# Patient Record
Sex: FEMALE | Race: BLACK OR AFRICAN AMERICAN | NOT HISPANIC OR LATINO | Employment: OTHER | URBAN - METROPOLITAN AREA
[De-identification: names, ages, dates, MRNs, and addresses within clinical notes are randomized per-mention and may not be internally consistent; named-entity substitution may affect disease eponyms.]

---

## 2017-01-05 ENCOUNTER — OFFICE VISIT (OUTPATIENT)
Dept: ORTHOPEDICS | Facility: CLINIC | Age: 82
End: 2017-01-05
Payer: MEDICARE

## 2017-01-05 VITALS
SYSTOLIC BLOOD PRESSURE: 145 MMHG | WEIGHT: 127 LBS | HEIGHT: 63 IN | BODY MASS INDEX: 22.5 KG/M2 | HEART RATE: 99 BPM | DIASTOLIC BLOOD PRESSURE: 72 MMHG

## 2017-01-05 DIAGNOSIS — M79.645 PAIN OF LEFT THUMB: Primary | ICD-10-CM

## 2017-01-05 PROCEDURE — 99203 OFFICE O/P NEW LOW 30 MIN: CPT | Mod: S$PBB,,, | Performed by: ORTHOPAEDIC SURGERY

## 2017-01-05 PROCEDURE — 97760 ORTHOTIC MGMT&TRAING 1ST ENC: CPT | Mod: GP,,, | Performed by: ORTHOPAEDIC SURGERY

## 2017-01-05 PROCEDURE — 99999 PR PBB SHADOW E&M-EST. PATIENT-LVL III: CPT | Mod: PBBFAC,,, | Performed by: ORTHOPAEDIC SURGERY

## 2017-01-05 PROCEDURE — 99213 OFFICE O/P EST LOW 20 MIN: CPT | Mod: PBBFAC,PN | Performed by: ORTHOPAEDIC SURGERY

## 2017-01-05 NOTE — LETTER
January 10, 2017      [facility]  [address] [facility] [address]           [address] [address]  Phone: [phone]          Patient: Breanne Culp   MR Number: [id number]   YOB: 1934   Date of Visit: 1/5/2017       Dear [facility]:    Thank you for referring Breanne Culp to me for evaluation. Attached you will find relevant portions of my assessment and plan of care.    If you have questions, please do not hesitate to call me. I look forward to following Breanne Culp along with you.    Sincerely,    Dimitri Titus MD    Enclosure  CC:  No Recipients    If you would like to receive this communication electronically, please contact [email or web address] or [phone] to request more information on [employer] Link access.    For providers and/or their staff who would like to refer a patient to [facility], please contact us through our one-stop-shop provider referral line, [facility], at [phone].    If you feel you have received this communication in error or would no longer like to receive these types of communications, please e-mail [email or web address]

## 2017-01-10 ENCOUNTER — OFFICE VISIT (OUTPATIENT)
Dept: ORTHOPEDICS | Facility: CLINIC | Age: 82
End: 2017-01-10
Payer: MEDICARE

## 2017-01-10 VITALS — HEIGHT: 63 IN | WEIGHT: 127 LBS | BODY MASS INDEX: 22.5 KG/M2

## 2017-01-10 DIAGNOSIS — M79.645 PAIN OF LEFT THUMB: ICD-10-CM

## 2017-01-10 DIAGNOSIS — M79.645 THUMB PAIN, LEFT: Primary | ICD-10-CM

## 2017-01-10 PROCEDURE — 99203 OFFICE O/P NEW LOW 30 MIN: CPT | Mod: S$PBB,,, | Performed by: ORTHOPAEDIC SURGERY

## 2017-01-10 PROCEDURE — 99999 PR PBB SHADOW E&M-EST. PATIENT-LVL II: CPT | Mod: PBBFAC,,, | Performed by: ORTHOPAEDIC SURGERY

## 2017-01-10 PROCEDURE — 99212 OFFICE O/P EST SF 10 MIN: CPT | Mod: PBBFAC,PO | Performed by: ORTHOPAEDIC SURGERY

## 2017-01-10 RX ORDER — DICLOFENAC SODIUM 10 MG/G
2 GEL TOPICAL 3 TIMES DAILY
Qty: 1 TUBE | Refills: 3 | Status: SHIPPED | OUTPATIENT
Start: 2017-01-10 | End: 2017-03-06 | Stop reason: SDUPTHER

## 2017-01-10 NOTE — PROGRESS NOTES
INITIAL VISIT HISTORY:  An 82-year-old female seen in consult for Dr. Titus   for evaluation of left thumb injury.  She had a fall about two weeks ago,   injured her left thumb.  Noted to have a subluxation of the left thumb MP joint   with probable ligament injury.  She was placed in a splint and is here today in   followup.  Currently, complaining of some pain in the thumb, but it does seem to   be getting better.  Denies previous history of problems with the thumb.    PAST MEDICAL HISTORY:  Anticoagulant use, arthritis, cataracts, glaucoma,   hyperlipidemia, osteoporosis and back problems.    PAST SURGICAL HISTORY:  Includes cataract surgery, epidural steroid injection,   tonsillectomy, colonoscopy.    FAMILY HISTORY:  Positive for cancer, diabetes and stroke.    SOCIAL HISTORY:  Former smoker.  Does not drink alcohol.    REVIEW OF SYSTEMS:  Negative fever, chills, rashes.    CURRENT MEDICATIONS:  Reviewed on chart.    ALLERGIES:  Brimonidine.    PHYSICAL EXAMINATION:  GENERAL:  Elderly female in no acute distress, alert and oriented x3.  EXTREMITIES:  Examination of the upper extremities significant for the left hand   and thumb, demonstrating tenderness, swelling of the MP joint of the left   thumb.  There is ulnar deviation to the thumb at the level of the MP joint,   which is passively correctable, but she does have some pain with stress to the   thumb, particularly the MP joint.  There is some mild subluxation and   insufficiency of the radial collateral ligament of the thumb.  The IP and CMC   joints are nontender and have good motion.   strength slightly decreased.    Sensation intact.  Tinel sign negative.    X-RAYS:  Reviewed, AP and lateral, left thumb, demonstrate subluxation of the MP   joint volar and also to the ulnar side slightly on the AP view, although the AP   is not exactly lined up correctly.  Degenerative changes are also noted of the   joint.    IMPRESSION:  1.  Acute-on-chronic  subluxation of the MP joint, left thumb.  2.  Degenerative arthritis, left thumb.    PLAN:  I explained the nature of problem to the patient and  today.    Because this is an acute-on-chronic problem, I do not think we need to rush in   for ligament repair in fact any surgery at all would need to include a fusion of   the MP joint of the thumb.  Because of her age and low demand use; however, I   really want to wait on this and see how she does with just conservative   treatment and a brace.  I have recommended Advil by mouth, Voltaren gel for   topical use and continued use of the thumb wrap and brace.  If she becomes   asymptomatic with some instability to the thumb, I do not think she needs a   fusion; however, we could certainly do the fusion to give her more support and   strength in the hand.  Follow up in 3-4 weeks for recheck.      MARY ANN  dd: 01/10/2017 08:44:25 (CST)  td: 01/10/2017 14:25:23 (CST)  Doc ID   #[id number]  Job ID #[id number]    CC:

## 2017-01-10 NOTE — LETTER
January 10, 2017        Dimitri Titus MD  [address] [place] [address] [address] [place] [address]             [facility]  [address] [place] [address] [address]  [place] [address]  Phone: [phone]   Patient: Breanne Culp   MR Number: [id number]   YOB: 1934   Date of Visit: 1/10/2017       Dear Dr. Titus:    Thank you for referring Breanne Culp to me for evaluation. Below are the relevant portions of my assessment and plan of care.            If you have questions, please do not hesitate to call me. I look forward to following Breanne LEÓN along with you.    Sincerely,      Doroteo Lynch Jr., MD           CC  No Recipients

## 2017-01-10 NOTE — Clinical Note
January 10, 2017      Dimitri Titus MD  [address] [place] [address] [address] [place] [address]           [facility]  [address] [place] [address] [address]  [place] [address]  Phone: [phone]          Patient: Breanne Culp   MR Number: [id number]   YOB: 1934   Date of Visit: 1/10/2017       Dear Dr. Dimitri Titus:    Thank you for referring Breanne Culp to me for evaluation. Attached you will find relevant portions of my assessment and plan of care.    If you have questions, please do not hesitate to call me. I look forward to following Breanne Culp along with you.    Sincerely,    Doroteo Lynch Jr., MD    Enclosure  CC:  No Recipients    If you would like to receive this communication electronically, please contact [email or web address] or [phone] to request more information on [facility] [employer] Link access.    For providers and/or their staff who would like to refer a patient to [facility], please contact us through our one-stop-shop provider referral line, [facility] , at [phone].    If you feel you have received this communication in error or would no longer like to receive these types of communications, please e-mail [email or web address]

## 2017-01-11 ENCOUNTER — TELEPHONE (OUTPATIENT)
Dept: ORTHOPEDICS | Facility: CLINIC | Age: 82
End: 2017-01-11

## 2017-01-11 NOTE — PROGRESS NOTES
HPI: 82-year-old  right hand dominant  female who c/o left thumb pain. The pain began acutely on 12/26/16 when she tripped over a shoe box and landed on her hand. She was seen in the ER and placed in a splint. She rates her pain as 0/10 today.        PAST MEDICAL HISTORY: Anticoagulant use, arthritis, cataracts, glaucoma,   hyperlipidemia, osteoporosis and back problems.     PAST SURGICAL HISTORY: Includes cataract surgery, epidural steroid injection,   tonsillectomy, colonoscopy.     FAMILY HISTORY: Positive for cancer, diabetes and stroke.     SOCIAL HISTORY: Former smoker. Does not drink alcohol.     REVIEW OF SYSTEMS: Negative fever, chills, rashes.     CURRENT MEDICATIONS: Reviewed on chart.     ALLERGIES: Brimonidine.     PHYSICAL EXAMINATION:  GENERAL: Elderly female in no acute distress, alert and oriented x3.  LUE: neurovascularly intact. subluxation of the left thumb MP joint   with probable ligament injury.  left hand  and thumb, demonstrating tenderness, swelling of the MP joint of the left   thumb. There is ulnar deviation to the thumb at the level of the MP joint,   which is passively correctable, but she does have some pain with stress to the   thumb, particularly the MP joint. There is some mild subluxation and   insufficiency of the radial collateral ligament of the thumb. The IPJ and CMCJs   are nontender and have good motion.      X-RAYS: Reviewed, AP and lateral, left thumb, demonstrate mild subluxation of the MP  Joint with Degenerative changes of the joint.      ASSESSMENT:   Left thumb ligamentous injury.      PLAN: We performed a custom orthotic/brace adjustment, fitting and training with the patient today. The patient demonstrated understanding and proper care. This was performed for 15 minutes.    Modaber thumb brace applied.   I referred her to Dr. Lynch for eval and tx as she may require surgery.

## 2017-01-11 NOTE — TELEPHONE ENCOUNTER
----- Message from Vinita Garcia sent at 1/11/2017  8:56 AM CST -----  Patient no. [phone]   [facility] in [place] told patient to call the office.  The medication prescribed needs prior authorization.

## 2017-01-12 ENCOUNTER — TELEPHONE (OUTPATIENT)
Dept: ORTHOPEDICS | Facility: CLINIC | Age: 82
End: 2017-01-12

## 2017-01-12 NOTE — TELEPHONE ENCOUNTER
I spoke with the patients  and informed him that we faxed over a new rx to [facility]. He understood.

## 2017-01-12 NOTE — TELEPHONE ENCOUNTER
----- Message from Vinita Garcia sent at 1/12/2017 11:53 AM CST -----  Patient's , Latasha, called.   No. [phone]   Patient had an appointment on 1/10/17.   Pharmacy told patient that the medicine for her hand needs prior authorization.

## 2017-02-02 ENCOUNTER — OFFICE VISIT (OUTPATIENT)
Dept: ORTHOPEDICS | Facility: CLINIC | Age: 82
End: 2017-02-02
Payer: MEDICARE

## 2017-02-02 VITALS — BODY MASS INDEX: 22.5 KG/M2 | HEIGHT: 63 IN | WEIGHT: 127 LBS

## 2017-02-02 DIAGNOSIS — M79.645 PAIN OF LEFT THUMB: ICD-10-CM

## 2017-02-02 DIAGNOSIS — M18.12 DEGENERATIVE ARTHRITIS OF THUMB, LEFT: Primary | ICD-10-CM

## 2017-02-02 PROCEDURE — 99212 OFFICE O/P EST SF 10 MIN: CPT | Mod: PBBFAC,PO,25 | Performed by: ORTHOPAEDIC SURGERY

## 2017-02-02 PROCEDURE — 99213 OFFICE O/P EST LOW 20 MIN: CPT | Mod: S$PBB,25,, | Performed by: ORTHOPAEDIC SURGERY

## 2017-02-02 PROCEDURE — 20600 DRAIN/INJ JOINT/BURSA W/O US: CPT | Mod: S$PBB,LT,, | Performed by: ORTHOPAEDIC SURGERY

## 2017-02-02 PROCEDURE — 99999 PR PBB SHADOW E&M-EST. PATIENT-LVL II: CPT | Mod: PBBFAC,,, | Performed by: ORTHOPAEDIC SURGERY

## 2017-02-02 PROCEDURE — 20600 DRAIN/INJ JOINT/BURSA W/O US: CPT | Mod: PBBFAC,PO | Performed by: ORTHOPAEDIC SURGERY

## 2017-02-02 RX ORDER — TRIAMCINOLONE ACETONIDE 40 MG/ML
40 INJECTION, SUSPENSION INTRA-ARTICULAR; INTRAMUSCULAR
Status: COMPLETED | OUTPATIENT
Start: 2017-02-02 | End: 2017-02-02

## 2017-02-02 RX ADMIN — TRIAMCINOLONE ACETONIDE 40 MG: 40 INJECTION, SUSPENSION INTRA-ARTICULAR; INTRAMUSCULAR at 01:02

## 2017-02-02 NOTE — PROGRESS NOTES
Ms. Culp in followup of left thumb symptoms after previous injury.  X-ray   showed a fair amount of arthritis in the joint.  I tried her with a brace and   some anti-inflammatory cream, but she is not really having much improvement.    PHYSICAL EXAMINATION:  LEFT THUMB:  There is some tenderness to the MP joint.  Swelling is down.  There   is some bony enlargement.  Range of motion is slightly decreased.     strength decreased.    IMPRESSION:  Acute on chronic subluxation, left thumb MP.    PLAN:  I recommended and performed an injection left thumb MP joint, combination   of Kenalog 10 mg, 0.5 mL Xylocaine, sterile technique.  I have also recommended   she continue with the use of the thumb wrap for support and anti-inflammatory   medication by mouth.  Follow up in one month for recheck.  If she is still   having symptoms, then we may consider a fusion of the MP joint.      OTILIO/IN  dd: 02/02/2017 13:31:13 (CST)  td: 02/02/2017 23:48:15 (CST)  Doc ID   #[id number]  Job ID #[id number]    CC:

## 2017-02-23 ENCOUNTER — OFFICE VISIT (OUTPATIENT)
Dept: PODIATRY | Facility: CLINIC | Age: 82
End: 2017-02-23
Payer: MEDICARE

## 2017-02-23 VITALS — HEIGHT: 63 IN | BODY MASS INDEX: 23.12 KG/M2 | WEIGHT: 130.5 LBS

## 2017-02-23 DIAGNOSIS — G60.9 IDIOPATHIC PERIPHERAL NEUROPATHY: Primary | ICD-10-CM

## 2017-02-23 DIAGNOSIS — B35.1 ONYCHOMYCOSIS DUE TO DERMATOPHYTE: ICD-10-CM

## 2017-02-23 DIAGNOSIS — R20.2 PARESTHESIA OF FOOT, BILATERAL: ICD-10-CM

## 2017-02-23 PROCEDURE — 99213 OFFICE O/P EST LOW 20 MIN: CPT | Mod: PBBFAC,PO | Performed by: PODIATRIST

## 2017-02-23 PROCEDURE — 11721 DEBRIDE NAIL 6 OR MORE: CPT | Mod: PBBFAC,PO | Performed by: PODIATRIST

## 2017-02-23 PROCEDURE — 99214 OFFICE O/P EST MOD 30 MIN: CPT | Mod: 25,S$PBB,, | Performed by: PODIATRIST

## 2017-02-23 PROCEDURE — 99999 PR PBB SHADOW E&M-EST. PATIENT-LVL III: CPT | Mod: PBBFAC,,, | Performed by: PODIATRIST

## 2017-02-23 PROCEDURE — 11721 DEBRIDE NAIL 6 OR MORE: CPT | Mod: S$PBB,Q9,, | Performed by: PODIATRIST

## 2017-02-23 NOTE — MR AVS SNAPSHOT
[facility]  [address] [facility] [address]  [facility] [address]  Phone: [phone]                  Breanne Culp   2017 11:20 AM   Office Visit    Description:  Female : 1934   Provider:  Tahir Person DPM   Department:  [facility]           Reason for Visit     Peripheral Neuropathy     Nail Care                To Do List           Future Appointments        Provider Department Dept Phone    3/6/2017 1:00 PM Doroteo Lynch Jr., MD [place] [phone]    2017 11:20 AM Tahir Person DPM [facility] [phone]      Goals (5 Years of Data)     None      [facility]     [facility] On Call Nurse Care Line -  Assistance  Registered nurses in the [facility] Center provide clinical advisement, health education, appointment booking, and other advisory services.  Call for this free service at [phone].             Medications           Message regarding Medications     Verify the changes and/or additions to your medication regime listed below are the same as discussed with your clinician today.  If any of these changes or additions are incorrect, please notify your healthcare provider.             Verify that the below list of medications is an accurate representation of the medications you are currently taking.  If none reported, the list may be blank. If incorrect, please contact your healthcare provider. Carry this list with you in case of emergency.           Current Medications     JUAN/INULIN/C-LOSE/MV-MN/FA (FIBER PLUS MULITVITAMIN ORAL) No Sig Provided    acetaminophen (TYLENOL) 500 MG tablet Take 500 mg by mouth every 6 (six) hours as needed for Pain.    aspirin 81 mg Tab Every day    calcium-vitamin D (CALCIUM-VITAMIN D) 500 mg(1,250mg) -200 unit per tablet Every day    donepezil (ARICEPT) 10 MG tablet Take 1 tablet (10 mg total) by mouth every evening.    fexofenadine (ALLEGRA) 30 MG tablet Take 30 mg by mouth 2 (two) times daily.  "As needed    hyoscyamine (LEVSIN) 0.125 mg TbDL PLACE ONE T ON THE TONGUE Q 8 H    latanoprost 0.005 % ophthalmic solution Place 1 drop into both eyes every evening.    meloxicam (MOBIC) 7.5 MG tablet Take 1 tablet (7.5 mg total) by mouth once daily.    omega-3 fatty acids-vitamin E (FISH OIL) 1,000 mg Cap Every day    PROPYLENE GLYCOL/ (SYSTANE OPHT) Place 1 drop into both eyes 2 (two) times daily.    raloxifene (EVISTA) 60 mg tablet Take 1 tablet (60 mg total) by mouth once daily.    diclofenac sodium (VOLTAREN) 1 % Gel Apply 2 g topically 3 (three) times daily.           Clinical Reference Information           Your Vitals Were     Height Weight BMI          5' 3" (1.6 m) 59.2 kg (130 lb 8.2 oz) 23.12 kg/m2        Allergies as of 2/23/2017     Brimonidine      Immunizations Administered on Date of Encounter - 2/23/2017     None      Language Assistance Services     ATTENTION: Language assistance services are available, free of charge. Please call [phone].      ATENCIÓN: Si valencia guerrero, tiene a castanon disposición servicios gratuitos de asistencia lingüística. Llame al [phone].     EDUARDO Ý: N?u b?n nói Ti?ng Vi?t, có các d?ch v? h? tr? ngôn ng? mi?n phí dành cho b?n. G?i s? [phone].         [facility] complies with applicable Federal civil rights laws and does not discriminate on the basis of race, color, national origin, age, disability, or sex.        "

## 2017-02-24 NOTE — PROGRESS NOTES
Subjective:      Patient ID: Breanne Culp is a 82 y.o. female.    Chief Complaint: Peripheral Neuropathy ([place] 9/14/16 (Foot Pain)) and Nail Care    Breanne LEÓN is a 82 y.o. female who presents to the clinic for evaluation and treatment of high risk feet. Breanne LEÓN has a past medical history of Anticoagulant long-term use; Arthritis; Cataract; DDD (degenerative disc disease), lumbar; Glaucoma; Hyperlipidemia; Low back pain; Osteoporosis; Positive PPD (1970); and Seasonal allergies. The patient's chief complaint is long, thick toenails.  Also, relates continued paresthesias of bilateral foot.  States symptoms are unchanged in comparison to the previous exam.  States that she was never contacted by the pharmacy regarding the previously prescribed pain cream.   This patient has documented high risk feet requiring routine maintenance secondary to peripheral neuropathy.    PCP: Rui Izquierdo MD    Date Last Seen by PCP: 9/14/16    Last encounter in this department: 11/18/2016    No results found for: HGBA1C      Past Medical History:   Diagnosis Date    Anticoagulant long-term use     ASA 81mg, Fish Oil    Arthritis     Cataract     od    DDD (degenerative disc disease), lumbar     Glaucoma     suspect    Hyperlipidemia     Low back pain     Osteoporosis     Positive PPD 1970    Seasonal allergies        Past Surgical History:   Procedure Laterality Date    CATARACT EXTRACTION W/  INTRAOCULAR LENS IMPLANT Bilateral 10/15/14     Dr Wright    COLONOSCOPY      Epidural Steroid injection      Pain Management    TONSILLECTOMY         Family History   Problem Relation Age of Onset    Cancer Father      Rectal    Glaucoma Mother     Cancer Mother      lung, throat    Cancer Brother      throat    Hypertension Brother     Stroke Brother     Diabetes Sister     Stroke Sister     Amblyopia Neg Hx     Blindness Neg Hx     Cataracts Neg Hx     Macular degeneration Neg Hx     Retinal  detachment Neg Hx     Strabismus Neg Hx     Thyroid disease Neg Hx        Social History     Social History    Marital status:      Spouse name: N/A    Number of children: N/A    Years of education: N/A     Social History Main Topics    Smoking status: Former Smoker     Packs/day: 0.25     Start date: 7/24/1961     Quit date: 6/11/1992    Smokeless tobacco: Never Used    Alcohol use No    Drug use: No    Sexual activity: Not Asked     Other Topics Concern    None     Social History Narrative       Current Outpatient Prescriptions   Medication Sig Dispense Refill    JUAN/INULIN/C-LOSE/MV-MN/FA (FIBER PLUS MULITVITAMIN ORAL) No Sig Provided      acetaminophen (TYLENOL) 500 MG tablet Take 500 mg by mouth every 6 (six) hours as needed for Pain.      aspirin 81 mg Tab Every day      calcium-vitamin D (CALCIUM-VITAMIN D) 500 mg(1,250mg) -200 unit per tablet Every day      donepezil (ARICEPT) 10 MG tablet Take 1 tablet (10 mg total) by mouth every evening. 30 tablet 11    fexofenadine (ALLEGRA) 30 MG tablet Take 30 mg by mouth 2 (two) times daily. As needed      hyoscyamine (LEVSIN) 0.125 mg TbDL PLACE ONE T ON THE TONGUE Q 8 H 30 tablet 3    latanoprost 0.005 % ophthalmic solution Place 1 drop into both eyes every evening. 7.5 mL 3    meloxicam (MOBIC) 7.5 MG tablet Take 1 tablet (7.5 mg total) by mouth once daily. 30 tablet 0    omega-3 fatty acids-vitamin E (FISH OIL) 1,000 mg Cap Every day      PROPYLENE GLYCOL/ (SYSTANE OPHT) Place 1 drop into both eyes 2 (two) times daily.      raloxifene (EVISTA) 60 mg tablet Take 1 tablet (60 mg total) by mouth once daily. 30 tablet 11    diclofenac sodium (VOLTAREN) 1 % Gel Apply 2 g topically 3 (three) times daily. 1 Tube 3     No current facility-administered medications for this visit.        Review of patient's allergies indicates:   Allergen Reactions    Brimonidine Other (See Comments)     Redness and irritation of eyes         Review  of Systems   Constitution: Negative for chills, decreased appetite, fever and weakness.   Cardiovascular: Negative for claudication and leg swelling.   Skin: Positive for color change, dry skin and nail changes.   Musculoskeletal: Positive for arthritis. Negative for joint pain.   Neurological: Positive for paresthesias.   Psychiatric/Behavioral: Negative for altered mental status.           Objective:      Physical Exam   Constitutional: She is oriented to person, place, and time. She appears well-developed and well-nourished. No distress.   Cardiovascular:   Pulses:       Dorsalis pedis pulses are 2+ on the right side, and 2+ on the left side.        Posterior tibial pulses are 1+ on the right side, and 1+ on the left side.   CFT <3 seconds bilateral.  Pedal hair growth decreased bilateral.   No varicosities noted bilateral. Mild nonpitting edema noted to bilateral lower extremity.  Toes are cool to touch bilateral.     Musculoskeletal: She exhibits no edema or tenderness.   Muscle strength 5/5 in all muscle groups bilateral.  No tenderness nor crepitation with ROM of foot/ankle joints bilateral.  No pain with palpation of bilateral foot and ankle.   Pes cavus foot type.  Bilateral hallux abducto valgus.  Bilateral semi-reducible contracture of toes 2-5.       Neurological: She is alert and oriented to person, place, and time. She has normal strength. A sensory deficit is present.   Protective sensation per Holland-Raudel monofilament intact bilateral.    Vibratory sensation decreased bilateral.    Light touch intact bilateral.   Skin: Skin is warm, dry and intact. No abrasion, no bruising, no burn, no ecchymosis, no laceration, no lesion, no petechiae and no rash noted. She is not diaphoretic. No cyanosis or erythema. No pallor. Nails show no clubbing.   Pedal skin is thin and atrophic bilateral.   Toenails x 10 appear thickened by 2 mm's, elongated by 4 mm's, and discolored with subungual debris. Examination  of the skin reveals no evidence of significant rashes, open lesions, suspicious appearing nevi or other concerning lesions.                Assessment:       Encounter Diagnoses   Name Primary?    Idiopathic peripheral neuropathy Yes    Paresthesia of foot, bilateral     Onychomycosis due to dermatophyte          Plan:       Breanne LEÓN was seen today for peripheral neuropathy and nail care.    Diagnoses and all orders for this visit:    Idiopathic peripheral neuropathy    Paresthesia of foot, bilateral    Onychomycosis due to dermatophyte      I counseled the patient on her conditions, their implications and medical management.    Being that the patient is still having occasional paresthesias of bilateral lower extremity, will order a topical analgesic.  Patient apparently was not contacted by [facility] for the medication when prescribed at the last visit.      Patient instructed on proper foot hygeine. We discussed wearing proper shoe gear, daily foot inspections, never walking without protective shoe gear, never putting sharp instruments to feet    With patient's permission, nails were aggressively reduced and debrided x 10 to their soft tissue attachment mechanically and with electric , removing all offending nail and debris. Patient relates relief following the procedure. She will continue to monitor the areas daily, inspect her feet, wear protective shoe gear when ambulatory, moisturizer to maintain skin integrity and follow in this office in approximately 2-3 months, sooner p.r.n.      Return in about 3 months (around 5/23/2017).    Tahir Person DPM

## 2017-03-06 ENCOUNTER — OFFICE VISIT (OUTPATIENT)
Dept: ORTHOPEDICS | Facility: CLINIC | Age: 82
End: 2017-03-06
Payer: MEDICARE

## 2017-03-06 VITALS — HEIGHT: 63 IN | BODY MASS INDEX: 23.04 KG/M2 | WEIGHT: 130 LBS

## 2017-03-06 DIAGNOSIS — M79.645 PAIN OF LEFT THUMB: Primary | ICD-10-CM

## 2017-03-06 DIAGNOSIS — M79.645 THUMB PAIN, LEFT: ICD-10-CM

## 2017-03-06 PROCEDURE — 99212 OFFICE O/P EST SF 10 MIN: CPT | Mod: PBBFAC,PO | Performed by: ORTHOPAEDIC SURGERY

## 2017-03-06 PROCEDURE — 99213 OFFICE O/P EST LOW 20 MIN: CPT | Mod: S$PBB,,, | Performed by: ORTHOPAEDIC SURGERY

## 2017-03-06 PROCEDURE — 99999 PR PBB SHADOW E&M-EST. PATIENT-LVL II: CPT | Mod: PBBFAC,,, | Performed by: ORTHOPAEDIC SURGERY

## 2017-03-06 RX ORDER — DICLOFENAC SODIUM 10 MG/G
2 GEL TOPICAL 3 TIMES DAILY
Qty: 1 TUBE | Refills: 3 | Status: ON HOLD | OUTPATIENT
Start: 2017-03-06 | End: 2020-09-16 | Stop reason: HOSPADM

## 2017-03-06 NOTE — PROGRESS NOTES
HISTORY OF PRESENT ILLNESS:  Ms. Culp in followup of MP arthritis, left thumb   is doing much better since last injection.  Still has some soreness and pain   from time to time, particularly with weather changes, but she does not feel like   it is bad enough to need another injection.    PHYSICAL EXAMINATION:  LEFT HAND:  The left thumb has some tenderness around the MP joint.  Swelling is   minimal, however.  She has some bony deformity, slight ulnar deviation.  Range   of motion slightly decreased.    IMPRESSION:  MP arthritis, left thumb.    PLAN:  I do want her to continue with anti-inflammatory cream topically for the   thumb and also use of her thumb brace, which she uses from time to time.    Continue arthritic medication for now and if symptoms get worse, I would like to   see her back for injection of the thumb.      MARY ANN  dd: 03/06/2017 13:34:39 (CST)  td: 03/07/2017 03:43:24 (CST)  Doc ID   #[id number]  Job ID #[id number]    CC:

## 2017-04-05 ENCOUNTER — OFFICE VISIT (OUTPATIENT)
Dept: FAMILY MEDICINE | Facility: CLINIC | Age: 82
End: 2017-04-05
Payer: MEDICARE

## 2017-04-05 VITALS
SYSTOLIC BLOOD PRESSURE: 138 MMHG | HEART RATE: 83 BPM | OXYGEN SATURATION: 97 % | HEIGHT: 63 IN | BODY MASS INDEX: 22.86 KG/M2 | DIASTOLIC BLOOD PRESSURE: 68 MMHG | WEIGHT: 129 LBS | TEMPERATURE: 99 F

## 2017-04-05 DIAGNOSIS — R05.8 ALLERGIC COUGH: ICD-10-CM

## 2017-04-05 DIAGNOSIS — J30.89 ALLERGIC RHINITIS DUE TO OTHER ALLERGIC TRIGGER, UNSPECIFIED RHINITIS SEASONALITY: Primary | ICD-10-CM

## 2017-04-05 PROCEDURE — 99999 PR PBB SHADOW E&M-EST. PATIENT-LVL IV: CPT | Mod: PBBFAC,,, | Performed by: NURSE PRACTITIONER

## 2017-04-05 PROCEDURE — 99213 OFFICE O/P EST LOW 20 MIN: CPT | Mod: S$PBB,,, | Performed by: NURSE PRACTITIONER

## 2017-04-05 PROCEDURE — 99214 OFFICE O/P EST MOD 30 MIN: CPT | Mod: PBBFAC,PO | Performed by: NURSE PRACTITIONER

## 2017-04-05 RX ORDER — FLUTICASONE PROPIONATE 50 MCG
2 SPRAY, SUSPENSION (ML) NASAL DAILY
Qty: 16 G | Refills: 11 | Status: SHIPPED | OUTPATIENT
Start: 2017-04-05 | End: 2020-08-07

## 2017-04-05 NOTE — PROGRESS NOTES
Subjective:       Patient ID: Breanne Culp is a 83 y.o. female.    Chief Complaint: Cough (gets a tickle in her throat) and Allergies (has been sneezing)    Cough   This is a new problem. The cough is non-productive. Associated symptoms include nasal congestion and postnasal drip. Pertinent negatives include no chest pain, chills, ear congestion, ear pain, fever, headaches, heartburn, hemoptysis, myalgias, rash, rhinorrhea, sore throat, shortness of breath, sweats, weight loss or wheezing.     Review of Systems   Constitutional: Negative for chills, fever and weight loss.   HENT: Positive for postnasal drip. Negative for ear pain, rhinorrhea and sore throat.    Respiratory: Positive for cough. Negative for hemoptysis, shortness of breath and wheezing.    Cardiovascular: Negative for chest pain.   Gastrointestinal: Negative for heartburn.   Musculoskeletal: Negative for myalgias.   Skin: Negative for rash.   Neurological: Negative for headaches.       Objective:      Physical Exam   Constitutional: She is oriented to person, place, and time. She appears well-nourished.   HENT:   Head: Normocephalic and atraumatic.   Right Ear: Hearing, tympanic membrane, external ear and ear canal normal.   Left Ear: Hearing, tympanic membrane, external ear and ear canal normal.   Nose: Nose normal.   Mouth/Throat: Oropharynx is clear and moist. No oropharyngeal exudate, posterior oropharyngeal edema or posterior oropharyngeal erythema.   Eyes: Conjunctivae and EOM are normal. Pupils are equal, round, and reactive to light.   Neck: Normal range of motion. Neck supple.   Cardiovascular: Normal rate, regular rhythm, normal heart sounds and intact distal pulses.    Pulmonary/Chest: Effort normal and breath sounds normal. She has no wheezes. She has no rales.   Abdominal: Soft. Bowel sounds are normal. There is no tenderness.   Neurological: She is alert and oriented to person, place, and time.   Skin: Skin is warm and dry. No rash  noted.   Vitals reviewed.      Assessment:       1. Allergic rhinitis due to other allergic trigger, unspecified rhinitis seasonality    2. Allergic cough        Plan:       Breanne LEÓN was seen today for cough and allergies.    Diagnoses and all orders for this visit:    Allergic rhinitis due to other allergic trigger, unspecified rhinitis seasonality  -     fluticasone (FLONASE) 50 mcg/actuation nasal spray; 2 sprays by Each Nare route once daily.    Allergic cough  -     fluticasone (FLONASE) 50 mcg/actuation nasal spray; 2 sprays by Each Nare route once daily.  Flonase nightly  Start back on Allegra daily    Return if symptoms worsen or fail to improve.

## 2017-04-05 NOTE — MR AVS SNAPSHOT
[facility]  [address]  [facility] [address]  Phone: [phone]  Fax: [phone]                  Breanne Culp   2017 11:20 AM   Office Visit    Description:  Female : 1934   Provider:  Autumn Mccormick NP   Department:  [facility]           Reason for Visit     Cough     Allergies           Diagnoses this Visit        Comments    Allergic rhinitis due to other allergic trigger, unspecified rhinitis seasonality    -  Primary     Allergic cough                To Do List           Future Appointments        Provider Department Dept Phone    2017 11:20 AM Tahir Person DPM [facility] [phone]      Goals (5 Years of Data)     None      Follow-Up and Disposition     Return if symptoms worsen or fail to improve.       These Medications        Disp Refills Start End    fluticasone (FLONASE) 50 mcg/actuation nasal spray 16 g 11 2017     2 sprays by Each Nare route once daily. - Each Nare    Pharmacy: [facility] [address] [place] [address] Ph #: [phone]         [facility]     [facility] Nurse Care Line -  Assistance  Unless otherwise directed by your provider, please contact [facility], our nurse care line that is available for  assistance.     Registered nurses in the [facility] provide: appointment scheduling, clinical advisement, health education, and other advisory services.  Call: [phone] (toll free)               Medications           Message regarding Medications     Verify the changes and/or additions to your medication regime listed below are the same as discussed with your clinician today.  If any of these changes or additions are incorrect, please notify your healthcare provider.        START taking these NEW medications        Refills    fluticasone (FLONASE) 50 mcg/actuation nasal spray 11    Si sprays by Each Nare  "route once daily.    Class: Normal    Route: Each Nare           Verify that the below list of medications is an accurate representation of the medications you are currently taking.  If none reported, the list may be blank. If incorrect, please contact your healthcare provider. Carry this list with you in case of emergency.           Current Medications     JUAN/INULIN/C-LOSE/MV-MN/FA (FIBER PLUS MULITVITAMIN ORAL) No Sig Provided    acetaminophen (TYLENOL) 500 MG tablet Take 500 mg by mouth every 6 (six) hours as needed for Pain.    aspirin 81 mg Tab Every day    calcium-vitamin D (CALCIUM-VITAMIN D) 500 mg(1,250mg) -200 unit per tablet Every day    diclofenac sodium (VOLTAREN) 1 % Gel Apply 2 g topically 3 (three) times daily.    donepezil (ARICEPT) 10 MG tablet Take 1 tablet (10 mg total) by mouth every evening.    fexofenadine (ALLEGRA) 30 MG tablet Take 30 mg by mouth 2 (two) times daily. As needed    hyoscyamine (LEVSIN) 0.125 mg TbDL PLACE ONE T ON THE TONGUE Q 8 H    latanoprost 0.005 % ophthalmic solution Place 1 drop into both eyes every evening.    meloxicam (MOBIC) 7.5 MG tablet Take 1 tablet (7.5 mg total) by mouth once daily.    omega-3 fatty acids-vitamin E (FISH OIL) 1,000 mg Cap Every day    PROPYLENE GLYCOL/ (SYSTANE OPHT) Place 1 drop into both eyes 2 (two) times daily.    raloxifene (EVISTA) 60 mg tablet Take 1 tablet (60 mg total) by mouth once daily.    fluticasone (FLONASE) 50 mcg/actuation nasal spray 2 sprays by Each Nare route once daily.           Clinical Reference Information           Your Vitals Were     BP Pulse Temp Height Weight SpO2    138/68 83 98.7 °F (37.1 °C) (Oral) 5' 3" (1.6 m) 58.5 kg (128 lb 15.5 oz) 97%    BMI                22.85 kg/m2          Blood Pressure          Most Recent Value    BP  138/68      Allergies as of 4/5/2017     Brimonidine      Immunizations Administered on Date of Encounter - 4/5/2017     None      Instructions    Flonase nightly  Start " back on Allegra daily       Language Assistance Services     ATTENTION: Language assistance services are available, free of charge. Please call [phone].      ATENCIÓN: Si habla cesar, tiene a castanon disposición servicios gratuitos de asistencia lingüística. Llame al [phone].     CHÚ Ý: N?u b?n nói Ti?ng Vi?t, có các d?ch v? h? tr? ngôn ng? mi?n phí dành cho b?n. G?i s? [phone].         [facility] complies with applicable Federal civil rights laws and does not discriminate on the basis of race, color, national origin, age, disability, or sex.

## 2017-05-16 DIAGNOSIS — R41.3 MEMORY LOSS: ICD-10-CM

## 2017-05-16 NOTE — TELEPHONE ENCOUNTER
----- Message from Dia Adorno sent at 5/16/2017  1:58 PM CDT -----  Contact: daughter  Daughter - Loree Jacobs [phone] is calling for a recommendation to a Geriatric doctor for memory loss/please call daughter to discuss

## 2017-05-16 NOTE — TELEPHONE ENCOUNTER
----- Message from Gretchen Kruse sent at 5/16/2017  2:58 PM CDT -----  Contact: Daughter- Loree JacobsTniro-[phone]  Patient's daughter returning the nurse phone call.  Thanks!

## 2017-05-16 NOTE — TELEPHONE ENCOUNTER
Spoke to patient's daughter and states her mother has been experiencing forgetfulness and she has been calling people other's name, including  member's of the family. States she is setting the table for an extra person every evening, and wondering where they are when they do not show up. Daughter would like to know what next step would be for this, and if  would like for patient to be seen, they would prefer to see him. Please advise. Thanks!

## 2017-05-17 NOTE — TELEPHONE ENCOUNTER
----- Message from Farhan Morrison sent at 5/17/2017  8:33 AM CDT -----  Contact: Patient's daughter Loree  Place call to pod,Patient's daughter Loree returning call. Please call back at [phone] to discuss medications.

## 2017-05-17 NOTE — TELEPHONE ENCOUNTER
Spoke to patient's daughter and she states that patient has not been taking the Aricept 10mg. Attempted to schedule patient an appointment for next week, and time given, just needs to verify with patient. Daughter is wanting to know if she should start back giving patient the Aricept until she sees . Will call daughter back to schedule patient an appointment after we hear from . Please advise. Thanks!

## 2017-05-18 RX ORDER — DONEPEZIL HYDROCHLORIDE 10 MG/1
10 TABLET, FILM COATED ORAL NIGHTLY
Qty: 30 TABLET | Refills: 3 | Status: SHIPPED | OUTPATIENT
Start: 2017-05-18 | End: 2018-02-21 | Stop reason: SDUPTHER

## 2017-05-18 NOTE — TELEPHONE ENCOUNTER
Spoke to patient's daughter, Ms Ralph. Informed her on the recommendation to restart the aricept. She verbalized understanding.   She is asking for a refill on the medication also, she is wanting to know if patient needs a follow up appt?

## 2017-05-19 ENCOUNTER — TELEPHONE (OUTPATIENT)
Dept: FAMILY MEDICINE | Facility: CLINIC | Age: 82
End: 2017-05-19

## 2017-05-19 NOTE — TELEPHONE ENCOUNTER
----- Message from Aisha Weems sent at 5/19/2017  2:25 PM CDT -----  Contact: Daughter  Loree, daughter [phone], Waiting on patients Aricef to the pharmacy.   Please advise. Thanks.    [facility] [id number]   [address] [place] [address]  Phone: [phone] Fax: [phone]

## 2017-05-19 NOTE — TELEPHONE ENCOUNTER
Called pt daughter Loree, advised rx sent in yesterday and she verbally understood. Advised needs appt and she stated she will have to call back with dates as she is pt caregiver and .

## 2017-05-29 ENCOUNTER — TELEPHONE (OUTPATIENT)
Dept: FAMILY MEDICINE | Facility: CLINIC | Age: 82
End: 2017-05-29

## 2017-05-29 DIAGNOSIS — F03.90 DEMENTIA WITHOUT BEHAVIORAL DISTURBANCE, UNSPECIFIED DEMENTIA TYPE: Primary | ICD-10-CM

## 2017-05-29 NOTE — TELEPHONE ENCOUNTER
----- Message from Aylin Moon sent at 5/26/2017  3:14 PM CDT -----  Daughter (Loree) requesting to speak with nurse concerning scheduling appointment with doctor to discuss patient's Alzheimers/please call back at [phone](stated must dial 1)

## 2017-05-29 NOTE — TELEPHONE ENCOUNTER
Called pt daughter Loree, she is wanting to get her mother in with someone who specializes in Alzheimer's. She stated she would like to know if there is a test to see what stage she is at with it. Advised I would send message to pcp to see what is the best route to take for all this and she verbally understood. Please advise.

## 2017-05-31 ENCOUNTER — OFFICE VISIT (OUTPATIENT)
Dept: PODIATRY | Facility: CLINIC | Age: 82
End: 2017-05-31
Payer: MEDICARE

## 2017-05-31 VITALS — BODY MASS INDEX: 22.73 KG/M2 | WEIGHT: 128.31 LBS | HEIGHT: 63 IN

## 2017-05-31 DIAGNOSIS — G60.9 IDIOPATHIC PERIPHERAL NEUROPATHY: Primary | ICD-10-CM

## 2017-05-31 DIAGNOSIS — B35.1 ONYCHOMYCOSIS DUE TO DERMATOPHYTE: ICD-10-CM

## 2017-05-31 DIAGNOSIS — R20.2 PARESTHESIA OF FOOT, BILATERAL: ICD-10-CM

## 2017-05-31 PROCEDURE — 11721 DEBRIDE NAIL 6 OR MORE: CPT | Mod: PBBFAC,PO | Performed by: PODIATRIST

## 2017-05-31 PROCEDURE — 11721 DEBRIDE NAIL 6 OR MORE: CPT | Mod: S$PBB,Q9,, | Performed by: PODIATRIST

## 2017-05-31 PROCEDURE — 99499 UNLISTED E&M SERVICE: CPT | Mod: S$PBB,,, | Performed by: PODIATRIST

## 2017-05-31 PROCEDURE — 99999 PR PBB SHADOW E&M-EST. PATIENT-LVL III: CPT | Mod: PBBFAC,,, | Performed by: PODIATRIST

## 2017-05-31 PROCEDURE — 99213 OFFICE O/P EST LOW 20 MIN: CPT | Mod: PBBFAC,PO | Performed by: PODIATRIST

## 2017-05-31 NOTE — TELEPHONE ENCOUNTER
Called pt daughter, notified Dr. Izquierdo wants her to see neurology. Advised he stated can be [place] or [place]. Pt daughter does not want [place]. Attempted to schedule in [place] and it would not let me schedule. Attempted to schedule in [place] and next appt was September and she would like sooner than that. Can you please assist in helping pt get appt on the [facility] sooner than September with neurologist who does dementia? Thank you in advance.

## 2017-05-31 NOTE — TELEPHONE ENCOUNTER
----- Message from Mariia Peguero sent at 5/30/2017  2:47 PM CDT -----  Returning your call.  Please call Deborah at [phone].

## 2017-05-31 NOTE — PROGRESS NOTES
Subjective:      Patient ID: Breanne Culp is a 83 y.o. female.    Chief Complaint: Foot Pain (Bilateral foot pain across the top of feet, neuropathy  PCP [place] 5/29/17) and Nail Care    Breanne LEÓN is a 83 y.o. female who presents to the clinic for evaluation and treatment of high risk feet. Breanne LEÓN has a past medical history of Anticoagulant long-term use; Arthritis; Cataract; DDD (degenerative disc disease), lumbar; Glaucoma; Hyperlipidemia; Low back pain; Osteoporosis; Positive PPD (1970); and Seasonal allergies. The patient's chief complaint is long, thick toenails.  Also, relates continued paresthesias of bilateral foot.  States symptoms have failed to improve and continue to be confined to the toes.  States symptoms continue to occur mostly while at rest.  Denies any additional pedal complaints.    PCP: Rui Izquierdo MD    Date Last Seen by PCP: 5/29/17    Last encounter in this department: 5/18/2016    No results found for: HGBA1C      Past Medical History:   Diagnosis Date    Anticoagulant long-term use     ASA 81mg, Fish Oil    Arthritis     Cataract     od    DDD (degenerative disc disease), lumbar     Glaucoma     suspect    Hyperlipidemia     Low back pain     Osteoporosis     Positive PPD 1970    Seasonal allergies        Past Surgical History:   Procedure Laterality Date    CATARACT EXTRACTION W/  INTRAOCULAR LENS IMPLANT Bilateral 10/15/14     Dr Wright    COLONOSCOPY      Epidural Steroid injection      Pain Management    TONSILLECTOMY         Family History   Problem Relation Age of Onset    Cancer Father      Rectal    Glaucoma Mother     Cancer Mother      lung, throat    Cancer Brother      throat    Hypertension Brother     Stroke Brother     Diabetes Sister     Stroke Sister     Amblyopia Neg Hx     Blindness Neg Hx     Cataracts Neg Hx     Macular degeneration Neg Hx     Retinal detachment Neg Hx     Strabismus Neg Hx     Thyroid disease Neg Hx         Social History     Social History    Marital status:      Spouse name: N/A    Number of children: N/A    Years of education: N/A     Social History Main Topics    Smoking status: Former Smoker     Packs/day: 0.25     Start date: 7/24/1961     Quit date: 6/11/1992    Smokeless tobacco: Never Used    Alcohol use No    Drug use: No    Sexual activity: Not Asked     Other Topics Concern    None     Social History Narrative    None       Current Outpatient Prescriptions   Medication Sig Dispense Refill    JUAN/INULIN/C-LOSE/MV-MN/FA (FIBER PLUS MULITVITAMIN ORAL) No Sig Provided      acetaminophen (TYLENOL) 500 MG tablet Take 500 mg by mouth every 6 (six) hours as needed for Pain.      aspirin 81 mg Tab Every day      calcium-vitamin D (CALCIUM-VITAMIN D) 500 mg(1,250mg) -200 unit per tablet Every day      donepezil (ARICEPT) 10 MG tablet Take 1 tablet (10 mg total) by mouth every evening. 30 tablet 3    fexofenadine (ALLEGRA) 30 MG tablet Take 30 mg by mouth 2 (two) times daily. As needed      fluticasone (FLONASE) 50 mcg/actuation nasal spray 2 sprays by Each Nare route once daily. 16 g 11    hyoscyamine (LEVSIN) 0.125 mg TbDL PLACE ONE T ON THE TONGUE Q 8 H 30 tablet 3    latanoprost 0.005 % ophthalmic solution Place 1 drop into both eyes every evening. 7.5 mL 3    meloxicam (MOBIC) 7.5 MG tablet Take 1 tablet (7.5 mg total) by mouth once daily. 30 tablet 0    omega-3 fatty acids-vitamin E (FISH OIL) 1,000 mg Cap Every day      PROPYLENE GLYCOL/ (SYSTANE OPHT) Place 1 drop into both eyes 2 (two) times daily.      raloxifene (EVISTA) 60 mg tablet Take 1 tablet (60 mg total) by mouth once daily. 30 tablet 11    diclofenac sodium (VOLTAREN) 1 % Gel Apply 2 g topically 3 (three) times daily. 1 Tube 3     No current facility-administered medications for this visit.        Review of patient's allergies indicates:   Allergen Reactions    Brimonidine Other (See Comments)      Redness and irritation of eyes         Review of Systems   Constitution: Negative for chills, decreased appetite, fever and weakness.   Cardiovascular: Negative for claudication and leg swelling.   Skin: Positive for color change, dry skin and nail changes.   Musculoskeletal: Positive for arthritis. Negative for joint pain.   Neurological: Positive for paresthesias.   Psychiatric/Behavioral: Negative for altered mental status.           Objective:      Physical Exam   Constitutional: She is oriented to person, place, and time. She appears well-developed and well-nourished. No distress.   Cardiovascular:   Pulses:       Dorsalis pedis pulses are 2+ on the right side, and 2+ on the left side.        Posterior tibial pulses are 1+ on the right side, and 1+ on the left side.   CFT <3 seconds bilateral.  Pedal hair growth decreased bilateral.   No varicosities noted bilateral. Mild nonpitting edema noted to bilateral lower extremity.  Toes are cool to touch bilateral.     Musculoskeletal: She exhibits no edema or tenderness.   Muscle strength 5/5 in all muscle groups bilateral.  No tenderness nor crepitation with ROM of foot/ankle joints bilateral.  No pain with palpation of bilateral foot and ankle.   Pes cavus foot type.  Bilateral hallux abducto valgus.  Bilateral semi-reducible contracture of toes 2-5.       Neurological: She is alert and oriented to person, place, and time. She has normal strength. A sensory deficit is present.   Protective sensation per Clarence-Raudel monofilament intact bilateral.    Vibratory sensation decreased bilateral.    Light touch intact bilateral.   Skin: Skin is warm, dry and intact. No abrasion, no bruising, no burn, no ecchymosis, no laceration, no lesion, no petechiae and no rash noted. She is not diaphoretic. No cyanosis or erythema. No pallor. Nails show no clubbing.   Pedal skin is thin and atrophic bilateral.   Toenails x 10 appear thickened by 4 mm's, elongated by 4 mm's, and  discolored with subungual debris. Examination of the skin reveals no evidence of significant rashes, open lesions, suspicious appearing nevi or other concerning lesions.                Assessment:       Encounter Diagnoses   Name Primary?    Idiopathic peripheral neuropathy Yes    Paresthesia of foot, bilateral     Onychomycosis due to dermatophyte          Plan:       Breanne LEÓN was seen today for foot pain and nail care.    Diagnoses and all orders for this visit:    Idiopathic peripheral neuropathy    Paresthesia of foot, bilateral    Onychomycosis due to dermatophyte      I counseled the patient on her conditions, their implications and medical management.    Advised to begin taking 600 mg of alpha lipoic acid daily to aid in reduction of LE paresthesias.    Patient instructed on proper foot hygeine. We discussed wearing proper shoe gear, daily foot inspections, never walking without protective shoe gear, never putting sharp instruments to feet    With patient's permission, nails were aggressively reduced and debrided x 10 to their soft tissue attachment mechanically and with electric , removing all offending nail and debris. Patient relates relief following the procedure. She will continue to monitor the areas daily, inspect her feet, wear protective shoe gear when ambulatory, moisturizer to maintain skin integrity and follow in this office in approximately 2-3 months, sooner p.r.n.    Return in about 3 months (around 8/31/2017).    Tahir Person DPM

## 2017-06-05 ENCOUNTER — TELEPHONE (OUTPATIENT)
Dept: FAMILY MEDICINE | Facility: CLINIC | Age: 82
End: 2017-06-05

## 2017-06-05 NOTE — TELEPHONE ENCOUNTER
----- Message from Zully Gonzalez sent at 6/5/2017  3:15 PM CDT -----  Contact: Patient's daughter Loree  Patient has been getting worse with her dementia and or Alzheimer's. Please call Loree at [phone] to advise whom the patient should see.

## 2017-06-09 ENCOUNTER — TELEPHONE (OUTPATIENT)
Dept: NEUROLOGY | Facility: CLINIC | Age: 82
End: 2017-06-09

## 2017-06-09 NOTE — TELEPHONE ENCOUNTER
----- Message from Trista Cunningham RT sent at 6/8/2017  4:52 PM CDT -----  Contact: Loree (Daughter) [phone]   Called pt, Loree (Daughter) [phone], returned pt missed call, thanks.

## 2017-06-09 NOTE — TELEPHONE ENCOUNTER
Loree will call back to schedule the patient's appointment once she has checked the patient's calendar.

## 2017-06-12 ENCOUNTER — TELEPHONE (OUTPATIENT)
Dept: FAMILY MEDICINE | Facility: CLINIC | Age: 82
End: 2017-06-12

## 2017-06-12 NOTE — TELEPHONE ENCOUNTER
----- Message from Jeannette Kilgore sent at 6/12/2017 10:02 AM CDT -----  Contact: Patient  Patient called to be worked in today for Leg Pain/Bug Bites - she stated a fruit bug went up her nose. She can be contacted at [phone].    Thanks,  Jeannette

## 2017-06-12 NOTE — TELEPHONE ENCOUNTER
Phoned pt in regards to message below. Scheduled an appt for pt tomorrow at 1:30 PM with Demetria Chandra NP. Pt voiced understanding for appt. CLC

## 2017-06-13 ENCOUNTER — OFFICE VISIT (OUTPATIENT)
Dept: PRIMARY CARE CLINIC | Facility: CLINIC | Age: 82
End: 2017-06-13
Payer: MEDICARE

## 2017-06-13 VITALS
DIASTOLIC BLOOD PRESSURE: 60 MMHG | HEART RATE: 77 BPM | BODY MASS INDEX: 22.3 KG/M2 | HEIGHT: 63 IN | TEMPERATURE: 98 F | WEIGHT: 125.88 LBS | OXYGEN SATURATION: 97 % | SYSTOLIC BLOOD PRESSURE: 120 MMHG

## 2017-06-13 DIAGNOSIS — K59.00 CONSTIPATION, UNSPECIFIED CONSTIPATION TYPE: ICD-10-CM

## 2017-06-13 DIAGNOSIS — M25.561 PAIN IN BOTH KNEES, UNSPECIFIED CHRONICITY: Primary | ICD-10-CM

## 2017-06-13 DIAGNOSIS — M25.562 PAIN IN BOTH KNEES, UNSPECIFIED CHRONICITY: Primary | ICD-10-CM

## 2017-06-13 DIAGNOSIS — T17.1XXA FOREIGN BODY IN NOSE, INITIAL ENCOUNTER: ICD-10-CM

## 2017-06-13 DIAGNOSIS — M79.645 THUMB PAIN, LEFT: ICD-10-CM

## 2017-06-13 DIAGNOSIS — L98.9 SKIN LESION: ICD-10-CM

## 2017-06-13 PROCEDURE — 99999 PR PBB SHADOW E&M-EST. PATIENT-LVL IV: CPT | Mod: PBBFAC,,, | Performed by: NURSE PRACTITIONER

## 2017-06-13 PROCEDURE — 99214 OFFICE O/P EST MOD 30 MIN: CPT | Mod: PBBFAC,PO | Performed by: NURSE PRACTITIONER

## 2017-06-13 PROCEDURE — 99213 OFFICE O/P EST LOW 20 MIN: CPT | Mod: S$PBB,,, | Performed by: NURSE PRACTITIONER

## 2017-06-13 PROCEDURE — 1159F MED LIST DOCD IN RCRD: CPT | Mod: ,,, | Performed by: NURSE PRACTITIONER

## 2017-06-13 PROCEDURE — 1125F AMNT PAIN NOTED PAIN PRSNT: CPT | Mod: ,,, | Performed by: NURSE PRACTITIONER

## 2017-06-13 RX ORDER — MELOXICAM 7.5 MG/1
7.5 TABLET ORAL DAILY
Qty: 30 TABLET | Refills: 0 | Status: ON HOLD | OUTPATIENT
Start: 2017-06-13 | End: 2019-04-03 | Stop reason: CLARIF

## 2017-06-13 NOTE — PROGRESS NOTES
Breanne Culp is a 83 y.o. female patient of Rui Izquierdo MD who presents to the clinic today for   Chief Complaint   Patient presents with    Insect Bite     fruit flies    Extremity Weakness   .    HPI    Pt, who is not known to me, reports new problems to me:    1)  Fruit fly went into her nose.  Wants to be sure it's still not in there.  Also states she got a bite on her right 4th finger pad.  No pain now.  Washed with Dial soap, cleaned with alcohol on it and used neosporin.  Finger is getting better.    2)  Also legs feel weak, knee pain.  No injury to the knees.   States she was in PT but, after she c/o right upper thigh pain, was referred back to her PCP for evaluation, she statets.  3)  Right side of buttock hurts when she strains at stool.  Has some medicine she's going to get for this.  It's at her pharmacy.    4)  Left thumb pain since falling [place] (nearly 6 mo ago).  Got a cortisone shot in it and has a thumb brace to lose.  Forgot to wear it today.     These symptoms began variable times ago and status is variable (see above).     Pt denies the following symptoms:  Falls since [place].      Aggravating factors include fruit flies, fall, walking and standing .    Relieving factors include cortisone shot in the thumb .    OTC Medications tried are none.    Prescription medications taken for symptoms are none.  Had meloxicam in the past that helped, has Voltaren gel at home but has not used it..    Pertinent medical history:  Arthritis, thumb pain after a fall, memory loss    ROS    Constitutional:  No fever, no fatigue.    Skin:  See chief complaint/HPI.    HEENT:  See above    Heart/Lungs:  No complaints    GI/:  No sxs.    MS:  See above      PAST MEDICAL HISTORY:  Past Medical History:   Diagnosis Date    Anticoagulant long-term use     ASA 81mg, Fish Oil    Arthritis     Cataract     od    DDD (degenerative disc disease), lumbar     Glaucoma     suspect    Hyperlipidemia      Low back pain     Osteoporosis     Positive PPD 1970    Seasonal allergies        PAST SURGICAL HISTORY:  Past Surgical History:   Procedure Laterality Date    CATARACT EXTRACTION W/  INTRAOCULAR LENS IMPLANT Bilateral 10/15/14     Dr Wright    COLONOSCOPY      Epidural Steroid injection      Pain Management    TONSILLECTOMY         SOCIAL HISTORY:  Social History     Social History    Marital status:      Spouse name: N/A    Number of children: N/A    Years of education: N/A     Occupational History    Not on file.     Social History Main Topics    Smoking status: Former Smoker     Packs/day: 0.25     Start date: 7/24/1961     Quit date: 6/11/1992    Smokeless tobacco: Never Used    Alcohol use No    Drug use: No    Sexual activity: Not on file     Other Topics Concern    Not on file     Social History Narrative    No narrative on file       FAMILY HISTORY:  Family History   Problem Relation Age of Onset    Cancer Father      Rectal    Glaucoma Mother     Cancer Mother      lung, throat    Cancer Brother      throat    Hypertension Brother     Stroke Brother     Diabetes Sister     Stroke Sister     Amblyopia Neg Hx     Blindness Neg Hx     Cataracts Neg Hx     Macular degeneration Neg Hx     Retinal detachment Neg Hx     Strabismus Neg Hx     Thyroid disease Neg Hx        ALLERGIES AND MEDICATIONS: updated and reviewed.  Review of patient's allergies indicates:   Allergen Reactions    Brimonidine Other (See Comments)     Redness and irritation of eyes     Current Outpatient Prescriptions   Medication Sig Dispense Refill    JUAN/INULIN/C-LOSE/MV-MN/FA (FIBER PLUS MULITVITAMIN ORAL) No Sig Provided      acetaminophen (TYLENOL) 500 MG tablet Take 500 mg by mouth every 6 (six) hours as needed for Pain.      aspirin 81 mg Tab Every day      calcium-vitamin D (CALCIUM-VITAMIN D) 500 mg(1,250mg) -200 unit per tablet Every day      donepezil (ARICEPT) 10 MG tablet  Take 1 tablet (10 mg total) by mouth every evening. 30 tablet 3    fexofenadine (ALLEGRA) 30 MG tablet Take 30 mg by mouth 2 (two) times daily. As needed      fluticasone (FLONASE) 50 mcg/actuation nasal spray 2 sprays by Each Nare route once daily. 16 g 11    hyoscyamine (LEVSIN) 0.125 mg TbDL PLACE ONE T ON THE TONGUE Q 8 H 30 tablet 3    latanoprost 0.005 % ophthalmic solution Place 1 drop into both eyes every evening. 7.5 mL 3    meloxicam (MOBIC) 7.5 MG tablet Take 1 tablet (7.5 mg total) by mouth once daily. 30 tablet 0    omega-3 fatty acids-vitamin E (FISH OIL) 1,000 mg Cap Every day      PROPYLENE GLYCOL/ (SYSTANE OPHT) Place 1 drop into both eyes 2 (two) times daily.      raloxifene (EVISTA) 60 mg tablet Take 1 tablet (60 mg total) by mouth once daily. 30 tablet 11    diclofenac sodium (VOLTAREN) 1 % Gel Apply 2 g topically 3 (three) times daily. 1 Tube 3     No current facility-administered medications for this visit.          PHYSICAL EXAM    Alert, coop 83 y.o. female patient in no acute distress, is not ill-appearing.    Vitals:    06/13/17 1341   BP: 120/60   Pulse: 77   Temp: 97.7 °F (36.5 °C)     VS reviewed.  VS stable.  CC, nursing note, medications & PMH all reviewed today.    Head:  Normocephalic, atraumatic.    EENT:  Ext nose/ears normal.               Eyes with normal lids, not injected.     Resp:  Respirations even, unlabored    Heart:  Regular rate.  CV:  Cap RF brisk    MS:  Ambulates with a cane.          The knees of the bilat LE is/are noted to have left medial knee edema, no erythema, no ecchymosis.  The affecbted area is not tender to palp.  Pain elicited with wt bearing and walking .  No knee joint(s) tender to palp.  ROM of the affected area is good.  ROM of the LEs is symmetrical.  She appears somewhat unsteady in her gait.  Uses a cane for ambulation.  The other joints and areas of the LE are without erythema, edema, ecchymosis or pain.  .     NEURO:  Alert and  oriented x 4.  Responds appropriately during interaction.                  Sensation to light touch intact over LEs .    Skin:  Warm, dry, color good.            A/an very small, faint, linear-shaped lesion is noted on the prox pad of the left 4th digit, approx 2 mm in length            No pustules or vesicles noted.    Psych:  Responds appropriately throughout the visit.               Relaxed.  Well-groomed    Pain in both knees, unspecified chronicity  Comments:  arthritic type pain, meloxicam and diclofenac gel  Orders:  -     meloxicam (MOBIC) 7.5 MG tablet; Take 1 tablet (7.5 mg total) by mouth once daily.  Dispense: 30 tablet; Refill: 0    Foreign body in nose, initial encounter  Comments:  none visualized    Thumb pain, left  Comments:  under Rx by Dr. Lynch    Skin lesion  Comments:  left 4th digit, minor, NT    Constipation, unspecified constipation type  Comments:  has med to  at pharmacy      Pt today presents with multiple concerns (Above with treatment recommendations)  Reassurance that no FB noted in nose.  Mild swelling of right knee medially.  Gait mildly unsteady-uses cane for ambulation.  Was in PT.  States PT recommended eval with PCP.  She does not wish to resume PT at this time, she states.  Skin lesion on left 4th digit--NT, minor, no treatment needed.  Constipation and thumb pain under treatment.    This is a new problem to me.  No work up is planned.        Pt advised to perform comfort measures recommended on patient instruction sheet .    RTC prn  Explained exam findings, diagnosis and treatment plan to patient and her , with her during the visit.  Questions answered and patient states understanding.

## 2017-06-13 NOTE — Clinical Note
Rui Izquierdo MD,  I saw your patient today in the [facility].  If you have any questions, please do not hesitate to contact me.  Thank you!  JUANJOSE Braun

## 2017-06-13 NOTE — PATIENT INSTRUCTIONS
The nose does not show any insect in it.  And your breathing is easy through your nose.  Use your nose spray if you have some congestion in your nose.    Knees:    Your exam and symptoms today are consistent with arthritis causing pain in both knees and some swelling in the right knee.    Use Meloxicam tablets for inflammation and pain.  I refilled that today.    Referral for physical therapy, if you desire.  I understand you want to wait for now.    Comfort measures:    Muscle/joint rubs like Biofreeze, Fede Nava, Aspercreme, Salonpas or generic.  Ask the pharmacist for assistance in choosing the generic equivalent at that store.  Or Voltaren (dicofenac) gel prescription topical agent up to 4x daily.    Ice or heat to the area.    Activity as tolerated.    Elastic knee supports may help.    If worse or you have concerns or questions, please do not hesitate to call.  You can reach us at [phone] Monday through Friday (except holidays) 12 nooon to 6 p.m.    Thank you for using the [facility]!    ONEMY Braun, CNP, FNP-BC  [facility]  [facility]

## 2017-07-18 ENCOUNTER — OFFICE VISIT (OUTPATIENT)
Dept: UROLOGY | Facility: CLINIC | Age: 82
End: 2017-07-18
Payer: MEDICARE

## 2017-07-18 VITALS
BODY MASS INDEX: 22.27 KG/M2 | HEART RATE: 90 BPM | SYSTOLIC BLOOD PRESSURE: 113 MMHG | DIASTOLIC BLOOD PRESSURE: 69 MMHG | HEIGHT: 63 IN | WEIGHT: 125.69 LBS

## 2017-07-18 DIAGNOSIS — N32.81 OAB (OVERACTIVE BLADDER): ICD-10-CM

## 2017-07-18 DIAGNOSIS — R32 URINARY INCONTINENCE IN FEMALE: Primary | ICD-10-CM

## 2017-07-18 LAB
BILIRUB SERPL-MCNC: NORMAL MG/DL
BLOOD URINE, POC: NORMAL
COLOR, POC UA: YELLOW
GLUCOSE UR QL STRIP: NORMAL
KETONES UR QL STRIP: NORMAL
LEUKOCYTE ESTERASE URINE, POC: NORMAL
NITRITE, POC UA: NORMAL
PH, POC UA: 5
PROTEIN, POC: NORMAL
SPECIFIC GRAVITY, POC UA: 1.03
UROBILINOGEN, POC UA: NORMAL

## 2017-07-18 PROCEDURE — 99204 OFFICE O/P NEW MOD 45 MIN: CPT | Mod: S$PBB,,, | Performed by: UROLOGY

## 2017-07-18 PROCEDURE — 81002 URINALYSIS NONAUTO W/O SCOPE: CPT | Mod: PBBFAC,PO | Performed by: UROLOGY

## 2017-07-18 PROCEDURE — 1125F AMNT PAIN NOTED PAIN PRSNT: CPT | Mod: ,,, | Performed by: UROLOGY

## 2017-07-18 PROCEDURE — 99213 OFFICE O/P EST LOW 20 MIN: CPT | Mod: PBBFAC,PO | Performed by: UROLOGY

## 2017-07-18 PROCEDURE — 99999 PR PBB SHADOW E&M-EST. PATIENT-LVL III: CPT | Mod: PBBFAC,,, | Performed by: UROLOGY

## 2017-07-18 PROCEDURE — 1159F MED LIST DOCD IN RCRD: CPT | Mod: ,,, | Performed by: UROLOGY

## 2017-07-18 RX ORDER — OXYBUTYNIN CHLORIDE 10 MG/1
10 TABLET, EXTENDED RELEASE ORAL DAILY
Qty: 30 TABLET | Refills: 11 | Status: SHIPPED | OUTPATIENT
Start: 2017-07-18 | End: 2018-08-22 | Stop reason: SDUPTHER

## 2017-07-18 NOTE — PROGRESS NOTES
UROLOGY [place]  7 18 17    ivania   Urinates very often.     Age 83, says has had a problem of urinating too often and in a hurry, and has nocturia x 2. Her urgency translates into incontinence and must wear a pad. Likewise, during the night she needs to have a diaper, so that she doesn't wet the bed.     No pains or burning on voiding     PMH    Surgical:  has a past surgical history that includes Cataract extraction w/  intraocular lens implant (Bilateral, 10/15/14); Epidural Steroid injection; Tonsillectomy (1941); and Colonoscopy.    Medical:  has a past medical history of Anticoagulant long-term use; Arthritis; Cataract; DDD (degenerative disc disease), lumbar; Glaucoma; Hyperlipidemia; Low back pain; Osteoporosis; Positive PPD; and Seasonal allergies.    Familial: no fh of kidney stones    Social: lives in [place]    Current Outpatient Prescriptions on File Prior to Visit   Medication Sig Dispense Refill    JUAN/INULIN/C-LOSE/MV-MN/FA (FIBER PLUS MULITVITAMIN ORAL) No Sig Provided      acetaminophen (TYLENOL) 500 MG tablet Take 500 mg by mouth every 6 (six) hours as needed for Pain.      aspirin 81 mg Tab Every day      calcium-vitamin D (CALCIUM-VITAMIN D) 500 mg(1,250mg) -200 unit per tablet Every day      donepezil (ARICEPT) 10 MG tablet Take 1 tablet (10 mg total) by mouth every evening. 30 tablet 3    fexofenadine (ALLEGRA) 30 MG tablet Take 30 mg by mouth 2 (two) times daily. As needed      fluticasone (FLONASE) 50 mcg/actuation nasal spray 2 sprays by Each Nare route once daily. 16 g 11    meloxicam (MOBIC) 7.5 MG tablet Take 1 tablet (7.5 mg total) by mouth once daily. 30 tablet 0    omega-3 fatty acids-vitamin E (FISH OIL) 1,000 mg Cap Every day      diclofenac sodium (VOLTAREN) 1 % Gel Apply 2 g topically 3 (three) times daily. 1 Tube 3    hyoscyamine (LEVSIN) 0.125 mg TbDL PLACE ONE T ON THE TONGUE Q 8 H 30 tablet 3    latanoprost 0.005 % ophthalmic solution Place 1 drop into both eyes  every evening. 7.5 mL 3    PROPYLENE GLYCOL/ (SYSTANE OPHT) Place 1 drop into both eyes 2 (two) times daily.      raloxifene (EVISTA) 60 mg tablet Take 1 tablet (60 mg total) by mouth once daily. 30 tablet 11       REVIEW OF SYSTEMS  GENERAL: has occasional fatigue. Has some frontal headaches, no dizzy spells.   HEENT: vision is slightly blurry. Sinuses: No complaints.   CARDIOPULMONARY: has some swelling of the legs; no chest pain. No shortness of breath, no wheezing.   GASTROINTESTINAL: No heartburn. Denies diarrhea; denies constipation, no blood or mucus in stools.   GENITOURINARY: Denies dysuria, bleeding or incontinence.   MUSCULOSKELETAL: has arthritic complaints such as pain or stiffness in various joints  PSYCHIATRIC: No history of depression or anxiety.   ENDOCRINOLOGIC: No reports of sweating, cold or heat intolerance. No polyuria or polydipsia.   NEUROLOGICAL: No headache, dizziness, syncope, paralysis.  LYMPHATICS: No enlarged nodes. No history of splenectomy.  ==    Pt alert, oriented, no distress  HEENT: wnl  Neck: supple, no JVD, no lymphadenopathy  Chest: CV NSR, no murmurs  Lungs: normal auscultation  Abdomen flat, nontender, no organomegaly, no masses.  No hernias  Extremities: no edema, peripheral pulses nl  Neuro: preserved    IMP  Overactive bladder, wet  I recommend starting on ditropan 10 xl daily  RTC as needed

## 2017-07-31 ENCOUNTER — HOSPITAL ENCOUNTER (OUTPATIENT)
Dept: RADIOLOGY | Facility: HOSPITAL | Age: 82
Discharge: HOME | End: 2017-07-31
Attending: FAMILY MEDICINE
Payer: MEDICARE

## 2017-07-31 DIAGNOSIS — Z12.31 VISIT FOR SCREENING MAMMOGRAM: ICD-10-CM

## 2017-07-31 PROCEDURE — 77063 BREAST TOMOSYNTHESIS BI: CPT | Mod: 26,,, | Performed by: RADIOLOGY

## 2017-07-31 PROCEDURE — 77067 SCR MAMMO BI INCL CAD: CPT | Mod: 26,,, | Performed by: RADIOLOGY

## 2017-07-31 PROCEDURE — 77067 SCR MAMMO BI INCL CAD: CPT | Mod: TC

## 2017-08-31 ENCOUNTER — OFFICE VISIT (OUTPATIENT)
Dept: PODIATRY | Facility: CLINIC | Age: 82
End: 2017-08-31
Payer: MEDICARE

## 2017-08-31 VITALS — HEIGHT: 63 IN | WEIGHT: 126.13 LBS | BODY MASS INDEX: 22.35 KG/M2

## 2017-08-31 DIAGNOSIS — B35.1 ONYCHOMYCOSIS DUE TO DERMATOPHYTE: ICD-10-CM

## 2017-08-31 DIAGNOSIS — G60.9 IDIOPATHIC PERIPHERAL NEUROPATHY: Primary | ICD-10-CM

## 2017-08-31 DIAGNOSIS — R20.2 PARESTHESIA OF FOOT, BILATERAL: ICD-10-CM

## 2017-08-31 PROCEDURE — 11721 DEBRIDE NAIL 6 OR MORE: CPT | Mod: S$PBB,Q9,, | Performed by: PODIATRIST

## 2017-08-31 PROCEDURE — 99999 PR PBB SHADOW E&M-EST. PATIENT-LVL III: CPT | Mod: PBBFAC,,, | Performed by: PODIATRIST

## 2017-08-31 PROCEDURE — 99213 OFFICE O/P EST LOW 20 MIN: CPT | Mod: PBBFAC,PO | Performed by: PODIATRIST

## 2017-08-31 PROCEDURE — 11721 DEBRIDE NAIL 6 OR MORE: CPT | Mod: PBBFAC,PO | Performed by: PODIATRIST

## 2017-08-31 PROCEDURE — 99499 UNLISTED E&M SERVICE: CPT | Mod: S$PBB,,, | Performed by: PODIATRIST

## 2017-09-01 NOTE — PROGRESS NOTES
Subjective:      Patient ID: Breanne Culp is a 83 y.o. female.    Chief Complaint: Follow-up (3 month); Ankle Pain (bilateral); and Foot Pain (bilateral)    Breanne LEÓN is a 83 y.o. female who presents to the clinic for evaluation and treatment of high risk feet. Breanne LEÓN has a past medical history of Anticoagulant long-term use; Arthritis; Cataract; DDD (degenerative disc disease), lumbar; Glaucoma; Hyperlipidemia; Low back pain; Osteoporosis; Positive PPD (1970); and Seasonal allergies. The patient's chief complaint is long, thick toenails.  Denies being painful with wearing shoe gear.  Has not attempted to self treat.  Also, relates continued paresthesias of bilateral foot.  Relates taking a B-complex vitamin, however, continues to experience neuropathy pain in the toes of bilateral foot.  States symptoms continue to occur mostly while at rest.  Inquires as to additional treatment options.  Denies any additional pedal complaints.    PCP: Rui Izquierdo MD; Autumn Mccormick NP  Date Last Seen by PCP: 4/10/17      Past Medical History:   Diagnosis Date    Anticoagulant long-term use     ASA 81mg, Fish Oil    Arthritis     Cataract     od    DDD (degenerative disc disease), lumbar     Glaucoma     suspect    Hyperlipidemia     Low back pain     Osteoporosis     Positive PPD 1970    Seasonal allergies        Past Surgical History:   Procedure Laterality Date    BREAST BIOPSY Right     2012 neg    CATARACT EXTRACTION W/  INTRAOCULAR LENS IMPLANT Bilateral 10/15/14     Dr Wright    COLONOSCOPY      Epidural Steroid injection      Pain Management    TONSILLECTOMY  1941       Family History   Problem Relation Age of Onset    Cancer Father      Rectal    Glaucoma Mother     Cancer Mother      lung, throat    Cancer Brother      throat    Hypertension Brother     Stroke Brother     Diabetes Sister     Stroke Sister     Amblyopia Neg Hx     Blindness Neg Hx     Cataracts Neg Hx     Macular  degeneration Neg Hx     Retinal detachment Neg Hx     Strabismus Neg Hx     Thyroid disease Neg Hx     Nephrolithiasis Neg Hx        Social History     Social History    Marital status:      Spouse name: N/A    Number of children: N/A    Years of education: N/A     Social History Main Topics    Smoking status: Former Smoker     Packs/day: 0.25     Start date: 7/24/1961     Quit date: 6/11/1992    Smokeless tobacco: Never Used    Alcohol use No    Drug use: No    Sexual activity: Not Asked     Other Topics Concern    None     Social History Narrative    None       Current Outpatient Prescriptions   Medication Sig Dispense Refill    JUAN/INULIN/C-LOSE/MV-MN/FA (FIBER PLUS MULITVITAMIN ORAL) No Sig Provided      acetaminophen (TYLENOL) 500 MG tablet Take 500 mg by mouth every 6 (six) hours as needed for Pain.      aspirin 81 mg Tab Every day      calcium-vitamin D (CALCIUM-VITAMIN D) 500 mg(1,250mg) -200 unit per tablet Every day      diclofenac sodium (VOLTAREN) 1 % Gel Apply 2 g topically 3 (three) times daily. 1 Tube 3    donepezil (ARICEPT) 10 MG tablet Take 1 tablet (10 mg total) by mouth every evening. 30 tablet 3    fluticasone (FLONASE) 50 mcg/actuation nasal spray 2 sprays by Each Nare route once daily. 16 g 11    meloxicam (MOBIC) 7.5 MG tablet Take 1 tablet (7.5 mg total) by mouth once daily. 30 tablet 0    omega-3 fatty acids-vitamin E (FISH OIL) 1,000 mg Cap Every day      oxybutynin (DITROPAN-XL) 10 MG 24 hr tablet Take 1 tablet (10 mg total) by mouth once daily. 30 tablet 11    PROPYLENE GLYCOL/ (SYSTANE OPHT) Place 1 drop into both eyes 2 (two) times daily.      fexofenadine (ALLEGRA) 30 MG tablet Take 30 mg by mouth 2 (two) times daily. As needed      hyoscyamine (LEVSIN) 0.125 mg TbDL PLACE ONE T ON THE TONGUE Q 8 H 30 tablet 3     No current facility-administered medications for this visit.        Review of patient's allergies indicates:   Allergen Reactions     Brimonidine Other (See Comments)     Redness and irritation of eyes         Review of Systems   Constitution: Negative for chills, decreased appetite, fever and weakness.   Cardiovascular: Negative for claudication and leg swelling.   Skin: Positive for color change, dry skin and nail changes.   Musculoskeletal: Positive for arthritis. Negative for joint pain.   Neurological: Positive for paresthesias.   Psychiatric/Behavioral: Negative for altered mental status.           Objective:      Physical Exam   Constitutional: She is oriented to person, place, and time. She appears well-developed and well-nourished. No distress.   Cardiovascular:   Pulses:       Dorsalis pedis pulses are 2+ on the right side, and 2+ on the left side.        Posterior tibial pulses are 1+ on the right side, and 1+ on the left side.   CFT <3 seconds bilateral.  Pedal hair growth decreased bilateral.   No varicosities noted bilateral. Mild nonpitting edema noted to bilateral lower extremity.  Toes are cool to touch bilateral.     Musculoskeletal: She exhibits no edema or tenderness.   Muscle strength 5/5 in all muscle groups bilateral.  No tenderness nor crepitation with ROM of foot/ankle joints bilateral.  No pain with palpation of bilateral foot and ankle.   Pes cavus foot type.  Bilateral hallux abducto valgus.  Bilateral semi-reducible contracture of toes 2-5.       Neurological: She is alert and oriented to person, place, and time. She has normal strength. A sensory deficit is present.   Protective sensation per [place] monofilament intact bilateral.    Vibratory sensation decreased bilateral.    Light touch intact bilateral.   Skin: Skin is warm, dry and intact. No abrasion, no bruising, no burn, no ecchymosis, no laceration, no lesion, no petechiae and no rash noted. She is not diaphoretic. No cyanosis or erythema. No pallor. Nails show no clubbing.   Pedal skin is thin and atrophic bilateral.   Toenails x 10 appear  thickened by 4 mm's, elongated by 2 mm's, and discolored with subungual debris. Examination of the skin reveals no evidence of significant rashes, open lesions, suspicious appearing nevi or other concerning lesions.                Assessment:       Encounter Diagnoses   Name Primary?    Idiopathic peripheral neuropathy Yes    Onychomycosis due to dermatophyte     Paresthesia of foot, bilateral          Plan:       Breanne LEÓN was seen today for follow-up, ankle pain and foot pain.    Diagnoses and all orders for this visit:    Idiopathic peripheral neuropathy    Onychomycosis due to dermatophyte    Paresthesia of foot, bilateral      I counseled the patient on her conditions, their implications and medical management.    Advised to begin taking 600 mg of alpha lipoic acid daily.  Patient was informed about this at the last exam, however, states she forgot to  this supplement from the pharmacy.    Discussed topical application of frankincense and myrrh to bilateral foot to help address neuropathy pain.    Patient instructed on proper foot hygeine. We discussed wearing proper shoe gear, daily foot inspections, never walking without protective shoe gear, never putting sharp instruments to feet    With patient's permission, nails were aggressively reduced and debrided x 10 to their soft tissue attachment mechanically and with electric , removing all offending nail and debris. Patient relates relief following the procedure. She will continue to monitor the areas daily, inspect her feet, wear protective shoe gear when ambulatory, moisturizer to maintain skin integrity and follow in this office in approximately 2-3 months, sooner p.r.n.    Return in about 3 months (around 11/30/2017).    Tahir Person DPM

## 2017-10-20 ENCOUNTER — OFFICE VISIT (OUTPATIENT)
Dept: OPHTHALMOLOGY | Facility: CLINIC | Age: 82
End: 2017-10-20
Payer: MEDICARE

## 2017-10-20 DIAGNOSIS — H40.053 OHT (OCULAR HYPERTENSION), BILATERAL: Primary | ICD-10-CM

## 2017-10-20 DIAGNOSIS — H43.813 PVD (POSTERIOR VITREOUS DETACHMENT), BILATERAL: ICD-10-CM

## 2017-10-20 DIAGNOSIS — H52.7 REFRACTIVE ERROR: ICD-10-CM

## 2017-10-20 DIAGNOSIS — Z96.1 PSEUDOPHAKIA OF BOTH EYES: ICD-10-CM

## 2017-10-20 PROCEDURE — 99212 OFFICE O/P EST SF 10 MIN: CPT | Mod: PBBFAC,PO | Performed by: OPHTHALMOLOGY

## 2017-10-20 PROCEDURE — 92014 COMPRE OPH EXAM EST PT 1/>: CPT | Mod: S$PBB,,, | Performed by: OPHTHALMOLOGY

## 2017-10-20 PROCEDURE — 99999 PR PBB SHADOW E&M-EST. PATIENT-LVL II: CPT | Mod: PBBFAC,,, | Performed by: OPHTHALMOLOGY

## 2017-10-20 NOTE — PROGRESS NOTES
HPI     Eye Problem    Additional comments: little h/a in am and evening// OU will hurt also//           Glaucoma    Additional comments: yearly iop ch with dilated HRT//  no drops//  no   latanoprost taken//  atears BID OU//           Comments   little h/a in am and evening// OU will hurt also//  yearly iop ch with dilated HRT//  no drops//  no latanoprost taken//  atears BID OU//    Agree with above. I do not see any HRT in [facility] today. Does not know when   she stopped taking Latanoprost.       Last edited by Karishma Wright MD on 10/20/2017 11:37 AM.   (History)        ROS     Positive for: Genitourinary (flank pain; denies flomax), Musculoskeletal   (arthritis), Respiratory (recent pneumonia), Psychiatric (memory trouble),   Heme/Lymph (ASA QD)    Negative for: Endocrine (denies DM), Cardiovascular (denies HTN), Eyes   (denies surgery or trauma)    Last edited by Karishma Wright MD on 10/20/2017 11:37 AM.   (History)        Assessment /Plan     For exam results, see Encounter Report.    OHT (ocular hypertension), bilateral    PVD (posterior vitreous detachment), bilateral    Pseudophakia of both eyes    Refractive error            OHT (ocular hypertension) OU - narrow angles with some PAS OU. IOP stable, even without being on Latanoprost for uncertain duration. HVF defects OU on prior testing, despite normal looking optic nerves - possibly poor test taker - memory difficulties. MRI done in March of 2015 with no other explanation for HVF defects. Allergic reaction to Brimonidine. Can resume latanoprost if IOP goes back up. F/u 6 months IOP check.      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 Optic nerves look WNL on clinical exam, but sup/temp thinning OS on OCT. Last HRT 3/7/16 difficult image but WNL as well. 3 mm proptosis OD measured previously on [place], appears new since 's license photo 2009 - no  abnormality on MRI orbits.    Proptosis - see above.    PVD (posterior vitreous detachment), bilateral - RD precautions    Pseudophakia of both eyes - stable

## 2017-11-06 ENCOUNTER — TELEPHONE (OUTPATIENT)
Dept: PRIMARY CARE CLINIC | Facility: CLINIC | Age: 82
End: 2017-11-06

## 2017-11-06 ENCOUNTER — OFFICE VISIT (OUTPATIENT)
Dept: PRIMARY CARE CLINIC | Facility: CLINIC | Age: 82
End: 2017-11-06
Payer: MEDICARE

## 2017-11-06 ENCOUNTER — LAB VISIT (OUTPATIENT)
Dept: LAB | Facility: HOSPITAL | Age: 82
End: 2017-11-06
Attending: NURSE PRACTITIONER
Payer: MEDICARE

## 2017-11-06 VITALS
HEIGHT: 63 IN | HEART RATE: 72 BPM | DIASTOLIC BLOOD PRESSURE: 80 MMHG | BODY MASS INDEX: 22.38 KG/M2 | OXYGEN SATURATION: 97 % | TEMPERATURE: 98 F | SYSTOLIC BLOOD PRESSURE: 122 MMHG | WEIGHT: 126.31 LBS

## 2017-11-06 DIAGNOSIS — N30.01 ACUTE CYSTITIS WITH HEMATURIA: Primary | ICD-10-CM

## 2017-11-06 DIAGNOSIS — R10.2 SUPRAPUBIC PAIN, ACUTE: ICD-10-CM

## 2017-11-06 DIAGNOSIS — R35.0 URINE FREQUENCY: ICD-10-CM

## 2017-11-06 LAB
ALBUMIN SERPL BCP-MCNC: 3.8 G/DL
ALP SERPL-CCNC: 88 U/L
ALT SERPL W/O P-5'-P-CCNC: 14 U/L
ANION GAP SERPL CALC-SCNC: 9 MMOL/L
AST SERPL-CCNC: 19 U/L
BASOPHILS # BLD AUTO: 0.04 K/UL
BASOPHILS NFR BLD: 0.6 %
BILIRUB SERPL-MCNC: 0.5 MG/DL
BILIRUB SERPL-MCNC: NORMAL MG/DL
BLOOD URINE, POC: 50
BUN SERPL-MCNC: 13 MG/DL
CALCIUM SERPL-MCNC: 9.9 MG/DL
CHLORIDE SERPL-SCNC: 106 MMOL/L
CO2 SERPL-SCNC: 27 MMOL/L
COLOR, POC UA: NORMAL
CREAT SERPL-MCNC: 0.8 MG/DL
DIFFERENTIAL METHOD: NORMAL
EOSINOPHIL # BLD AUTO: 0.1 K/UL
EOSINOPHIL NFR BLD: 1.4 %
ERYTHROCYTE [DISTWIDTH] IN BLOOD BY AUTOMATED COUNT: 13.4 %
EST. GFR  (AFRICAN AMERICAN): >60 ML/MIN/1.73 M^2
EST. GFR  (NON AFRICAN AMERICAN): >60 ML/MIN/1.73 M^2
GLUCOSE SERPL-MCNC: 88 MG/DL
GLUCOSE UR QL STRIP: NORMAL
HCT VFR BLD AUTO: 38.9 %
HGB BLD-MCNC: 13 G/DL
IMM GRANULOCYTES # BLD AUTO: 0.01 K/UL
IMM GRANULOCYTES NFR BLD AUTO: 0.2 %
KETONES UR QL STRIP: NORMAL
LEUKOCYTE ESTERASE URINE, POC: NORMAL
LYMPHOCYTES # BLD AUTO: 2.6 K/UL
LYMPHOCYTES NFR BLD: 41.7 %
MCH RBC QN AUTO: 30.8 PG
MCHC RBC AUTO-ENTMCNC: 33.4 G/DL
MCV RBC AUTO: 92 FL
MONOCYTES # BLD AUTO: 0.5 K/UL
MONOCYTES NFR BLD: 8.3 %
NEUTROPHILS # BLD AUTO: 3 K/UL
NEUTROPHILS NFR BLD: 47.8 %
NITRITE, POC UA: NORMAL
NRBC BLD-RTO: 0 /100 WBC
PH, POC UA: 5
PLATELET # BLD AUTO: 224 K/UL
PMV BLD AUTO: 11.4 FL
POTASSIUM SERPL-SCNC: 4 MMOL/L
PROT SERPL-MCNC: 7.4 G/DL
PROTEIN, POC: NORMAL
RBC # BLD AUTO: 4.22 M/UL
SODIUM SERPL-SCNC: 142 MMOL/L
SPECIFIC GRAVITY, POC UA: 1
UROBILINOGEN, POC UA: NORMAL
WBC # BLD AUTO: 6.3 K/UL

## 2017-11-06 PROCEDURE — 99999 PR PBB SHADOW E&M-EST. PATIENT-LVL V: CPT | Mod: PBBFAC,,, | Performed by: NURSE PRACTITIONER

## 2017-11-06 PROCEDURE — 87186 SC STD MICRODIL/AGAR DIL: CPT

## 2017-11-06 PROCEDURE — 87086 URINE CULTURE/COLONY COUNT: CPT

## 2017-11-06 PROCEDURE — 99214 OFFICE O/P EST MOD 30 MIN: CPT | Mod: S$PBB,,, | Performed by: NURSE PRACTITIONER

## 2017-11-06 PROCEDURE — 87077 CULTURE AEROBIC IDENTIFY: CPT

## 2017-11-06 PROCEDURE — 99215 OFFICE O/P EST HI 40 MIN: CPT | Mod: PBBFAC,PO | Performed by: NURSE PRACTITIONER

## 2017-11-06 PROCEDURE — 87088 URINE BACTERIA CULTURE: CPT

## 2017-11-06 PROCEDURE — 36415 COLL VENOUS BLD VENIPUNCTURE: CPT | Mod: PO

## 2017-11-06 PROCEDURE — 80053 COMPREHEN METABOLIC PANEL: CPT

## 2017-11-06 PROCEDURE — 85025 COMPLETE CBC W/AUTO DIFF WBC: CPT

## 2017-11-06 PROCEDURE — 81001 URINALYSIS AUTO W/SCOPE: CPT | Mod: PBBFAC,PO | Performed by: NURSE PRACTITIONER

## 2017-11-06 RX ORDER — AMOXICILLIN AND CLAVULANATE POTASSIUM 875; 125 MG/1; MG/1
1 TABLET, FILM COATED ORAL 2 TIMES DAILY
Qty: 14 TABLET | Refills: 0 | Status: SHIPPED | OUTPATIENT
Start: 2017-11-06 | End: 2017-11-13

## 2017-11-06 NOTE — TELEPHONE ENCOUNTER
----- Message from Mikayla Jimbc sent at 11/6/2017  1:45 PM CST -----  Contact: Self  Patient states that she was told by  Amalia that she was givng her two prescriptions of amoxicillin-clavulanate 875-125mg (AUGMENTIN) 875-125 mg per tablet and when her  picked it up there was only one prescription of it.  Please call [phone] (home).  Thanks

## 2017-11-06 NOTE — PROGRESS NOTES
Subjective:       Patient ID: Breanne Culp is a 83 y.o. female.    Chief Complaint: Hematuria (pain in tomach- couple months- taking tylenol- blood in urine yesterday)    The patient who is new to me presents with concerns over seeing blood in her urine yesterday.       Hematuria   This is a new problem. The current episode started yesterday (yesterday morning). She describes the hematuria as gross hematuria. The hematuria occurs during the initial portion of her urinary stream. She reports no clotting in her urine stream. Her pain is at a severity of 0/10. She is experiencing no pain. She describes her urine color as bright red. Irritative symptoms include frequency, nocturia and urgency. Pertinent negatives include no abdominal pain, chills, dysuria, fever, flank pain, genital pain, hesitancy, inability to urinate, nausea, vomiting or sore throat. Her past medical history is significant for hypertension.     Review of Systems   Constitutional: Negative for chills, fatigue and fever.   HENT: Negative for congestion, sinus pain, sinus pressure, sneezing and sore throat.    Respiratory: Negative for cough, chest tightness, shortness of breath and wheezing.    Cardiovascular: Negative for chest pain, palpitations and leg swelling.   Gastrointestinal: Negative for abdominal distention, abdominal pain, constipation, diarrhea, nausea and vomiting.   Genitourinary: Positive for frequency, hematuria, nocturia and urgency. Negative for decreased urine volume, difficulty urinating, dysuria, flank pain and hesitancy.   Musculoskeletal: Negative for arthralgias, gait problem, joint swelling and myalgias.   Skin: Negative for rash and wound.   Neurological: Negative for dizziness, light-headedness, numbness and headaches.       Objective:      Physical Exam   Constitutional: She is oriented to person, place, and time. Vital signs are normal. She appears well-developed and well-nourished. She is cooperative.   HENT:   Head:  Normocephalic and atraumatic.   Right Ear: External ear normal.   Left Ear: External ear normal.   Nose: Nose normal.   Mouth/Throat: Oropharynx is clear and moist.   Eyes: Pupils are equal, round, and reactive to light.   Neck: Normal range of motion.   Cardiovascular: Normal rate, regular rhythm, normal heart sounds and intact distal pulses.    Pulmonary/Chest: Effort normal and breath sounds normal.   Abdominal: Soft. Normal appearance and bowel sounds are normal. There is tenderness in the suprapubic area. There is no rigidity, no rebound, no guarding and no CVA tenderness.   Musculoskeletal: Normal range of motion.   Neurological: She is alert and oriented to person, place, and time.   Skin: Skin is warm and dry.   Nursing note and vitals reviewed.      Assessment:       1. Acute cystitis with hematuria    2. Urine frequency    3. Suprapubic pain, acute        Plan:       Acute cystitis with hematuria  -     CBC auto differential; Future; Expected date: 11/06/2017  -     amoxicillin-clavulanate 875-125mg (AUGMENTIN) 875-125 mg per tablet; Take 1 tablet by mouth 2 (two) times daily.  Dispense: 14 tablet; Refill: 0    Urine frequency  -     Comprehensive metabolic panel; Future; Expected date: 11/06/2017  -     CBC auto differential; Future; Expected date: 11/06/2017  -     POCT URINE DIPSTICK WITH MICROSCOPE, AUTOMATED  -     Urine culture  -     amoxicillin-clavulanate 875-125mg (AUGMENTIN) 875-125 mg per tablet; Take 1 tablet by mouth 2 (two) times daily.  Dispense: 14 tablet; Refill: 0    Suprapubic pain, acute  -     POCT URINE DIPSTICK WITH MICROSCOPE, AUTOMATED  -     Urine culture  -     amoxicillin-clavulanate 875-125mg (AUGMENTIN) 875-125 mg per tablet; Take 1 tablet by mouth 2 (two) times daily.  Dispense: 14 tablet; Refill: 0      Patient instructed to increase her fluids, wipe from front to back, and to change her pads frequently. Patient and spouse instructed to go to ED or call clinic with any new  or worsening symptoms after starting antibiotic.

## 2017-11-06 NOTE — TELEPHONE ENCOUNTER
Informed pt Amalia was referring to the abx she sent in is a 2 in 1 abx. Pt verbalized understanding. Also, informed pt to eat some yogurt when she takes abx. Pt verbalized understanding.

## 2017-11-06 NOTE — PATIENT INSTRUCTIONS

## 2017-11-07 ENCOUNTER — TELEPHONE (OUTPATIENT)
Dept: PRIMARY CARE CLINIC | Facility: CLINIC | Age: 82
End: 2017-11-07

## 2017-11-07 NOTE — PROGRESS NOTES
Please call the patient and let her know that her blood count and chemistry labs are all normal. Please set up an appointment with Elvia Mauricio, for a physical and follow up on the UTI in about 2.5 weeks. Thanks.

## 2017-11-07 NOTE — TELEPHONE ENCOUNTER
----- Message from Lina Agrawal sent at 11/7/2017 10:38 AM CST -----  Patient is returning office call. Please call back with details at [phone].

## 2017-11-08 LAB — BACTERIA UR CULT: NORMAL

## 2017-11-09 ENCOUNTER — TELEPHONE (OUTPATIENT)
Dept: PRIMARY CARE CLINIC | Facility: CLINIC | Age: 82
End: 2017-11-09

## 2017-11-09 NOTE — PROGRESS NOTES
Please let the patient know that her antibiotic that was prescribed will treat her UTI. She should keep her appointment with Elvia Mauricio in 2 weeks. Thanks.

## 2017-11-09 NOTE — TELEPHONE ENCOUNTER
----- Message from Felipa Dong sent at 11/9/2017 11:53 AM CST -----  Contact: Elier Pagan [phone]  Call placed to pod. Patient returned your call, please call back.  Thank you!

## 2017-11-20 ENCOUNTER — OFFICE VISIT (OUTPATIENT)
Dept: FAMILY MEDICINE | Facility: CLINIC | Age: 82
End: 2017-11-20
Payer: MEDICARE

## 2017-11-20 ENCOUNTER — HOSPITAL ENCOUNTER (OUTPATIENT)
Dept: RADIOLOGY | Facility: HOSPITAL | Age: 82
Discharge: HOME | End: 2017-11-20
Attending: PHYSICIAN ASSISTANT
Payer: MEDICARE

## 2017-11-20 VITALS
DIASTOLIC BLOOD PRESSURE: 70 MMHG | HEART RATE: 75 BPM | HEIGHT: 63 IN | SYSTOLIC BLOOD PRESSURE: 120 MMHG | RESPIRATION RATE: 16 BRPM | BODY MASS INDEX: 22.35 KG/M2 | WEIGHT: 126.13 LBS

## 2017-11-20 DIAGNOSIS — N30.01 ACUTE CYSTITIS WITH HEMATURIA: Primary | ICD-10-CM

## 2017-11-20 DIAGNOSIS — M54.50 BILATERAL LOW BACK PAIN WITHOUT SCIATICA, UNSPECIFIED CHRONICITY: ICD-10-CM

## 2017-11-20 DIAGNOSIS — H61.22 IMPACTED CERUMEN OF LEFT EAR: ICD-10-CM

## 2017-11-20 DIAGNOSIS — Z13.6 ENCOUNTER FOR SCREENING FOR CARDIOVASCULAR DISORDERS: ICD-10-CM

## 2017-11-20 LAB
BILIRUB UR QL STRIP: NEGATIVE
CLARITY UR: CLEAR
COLOR UR: YELLOW
GLUCOSE UR QL STRIP: NEGATIVE
HGB UR QL STRIP: NEGATIVE
KETONES UR QL STRIP: NEGATIVE
LEUKOCYTE ESTERASE UR QL STRIP: NEGATIVE
NITRITE UR QL STRIP: NEGATIVE
PH UR STRIP: 6 [PH] (ref 5–8)
PROT UR QL STRIP: NEGATIVE
SP GR UR STRIP: 1.02 (ref 1–1.03)
URN SPEC COLLECT METH UR: NORMAL

## 2017-11-20 PROCEDURE — G0009 ADMIN PNEUMOCOCCAL VACCINE: HCPCS | Mod: PBBFAC,PO

## 2017-11-20 PROCEDURE — 99214 OFFICE O/P EST MOD 30 MIN: CPT | Mod: PBBFAC,PO | Performed by: PHYSICIAN ASSISTANT

## 2017-11-20 PROCEDURE — 99999 PR PBB SHADOW E&M-EST. PATIENT-LVL IV: CPT | Mod: PBBFAC,,, | Performed by: PHYSICIAN ASSISTANT

## 2017-11-20 PROCEDURE — 72100 X-RAY EXAM L-S SPINE 2/3 VWS: CPT | Mod: 26,,, | Performed by: RADIOLOGY

## 2017-11-20 PROCEDURE — 72100 X-RAY EXAM L-S SPINE 2/3 VWS: CPT | Mod: TC,PO

## 2017-11-20 PROCEDURE — 87086 URINE CULTURE/COLONY COUNT: CPT

## 2017-11-20 PROCEDURE — G0008 ADMIN INFLUENZA VIRUS VAC: HCPCS | Mod: PBBFAC,PO

## 2017-11-20 PROCEDURE — 90670 PCV13 VACCINE IM: CPT | Mod: PBBFAC,PO

## 2017-11-20 PROCEDURE — 99214 OFFICE O/P EST MOD 30 MIN: CPT | Mod: S$PBB,,, | Performed by: PHYSICIAN ASSISTANT

## 2017-11-20 PROCEDURE — 81003 URINALYSIS AUTO W/O SCOPE: CPT | Mod: PO

## 2017-11-20 NOTE — PROGRESS NOTES
Subjective:       Patient ID: Breanne Culp is a 83 y.o. female    Chief Complaint: Preventative Health Care (Annual visit.Had labs done on 11/6. Still have some pain when she urinates.She finished her antibiotic)    HPI  Mrs Culp presents today with her  for follow up after her recent UTI and for her annual physical.  Her current complaints include lower back and buttock pain and right ankle pain that began last night.  She denies any know injury, no radiating pain, no numbness or tingling.      Review of Systems   Constitutional: Negative for activity change, chills and fever.   HENT: Negative for congestion and sore throat.    Eyes: Negative for visual disturbance.   Respiratory: Negative for cough and shortness of breath.    Cardiovascular: Negative for chest pain and palpitations.   Gastrointestinal: Negative for abdominal pain, diarrhea, nausea and vomiting.   Endocrine: Negative for polydipsia and polyuria.   Musculoskeletal: Positive for arthralgias and back pain. Negative for myalgias.   Skin: Negative for rash.   Neurological: Negative for headaches.   Psychiatric/Behavioral: The patient is not nervous/anxious.           Objective:   Physical Exam   Constitutional: She is oriented to person, place, and time. She appears well-developed and well-nourished. No distress.   HENT:   Head: Normocephalic and atraumatic.   Eyes: EOM are normal. Pupils are equal, round, and reactive to light.   Neck: Normal range of motion. Neck supple.   Cardiovascular: Normal rate, regular rhythm, normal heart sounds and intact distal pulses.  Exam reveals no gallop and no friction rub.    No murmur heard.  Pulmonary/Chest: Effort normal and breath sounds normal. No respiratory distress. She has no wheezes. She has no rales. She exhibits no tenderness.   Abdominal: Soft. Bowel sounds are normal. She exhibits no distension and no mass. There is no tenderness. There is no guarding.   Musculoskeletal: Normal range of  motion. She exhibits tenderness (mild ttp of the lower back over the paraspinal muscles).   Mrs Culp moves slowly and carefully, she is able to climb the step and get onto the exam table.  She reports using a walker or a cane at home.     Neurological: She is alert and oriented to person, place, and time.   Skin: Skin is warm and dry. No rash noted. She is not diaphoretic.   Psychiatric: She has a normal mood and affect. Her behavior is normal. Judgment and thought content normal.        Assessment:       1. Acute cystitis with hematuria  Urine culture    Urinalysis   2. Bilateral low back pain without sciatica, unspecified chronicity  X-Ray Lumbar Spine 2 Or 3 Views   3. Impacted cerumen of left ear     4. Encounter for screening for cardiovascular disorders          Plan:       Acute cystitis with hematuria  -     Urine culture  -     Urinalysis    Bilateral low back pain without sciatica, unspecified chronicity  -     X-Ray Lumbar Spine 2 Or 3 Views; Future; Expected date: 11/20/2017  -   Consider PT if pain persists    Impacted cerumen of left ear  - recommended debrox    Encounter for screening for cardiovascular disorders  - recent labs reviewed    Other orders  -     Pneumococcal Conjugate Vaccine (13 Valent) (IM)

## 2017-11-20 NOTE — PATIENT INSTRUCTIONS
Debrox -wax softening drop      Impacted Earwax    Impacted earwax is a buildup of the natural wax in the ear (cerumen). Impacted earwax is very common. It can cause symptoms such as hearing loss. It can also stop a doctor doing an exam of your ear.  Understanding earwax  Tiny glands in your ear make substances that combine with dead skin cells to form earwax. Earwax helps protect your ear canal from water, dirt, infection, and injury. Over time, earwax travels from the inner part of your ear canal to the entrance of the canal. Then it falls away naturally. But in some cases, it cant travel to the entrance of the canal. This may be because of a health condition or objects put in the ear. With age, earwax tends to become harder and less fluid. Older adults are more likely to have problems with earwax buildup.  What causes impacted earwax?  Earwax can build up because of many health conditions. Some cause a physical blockage. Others cause too much earwax to be made. Health conditions that can cause earwax buildup include:  · Bony blockage in the ear (osteoma or exostoses)  · Infections, such as swimmers ear (external otitis)  · Skin disease, such as eczema  · Autoimmune diseases, such as lupus  · A narrowed ear canal from birth, chronic inflammation, or injury  · Too much earwax because of injury  · Too much earwax because of  water in the ear canal  Objects repeatedly placed in the ear can also cause impacted earwax. For example, putting cotton swabs in the ear may push the wax deeper into the ear. Over time, this may cause blockage. Hearing aids, swimming plugs, and swim molds can cause the same problem when used again and again.  In some cases, the cause of impacted earwax is not known.  Symptoms of impacted earwax  Excess earwax usually does not cause any symptoms, unless there is a large amount of buildup. Then it may cause symptoms such as:  · Hearing loss  · Earache  · Sense of ear fullness  · Itching in the  ear  · Dizziness  · Ringing in the ears  · Cough  Treatment for impacted earwax  If you dont have symptoms, you may not need treatment. Often the earwax goes away on its own with time. If you have symptoms, you may have 1 or more treatments such as:  · Ear drops. These help to soften the earwax. This helps it leave the ear over time.  · Rinsing (irrigation) of the ear canal with water. This is done in a doctors office.  · Removal of the earwax with small tools. This is also done in a doctors office.  In rare cases, some treatments for earwax removal may cause complications such as:  · Swimmers ear (otitis external)  · Earache  · Short-term hearing loss  · Dizziness  · Water trapped in the ear canal  · Hole in the eardrum  · Ringing in the ears  · Bleeding from the ear  Talk with your health care provider about which risks apply most to you.  Dont use these at home  Health care providers do not advise use of ear candles or ear vacuum kits. These methods are not shown to work.   Preventing impacted earwax  You may not be able to prevent impacted earwax if you have a health condition that causes it, such as eczema. In other cases, you may be able to prevent earwax buildup by:  · Using ear drops once a week  · Having routine cleaning of the ear about every 6 months  · Not using cotton swabs in the ear  When to call the health care provider  Call your health care provider right away if you have severe symptoms after earwax removal. These may include bleeding or severe ear pain.   Date Last Reviewed: 3/19/2015  © 8348-9878 [employer]. [address] [place] [address], [place] [address]. All rights reserved. This information is not intended as a substitute for professional medical care. Always follow your healthcare professional's instructions.

## 2017-11-21 ENCOUNTER — TELEPHONE (OUTPATIENT)
Dept: FAMILY MEDICINE | Facility: CLINIC | Age: 82
End: 2017-11-21

## 2017-11-21 LAB — BACTERIA UR CULT: NORMAL

## 2017-11-21 NOTE — TELEPHONE ENCOUNTER
----- Message from Jocelynn Brown sent at 11/21/2017  8:31 AM CST -----  Contact: claudia hoff   Missed call   Call back

## 2017-11-30 ENCOUNTER — OFFICE VISIT (OUTPATIENT)
Dept: PODIATRY | Facility: CLINIC | Age: 82
End: 2017-11-30
Payer: MEDICARE

## 2017-11-30 VITALS — BODY MASS INDEX: 22.5 KG/M2 | HEIGHT: 63 IN | WEIGHT: 127 LBS

## 2017-11-30 DIAGNOSIS — R20.2 PARESTHESIA OF FOOT, BILATERAL: ICD-10-CM

## 2017-11-30 DIAGNOSIS — G60.9 IDIOPATHIC PERIPHERAL NEUROPATHY: Primary | ICD-10-CM

## 2017-11-30 DIAGNOSIS — B35.1 ONYCHOMYCOSIS DUE TO DERMATOPHYTE: ICD-10-CM

## 2017-11-30 PROCEDURE — 99499 UNLISTED E&M SERVICE: CPT | Mod: S$PBB,,, | Performed by: PODIATRIST

## 2017-11-30 PROCEDURE — 11721 DEBRIDE NAIL 6 OR MORE: CPT | Mod: S$PBB,Q9,, | Performed by: PODIATRIST

## 2017-11-30 PROCEDURE — 11721 DEBRIDE NAIL 6 OR MORE: CPT | Mod: PBBFAC,PO | Performed by: PODIATRIST

## 2017-11-30 PROCEDURE — 99999 PR PBB SHADOW E&M-EST. PATIENT-LVL III: CPT | Mod: PBBFAC,,, | Performed by: PODIATRIST

## 2017-11-30 PROCEDURE — 99213 OFFICE O/P EST LOW 20 MIN: CPT | Mod: PBBFAC,PO | Performed by: PODIATRIST

## 2017-11-30 NOTE — PROGRESS NOTES
Subjective:      Patient ID: Breanne Culp is a 83 y.o. female.    Chief Complaint: Follow-up and Foot Pain (complains of both feet hurting.)    Breanne LEÓN is a 83 y.o. female who presents to the clinic for evaluation and treatment of high risk feet. Breanne LEÓN has a past medical history of Anticoagulant long-term use; Arthritis; Cataract; DDD (degenerative disc disease), lumbar; Glaucoma; Hyperlipidemia; Low back pain; Osteoporosis; Positive PPD (1970); and Seasonal allergies. The patient's chief complaint is long, thick toenails.  Denies being painful with wearing shoe gear.  Has not attempted to self treat.  Also, relates continued paresthesias of bilateral foot. Has been taking B-complex vitamins, however, has yet to begin taking 600 mg of alpha lipoic acid daily.  Has attempted to treat with applying frankincense and myrrh oil with no improvement. States symptoms continue to occur mostly while at rest. Denies any additional pedal complaints.    PCP: Rui Izquierdo MD; QUIANA Pollard  Date Last Seen by PCP: 11/20/17      Past Medical History:   Diagnosis Date    Anticoagulant long-term use     ASA 81mg, Fish Oil    Arthritis     Cataract     OU done//    DDD (degenerative disc disease), lumbar     Glaucoma     suspect    Hyperlipidemia     Low back pain     Osteoporosis     Positive PPD 1970    Seasonal allergies        Past Surgical History:   Procedure Laterality Date    BREAST BIOPSY Right     2012 neg    CATARACT EXTRACTION W/  INTRAOCULAR LENS IMPLANT Bilateral 10/15/14     Dr Wright    COLONOSCOPY      Epidural Steroid injection      Pain Management    TONSILLECTOMY  1941       Family History   Problem Relation Age of Onset    Cancer Father      Rectal    Glaucoma Mother     Cancer Mother      lung, throat    Cancer Brother      throat    Hypertension Brother     Stroke Brother     Diabetes Sister     Stroke Sister     Amblyopia Neg Hx     Blindness Neg Hx      Cataracts Neg Hx     Macular degeneration Neg Hx     Retinal detachment Neg Hx     Strabismus Neg Hx     Thyroid disease Neg Hx     Nephrolithiasis Neg Hx        Social History     Social History    Marital status:      Spouse name: N/A    Number of children: N/A    Years of education: N/A     Social History Main Topics    Smoking status: Former Smoker     Packs/day: 0.25     Start date: 7/24/1961     Quit date: 6/11/1992    Smokeless tobacco: Never Used    Alcohol use No    Drug use: No    Sexual activity: Not Asked     Other Topics Concern    None     Social History Narrative    None       Current Outpatient Prescriptions   Medication Sig Dispense Refill    JUAN/INULIN/C-LOSE/MV-MN/FA (FIBER PLUS MULITVITAMIN ORAL) No Sig Provided      acetaminophen (TYLENOL) 500 MG tablet Take 500 mg by mouth every 6 (six) hours as needed for Pain.      aspirin 81 mg Tab Every day      calcium-vitamin D (CALCIUM-VITAMIN D) 500 mg(1,250mg) -200 unit per tablet Every day      fexofenadine (ALLEGRA) 30 MG tablet Take 30 mg by mouth 2 (two) times daily. As needed      fluticasone (FLONASE) 50 mcg/actuation nasal spray 2 sprays by Each Nare route once daily. 16 g 11    omega-3 fatty acids-vitamin E (FISH OIL) 1,000 mg Cap Every day      oxybutynin (DITROPAN-XL) 10 MG 24 hr tablet Take 1 tablet (10 mg total) by mouth once daily. 30 tablet 11    PROPYLENE GLYCOL/ (SYSTANE OPHT) Place 1 drop into both eyes 2 (two) times daily.      diclofenac sodium (VOLTAREN) 1 % Gel Apply 2 g topically 3 (three) times daily. 1 Tube 3    donepezil (ARICEPT) 10 MG tablet Take 1 tablet (10 mg total) by mouth every evening. 30 tablet 3    meloxicam (MOBIC) 7.5 MG tablet Take 1 tablet (7.5 mg total) by mouth once daily. (Patient taking differently: Take 7.5 mg by mouth once daily. ) 30 tablet 0     No current facility-administered medications for this visit.        Review of patient's allergies indicates:    Allergen Reactions    Brimonidine Other (See Comments)     Redness and irritation of eyes         Review of Systems   Constitution: Negative for chills, decreased appetite, fever and weakness.   Cardiovascular: Negative for claudication and leg swelling.   Skin: Positive for color change, dry skin and nail changes.   Musculoskeletal: Positive for arthritis. Negative for joint pain.   Neurological: Positive for paresthesias.   Psychiatric/Behavioral: Negative for altered mental status.           Objective:      Physical Exam   Constitutional: She is oriented to person, place, and time. She appears well-developed and well-nourished. No distress.   Cardiovascular:   Pulses:       Dorsalis pedis pulses are 2+ on the right side, and 2+ on the left side.        Posterior tibial pulses are 1+ on the right side, and 1+ on the left side.   CFT <3 seconds bilateral.  Pedal hair growth decreased bilateral.   No varicosities noted bilateral. Mild nonpitting edema noted to bilateral lower extremity.  Toes are cool to touch bilateral.     Musculoskeletal: She exhibits no edema or tenderness.   Muscle strength 5/5 in all muscle groups bilateral.  No tenderness nor crepitation with ROM of foot/ankle joints bilateral.  No pain with palpation of bilateral foot and ankle.   Pes cavus foot type.  Bilateral hallux abducto valgus.  Bilateral semi-reducible contracture of toes 2-5.       Neurological: She is alert and oriented to person, place, and time. She has normal strength. A sensory deficit is present.   Protective sensation per Bent-Raudel monofilament intact bilateral.    Vibratory sensation decreased bilateral.    Light touch intact bilateral.   Skin: Skin is warm, dry and intact. No abrasion, no bruising, no burn, no ecchymosis, no laceration, no lesion, no petechiae and no rash noted. She is not diaphoretic. No cyanosis or erythema. No pallor. Nails show no clubbing.   Pedal skin is thin and atrophic bilateral.    Toenails x 10 appear thickened by 4 mm's, elongated by 2 mm's, and discolored with subungual debris. Examination of the skin reveals no evidence of significant rashes, open lesions, suspicious appearing nevi or other concerning lesions.                Assessment:       Encounter Diagnoses   Name Primary?    Idiopathic peripheral neuropathy Yes    Onychomycosis due to dermatophyte     Paresthesia of foot, bilateral          Plan:       Breanne LEÓN was seen today for follow-up and foot pain.    Diagnoses and all orders for this visit:    Idiopathic peripheral neuropathy    Onychomycosis due to dermatophyte    Paresthesia of foot, bilateral      I counseled the patient on her conditions, their implications and medical management.    Advised to begin taking 600 mg of alpha lipoic acid daily.  Will consider gabapentin if no improvement within the next month.    Patient instructed on proper foot hygeine. We discussed wearing proper shoe gear, daily foot inspections, never walking without protective shoe gear, never putting sharp instruments to feet    With patient's permission, nails were aggressively reduced and debrided x 10 to their soft tissue attachment mechanically and with electric , removing all offending nail and debris. Patient relates relief following the procedure. She will continue to monitor the areas daily, inspect her feet, wear protective shoe gear when ambulatory, moisturizer to maintain skin integrity and follow in this office in approximately 2-3 months, sooner p.r.n.    Return in about 3 months (around 2/28/2018).    Tahir Person DPM

## 2018-01-26 ENCOUNTER — OFFICE VISIT (OUTPATIENT)
Dept: FAMILY MEDICINE | Facility: CLINIC | Age: 83
End: 2018-01-26
Payer: MEDICARE

## 2018-01-26 VITALS
BODY MASS INDEX: 22.35 KG/M2 | RESPIRATION RATE: 16 BRPM | HEART RATE: 72 BPM | SYSTOLIC BLOOD PRESSURE: 114 MMHG | HEIGHT: 63 IN | WEIGHT: 126.13 LBS | DIASTOLIC BLOOD PRESSURE: 62 MMHG | OXYGEN SATURATION: 97 %

## 2018-01-26 DIAGNOSIS — M51.36 DDD (DEGENERATIVE DISC DISEASE), LUMBAR: ICD-10-CM

## 2018-01-26 DIAGNOSIS — M54.50 BILATERAL LOW BACK PAIN WITHOUT SCIATICA, UNSPECIFIED CHRONICITY: ICD-10-CM

## 2018-01-26 DIAGNOSIS — M25.551 PAIN OF RIGHT HIP JOINT: ICD-10-CM

## 2018-01-26 DIAGNOSIS — M54.9 BACK PAIN, UNSPECIFIED BACK LOCATION, UNSPECIFIED BACK PAIN LATERALITY, UNSPECIFIED CHRONICITY: ICD-10-CM

## 2018-01-26 DIAGNOSIS — M54.16 LUMBAR RADICULOPATHY: Primary | ICD-10-CM

## 2018-01-26 DIAGNOSIS — F03.90 DEMENTIA WITHOUT BEHAVIORAL DISTURBANCE, UNSPECIFIED DEMENTIA TYPE: ICD-10-CM

## 2018-01-26 PROCEDURE — 99215 OFFICE O/P EST HI 40 MIN: CPT | Mod: PBBFAC,PO | Performed by: NURSE PRACTITIONER

## 2018-01-26 PROCEDURE — 99999 PR PBB SHADOW E&M-EST. PATIENT-LVL V: CPT | Mod: PBBFAC,,, | Performed by: NURSE PRACTITIONER

## 2018-01-26 PROCEDURE — 99214 OFFICE O/P EST MOD 30 MIN: CPT | Mod: S$PBB,,, | Performed by: NURSE PRACTITIONER

## 2018-01-26 NOTE — PROGRESS NOTES
Subjective:       Patient ID: Breanne Culp is a 83 y.o. female.    Chief Complaint: Leg Pain (right leg with cramping at night) and Back Pain (right side )    Leg Pain    The incident occurred more than 1 week ago (2 yrs ). The incident occurred at home. There was no injury mechanism. The pain is present in the right leg. The quality of the pain is described as aching and burning. The pain is at a severity of 5/10. The pain is moderate. The pain has been worsening since onset. Associated symptoms include muscle weakness, numbness and tingling. Pertinent negatives include no inability to bear weight, loss of motion or loss of sensation. The symptoms are aggravated by weight bearing and movement. She has tried non-weight bearing and rest for the symptoms.   Back Pain   This is a chronic problem. The current episode started more than 1 year ago. The problem occurs constantly. The problem has been gradually worsening since onset. The pain is present in the lumbar spine. The quality of the pain is described as aching. The symptoms are aggravated by bending. Associated symptoms include leg pain, numbness, paresthesias, tingling and weakness. Pertinent negatives include no abdominal pain, bladder incontinence, bowel incontinence, chest pain, dysuria, fever, headaches, paresis, pelvic pain, perianal numbness or weight loss. She has tried analgesics and bed rest for the symptoms.     Vitals:    01/26/18 0850   BP: 114/62   Pulse: 72   Resp: 16     Review of Systems   Constitutional: Positive for activity change. Negative for diaphoresis, fatigue, fever and weight loss.   HENT: Negative.    Eyes: Negative.    Respiratory: Negative.  Negative for cough, shortness of breath and wheezing.    Cardiovascular: Negative.  Negative for chest pain.   Gastrointestinal: Negative.  Negative for abdominal pain, bowel incontinence, diarrhea and nausea.   Endocrine: Negative.    Genitourinary: Negative.  Negative for bladder  incontinence, dysuria, hematuria and pelvic pain.   Musculoskeletal: Positive for arthralgias, back pain, gait problem and myalgias.   Skin: Negative for color change and rash.   Allergic/Immunologic: Negative.    Neurological: Positive for tingling, weakness, numbness and paresthesias. Negative for speech difficulty and headaches.   Hematological: Negative.    Psychiatric/Behavioral: Negative.        Past Medical History:   Diagnosis Date    Anticoagulant long-term use     ASA 81mg, Fish Oil    Arthritis     Cataract     OU done//    DDD (degenerative disc disease), lumbar     Glaucoma     suspect    Hyperlipidemia     Low back pain     Osteoporosis     Positive PPD 1970    Seasonal allergies      Objective:      Physical Exam   Constitutional: She is oriented to person, place, and time. She appears well-developed and well-nourished.   HENT:   Head: Normocephalic and atraumatic.   Mouth/Throat: Oropharynx is clear and moist.   Eyes: Conjunctivae and EOM are normal. Pupils are equal, round, and reactive to light.   Neck: Neck supple.   Cardiovascular: Normal rate, regular rhythm, normal heart sounds and intact distal pulses.    Pulmonary/Chest: Effort normal and breath sounds normal.   Abdominal: Soft. Bowel sounds are normal.   Musculoskeletal:        Lumbar back: She exhibits decreased range of motion, tenderness, pain and spasm. She exhibits no bony tenderness.   Radicular pain into right thigh area -    Neurological: She is alert and oriented to person, place, and time.   Skin: Skin is warm and dry.   Psychiatric: She has a normal mood and affect. Her behavior is normal.   Nursing note and vitals reviewed.      Assessment:       1. Lumbar radiculopathy    2. Back pain, unspecified back location, unspecified back pain laterality, unspecified chronicity    3. Pain of right hip joint    4. Bilateral low back pain without sciatica, unspecified chronicity    5. Dementia without behavioral disturbance,  unspecified dementia type    6. DDD (degenerative disc disease), lumbar        Plan:       Lumbar radiculopathy  -     MRI Lumbar Spine Without Contrast; Future; Expected date: 01/26/2018  -     Ambulatory referral to Pain Clinic    Back pain, unspecified back location, unspecified back pain laterality, unspecified chronicity  -     MRI Lumbar Spine Without Contrast; Future; Expected date: 01/26/2018  -     Ambulatory referral to Pain Clinic    Pain of right hip joint  -     MRI Lumbar Spine Without Contrast; Future; Expected date: 01/26/2018  -     Ambulatory referral to Pain Clinic    Bilateral low back pain without sciatica, unspecified chronicity    Dementia without behavioral disturbance, unspecified dementia type  On aricept     DDD (degenerative disc disease), lumbar    Reviewed with patient and  in detail about her lumbar xray   Reviewed options for helping her with her pain -   Reviewed pathology of back and education on resources   Patient and  agreeable to MRI and then proceed  >30 min spent with patient and           No Follow-up on file.

## 2018-02-07 ENCOUNTER — HOSPITAL ENCOUNTER (OUTPATIENT)
Dept: RADIOLOGY | Facility: HOSPITAL | Age: 83
Discharge: HOME | End: 2018-02-07
Attending: NURSE PRACTITIONER
Payer: MEDICARE

## 2018-02-07 DIAGNOSIS — M25.551 PAIN OF RIGHT HIP JOINT: ICD-10-CM

## 2018-02-07 DIAGNOSIS — M54.9 BACK PAIN, UNSPECIFIED BACK LOCATION, UNSPECIFIED BACK PAIN LATERALITY, UNSPECIFIED CHRONICITY: ICD-10-CM

## 2018-02-07 DIAGNOSIS — M54.16 LUMBAR RADICULOPATHY: ICD-10-CM

## 2018-02-07 PROCEDURE — 72148 MRI LUMBAR SPINE W/O DYE: CPT | Mod: TC,PO

## 2018-02-07 PROCEDURE — 72148 MRI LUMBAR SPINE W/O DYE: CPT | Mod: 26,,, | Performed by: RADIOLOGY

## 2018-02-09 ENCOUNTER — TELEPHONE (OUTPATIENT)
Dept: FAMILY MEDICINE | Facility: CLINIC | Age: 83
End: 2018-02-09

## 2018-02-09 NOTE — TELEPHONE ENCOUNTER
----- Message from Rui Ruth sent at 2/9/2018 10:45 AM CST -----  Contact:  - Latasha Culp  States that he has questions about the results of the MRI.  They would like to know what is the next thing to do about the patient's pain.  Can you please call him back at [phone].  Thank you

## 2018-02-21 DIAGNOSIS — R41.3 MEMORY LOSS: ICD-10-CM

## 2018-02-21 RX ORDER — DONEPEZIL HYDROCHLORIDE 10 MG/1
TABLET, FILM COATED ORAL
Qty: 30 TABLET | Refills: 11 | Status: SHIPPED | OUTPATIENT
Start: 2018-02-21 | End: 2019-02-22 | Stop reason: SDUPTHER

## 2018-02-25 ENCOUNTER — NURSE TRIAGE (OUTPATIENT)
Dept: ADMINISTRATIVE | Facility: CLINIC | Age: 83
End: 2018-02-25

## 2018-02-25 DIAGNOSIS — F03.90 DEMENTIA WITHOUT BEHAVIORAL DISTURBANCE, UNSPECIFIED DEMENTIA TYPE: Primary | ICD-10-CM

## 2018-02-25 NOTE — TELEPHONE ENCOUNTER
"  Reason for Disposition   Caller has NON-URGENT medication question about med that PCP prescribed and triager unable to answer question    Answer Assessment - Initial Assessment Questions  1. SYMPTOMS: "Do you have any symptoms?"      Occasional nausea, vomiting, and diarrhea with Aricept  2. SEVERITY: If symptoms are present, ask "Are they mild, moderate or severe?"    Protocols used: ST MEDICATION QUESTION CALL-A-    Patient's  called to discuss the side effects he has noticed his wife has while taking the Aricept; she has nausea, decreased appetite, vomiting and diarrhea occasionally. Advised Mr. Culp to speak with a pharmacist and the pt's pcp to discuss trying another medication since the side effects have affected patient's appetite. Mr. Culp states she has not vomited or had diarrhea within the last 24 hours. Mr. Culp accepts care advice.   "

## 2018-02-26 NOTE — TELEPHONE ENCOUNTER
Greetings,     Patient has side effects with the Aricept and her  would like a referral to neurology for the memory loss. Please contact  Robbi at your earliest convenience.     Thank you,   Susana Staley RN   OOC  (Routing comment)

## 2018-02-28 ENCOUNTER — OFFICE VISIT (OUTPATIENT)
Dept: PODIATRY | Facility: CLINIC | Age: 83
End: 2018-02-28
Payer: MEDICARE

## 2018-02-28 VITALS — BODY MASS INDEX: 22.38 KG/M2 | HEIGHT: 63 IN | WEIGHT: 126.31 LBS

## 2018-02-28 DIAGNOSIS — R20.2 PARESTHESIA OF FOOT, BILATERAL: ICD-10-CM

## 2018-02-28 DIAGNOSIS — B35.1 ONYCHOMYCOSIS DUE TO DERMATOPHYTE: ICD-10-CM

## 2018-02-28 DIAGNOSIS — M19.079 ARTHRITIS OF ANKLE JOINT: ICD-10-CM

## 2018-02-28 DIAGNOSIS — G60.9 IDIOPATHIC PERIPHERAL NEUROPATHY: Primary | ICD-10-CM

## 2018-02-28 PROCEDURE — 99213 OFFICE O/P EST LOW 20 MIN: CPT | Mod: 25,S$PBB,, | Performed by: PODIATRIST

## 2018-02-28 PROCEDURE — 11721 DEBRIDE NAIL 6 OR MORE: CPT | Mod: PBBFAC,PN | Performed by: PODIATRIST

## 2018-02-28 PROCEDURE — 11721 DEBRIDE NAIL 6 OR MORE: CPT | Mod: S$PBB,Q9,, | Performed by: PODIATRIST

## 2018-02-28 PROCEDURE — 99213 OFFICE O/P EST LOW 20 MIN: CPT | Mod: PBBFAC,PN,25 | Performed by: PODIATRIST

## 2018-02-28 PROCEDURE — 99999 PR PBB SHADOW E&M-EST. PATIENT-LVL III: CPT | Mod: PBBFAC,,, | Performed by: PODIATRIST

## 2018-02-28 NOTE — TELEPHONE ENCOUNTER
Attempted to contact pt. Left message on voicemail for pt to return call to clinic. Closing message as this is the third attempt to contact.

## 2018-03-01 NOTE — PROGRESS NOTES
Subjective:      Patient ID: Breanne Culp is a 84 y.o. female.    Chief Complaint: Routine Foot Care (IPN  PCP Timbo  1/26/18 ); Nail Care; and Ankle Pain (Rt ankle)    Breanne LEÓN is a 84 y.o. female who presents to the clinic for evaluation and treatment of high risk feet. Breanne LEÓN has a past medical history of Anticoagulant long-term use; Arthritis; Cataract; DDD (degenerative disc disease), lumbar; Glaucoma; Hyperlipidemia; Low back pain; Osteoporosis; Positive PPD (1970); and Seasonal allergies. The patient's chief complaint is long, thick toenails.  Denies being painful with wearing shoe gear.  Has not attempted to self treat.  Also, relates continued sporadic paresthesias in bilateral foot while at rest.  She continues taking alpha lipoic acid daily, however, is uncertain as to the efficacy.  Also, relates having new onset of Rt. Ankle pain.  Describes pain from the ankle as aching and rates as a 5/10.  Symptoms are exacerbated with prolonged walking and alleviated with rest.  Denies definitive injury to the ankle.  Has not attempted to self treat.  Denies any additional pedal complaints.    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    Seasonal allergies        Past Surgical History:   Procedure Laterality Date    BREAST BIOPSY Right     2012 neg    CATARACT EXTRACTION W/  INTRAOCULAR LENS IMPLANT Bilateral 10/15/14     Dr Wright    COLONOSCOPY      Epidural Steroid injection      Pain Management    TONSILLECTOMY  1941       Family History   Problem Relation Age of Onset    Cancer Father      Rectal    Glaucoma Mother     Cancer Mother      lung, throat    Cancer Brother      throat     Hypertension Brother     Stroke Brother     Diabetes Sister     Stroke Sister     Amblyopia Neg Hx     Blindness Neg Hx     Cataracts Neg Hx     Macular degeneration Neg Hx     Retinal detachment Neg Hx     Strabismus Neg Hx     Thyroid disease Neg Hx     Nephrolithiasis Neg Hx        Social History     Social History    Marital status:      Spouse name: N/A    Number of children: N/A    Years of education: N/A     Social History Main Topics    Smoking status: Former Smoker     Packs/day: 0.25     Start date: 7/24/1961     Quit date: 6/11/1992    Smokeless tobacco: Never Used    Alcohol use No    Drug use: No    Sexual activity: Not Asked     Other Topics Concern    None     Social History Narrative    None       Current Outpatient Prescriptions   Medication Sig Dispense Refill    JUAN/INULIN/C-LOSE/MV-MN/FA (FIBER PLUS MULITVITAMIN ORAL) No Sig Provided      acetaminophen (TYLENOL) 500 MG tablet Take 500 mg by mouth every 6 (six) hours as needed for Pain.      aspirin 81 mg Tab Every day      calcium-vitamin D (CALCIUM-VITAMIN D) 500 mg(1,250mg) -200 unit per tablet Every day      donepezil (ARICEPT) 10 MG tablet TAKE 1 TABLET BY MOUTH EVERY EVENING 30 tablet 11    fexofenadine (ALLEGRA) 30 MG tablet Take 30 mg by mouth 2 (two) times daily. As needed      fluticasone (FLONASE) 50 mcg/actuation nasal spray 2 sprays by Each Nare route once daily. 16 g 11    meloxicam (MOBIC) 7.5 MG tablet Take 1 tablet (7.5 mg total) by mouth once daily. (Patient taking differently: Take 7.5 mg by mouth once daily. ) 30 tablet 0    omega-3 fatty acids-vitamin E (FISH OIL) 1,000 mg Cap Every day      oxybutynin (DITROPAN-XL) 10 MG 24 hr tablet Take 1 tablet (10 mg total) by mouth once daily. 30 tablet 11    PROPYLENE GLYCOL/ (SYSTANE OPHT) Place 1 drop into both eyes 2 (two) times daily.      diclofenac sodium (VOLTAREN) 1 % Gel Apply 2 g topically 3 (three) times daily. 1 Tube 3     No  current facility-administered medications for this visit.        Review of patient's allergies indicates:   Allergen Reactions    Brimonidine Other (See Comments)     Redness and irritation of eyes         Review of Systems   Constitution: Negative for chills, decreased appetite, fever and weakness.   Cardiovascular: Negative for claudication and leg swelling.   Skin: Positive for color change, dry skin and nail changes.   Musculoskeletal: Positive for arthritis. Negative for joint pain.   Neurological: Positive for paresthesias.   Psychiatric/Behavioral: Negative for altered mental status.           Objective:      Physical Exam   Constitutional: She is oriented to person, place, and time. She appears well-developed and well-nourished. No distress.   Cardiovascular:   Pulses:       Dorsalis pedis pulses are 2+ on the right side, and 2+ on the left side.        Posterior tibial pulses are 1+ on the right side, and 1+ on the left side.   CFT <3 seconds bilateral.  Pedal hair growth decreased bilateral.   No varicosities noted bilateral. Mild nonpitting edema noted to bilateral lower extremity.  Toes are cool to touch bilateral.     Musculoskeletal: She exhibits tenderness. She exhibits no edema.   Muscle strength 5/5 in all muscle groups bilateral.  No tenderness nor crepitation with ROM of foot/ankle joints bilateral.  Pain with palpation of the Rt. Anterior ankle gutter.  (-) anterior and posterior drawer sign bilateral.    Pes cavus foot type.  Bilateral hallux abducto valgus.  Bilateral semi-reducible contracture of toes 2-5.       Neurological: She is alert and oriented to person, place, and time. She has normal strength. A sensory deficit is present.   Protective sensation per [place] monofilament intact bilateral.    Vibratory sensation decreased bilateral.    Light touch intact bilateral.   Skin: Skin is warm, dry and intact. No abrasion, no bruising, no burn, no ecchymosis, no laceration, no  lesion, no petechiae and no rash noted. She is not diaphoretic. No cyanosis or erythema. No pallor. Nails show no clubbing.   Pedal skin is thin and atrophic bilateral.   Toenails x 10 appear thickened by 3 mm's, elongated by 5 mm's, and discolored with subungual debris. Examination of the skin reveals no evidence of significant rashes, open lesions, suspicious appearing nevi or other concerning lesions.                Assessment:       Encounter Diagnoses   Name Primary?    Idiopathic peripheral neuropathy Yes    Onychomycosis due to dermatophyte     Paresthesia of foot, bilateral     Arthritis of ankle joint          Plan:       Breanne LEÓN was seen today for routine foot care, nail care and ankle pain.    Diagnoses and all orders for this visit:    Idiopathic peripheral neuropathy    Onychomycosis due to dermatophyte    Paresthesia of foot, bilateral    Arthritis of ankle joint      I counseled the patient on her conditions, their implications and medical management.    Suspect that patient's Rt. Ankle pain is secondary to arthritis.  Advised to apply a combination of heat/ice therapy to the affected ankle daily.    Also, discussed application of aspercream or biofreeze to the affected joint 2-4 times daily.    Patient to continue taking 600 mg of alpha lipoic acid daily.  We discussed taking oral gabapentin, however, she would like to try ALA for one final month.    Patient instructed on proper foot hygeine. We discussed wearing proper shoe gear, daily foot inspections, never walking without protective shoe gear, never putting sharp instruments to feet    With patient's permission, nails were aggressively reduced and debrided x 10 to their soft tissue attachment mechanically and with electric , removing all offending nail and debris. Patient relates relief following the procedure. She will continue to monitor the areas daily, inspect her feet, wear protective shoe gear when ambulatory, moisturizer to  maintain skin integrity and follow in this office in approximately 2-3 months, sooner p.r.n.    Follow-up in about 3 months (around 5/28/2018).    Tahir Person DPM

## 2018-03-08 ENCOUNTER — OFFICE VISIT (OUTPATIENT)
Dept: PAIN MEDICINE | Facility: CLINIC | Age: 83
End: 2018-03-08
Payer: MEDICARE

## 2018-03-08 VITALS
RESPIRATION RATE: 20 BRPM | HEART RATE: 88 BPM | SYSTOLIC BLOOD PRESSURE: 141 MMHG | DIASTOLIC BLOOD PRESSURE: 68 MMHG | WEIGHT: 125.69 LBS | BODY MASS INDEX: 22.26 KG/M2 | TEMPERATURE: 97 F | OXYGEN SATURATION: 98 %

## 2018-03-08 DIAGNOSIS — M54.16 BILATERAL LUMBAR RADICULOPATHY: Primary | ICD-10-CM

## 2018-03-08 DIAGNOSIS — M48.062 SPINAL STENOSIS OF LUMBAR REGION WITH NEUROGENIC CLAUDICATION: ICD-10-CM

## 2018-03-08 DIAGNOSIS — M51.36 DDD (DEGENERATIVE DISC DISEASE), LUMBAR: ICD-10-CM

## 2018-03-08 DIAGNOSIS — M16.11 PRIMARY OSTEOARTHRITIS OF RIGHT HIP: ICD-10-CM

## 2018-03-08 PROCEDURE — 99213 OFFICE O/P EST LOW 20 MIN: CPT | Mod: PBBFAC,PN | Performed by: ANESTHESIOLOGY

## 2018-03-08 PROCEDURE — 99999 PR PBB SHADOW E&M-EST. PATIENT-LVL III: CPT | Mod: PBBFAC,,, | Performed by: ANESTHESIOLOGY

## 2018-03-08 PROCEDURE — 99213 OFFICE O/P EST LOW 20 MIN: CPT | Mod: S$PBB,,, | Performed by: ANESTHESIOLOGY

## 2018-03-08 RX ORDER — GABAPENTIN 100 MG/1
100 CAPSULE ORAL NIGHTLY
Qty: 30 CAPSULE | Refills: 2 | Status: SHIPPED | OUTPATIENT
Start: 2018-03-08 | End: 2018-05-14 | Stop reason: SDUPTHER

## 2018-03-08 NOTE — LETTER
March 11, 2018      Lona Gore, SAADIA  [address] [facility] [address] [place] [address]           [facility]  [address] [facility] [address] [place] [address]  Phone: [phone]  Fax: [phone]          Patient: Breanne Culp   MR Number: [id number]   YOB: 1934   Date of Visit: 3/8/2018       Dear Lona Gore:    Thank you for referring Breanne Culp to me for evaluation. Attached you will find relevant portions of my assessment and plan of care.    If you have questions, please do not hesitate to call me. I look forward to following Breanne Culp along with you.    Sincerely,    Tashi Morrison MD    Enclosure  CC:  No Recipients    If you would like to receive this communication electronically, please contact [email or web address] or [phone] to request more information on [facility] access.    For providers and/or their staff who would like to refer a patient to [facility], please contact us through our one-stop-shop provider referral line, [facility], at [phone].    If you feel you have received this communication in error or would no longer like to receive these types of communications, please e-mail [email or web address]

## 2018-03-08 NOTE — PROGRESS NOTES
This note was completed with dictation software and grammatical errors may exist.    CC:Back pain    HPI: The patient is an 84-year-old woman with a history of osteoporosis, low back pain who initially presented in referral from Autumn Mccormick NP for worsening low back pain and right leg pain.  She returns in follow-up today for back pain and leg pain, it has been about 2 years since I've seen her.  We had performed some lumbar epidural steroid injections that provided significant relief but did not seem to last more than 2-3 months.  She states that her pain is getting worse, interfering with her ability to stand and walk.  She begins having pain almost immediately upon standing and walking, located in the bilateral low back throughout the posterior thighs into the lower legs.  Pain is slightly worse on the right than the left.  She states that the pain is burning, throbbing, electric and causes cramping in her legs as well.  She has having some difficulty with sleeping secondary to the pain.  Her other complaint is right lateral hip and right groin pain, right buttock pain.  This seems to be worse with standing and walking, rotating her leg or sleeping on that side.  She denies any bowel or bladder incontinence.    Pain intervention history: She takes Mobic and Tylenol without relief, no recent physical therapy.  She is status post L5/S1 interlaminar epidural steroid injection on 8/4/14 with 50% relief of her back pain and 100% relief of her leg pain.  She is status post L5/S1 interlaminar epidural steroid injection 11/18/14 with about 20% relief.  She is status post bilateral L5 transforaminal epidural steroid injections on 3/14/16 with a little less than 50% relief.    ROS:She reports fevers chills, weight loss, rash some itching, color change, texture change her skin, vision change double vision swallowing plans running nose stomach pain appetite change easy bruising and back pain.  Balance of review of systems  is negative.    Medical, surgical, family and social history reviewed elsewhere in record.    Medications/Allergies: See med card    Vitals:    03/08/18 0759   BP: (!) 141/68   Pulse: 88   Resp: 20   Temp: 97 °F (36.1 °C)   TempSrc: Oral   SpO2: 98%   Weight: 57 kg (125 lb 10.6 oz)   PainSc:   6   PainLoc: Back         Physical exam:  Gen: A and O x3, pleasant, well-groomed  Skin: No rashes or obvious lesions  HEENT: PERRLA  CVS: Regular rate and rhythm, normal S1 and S2, no murmurs.  Resp: Clear to auscultation bilaterally, no wheezes or rales.  Abdomen: Soft, NT/ND, normal bowel sounds present.  Musculoskeletal: Presents in a wheelchair, needs a walker to ambulate..    Neuro:  Lower extremities: 4/5 strength bilaterally  Reflexes: Patellar 1+, Achilles 0+ bilaterally.  Sensory:  Intact and symmetrical to light touch and pinprick in L2-S1 dermatomes bilaterally.    Lumbar spine:  Lumbar spine: Range of motion is severely decreased with extension and mild reduced with flexion with increased pain primarily on extension.  Spike's test causes no increased pain on either side.    Supine straight leg raise is negative bilaterally.    Internal and external rotation of the hip causes some pain in the right groin.  Myofascial exam: No tenderness to palpation across lumbar paraspinous muscles.    Imaging:  3/11/14 Xray:  Intervertebral disk height loss is noted at the L5-S1 level. Mild anterior listhesis of the L5 on S1 vertebral body appears to present of 4 mm. Atherosclerotic calcifications are noted within the aorta. Anterior bridging osteophytes are noted at the L1-L2 and L2-L3 levels. Vertebral body heights appear well preserved. Facet arthropathy is noted at the L4-L5 and L5-S1 levels.    11/20/17 Xray L-spine  The bones are osteopenic.  There is advanced multilevel degenerative change of the lumbar spine in the form of marginal osteophyte formation, facet arthropathy, and disc space narrowing.  Space narrowing is  most pronounced at L5-S1.  No acute lumbar compression fracture or osseous destructive process.  No definite spondylolisthesis.  There is a prominent right-sided lateral osteophyte present at L2-L3.  The sacroiliac joints are unremarkable.  There is advanced degenerative change of both hip joints resulting in moderate hip joint space narrowing, right greater than left.    2/7/18 MRI L-spine  T12-L1: There is no spinal canal or foraminal stenosis.  There is no change.  L1-2: There is no spinal canal or foraminal stenosis.  There is mild facet joint arthropathy.  There is no significant change.  L2-3: There is disc space narrowing.  There is a diffuse disc bulge with mild to moderate facet joint arthropathy and ligamentum flavum thickening.  There is borderline spinal stenosis with mild bilateral foraminal stenosis without significant change.  L3-4: There is a diffuse disc bulge, facet joint arthropathy with ligamentum flavum thickening resulting in borderline to mild spinal stenosis with mild to moderate bilateral foraminal stenosis, slightly progressed.  L4-5:There is disc space narrowing.  There is moderate diffuse disc bulge with osteophytic ridging and superimposed right foraminal disc protrusion.  There is facet joint arthropathy with ligamentum flavum thickening.  There is mild to moderate central spinal stenosis with moderate to marked right and moderate left foraminal stenosis without significant change.  L5-S1: There is mild anterolisthesis of L5 on S1.  There is disc space narrowing.  There is moderate marked facet joint arthropathy with ligamentum flavum thickening.  There is unroofing of a moderate diffuse disc bulge with osteophytic ridging.  There is mild spinal stenosis.  There is moderate to marked bilateral lateral recess stenosis with severe bilateral foraminal stenosis without significant change.    Assessment:   The patient is an 84-year-old woman with a history of osteoporosis, low back pain who  initially presented in referral from Autumn Mccormick NP for worsening low back pain and right leg pain.     1. Bilateral lumbar radiculopathy     2. DDD (degenerative disc disease), lumbar     3. Spinal stenosis of lumbar region with neurogenic claudication     4. Primary osteoarthritis of right hip         Plan:  1.  We reviewed her MRI that shows canal stenosis at L4/5 and bilateral foraminal stenosis at L5/S1.  Unfortunately she has not had relief more than about 2 months with the previous injections.  We discussed that she is not a great candidate for surgery.  We discussed possibly trying gabapentin at a very low dose because I'm concerned about any adverse side effects but we will start at 100 mg nightly to see if this helps her sleep and may provide some benefit.  I will have her follow-up in about 3-4 weeks or sooner as needed.  If she is not getting benefit, I would set her up for a bilateral L5/S1 transforaminal injection to at least help with her leg pain since this seems to affect her more than her back.  We also discussed possibly setting up medial branch blocks for her back.  2.  For her right hip joint pain, she has never had a hip joint injection and that may be an option as well.  3.  I'll see her in follow-up in several weeks or sooner as needed.

## 2018-04-09 ENCOUNTER — OFFICE VISIT (OUTPATIENT)
Dept: PAIN MEDICINE | Facility: CLINIC | Age: 83
End: 2018-04-09
Payer: MEDICARE

## 2018-04-09 VITALS
TEMPERATURE: 96 F | BODY MASS INDEX: 22.24 KG/M2 | WEIGHT: 125.56 LBS | DIASTOLIC BLOOD PRESSURE: 78 MMHG | RESPIRATION RATE: 18 BRPM | OXYGEN SATURATION: 96 % | SYSTOLIC BLOOD PRESSURE: 133 MMHG | HEART RATE: 74 BPM

## 2018-04-09 DIAGNOSIS — M54.16 LUMBAR RADICULOPATHY: Primary | ICD-10-CM

## 2018-04-09 DIAGNOSIS — M48.062 SPINAL STENOSIS OF LUMBAR REGION WITH NEUROGENIC CLAUDICATION: ICD-10-CM

## 2018-04-09 DIAGNOSIS — G60.9 IDIOPATHIC PERIPHERAL NEUROPATHY: ICD-10-CM

## 2018-04-09 PROCEDURE — 99999 PR PBB SHADOW E&M-EST. PATIENT-LVL IV: CPT | Mod: PBBFAC,,, | Performed by: ANESTHESIOLOGY

## 2018-04-09 PROCEDURE — 99213 OFFICE O/P EST LOW 20 MIN: CPT | Mod: S$PBB,,, | Performed by: ANESTHESIOLOGY

## 2018-04-09 PROCEDURE — 99214 OFFICE O/P EST MOD 30 MIN: CPT | Mod: PBBFAC,PN | Performed by: ANESTHESIOLOGY

## 2018-04-09 NOTE — PROGRESS NOTES
This note was completed with dictation software and grammatical errors may exist.    CC:Back pain    HPI: The patient is an 84-year-old woman with a history of osteoporosis, low back pain who initially presented in referral from Autumn Mccormick NP for worsening low back pain and right leg pain. She returns in follow-up today, one month since the last visit, I had started her on 100 mg gabapentin nightly, she feels that she is unsure if this has helped her back and lower extremity pain.  She denies any side effects with this medication.  She has also seen podiatry and started Alpha Lipoic Acid, unsure if this has started helping with her neuropathy.  She continues to have pain with standing and walking, worse pain when laying down at night.  She denies any new weakness, no bowel or bladder incontinence.    Pain intervention history: She takes Mobic and Tylenol without relief, no recent physical therapy.  She is status post L5/S1 interlaminar epidural steroid injection on 8/4/14 with 50% relief of her back pain and 100% relief of her leg pain.  She is status post L5/S1 interlaminar epidural steroid injection 11/18/14 with about 20% relief.  She is status post bilateral L5 transforaminal epidural steroid injections on 3/14/16 with a little less than 50% relief.    ROS:She reports fevers chills, weight loss, rash some itching, color change, texture change her skin, vision change double vision swallowing plans running nose stomach pain appetite change easy bruising and back pain.  Balance of review of systems is negative.    Medical, surgical, family and social history reviewed elsewhere in record.    Medications/Allergies: See med card    Vitals:    04/09/18 0824   BP: 133/78   Pulse: 74   Resp: 18   Temp: 96.3 °F (35.7 °C)   TempSrc: Oral   SpO2: 96%   Weight: 56.9 kg (125 lb 8.8 oz)   PainSc:   4   PainLoc: Back         Physical exam:  Gen: A and O x3, pleasant, well-groomed  Skin: No rashes or obvious lesions  HEENT:  PERRLA  CVS: Regular rate and rhythm, normal S1 and S2, no murmurs.  Resp: Clear to auscultation bilaterally, no wheezes or rales.  Abdomen: Soft, NT/ND, normal bowel sounds present.  Musculoskeletal: Presents in a wheelchair, needs a walker to ambulate..    Neuro:  Lower extremities: 4/5 strength bilaterally  Reflexes: Patellar 1+, Achilles 0+ bilaterally.  Sensory:  Intact and symmetrical to light touch and pinprick in L2-S1 dermatomes bilaterally.    Lumbar spine:  Lumbar spine: Range of motion is severely decreased with extension and mild reduced with flexion with increased pain primarily on extension.  Spike's test causes no increased pain on either side.    Supine straight leg raise is negative bilaterally.    Internal and external rotation of the hip causes some pain in the right groin.  Myofascial exam: No tenderness to palpation across lumbar paraspinous muscles.    Imaging:  3/11/14 Xray:  Intervertebral disk height loss is noted at the L5-S1 level. Mild anterior listhesis of the L5 on S1 vertebral body appears to present of 4 mm. Atherosclerotic calcifications are noted within the aorta. Anterior bridging osteophytes are noted at the L1-L2 and L2-L3 levels. Vertebral body heights appear well preserved. Facet arthropathy is noted at the L4-L5 and L5-S1 levels.    11/20/17 Xray L-spine  The bones are osteopenic.  There is advanced multilevel degenerative change of the lumbar spine in the form of marginal osteophyte formation, facet arthropathy, and disc space narrowing.  Space narrowing is most pronounced at L5-S1.  No acute lumbar compression fracture or osseous destructive process.  No definite spondylolisthesis.  There is a prominent right-sided lateral osteophyte present at L2-L3.  The sacroiliac joints are unremarkable.  There is advanced degenerative change of both hip joints resulting in moderate hip joint space narrowing, right greater than left.    2/7/18 MRI L-spine  T12-L1: There is no spinal  canal or foraminal stenosis.  There is no change.  L1-2: There is no spinal canal or foraminal stenosis.  There is mild facet joint arthropathy.  There is no significant change.  L2-3: There is disc space narrowing.  There is a diffuse disc bulge with mild to moderate facet joint arthropathy and ligamentum flavum thickening.  There is borderline spinal stenosis with mild bilateral foraminal stenosis without significant change.  L3-4: There is a diffuse disc bulge, facet joint arthropathy with ligamentum flavum thickening resulting in borderline to mild spinal stenosis with mild to moderate bilateral foraminal stenosis, slightly progressed.  L4-5:There is disc space narrowing.  There is moderate diffuse disc bulge with osteophytic ridging and superimposed right foraminal disc protrusion.  There is facet joint arthropathy with ligamentum flavum thickening.  There is mild to moderate central spinal stenosis with moderate to marked right and moderate left foraminal stenosis without significant change.  L5-S1: There is mild anterolisthesis of L5 on S1.  There is disc space narrowing.  There is moderate marked facet joint arthropathy with ligamentum flavum thickening.  There is unroofing of a moderate diffuse disc bulge with osteophytic ridging.  There is mild spinal stenosis.  There is moderate to marked bilateral lateral recess stenosis with severe bilateral foraminal stenosis without significant change.    Assessment:   The patient is an 84-year-old woman with a history of osteoporosis, low back pain who initially presented in referral from Autumn Mccormick NP for worsening low back pain and right leg pain.     1. Lumbar radiculopathy     2. Idiopathic peripheral neuropathy     3. Spinal stenosis of lumbar region with neurogenic claudication         Plan:  1.  I will have her continue the gabapentin 100 mg nightly, I asked if she could increase this but she would like to give this a little bit more time since she is also  taking the ALA.  I told her to give it a month and if this is not helping increase to 200 mg nightly.  2. I will have her follow-up in about 8 weeks or sooner as needed.  If she is not getting benefit, I would set her up for a bilateral L5/S1 transforaminal injection to at least help with the back and leg pain.

## 2018-04-23 ENCOUNTER — TELEPHONE (OUTPATIENT)
Dept: OPHTHALMOLOGY | Facility: CLINIC | Age: 83
End: 2018-04-23

## 2018-04-30 ENCOUNTER — TELEPHONE (OUTPATIENT)
Dept: RHEUMATOLOGY | Facility: CLINIC | Age: 83
End: 2018-04-30

## 2018-05-14 RX ORDER — GABAPENTIN 100 MG/1
CAPSULE ORAL
Qty: 30 CAPSULE | Refills: 0 | Status: SHIPPED | OUTPATIENT
Start: 2018-05-14 | End: 2018-06-06 | Stop reason: SDUPTHER

## 2018-05-30 ENCOUNTER — OFFICE VISIT (OUTPATIENT)
Dept: PODIATRY | Facility: CLINIC | Age: 83
End: 2018-05-30
Payer: MEDICARE

## 2018-05-30 VITALS — WEIGHT: 125.44 LBS | BODY MASS INDEX: 22.23 KG/M2 | HEIGHT: 63 IN

## 2018-05-30 DIAGNOSIS — B35.1 ONYCHOMYCOSIS DUE TO DERMATOPHYTE: ICD-10-CM

## 2018-05-30 DIAGNOSIS — R20.2 PARESTHESIA OF FOOT, BILATERAL: ICD-10-CM

## 2018-05-30 DIAGNOSIS — G60.9 IDIOPATHIC PERIPHERAL NEUROPATHY: Primary | ICD-10-CM

## 2018-05-30 PROCEDURE — 11721 DEBRIDE NAIL 6 OR MORE: CPT | Mod: PBBFAC,PN | Performed by: PODIATRIST

## 2018-05-30 PROCEDURE — 99499 UNLISTED E&M SERVICE: CPT | Mod: S$PBB,,, | Performed by: PODIATRIST

## 2018-05-30 PROCEDURE — 99999 PR PBB SHADOW E&M-EST. PATIENT-LVL III: CPT | Mod: PBBFAC,,, | Performed by: PODIATRIST

## 2018-05-30 PROCEDURE — 99213 OFFICE O/P EST LOW 20 MIN: CPT | Mod: PBBFAC,PN,25 | Performed by: PODIATRIST

## 2018-05-30 PROCEDURE — 11721 DEBRIDE NAIL 6 OR MORE: CPT | Mod: S$PBB,Q9,, | Performed by: PODIATRIST

## 2018-05-30 NOTE — PROGRESS NOTES
Subjective:      Patient ID: Breanne Culp is a 84 y.o. female.    Chief Complaint: Peripheral Neuropathy (Sofía 1/26/18)    Breanne LEÓN is a 84 y.o. female who presents to the clinic for evaluation and treatment of high risk feet. Breanne LEÓN has a past medical history of Anticoagulant long-term use; Arthritis; Cataract; DDD (degenerative disc disease), lumbar; Glaucoma; Hyperlipidemia; Low back pain; Osteoporosis; Positive PPD (1970); and Seasonal allergies. The patient's chief complaint is long, thick toenails.  Denies being painful with wearing shoe gear.  Has not attempted to self treat.  Also, relates continued paresthesias in bilateral foot.  States that symptoms are constant while at rest and alleviated with weight bearing.  Currently rates pain as a 6/10.  Is interested in trying another pain cream in hopes to obtain symptomatic relief.   Has not attempted to self treat.  Denies any additional pedal complaints.    PCP: Rui Izquierdo MD;  Date Last Seen by PCP: 2/25/18      Past Medical History:   Diagnosis Date    Anticoagulant long-term use     ASA 81mg, Fish Oil    Arthritis     Cataract     OU done//    DDD (degenerative disc disease), lumbar     Glaucoma     suspect    Hyperlipidemia     Low back pain     Osteoporosis     Positive PPD 1970    Seasonal allergies        Past Surgical History:   Procedure Laterality Date    BREAST BIOPSY Right     2012 neg    CATARACT EXTRACTION W/  INTRAOCULAR LENS IMPLANT Bilateral 10/15/14     Dr Wright    COLONOSCOPY      Epidural Steroid injection      Pain Management    TONSILLECTOMY  1941       Family History   Problem Relation Age of Onset    Cancer Father         Rectal    Glaucoma Mother     Cancer Mother         lung, throat    Cancer Brother         throat    Hypertension Brother     Stroke Brother     Diabetes Sister     Stroke Sister     Amblyopia Neg Hx     Blindness Neg Hx     Cataracts Neg Hx     Macular degeneration  Neg Hx     Retinal detachment Neg Hx     Strabismus Neg Hx     Thyroid disease Neg Hx     Nephrolithiasis Neg Hx        Social History     Social History    Marital status:      Spouse name: N/A    Number of children: N/A    Years of education: N/A     Social History Main Topics    Smoking status: Former Smoker     Packs/day: 0.25     Start date: 7/24/1961     Quit date: 6/11/1992    Smokeless tobacco: Never Used    Alcohol use No    Drug use: No    Sexual activity: Not Asked     Other Topics Concern    None     Social History Narrative    None       Current Outpatient Prescriptions   Medication Sig Dispense Refill    JUAN/INULIN/C-LOSE/MV-MN/FA (FIBER PLUS MULITVITAMIN ORAL) No Sig Provided      acetaminophen (TYLENOL) 500 MG tablet Take 500 mg by mouth every 6 (six) hours as needed for Pain.      aspirin 81 mg Tab Every day      calcium-vitamin D (CALCIUM-VITAMIN D) 500 mg(1,250mg) -200 unit per tablet Every day      diclofenac sodium (VOLTAREN) 1 % Gel Apply 2 g topically 3 (three) times daily. 1 Tube 3    donepezil (ARICEPT) 10 MG tablet TAKE 1 TABLET BY MOUTH EVERY EVENING 30 tablet 11    fexofenadine (ALLEGRA) 30 MG tablet Take 30 mg by mouth 2 (two) times daily. As needed      fluticasone (FLONASE) 50 mcg/actuation nasal spray 2 sprays by Each Nare route once daily. 16 g 11    gabapentin (NEURONTIN) 100 MG capsule TAKE ONE CAPSULE BY MOUTH EVERY EVENING 30 capsule 0    meloxicam (MOBIC) 7.5 MG tablet Take 1 tablet (7.5 mg total) by mouth once daily. (Patient taking differently: Take 7.5 mg by mouth once daily. ) 30 tablet 0    omega-3 fatty acids-vitamin E (FISH OIL) 1,000 mg Cap Every day      oxybutynin (DITROPAN-XL) 10 MG 24 hr tablet Take 1 tablet (10 mg total) by mouth once daily. 30 tablet 11    PROPYLENE GLYCOL/ (SYSTANE OPHT) Place 1 drop into both eyes 2 (two) times daily.       No current facility-administered medications for this visit.        Review of  patient's allergies indicates:   Allergen Reactions    Brimonidine Other (See Comments)     Redness and irritation of eyes         Review of Systems   Constitution: Negative for chills, decreased appetite, fever and weakness.   Cardiovascular: Negative for claudication and leg swelling.   Skin: Positive for color change, dry skin and nail changes.   Musculoskeletal: Positive for arthritis. Negative for joint pain.   Neurological: Positive for paresthesias.   Psychiatric/Behavioral: Negative for altered mental status.           Objective:      Physical Exam   Constitutional: She is oriented to person, place, and time. She appears well-developed and well-nourished. No distress.   Cardiovascular:   Pulses:       Dorsalis pedis pulses are 2+ on the right side, and 2+ on the left side.        Posterior tibial pulses are 1+ on the right side, and 1+ on the left side.   CFT <3 seconds bilateral.  Pedal hair growth decreased bilateral.   No varicosities noted bilateral. Mild nonpitting edema noted to bilateral lower extremity.  Toes are cool to touch bilateral.     Musculoskeletal: She exhibits edema. She exhibits no tenderness.   Muscle strength 5/5 in all muscle groups bilateral.  No tenderness nor crepitation with ROM of foot/ankle joints bilateral.  No pain with palpation of bilateral foot and ankle.  Pes cavus foot type.  Bilateral hallux abducto valgus.  Bilateral semi-reducible contracture of toes 2-5.       Neurological: She is alert and oriented to person, place, and time. She has normal strength. A sensory deficit is present.   Protective sensation per Tyro-Raudel monofilament intact bilateral.    Vibratory sensation decreased bilateral.    Light touch intact bilateral.   Skin: Skin is warm, dry and intact. No abrasion, no bruising, no burn, no ecchymosis, no laceration, no lesion, no petechiae and no rash noted. She is not diaphoretic. No cyanosis or erythema. No pallor. Nails show no clubbing.   Pedal skin  is thin and atrophic bilateral.   Toenails x 10 appear thickened by 3 mm's, elongated by 3 mm's, and discolored with subungual debris. Examination of the skin reveals no evidence of significant rashes, open lesions, suspicious appearing nevi or other concerning lesions.                Assessment:       Encounter Diagnoses   Name Primary?    Idiopathic peripheral neuropathy Yes    Onychomycosis due to dermatophyte     Paresthesia of foot, bilateral          Plan:       Breanne LEÓN was seen today for peripheral neuropathy.    Diagnoses and all orders for this visit:    Idiopathic peripheral neuropathy    Onychomycosis due to dermatophyte    Paresthesia of foot, bilateral      I counseled the patient on her conditions, their implications and medical management.    Patient to continue taking 600 mg of alpha lipoic acid daily.  Prescription written for pain cream with gabapentin.  If this fails to alleviate symptoms in one month, will consider prescribing 300 mg of gabapentin daily.    Patient instructed on proper foot hygeine. We discussed wearing proper shoe gear, daily foot inspections, never walking without protective shoe gear, never putting sharp instruments to feet    With patient's permission, nails were aggressively reduced and debrided x 10 to their soft tissue attachment mechanically and with electric , removing all offending nail and debris. Patient relates relief following the procedure. She will continue to monitor the areas daily, inspect her feet, wear protective shoe gear when ambulatory, moisturizer to maintain skin integrity and follow in this office in approximately 2-3 months, sooner p.r.n.    Follow-up in about 3 months (around 8/30/2018).    Tahir Person DPM

## 2018-06-06 ENCOUNTER — OFFICE VISIT (OUTPATIENT)
Dept: PAIN MEDICINE | Facility: CLINIC | Age: 83
End: 2018-06-06
Payer: MEDICARE

## 2018-06-06 VITALS
SYSTOLIC BLOOD PRESSURE: 118 MMHG | TEMPERATURE: 99 F | HEART RATE: 98 BPM | RESPIRATION RATE: 20 BRPM | DIASTOLIC BLOOD PRESSURE: 80 MMHG | OXYGEN SATURATION: 95 %

## 2018-06-06 DIAGNOSIS — M54.16 LUMBAR RADICULOPATHY: Primary | ICD-10-CM

## 2018-06-06 DIAGNOSIS — M48.062 SPINAL STENOSIS OF LUMBAR REGION WITH NEUROGENIC CLAUDICATION: ICD-10-CM

## 2018-06-06 DIAGNOSIS — M51.36 DDD (DEGENERATIVE DISC DISEASE), LUMBAR: ICD-10-CM

## 2018-06-06 PROCEDURE — 99999 PR PBB SHADOW E&M-EST. PATIENT-LVL IV: CPT | Mod: PBBFAC,,, | Performed by: PHYSICIAN ASSISTANT

## 2018-06-06 PROCEDURE — 99213 OFFICE O/P EST LOW 20 MIN: CPT | Mod: S$PBB,,, | Performed by: PHYSICIAN ASSISTANT

## 2018-06-06 PROCEDURE — 99214 OFFICE O/P EST MOD 30 MIN: CPT | Mod: PBBFAC,PN | Performed by: PHYSICIAN ASSISTANT

## 2018-06-06 RX ORDER — GABAPENTIN 100 MG/1
200 CAPSULE ORAL NIGHTLY
Qty: 60 CAPSULE | Refills: 3 | Status: SHIPPED | OUTPATIENT
Start: 2018-06-06 | End: 2018-11-21 | Stop reason: SDUPTHER

## 2018-06-06 RX ORDER — ALPRAZOLAM 0.5 MG/1
1 TABLET, ORALLY DISINTEGRATING ORAL ONCE AS NEEDED
Status: CANCELLED | OUTPATIENT
Start: 2018-07-10 | End: 2018-07-10

## 2018-06-06 NOTE — PROGRESS NOTES
This note was completed with dictation software and grammatical errors may exist.    CC:Back pain    HPI: The patient is an 84-year-old woman with a history of osteoporosis, low back pain who initially presented in referral from Autumn Mccormick NP for worsening low back pain and right leg pain. She returns in follow-up today with low back and leg pain.  She plans a bilateral low back pain radiating to the buttocks, right greater than left thighs and lower legs.  This is significantly worse with standing and walking, improved with sitting.  She has been taking gabapentin 100 mg nightly with unknown relief.  She denies any numbness today, denies bladder or bowel incontinence.  She reports weakness in her right greater than left legs.    Pain intervention history: She takes Mobic and Tylenol without relief, no recent physical therapy.  She is status post L5/S1 interlaminar epidural steroid injection on 8/4/14 with 50% relief of her back pain and 100% relief of her leg pain.  She is status post L5/S1 interlaminar epidural steroid injection 11/18/14 with about 20% relief.  She is status post bilateral L5 transforaminal epidural steroid injections on 3/14/16 with a little less than 50% relief.    ROS:She reports fevers chills, weight loss, rash some itching, color change, texture change her skin, vision change double vision swallowing plans running nose stomach pain appetite change easy bruising and back pain.  Balance of review of systems is negative.    Medical, surgical, family and social history reviewed elsewhere in record.    Medications/Allergies: See med card    Vitals:    06/06/18 1128   BP: 118/80   Pulse: 98   Resp: 20   Temp: 98.5 °F (36.9 °C)   TempSrc: Oral   SpO2: 95%   PainSc:   6   PainLoc: Back         Physical exam:  Gen: A and O x3, pleasant, well-groomed  Skin: No rashes or obvious lesions  HEENT: PERRLA  CVS: Regular rate and rhythm, normal S1 and S2, no murmurs.  Resp: Clear to auscultation  bilaterally, no wheezes or rales.  Abdomen: Soft, NT/ND, normal bowel sounds present.  Musculoskeletal: Presents in a wheelchair, needs a walker to ambulate..    Neuro:  Lower extremities: 4/5 strength bilaterally  Reflexes: Patellar 1+, Achilles 0+ bilaterally.  Sensory:  Intact and symmetrical to light touch and pinprick in L2-S1 dermatomes bilaterally.    Lumbar spine:  Lumbar spine: Range of motion is severely decreased with extension and mild reduced with flexion with increased pain primarily on extension.  Spike's test causes no increased pain on either side.    Supine straight leg raise causes right greater than left buttock and leg pain.    Internal and external rotation of the hip causes some pain in the right groin.  Myofascial exam: No tenderness to palpation across lumbar paraspinous muscles.    Imaging:  3/11/14 Xray:  Intervertebral disk height loss is noted at the L5-S1 level. Mild anterior listhesis of the L5 on S1 vertebral body appears to present of 4 mm. Atherosclerotic calcifications are noted within the aorta. Anterior bridging osteophytes are noted at the L1-L2 and L2-L3 levels. Vertebral body heights appear well preserved. Facet arthropathy is noted at the L4-L5 and L5-S1 levels.    11/20/17 Xray L-spine  The bones are osteopenic.  There is advanced multilevel degenerative change of the lumbar spine in the form of marginal osteophyte formation, facet arthropathy, and disc space narrowing.  Space narrowing is most pronounced at L5-S1.  No acute lumbar compression fracture or osseous destructive process.  No definite spondylolisthesis.  There is a prominent right-sided lateral osteophyte present at L2-L3.  The sacroiliac joints are unremarkable.  There is advanced degenerative change of both hip joints resulting in moderate hip joint space narrowing, right greater than left.    2/7/18 MRI L-spine  T12-L1: There is no spinal canal or foraminal stenosis.  There is no change.  L1-2: There is no  spinal canal or foraminal stenosis.  There is mild facet joint arthropathy.  There is no significant change.  L2-3: There is disc space narrowing.  There is a diffuse disc bulge with mild to moderate facet joint arthropathy and ligamentum flavum thickening.  There is borderline spinal stenosis with mild bilateral foraminal stenosis without significant change.  L3-4: There is a diffuse disc bulge, facet joint arthropathy with ligamentum flavum thickening resulting in borderline to mild spinal stenosis with mild to moderate bilateral foraminal stenosis, slightly progressed.  L4-5:There is disc space narrowing.  There is moderate diffuse disc bulge with osteophytic ridging and superimposed right foraminal disc protrusion.  There is facet joint arthropathy with ligamentum flavum thickening.  There is mild to moderate central spinal stenosis with moderate to marked right and moderate left foraminal stenosis without significant change.  L5-S1: There is mild anterolisthesis of L5 on S1.  There is disc space narrowing.  There is moderate marked facet joint arthropathy with ligamentum flavum thickening.  There is unroofing of a moderate diffuse disc bulge with osteophytic ridging.  There is mild spinal stenosis.  There is moderate to marked bilateral lateral recess stenosis with severe bilateral foraminal stenosis without significant change.    Assessment:   The patient is an 84-year-old woman with a history of osteoporosis, low back pain who initially presented in referral from Autumn Mccormick NP for worsening low back pain and right leg pain.     1. Lumbar radiculopathy     2. Spinal stenosis of lumbar region with neurogenic claudication     3. DDD (degenerative disc disease), lumbar         Plan:  1.  I will schedule patient for bilateral L5/S1 transforaminal epidural steroid injections.  This can be repeated if she has some relief but not full.  2.  I advised her to increase gabapentin to 200 mg nightly if tolerated.  3.   Follow-up in 4 weeks post procedure or sooner as needed.

## 2018-06-19 ENCOUNTER — TELEPHONE (OUTPATIENT)
Dept: PODIATRY | Facility: CLINIC | Age: 83
End: 2018-06-19

## 2018-06-19 NOTE — TELEPHONE ENCOUNTER
----- Message from Lina Agrawal sent at 6/19/2018 10:00 AM CDT -----  Type: Needs Medical Advice    Who Called:  /Luisito  Best Call Back Number: [phone]  Additional Information:  needs to talk to office concerning a medication (that he did not know the name of) that was supposed to be sent to a pharmacy in [place]. Pharmacy has said that they cannot get in touch with office. Office does not respond. Please call  to help him figure this out.

## 2018-06-19 NOTE — TELEPHONE ENCOUNTER
Patient's  stated that he called [facility] and would like a Rx sent to them. He said it would cost them $15.

## 2018-06-26 ENCOUNTER — NURSE TRIAGE (OUTPATIENT)
Dept: ADMINISTRATIVE | Facility: CLINIC | Age: 83
End: 2018-06-26

## 2018-06-26 NOTE — TELEPHONE ENCOUNTER
"    Reason for Disposition   [1] Thigh, calf, or ankle swelling AND [2] only 1 side     Breanne LEÓN 's right ankle swollen, very painful, per .  He wants to know how much Emla cream to apply.  He states it was ordered by Dr Tahir Person, and is in two separate tubes, one labeled prilocaine and one labeled lidocaine.  The instructions are different with each tube, he says.  He said he has been using frankincense oil on her ankle every day for pain, and it makes it better for about 7 hours, then wears off and she is crying again.  He repeats several times he wants instructions on the cream Dr Person had compounded for her.  Explained I do not see this medication in her med list in her chart, but I will message Dr Person and ask him to enter the medication in her med list with instructions, and to please call Mr Culp with instructions.  In the meantime, he states her right ankle, which began swelling 4 days ago, keeps her up at night and she screams in pain.  He will call his daughter, who will helpfully be able to assist him in getting Breanne LEÓN into the car and to the hospital.  If he cannot reach her, he will call 911 for transportation to the hospital.  Please contact caller directly with any additional care advice.    Answer Assessment - Initial Assessment Questions  1. LOCATION: "Which joint is swollen?"      the right ankle.    2. ONSET: "When did the swelling start?"      4 days ago.    3. SIZE: "How large is the swelling?"      It is larger than the other ankle.    4. PAIN: "Is there any pain?" If so, ask: "How bad is it?" (Scale 1-10; or mild, moderate, severe)      She is crying all night.    5. CAUSE: "What do you think caused the swollen joint?"      I have no idea.    6. OTHER SYMPTOMS: "Do you have any other symptoms?" (e.g., fever, chest pain, difficulty breathing, calf pain)      No fever, no chest pain, no difficulty breathing.  Sometimes her arms hurt, she says.    7. PREGNANCY: "Is there " "any chance you are pregnant?" "When was your last menstrual period?"      N/a    Protocols used: [facility]      "

## 2018-06-27 ENCOUNTER — TELEPHONE (OUTPATIENT)
Dept: PAIN MEDICINE | Facility: CLINIC | Age: 83
End: 2018-06-27

## 2018-06-27 ENCOUNTER — OFFICE VISIT (OUTPATIENT)
Dept: ORTHOPEDICS | Facility: CLINIC | Age: 83
End: 2018-06-27
Payer: MEDICARE

## 2018-06-27 VITALS
HEIGHT: 63 IN | SYSTOLIC BLOOD PRESSURE: 126 MMHG | DIASTOLIC BLOOD PRESSURE: 62 MMHG | WEIGHT: 125 LBS | HEART RATE: 86 BPM | BODY MASS INDEX: 22.15 KG/M2

## 2018-06-27 DIAGNOSIS — S82.64XA CLOSED NONDISPLACED FRACTURE OF LATERAL MALLEOLUS OF RIGHT FIBULA, INITIAL ENCOUNTER: Primary | ICD-10-CM

## 2018-06-27 PROCEDURE — 99203 OFFICE O/P NEW LOW 30 MIN: CPT | Mod: ,,, | Performed by: ORTHOPAEDIC SURGERY

## 2018-06-27 RX ORDER — LIDOCAINE AND PRILOCAINE 25; 25 MG/G; MG/G
CREAM TOPICAL
Status: ON HOLD | COMMUNITY
Start: 2018-06-20 | End: 2020-09-16 | Stop reason: HOSPADM

## 2018-06-27 NOTE — TELEPHONE ENCOUNTER
----- Message from Bernadette Dempsey sent at 6/27/2018 10:43 AM CDT -----  Contact:  Woo  Patient  call stating patient has a broken fibula currently   Patient  call regarding the procedure she has schedule for 7/10 far as do Dr. Morrison will still want to go with procedure      Please call to advise [phone] claudia Berkowitz

## 2018-06-27 NOTE — PROGRESS NOTES
Subjective:       Chief Complaint    Chief Complaint   Patient presents with    Fibula fx     NP, right distal fibular fx.  DOI: 6/22/18 & 6/25/18, due a series of 2 falls. Patient is in a short leg splint & wheelchair.  Previous eval & xrays of the ankle & foot on 6/26/18 @ [facility] ER.  Patient reports pain w/ any movement or touch.  She is ok otherwise.       HPI  Breanne Culp is a 84 y.o.  female who presents  With the fractured lateral malleolus of the right ankle. She was seen in emergency room and placed in a short posterior splint, nonweightbearing.      Past History  Past Medical History:   Diagnosis Date    Anticoagulant long-term use     ASA 81mg, Fish Oil    Arthritis     Cataract     OU done//    DDD (degenerative disc disease), lumbar     Glaucoma     suspect    Hyperlipidemia     Low back pain     Osteoporosis     Positive PPD 1970    Seasonal allergies      Past Surgical History:   Procedure Laterality Date    BREAST BIOPSY Right     2012 neg    CATARACT EXTRACTION W/  INTRAOCULAR LENS IMPLANT Bilateral 10/15/14     Dr Wright    COLONOSCOPY      Epidural Steroid injection      Pain Management    TONSILLECTOMY  1941     Social History     Social History    Marital status:      Spouse name: N/A    Number of children: N/A    Years of education: N/A     Occupational History    Not on file.     Social History Main Topics    Smoking status: Former Smoker     Packs/day: 0.25     Start date: 7/24/1961     Quit date: 6/11/1992    Smokeless tobacco: Never Used    Alcohol use No    Drug use: No    Sexual activity: Not on file     Other Topics Concern    Not on file     Social History Narrative    No narrative on file         Medications  Current Outpatient Prescriptions   Medication Sig    JUAN/INULIN/C-LOSE/MV-MN/FA (FIBER PLUS MULITVITAMIN ORAL) No Sig Provided    acetaminophen (TYLENOL) 500 MG tablet Take 500 mg by mouth every 6 (six) hours as needed for Pain.     aspirin 81 mg Tab Every day    calcium-vitamin D (CALCIUM-VITAMIN D) 500 mg(1,250mg) -200 unit per tablet Every day    diclofenac sodium (VOLTAREN) 1 % Gel Apply 2 g topically 3 (three) times daily.    donepezil (ARICEPT) 10 MG tablet TAKE 1 TABLET BY MOUTH EVERY EVENING    fexofenadine (ALLEGRA) 30 MG tablet Take 30 mg by mouth 2 (two) times daily. As needed    fluticasone (FLONASE) 50 mcg/actuation nasal spray 2 sprays by Each Nare route once daily.    gabapentin (NEURONTIN) 100 MG capsule Take 2 capsules (200 mg total) by mouth every evening.    lidocaine-prilocaine (EMLA) cream As directed    meloxicam (MOBIC) 7.5 MG tablet Take 1 tablet (7.5 mg total) by mouth once daily. (Patient taking differently: Take 7.5 mg by mouth once daily. )    omega-3 fatty acids-vitamin E (FISH OIL) 1,000 mg Cap Every day    oxybutynin (DITROPAN-XL) 10 MG 24 hr tablet Take 1 tablet (10 mg total) by mouth once daily.    PROPYLENE GLYCOL/ (SYSTANE OPHT) Place 1 drop into both eyes 2 (two) times daily.     No current facility-administered medications for this visit.        Allergies  Review of patient's allergies indicates:   Allergen Reactions    Brimonidine Other (See Comments)     Redness and irritation of eyes         Review of Systems     Constitutional: Negative    HENT: Negative  Eyes: Negative  Respiratory: Negative  Cardiovascular: Negative  Musculoskeletal: HPI  Skin: Negative  Neurological: Negative  Hematological: Negative  Endocrine: Negative      Physical Exam    Vitals:    06/27/18 1311   BP: 126/62   Pulse: 86     Physical Examination:Right ankle examination out of the splint reveals swelling and tenderness over the lateral malleolus. Swelling appears to be confined just to the lateral malleolus area.     Skin-Clear  General appearance -  well appearing, and in no distress  Mental status - awake  Neck - supple  Chest -  symmetric air entry  Heart - normal rate   Abdomen - soft      Assessment/Plan    Closed nondisplaced fracture of lateral malleolus of right fibula, initial encounter         Fracture brace ordered through [employer]. Soon as they get the brace. They'll call and will have him come in and shown how to use the boot.    This note was dictated using voice recognition software and may contain grammatical errors.

## 2018-08-03 ENCOUNTER — OFFICE VISIT (OUTPATIENT)
Dept: FAMILY MEDICINE | Facility: CLINIC | Age: 83
End: 2018-08-03
Payer: MEDICARE

## 2018-08-03 VITALS
WEIGHT: 120.81 LBS | DIASTOLIC BLOOD PRESSURE: 66 MMHG | TEMPERATURE: 99 F | BODY MASS INDEX: 21.41 KG/M2 | SYSTOLIC BLOOD PRESSURE: 112 MMHG | OXYGEN SATURATION: 99 % | HEIGHT: 63 IN | HEART RATE: 72 BPM

## 2018-08-03 DIAGNOSIS — R26.2 IMPAIRED AMBULATION: ICD-10-CM

## 2018-08-03 DIAGNOSIS — R41.3 MEMORY LOSS: ICD-10-CM

## 2018-08-03 DIAGNOSIS — M51.36 DDD (DEGENERATIVE DISC DISEASE), LUMBAR: ICD-10-CM

## 2018-08-03 DIAGNOSIS — M47.816 LUMBAR SPONDYLOSIS: ICD-10-CM

## 2018-08-03 DIAGNOSIS — M48.062 SPINAL STENOSIS OF LUMBAR REGION WITH NEUROGENIC CLAUDICATION: ICD-10-CM

## 2018-08-03 DIAGNOSIS — M54.16 LUMBAR RADICULOPATHY: ICD-10-CM

## 2018-08-03 DIAGNOSIS — J30.89 SEASONAL ALLERGIC RHINITIS DUE TO OTHER ALLERGIC TRIGGER: ICD-10-CM

## 2018-08-03 DIAGNOSIS — G60.9 IDIOPATHIC PERIPHERAL NEUROPATHY: Primary | ICD-10-CM

## 2018-08-03 DIAGNOSIS — M81.0 SENILE OSTEOPOROSIS: ICD-10-CM

## 2018-08-03 DIAGNOSIS — E78.5 HYPERLIPIDEMIA, UNSPECIFIED HYPERLIPIDEMIA TYPE: ICD-10-CM

## 2018-08-03 PROCEDURE — 99213 OFFICE O/P EST LOW 20 MIN: CPT | Mod: PBBFAC,PN | Performed by: FAMILY MEDICINE

## 2018-08-03 PROCEDURE — 99214 OFFICE O/P EST MOD 30 MIN: CPT | Mod: S$PBB,,, | Performed by: FAMILY MEDICINE

## 2018-08-03 PROCEDURE — 99999 PR PBB SHADOW E&M-EST. PATIENT-LVL III: CPT | Mod: PBBFAC,,, | Performed by: FAMILY MEDICINE

## 2018-08-03 NOTE — PROGRESS NOTES
For THIS DOCUMENT WAS MADE IN PART WITH VOICE RECOGNITION SOFTWARE.  OCCASIONALLY THIS SOFTWARE WILL MISINTERPRET WORDS OR PHRASES.      Breanne Culp  2/28/1934    Subjective     Chief Complaint   Patient presents with    Establish Care       HPI     84-year-old female here with her daughter to establish care. We have met before because I know her  which he requested I take over primary care.     I reviewed her chart and past medical history. She has chronic back pain due to degenerative disc disease, spinal stenosis, spondylosis, and radiculopathy. She does see a pain specialist.     She does have peripheral neuropathy, at this time cause is not certain but likely multifactorial.     History of hyperlipidemia currently not on medication.     History of at least mild cognitive impairment. On Aricept but having some G.I. Symptoms. Possibly from the Aricept     currently recovering from an ankle fracture seeing orthopedics    Active Ambulatory Problems     Diagnosis Date Noted    Nonspecific (abnormal) findings on radiological and other examination of genitourinary organs 11/14/2011    Posterior subcapsular polar senile cataract 11/08/2010    DDD (degenerative disc disease), lumbar 07/13/2014    Lumbar spondylosis 07/13/2014    Degeneration of lumbar or lumbosacral intervertebral disc 11/18/2014    Idiopathic peripheral neuropathy 11/26/2014    Dystrophic nail 11/26/2014    Onychomycosis due to dermatophyte 11/26/2014    Lumbar stenosis 02/23/2016    Lumbar radiculopathy 03/14/2016    Pain of left thumb 01/10/2017    Closed nondisplaced fracture of lateral malleolus of right fibula with routine healing 08/07/2018    Seasonal allergies     Senile osteoporosis     Hyperlipidemia     Memory loss 08/11/2018     Resolved Ambulatory Problems     Diagnosis Date Noted    Acute maxillary sinusitis 06/13/2010    Nuclear sclerosis 08/20/2014     Past Medical History:   Diagnosis Date     Anticoagulant long-term use     Arthritis     Cataract     DDD (degenerative disc disease), lumbar     Glaucoma     Hyperlipidemia     Low back pain     Osteoporosis     Positive PPD 1970    Seasonal allergies      Current Outpatient Prescriptions on File Prior to Visit   Medication Sig Dispense Refill    acetaminophen (TYLENOL) 500 MG tablet Take 500 mg by mouth every 6 (six) hours as needed for Pain.      aspirin 81 mg Tab Every day      donepezil (ARICEPT) 10 MG tablet TAKE 1 TABLET BY MOUTH EVERY EVENING 30 tablet 11    gabapentin (NEURONTIN) 100 MG capsule Take 2 capsules (200 mg total) by mouth every evening. 60 capsule 3    oxybutynin (DITROPAN-XL) 10 MG 24 hr tablet Take 1 tablet (10 mg total) by mouth once daily. 30 tablet 11    JUAN/INULIN/C-LOSE/MV-MN/FA (FIBER PLUS MULITVITAMIN ORAL) No Sig Provided      calcium-vitamin D (CALCIUM-VITAMIN D) 500 mg(1,250mg) -200 unit per tablet Every day      diclofenac sodium (VOLTAREN) 1 % Gel Apply 2 g topically 3 (three) times daily. 1 Tube 3    fexofenadine (ALLEGRA) 30 MG tablet Take 30 mg by mouth 2 (two) times daily. As needed      fluticasone (FLONASE) 50 mcg/actuation nasal spray 2 sprays by Each Nare route once daily. 16 g 11    lidocaine-prilocaine (EMLA) cream As directed      meloxicam (MOBIC) 7.5 MG tablet Take 1 tablet (7.5 mg total) by mouth once daily. (Patient taking differently: Take 7.5 mg by mouth once daily. ) 30 tablet 0    omega-3 fatty acids-vitamin E (FISH OIL) 1,000 mg Cap Every day      PROPYLENE GLYCOL/ (SYSTANE OPHT) Place 1 drop into both eyes 2 (two) times daily.       No current facility-administered medications on file prior to visit.          Review of Systems   Constitutional: Positive for activity change. Negative for diaphoresis, fatigue, fever and unexpected weight change.   HENT: Negative.    Eyes: Negative.    Respiratory: Negative.  Negative for cough, shortness of breath and wheezing.   "  Cardiovascular: Negative for chest pain and leg swelling.   Gastrointestinal: Negative.  Negative for abdominal pain, diarrhea and nausea.   Endocrine: Negative.  Negative for polydipsia and polyuria.   Genitourinary: Negative.  Negative for dysuria, hematuria and pelvic pain.   Musculoskeletal: Positive for arthralgias, back pain and gait problem.   Skin: Negative for color change and rash.   Allergic/Immunologic: Negative.    Neurological: Positive for weakness and numbness. Negative for speech difficulty and headaches.   Hematological: Negative.    Psychiatric/Behavioral: Negative.        Objective     Physical Exam   Constitutional: She is oriented to person, place, and time. She appears well-developed and well-nourished. No distress.   This is an elderly -American female in a wheelchair. She is comfortable and in no distress   HENT:   Head: Normocephalic and atraumatic.   Right Ear: External ear normal.   Left Ear: External ear normal.   Eyes: No scleral icterus.   Cardiovascular: Normal rate, regular rhythm and normal heart sounds.    No murmur heard.  Pulmonary/Chest: Effort normal and breath sounds normal. No respiratory distress.   Abdominal: Soft. Bowel sounds are normal. She exhibits no distension. There is no tenderness.   Musculoskeletal:   She is in a wheelchair and has a boot on her right ankle   Neurological: She is alert and oriented to person, place, and time.   Skin: No rash noted. She is not diaphoretic.   Psychiatric: She has a normal mood and affect. Her behavior is normal.   Vitals reviewed.    Vitals:    08/03/18 1513   BP: 112/66   BP Location: Left arm   Patient Position: Sitting   BP Method: Medium (Manual)   Pulse: 72   Temp: 98.8 °F (37.1 °C)   TempSrc: Oral   SpO2: 99%   Weight: 54.8 kg (120 lb 13 oz)   Height: 5' 3" (1.6 m)       MOST RECENT LABS IN OUR ELECTRONIC MEDICAL RECORD:     Results for orders placed or performed in visit on 11/20/17   Urine culture   Result Value Ref " Range    Urine Culture, Routine No significant growth    Urinalysis   Result Value Ref Range    Specimen UA Urine, Clean Catch     Color, UA Yellow Yellow, Straw, Genevieve    Appearance, UA Clear Clear    pH, UA 6.0 5.0 - 8.0    Specific Gravity, UA 1.020 1.005 - 1.030    Protein, UA Negative Negative    Glucose, UA Negative Negative    Ketones, UA Negative Negative    Bilirubin (UA) Negative Negative    Occult Blood UA Negative Negative    Nitrite, UA Negative Negative    Leukocytes, UA Negative Negative         Breanne ROMELIA was seen today for establish care.    Diagnoses and all orders for this visit:    Idiopathic peripheral neuropathy  Appears stable, appears to be chronic, does not appear to be painful  Spinal stenosis of lumbar region with neurogenic claudication  Lumbar spondylosis  DDD (degenerative disc disease), lumbar  Lumbar radiculopathy   chronic not on chronic narcotics. Consulting with pain management    Seasonal allergic rhinitis due to other allergic trigger   stable    Senile osteoporosis   suspected per history, currently not on medication. Recovering from a fracture. Will need to get DEXA scan and reevaluate but anticipate treatment candidate    Hyperlipidemia, unspecified hyperlipidemia type   appears chronic currently not on medication. Will request labs    Impaired ambulation    Memory loss        Will request HH and PT when ok with ortho  DXA scan  Hold Aricept, if better then exelon patch  If not restart then add namenda

## 2018-08-07 ENCOUNTER — OFFICE VISIT (OUTPATIENT)
Dept: ORTHOPEDICS | Facility: CLINIC | Age: 83
End: 2018-08-07
Payer: MEDICARE

## 2018-08-07 VITALS — BODY MASS INDEX: 21.26 KG/M2 | WEIGHT: 120 LBS | HEIGHT: 63 IN

## 2018-08-07 DIAGNOSIS — S82.64XD CLOSED NONDISPLACED FRACTURE OF LATERAL MALLEOLUS OF RIGHT FIBULA WITH ROUTINE HEALING, SUBSEQUENT ENCOUNTER: Primary | ICD-10-CM

## 2018-08-07 PROCEDURE — 99213 OFFICE O/P EST LOW 20 MIN: CPT | Mod: ,,, | Performed by: ORTHOPAEDIC SURGERY

## 2018-08-08 NOTE — PROGRESS NOTES
Subjective:       Chief Complaint    Chief Complaint   Patient presents with    Right Ankle - Fracture, Pain, Injury     Right distal fibular fx.  DOI: 6/22/18 & 6/25/18, 6 weeks ago. Patient is wearing a walking boot and her pain is minimal. Patient's family is asking if she can receive Home Therapy.        CHINMAY Culp is a 84 y.o.  female who presents Presents with her son and home therapy. Representative. She is in a wheelchair and has a fracture boot on her right ankle. Fracture boot was removed.      Past History  Past Medical History:   Diagnosis Date    Anticoagulant long-term use     ASA 81mg, Fish Oil    Arthritis     Cataract     OU done//    DDD (degenerative disc disease), lumbar     Glaucoma     suspect    Hyperlipidemia     Low back pain     Osteoporosis     Positive PPD 1970    Seasonal allergies      Past Surgical History:   Procedure Laterality Date    BREAST BIOPSY Right     2012 neg    CATARACT EXTRACTION W/  INTRAOCULAR LENS IMPLANT Bilateral 10/15/14     Dr Wright    COLONOSCOPY      Epidural Steroid injection      Pain Management    TONSILLECTOMY  1941     Social History     Social History    Marital status:      Spouse name: N/A    Number of children: N/A    Years of education: N/A     Occupational History    Not on file.     Social History Main Topics    Smoking status: Former Smoker     Packs/day: 0.25     Start date: 7/24/1961     Quit date: 6/11/1992    Smokeless tobacco: Never Used    Alcohol use No    Drug use: No    Sexual activity: Not on file     Other Topics Concern    Not on file     Social History Narrative    No narrative on file         Medications  Current Outpatient Prescriptions   Medication Sig    JUAN/INULIN/C-LOSE/MV-MN/FA (FIBER PLUS MULITVITAMIN ORAL) No Sig Provided    acetaminophen (TYLENOL) 500 MG tablet Take 500 mg by mouth every 6 (six) hours as needed for Pain.    aspirin 81 mg Tab Every day    calcium-vitamin D  (CALCIUM-VITAMIN D) 500 mg(1,250mg) -200 unit per tablet Every day    diclofenac sodium (VOLTAREN) 1 % Gel Apply 2 g topically 3 (three) times daily.    donepezil (ARICEPT) 10 MG tablet TAKE 1 TABLET BY MOUTH EVERY EVENING    fexofenadine (ALLEGRA) 30 MG tablet Take 30 mg by mouth 2 (two) times daily. As needed    fluticasone (FLONASE) 50 mcg/actuation nasal spray 2 sprays by Each Nare route once daily.    gabapentin (NEURONTIN) 100 MG capsule Take 2 capsules (200 mg total) by mouth every evening.    lidocaine-prilocaine (EMLA) cream As directed    meloxicam (MOBIC) 7.5 MG tablet Take 1 tablet (7.5 mg total) by mouth once daily. (Patient taking differently: Take 7.5 mg by mouth once daily. )    omega-3 fatty acids-vitamin E (FISH OIL) 1,000 mg Cap Every day    PROPYLENE GLYCOL/ (SYSTANE OPHT) Place 1 drop into both eyes 2 (two) times daily.    oxybutynin (DITROPAN-XL) 10 MG 24 hr tablet Take 1 tablet (10 mg total) by mouth once daily.     No current facility-administered medications for this visit.        Allergies  Review of patient's allergies indicates:   Allergen Reactions    Brimonidine Other (See Comments)     Redness and irritation of eyes         Review of Systems     Constitutional: Negative    HENT: Negative  Eyes: Negative  Respiratory: Negative  Cardiovascular: Negative  Musculoskeletal: HPI  Skin: Negative  Neurological: Negative  Hematological: Negative  Endocrine: Negative      Physical Exam    There were no vitals filed for this visit.  Physical Examination:Right ankle has minimal if any tenderness over the fractured lateral malleolus. There is no swelling. Reasonably good range of motion.     Skin-clear  General appearance -  well appearing, and in no distress  Mental status - awake  Neck - supple  Chest -  symmetric air entry  Heart - normal rate   Abdomen - soft      Assessment/Plan   Closed nondisplaced fracture of lateral malleolus of right fibula with routine healing,  subsequent encounter      Discontinue the fracture boot and continue using her walker. She doesn't walk very much. When she does she is using the walker. Spends more time in the wheelchair. She is to return if she has any difficulties. The original ankle x-ray was reviewed. She has essentially a nondisplaced fracture. She has adequate healing to begin weightbearing without the boot as tolerated.      This note was dictated using voice recognition software and may contain grammatical errors.

## 2018-08-10 ENCOUNTER — TELEPHONE (OUTPATIENT)
Dept: PAIN MEDICINE | Facility: CLINIC | Age: 83
End: 2018-08-10

## 2018-08-10 ENCOUNTER — TELEPHONE (OUTPATIENT)
Dept: ORTHOPEDICS | Facility: CLINIC | Age: 83
End: 2018-08-10

## 2018-08-10 DIAGNOSIS — S82.64XD CLOSED NONDISPLACED FRACTURE OF LATERAL MALLEOLUS OF RIGHT FIBULA WITH ROUTINE HEALING: Primary | ICD-10-CM

## 2018-08-10 NOTE — TELEPHONE ENCOUNTER
----- Message from Caren Templeton sent at 8/10/2018  2:48 PM CDT -----  Contact: Pt's daughter Loree Ralph is calling in regards to rescheduling pt's appt that was missed on 08/08.    Loree can be reached at [phone].    Thank you

## 2018-08-11 PROBLEM — R41.3 MEMORY LOSS: Status: ACTIVE | Noted: 2018-08-11

## 2018-08-13 ENCOUNTER — TELEPHONE (OUTPATIENT)
Dept: FAMILY MEDICINE | Facility: CLINIC | Age: 83
End: 2018-08-13

## 2018-08-13 NOTE — TELEPHONE ENCOUNTER
Called and spoke to patient. Patient states she is doing better with new medication. Nothing needed at this time.

## 2018-08-13 NOTE — TELEPHONE ENCOUNTER
----- Message from Liset Topete sent at 8/13/2018  2:17 PM CDT -----  Contact: daughterLoree  Type: Needs Medical Advice    Who Called:  Loree jo  Best Call Back Number: [phone]  Additional Information: Daughter is calling to report on the new medication donepezil (ARICEPT). Daughter states patient is not complaining as much about stomach pain.  Please call daughter. Thanks!

## 2018-08-23 ENCOUNTER — TELEPHONE (OUTPATIENT)
Dept: FAMILY MEDICINE | Facility: CLINIC | Age: 83
End: 2018-08-23

## 2018-08-23 DIAGNOSIS — N32.81 OAB (OVERACTIVE BLADDER): Primary | ICD-10-CM

## 2018-08-23 RX ORDER — OXYBUTYNIN CHLORIDE 15 MG/1
15 TABLET, EXTENDED RELEASE ORAL DAILY
Qty: 30 TABLET | Refills: 11 | Status: SHIPPED | OUTPATIENT
Start: 2018-08-23 | End: 2019-08-23

## 2018-08-23 RX ORDER — OXYBUTYNIN CHLORIDE 10 MG/1
TABLET, EXTENDED RELEASE ORAL
Qty: 90 TABLET | Refills: 3 | Status: SHIPPED | OUTPATIENT
Start: 2018-08-23 | End: 2018-08-23

## 2018-08-23 NOTE — TELEPHONE ENCOUNTER
----- Message from Alin Wilcox sent at 8/23/2018 11:55 AM CDT -----  Contact: Loree Jacobs - daughter  Calling to request a refill increase on Rx oxybutynin (DITROPAN-XL) 10 MG 24 hr tablet 90 tabs  She said they are waking up 4 times a night and she does not think the medication is working call [phone]    [facility] [address] [place] [address] [address] [facility]  [address] [facility] [address]  Phone: [phone] Fax: [phone]

## 2018-08-23 NOTE — TELEPHONE ENCOUNTER
Dr Santillan pt daughter is asking if oxybutynin could be increased. States patient wets the bed 3-4 times per night. This is causing family lack of rest and distress to patient. Please advise.

## 2018-08-23 NOTE — TELEPHONE ENCOUNTER
MD Heidi Jeff, LPN   Caller: Unspecified (Today, 12:59 PM)             Yes, we can try the ditropan 15 mg xl daily (over the 10 mg he was taking before). I dont know if this dose increase will make a difference; there are times when nothing makes a difference.   He can try that for a month, if no help can come see us again and we can discuss alternatives (make sure bladder is emptying well, possible botox injections, etc)    Previous Messages

## 2018-09-10 ENCOUNTER — OFFICE VISIT (OUTPATIENT)
Dept: FAMILY MEDICINE | Facility: CLINIC | Age: 83
End: 2018-09-10
Payer: MEDICARE

## 2018-09-10 VITALS
WEIGHT: 119.19 LBS | HEART RATE: 76 BPM | DIASTOLIC BLOOD PRESSURE: 66 MMHG | BODY MASS INDEX: 21.12 KG/M2 | HEIGHT: 63 IN | SYSTOLIC BLOOD PRESSURE: 110 MMHG | TEMPERATURE: 98 F

## 2018-09-10 DIAGNOSIS — N32.81 OAB (OVERACTIVE BLADDER): ICD-10-CM

## 2018-09-10 DIAGNOSIS — M48.062 SPINAL STENOSIS OF LUMBAR REGION WITH NEUROGENIC CLAUDICATION: Primary | ICD-10-CM

## 2018-09-10 DIAGNOSIS — R26.9 ABNORMAL GAIT: ICD-10-CM

## 2018-09-10 DIAGNOSIS — R41.3 MEMORY LOSS: ICD-10-CM

## 2018-09-10 DIAGNOSIS — R53.1 WEAKNESS: ICD-10-CM

## 2018-09-10 PROCEDURE — 99213 OFFICE O/P EST LOW 20 MIN: CPT | Mod: S$PBB,,, | Performed by: FAMILY MEDICINE

## 2018-09-10 PROCEDURE — 99999 PR PBB SHADOW E&M-EST. PATIENT-LVL III: CPT | Mod: PBBFAC,,, | Performed by: FAMILY MEDICINE

## 2018-09-10 PROCEDURE — 99213 OFFICE O/P EST LOW 20 MIN: CPT | Mod: PBBFAC,PN | Performed by: FAMILY MEDICINE

## 2018-09-10 NOTE — PROGRESS NOTES
THIS DOCUMENT WAS MADE IN PART WITH VOICE RECOGNITION SOFTWARE.  OCCASIONALLY THIS SOFTWARE WILL MISINTERPRET WORDS OR PHRASES.      Breanne LEÓN Robbi  2/28/1934    Breanne LEÓN was seen today for follow-up.    Diagnoses and all orders for this visit:    Spinal stenosis of lumbar region with neurogenic claudication  Weakness  Abnormal gait   perhaps mild improvement with therapy though long-term prognosis guarded. Will require long-term motivation to maintain activity once therapy complete. Also likeliness of rapid relapse given any other acute illness is high    Memory loss   seems to be doing okay on 5 mg Aricept will hold off on increasing right now given many other factors    OAB (overactive bladder)   perhaps mild improvement with oxybutynin. Otherwise stable      Subjective     Chief Complaint   Patient presents with    Follow-up     3-4 week follow up       HPI  perhabs mild help with oxybutinin    Walking better, in home PT   daughter reports noticeable improvement and cooperation.     Discussed increasing Aricept but her  has been very ill, a lot of stress going on so we decided now is not the best time      HPI elements addressed above in the assessment and plan including problems, diagnosis, stability/instability,  improving/worsening, and chronicity will not be duplicated in this section. Any important additional HPI topics will be discussed here if needed.    Active Ambulatory Problems     Diagnosis Date Noted    Nonspecific (abnormal) findings on radiological and other examination of genitourinary organs 11/14/2011    Posterior subcapsular polar senile cataract 11/08/2010    DDD (degenerative disc disease), lumbar 07/13/2014    Lumbar spondylosis 07/13/2014    Degeneration of lumbar or lumbosacral intervertebral disc 11/18/2014    Idiopathic peripheral neuropathy 11/26/2014    Dystrophic nail 11/26/2014    Onychomycosis due to dermatophyte 11/26/2014    Lumbar stenosis 02/23/2016     Lumbar radiculopathy 03/14/2016    Pain of left thumb 01/10/2017    Closed nondisplaced fracture of lateral malleolus of right fibula with routine healing 08/07/2018    Seasonal allergies     Senile osteoporosis     Hyperlipidemia     Memory loss 08/11/2018     Resolved Ambulatory Problems     Diagnosis Date Noted    Acute maxillary sinusitis 06/13/2010    Nuclear sclerosis 08/20/2014     Past Medical History:   Diagnosis Date    Anticoagulant long-term use     Arthritis     Cataract     DDD (degenerative disc disease), lumbar     Glaucoma     Hyperlipidemia     Low back pain     Osteoporosis     Positive PPD 1970    Seasonal allergies          Review of Systems   Constitutional: Negative for unexpected weight change.   Respiratory: Negative for shortness of breath.    Cardiovascular: Negative for chest pain.   Musculoskeletal: Positive for arthralgias, back pain, gait problem and myalgias.   Skin: Negative for wound.   Psychiatric/Behavioral: Negative for dysphoric mood. The patient is not nervous/anxious.        Objective     Physical Exam   Constitutional: She is oriented to person, place, and time. She appears well-developed and well-nourished. No distress.   This is an elderly -American female in a wheelchair. She is comfortable and in no distress   HENT:   Head: Normocephalic and atraumatic.   Cardiovascular: Normal rate, regular rhythm and normal heart sounds.   No murmur heard.  Pulmonary/Chest: Effort normal and breath sounds normal. No respiratory distress.   Musculoskeletal:   Boot is off the ankle. She is ambulating with a walker   Neurological: She is alert and oriented to person, place, and time.   Skin: She is not diaphoretic.   Psychiatric: She has a normal mood and affect. Her behavior is normal.   Vitals reviewed.    Vitals:    09/10/18 1556   BP: 110/66   BP Location: Left arm   Patient Position: Sitting   BP Method: Medium (Manual)   Pulse: 76   Temp: 97.9 °F (36.6 °C)  "  [facility]: Oral   Weight: 54.1 kg (119 lb 2.5 oz)   Height: 5' 3" (1.6 m)       MOST RECENT LABS IN OUR ELECTRONIC MEDICAL RECORD:     Results for orders placed or performed in visit on 11/20/17   Urine culture   Result Value Ref Range    Urine Culture, Routine No significant growth    Urinalysis   Result Value Ref Range    Specimen UA Urine, Clean Catch     Color, UA Yellow Yellow, Straw, Genevieve    Appearance, UA Clear Clear    pH, UA 6.0 5.0 - 8.0    Specific Gravity, UA 1.020 1.005 - 1.030    Protein, UA Negative Negative    Glucose, UA Negative Negative    Ketones, UA Negative Negative    Bilirubin (UA) Negative Negative    Occult Blood UA Negative Negative    Nitrite, UA Negative Negative    Leukocytes, UA Negative Negative         "

## 2018-10-23 ENCOUNTER — OFFICE VISIT (OUTPATIENT)
Dept: PODIATRY | Facility: CLINIC | Age: 83
End: 2018-10-23
Payer: MEDICARE

## 2018-10-23 VITALS
WEIGHT: 119.25 LBS | DIASTOLIC BLOOD PRESSURE: 76 MMHG | BODY MASS INDEX: 21.13 KG/M2 | SYSTOLIC BLOOD PRESSURE: 171 MMHG | HEART RATE: 69 BPM | HEIGHT: 63 IN

## 2018-10-23 DIAGNOSIS — G60.9 IDIOPATHIC PERIPHERAL NEUROPATHY: Primary | ICD-10-CM

## 2018-10-23 DIAGNOSIS — B35.1 ONYCHOMYCOSIS DUE TO DERMATOPHYTE: ICD-10-CM

## 2018-10-23 DIAGNOSIS — R20.2 PARESTHESIA OF FOOT, BILATERAL: ICD-10-CM

## 2018-10-23 PROCEDURE — 11721 DEBRIDE NAIL 6 OR MORE: CPT | Mod: S$PBB,Q9,, | Performed by: PODIATRIST

## 2018-10-23 PROCEDURE — 99999 PR PBB SHADOW E&M-EST. PATIENT-LVL II: CPT | Mod: PBBFAC,,, | Performed by: PODIATRIST

## 2018-10-23 PROCEDURE — 11721 DEBRIDE NAIL 6 OR MORE: CPT | Mod: PBBFAC,PN | Performed by: PODIATRIST

## 2018-10-23 PROCEDURE — 99499 UNLISTED E&M SERVICE: CPT | Mod: S$PBB,,, | Performed by: PODIATRIST

## 2018-10-23 PROCEDURE — 99212 OFFICE O/P EST SF 10 MIN: CPT | Mod: PBBFAC,PN,25 | Performed by: PODIATRIST

## 2018-10-23 NOTE — PROGRESS NOTES
Subjective:      Patient ID: Breanne Culp is a 84 y.o. female.    Chief Complaint: Peripheral Neuropathy (jordan 9/10/18)    Breanne LEÓN is a 84 y.o. female who presents to the clinic for evaluation and treatment of high risk feet. Breanne LEÓN has a past medical history of Anticoagulant long-term use, Arthritis, Cataract, DDD (degenerative disc disease), lumbar, Glaucoma, Hyperlipidemia, Low back pain, Osteoporosis, Positive PPD (1970), and Seasonal allergies. The patient's chief complaint is long, thick toenails.  Denies being painful with wearing shoe gear.  Has not attempted to self treat.  Also, continues to relate burning neuropathy pain in bilateral lower extremity.  Relates being placed on 200mg gabapentin daily per Pain Management, however, this has failed to alleviate symptoms.  Symptoms continue to be present while at rest.  Symptoms lessen with weight bearing.  Has attempted no other self treatment.  Denies any additional pedal complaints.    PCP: Frantz Bill MD;  Date Last Seen by PCP: 9/10/18      Past Medical History:   Diagnosis Date    Anticoagulant long-term use     ASA 81mg, Fish Oil    Arthritis     Cataract     OU done//    DDD (degenerative disc disease), lumbar     Glaucoma     suspect    Hyperlipidemia     Low back pain     Osteoporosis     Positive PPD 1970    Seasonal allergies        Past Surgical History:   Procedure Laterality Date    BREAST BIOPSY Right     2012 neg    CATARACT EXTRACTION W/  INTRAOCULAR LENS IMPLANT Bilateral 10/15/14     Dr rWight    COLONOSCOPY      Epidural Steroid injection      Pain Management    LUISANA-TRANSFORAMINAL L5/S1 Bilateral 3/14/2016    [facility]    INJECTION-STEROID-EPIDURAL-LUMBAR N/A 8/4/2014    [facility]    INJECTION-STEROID-EPIDURAL-LUMBAR, L5/S1 N/A 11/18/2014    [facility]    phaco/pciol Left 10/15/2014     [facility]    phaco/pciol Right 2014    [facility]    TONSILLECTOMY  89       Family History   Problem Relation Age of Onset    Cancer Father         Rectal    Glaucoma Mother     Cancer Mother         lung, throat    Cancer Brother         throat    Hypertension Brother     Stroke Brother     Diabetes Sister     Stroke Sister     Amblyopia Neg Hx     Blindness Neg Hx     Cataracts Neg Hx     Macular degeneration Neg Hx     Retinal detachment Neg Hx     Strabismus Neg Hx     Thyroid disease Neg Hx     Nephrolithiasis Neg Hx        Social History     Socioeconomic History    Marital status:      Spouse name: None    Number of children: None    Years of education: None    Highest education level: None   Social Needs    Financial resource strain: None    Food insecurity - worry: None    Food insecurity - inability: None    Transportation needs - medical: None    Transportation needs - non-medical: None   Occupational History    None   Tobacco Use    Smoking status: Former Smoker     Packs/day: 0.25     Start date: 1961     Last attempt to quit: 1992     Years since quittin.3    Smokeless tobacco: Never Used   Substance and Sexual Activity    Alcohol use: No    Drug use: No    Sexual activity: None   Other Topics Concern    None   Social History Narrative    None       Current Outpatient Medications   Medication Sig Dispense Refill    JUAN/INULIN/C-LOSE/MV-MN/FA (FIBER PLUS MULITVITAMIN ORAL) No Sig Provided      acetaminophen (TYLENOL) 500 MG tablet Take 500 mg by mouth every 6 (six) hours as needed for Pain.      aspirin 81 mg Tab Every day      calcium-vitamin D (CALCIUM-VITAMIN D) 500 mg(1,250mg) -200 unit per tablet Every day      diclofenac sodium (VOLTAREN) 1 % Gel Apply 2 g topically 3 (three) times daily. 1 Tube 3    donepezil (ARICEPT) 10 MG tablet TAKE 1  TABLET BY MOUTH EVERY EVENING 30 tablet 11    fexofenadine (ALLEGRA) 30 MG tablet Take 30 mg by mouth 2 (two) times daily. As needed      fluticasone (FLONASE) 50 mcg/actuation nasal spray 2 sprays by Each Nare route once daily. 16 g 11    gabapentin (NEURONTIN) 100 MG capsule Take 2 capsules (200 mg total) by mouth every evening. 60 capsule 3    lidocaine-prilocaine (EMLA) cream As directed      meloxicam (MOBIC) 7.5 MG tablet Take 1 tablet (7.5 mg total) by mouth once daily. (Patient taking differently: Take 7.5 mg by mouth once daily. ) 30 tablet 0    omega-3 fatty acids-vitamin E (FISH OIL) 1,000 mg Cap Every day      oxybutynin (DITROPAN XL) 15 MG TR24 Take 1 tablet (15 mg total) by mouth once daily. 30 tablet 11    PROPYLENE GLYCOL/ (SYSTANE OPHT) Place 1 drop into both eyes 2 (two) times daily.       No current facility-administered medications for this visit.        Review of patient's allergies indicates:   Allergen Reactions    Brimonidine Other (See Comments)     Redness and irritation of eyes         Review of Systems   Constitution: Negative for chills, decreased appetite, fever and weakness.   Cardiovascular: Negative for claudication and leg swelling.   Skin: Positive for color change, dry skin and nail changes.   Musculoskeletal: Positive for arthritis. Negative for joint pain.   Neurological: Positive for paresthesias.   Psychiatric/Behavioral: Negative for altered mental status.           Objective:      Physical Exam   Constitutional: She is oriented to person, place, and time. She appears well-developed and well-nourished. No distress.   Cardiovascular:   Pulses:       Dorsalis pedis pulses are 2+ on the right side, and 2+ on the left side.        Posterior tibial pulses are 1+ on the right side, and 1+ on the left side.   CFT <3 seconds bilateral.  Pedal hair growth decreased bilateral.   No varicosities noted bilateral. Mild nonpitting edema noted to bilateral lower extremity.   Toes are cool to touch bilateral.     Musculoskeletal: She exhibits edema. She exhibits no tenderness.   Muscle strength 5/5 in all muscle groups bilateral.  No tenderness nor crepitation with ROM of foot/ankle joints bilateral.  No pain with palpation of bilateral foot and ankle.  Pes cavus foot type.  Bilateral hallux abducto valgus.  Bilateral semi-reducible contracture of toes 2-5.       Neurological: She is alert and oriented to person, place, and time. She has normal strength. A sensory deficit is present.   Protective sensation per Bonfield-Raudel monofilament intact bilateral.    Vibratory sensation decreased bilateral.    Light touch intact bilateral.   Skin: Skin is warm, dry and intact. No abrasion, no bruising, no burn, no ecchymosis, no laceration, no lesion, no petechiae and no rash noted. She is not diaphoretic. No cyanosis or erythema. No pallor. Nails show no clubbing.   Pedal skin is thin and atrophic bilateral.   Toenails x 10 appear thickened by 3 mm's, elongated by 4 mm's, and discolored with subungual debris. Examination of the skin reveals no evidence of significant rashes, open lesions, suspicious appearing nevi or other concerning lesions.                Assessment:       Encounter Diagnoses   Name Primary?    Idiopathic peripheral neuropathy Yes    Onychomycosis due to dermatophyte     Paresthesia of foot, bilateral          Plan:       Breanne LEÓN was seen today for peripheral neuropathy.    Diagnoses and all orders for this visit:    Idiopathic peripheral neuropathy    Onychomycosis due to dermatophyte    Paresthesia of foot, bilateral      I counseled the patient on her conditions, their implications and medical management.    Advised to begin taking three (100mg gabapentin) once daily.  Instructed to call to relate the efficacy of the dosage increase in one month.    Patient instructed on proper foot hygeine. We discussed wearing proper shoe gear, daily foot inspections, never  walking without protective shoe gear, never putting sharp instruments to feet    With patient's permission, nails were aggressively reduced and debrided x 10 to their soft tissue attachment mechanically and with electric , removing all offending nail and debris. Patient relates relief following the procedure. She will continue to monitor the areas daily, inspect her feet, wear protective shoe gear when ambulatory, moisturizer to maintain skin integrity and follow in this office in approximately 2-3 months, sooner p.r.n.    Follow-up in about 3 months (around 1/23/2019).    Tahir Person DPM

## 2018-11-03 ENCOUNTER — NURSE TRIAGE (OUTPATIENT)
Dept: ADMINISTRATIVE | Facility: CLINIC | Age: 83
End: 2018-11-03

## 2018-11-03 NOTE — TELEPHONE ENCOUNTER
"    Reason for Disposition   Side (flank) or lower back pain present    Answer Assessment - Initial Assessment Questions  1. SYMPTOM: "What's the main symptom you're concerned about?" (e.g., frequency, incontinence)      Vaginal pain  2. ONSET: "When did the  ________  start?"      This morning  3. PAIN: "Is there any pain?" If so, ask: "How bad is it?" (Scale: 1-10; mild, moderate, severe)     Not sure  4. CAUSE: "What do you think is causing the symptoms?"      Not sure  5. OTHER SYMPTOMS: "Do you have any other symptoms?" (e.g., fever, flank pain, blood in urine, pain with urination)      Back pain  6. PREGNANCY: "Is there any chance you are pregnant?" "When was your last menstrual period?"      no    Protocols used: ST URINARY SYMPTOMS-A-      "

## 2018-11-07 ENCOUNTER — TELEPHONE (OUTPATIENT)
Dept: FAMILY MEDICINE | Facility: CLINIC | Age: 83
End: 2018-11-07

## 2018-11-07 NOTE — TELEPHONE ENCOUNTER
----- Message from Trista Cunningham RT sent at 11/7/2018  1:42 PM CST -----  Contact: Loree,Daughter,[phone]   Loree,Daughter,[phone] Returned missed call for pt, called pod.

## 2018-11-07 NOTE — TELEPHONE ENCOUNTER
----- Message from Diamond Landaverde sent at 11/7/2018 11:48 AM CST -----  Contact: Loree Jacobs (daughter)  Type: Needs Medical Advice    Who Called:  Loree Jacobs (daughter)  Symptoms (please be specific):  [facility]- diverticulosis, severe arthritis in both hips and abdominal pain  How long has patient had these symptoms:  yesterday  Pharmacy name and phone #: na  Best Call Back Number: Loree at [phone]  Additional Information: Calling to schedule a Hospital f/u. No avail appt. Please advise.

## 2018-11-23 ENCOUNTER — OFFICE VISIT (OUTPATIENT)
Dept: FAMILY MEDICINE | Facility: CLINIC | Age: 83
End: 2018-11-23
Payer: MEDICARE

## 2018-11-23 VITALS
BODY MASS INDEX: 22.38 KG/M2 | OXYGEN SATURATION: 97 % | DIASTOLIC BLOOD PRESSURE: 64 MMHG | TEMPERATURE: 98 F | HEART RATE: 73 BPM | SYSTOLIC BLOOD PRESSURE: 118 MMHG | HEIGHT: 63 IN | WEIGHT: 126.31 LBS

## 2018-11-23 DIAGNOSIS — R05.9 COUGH: Primary | ICD-10-CM

## 2018-11-23 DIAGNOSIS — R09.82 POST-NASAL DRAINAGE: ICD-10-CM

## 2018-11-23 DIAGNOSIS — Z23 NEEDS FLU SHOT: ICD-10-CM

## 2018-11-23 PROCEDURE — 99999 PR PBB SHADOW E&M-EST. PATIENT-LVL V: CPT | Mod: PBBFAC,,, | Performed by: NURSE PRACTITIONER

## 2018-11-23 PROCEDURE — 99215 OFFICE O/P EST HI 40 MIN: CPT | Mod: PBBFAC,PN,25 | Performed by: NURSE PRACTITIONER

## 2018-11-23 PROCEDURE — 99215 OFFICE O/P EST HI 40 MIN: CPT | Mod: S$PBB,,, | Performed by: NURSE PRACTITIONER

## 2018-11-23 PROCEDURE — 90662 IIV NO PRSV INCREASED AG IM: CPT | Mod: PBBFAC,PN

## 2018-11-23 NOTE — PROGRESS NOTES
This dictation has been generated using Modal Fluency Dictation some phonetic errors may occur. Please contact author for clarification if needed.     Problem List Items Addressed This Visit     None      Visit Diagnoses     Cough    -  Primary    Post-nasal drainage        Needs flu shot        Relevant Orders    Influenza - High Dose (65+) (PF) (IM)          Orders Placed This Encounter    Influenza - High Dose (65+) (PF) (IM)    Influenza - High Dose (65+) (PF) (IM)       Cough and postnasal drip.  Recommended Allegra 30 mg twice a day as previously taking.  Delsym for the cough.  Update flu shot  In excessive of 45 minutes spent with patient with greater than 50% of time dedicated to education on symptoms, diagnosis, treatments, and coordination of care.       Follow-up if symptoms worsen or fail to improve.    ________________________________________________________________  ________________________________________________________________      Chief Complaint   Patient presents with    Cough    Headache     History of present illness  This 84 y.o. presents today for complaint of cough.  Patient is new to me.  Follows with 1 of my partners.  She is here with her daughter.  She is arriving 13 min late.  We discussed leaving early and the possibility of rescheduling to later in the day or other dates as patient has more issues with dementia and staying on task.  Discussed caregiver stress and anticipatory guidance with other dementia-related problems with her daughter.  Patient does note a cough.  She is verbal.  Her daughter gives a history given that she has dementia.  Her daughter states symptoms have been present for about a week.  She went to the emergency room for a stomach complaint and they checked her chest and said it was okay.  She has been taking Coricidin without any improvement.  Previously had been taking Allegra 30 mg but not currently taking that.  They do ask about a cough suppressant.  Cough  is occasionally productive.  Daughter says that it was clear this morning.  Review of systems  No fever chills malaise.  Daughter notes that she has been wanting to nap more frequently.  There might be a little bit of increase in confusion but difficult to tell with dementia.  Seems she has lost more focus lately.  Had a difficult time keeping her organized to get out of the door into the visit on time.  Her appetite has been fair  Further review of systems deferred due to dementia    Past medical social family history reviewed.  Patient new to me    Past Medical History:   Diagnosis Date    Anticoagulant long-term use     ASA 81mg, Fish Oil    Arthritis     Cataract     OU done//    DDD (degenerative disc disease), lumbar     Glaucoma     suspect    Hyperlipidemia     Low back pain     Osteoporosis     Positive PPD 1970    Seasonal allergies        Past Surgical History:   Procedure Laterality Date    BREAST BIOPSY Right     2012 neg    CATARACT EXTRACTION W/  INTRAOCULAR LENS IMPLANT Bilateral 10/15/14     Dr Wright    COLONOSCOPY      Epidural Steroid injection      Pain Management    LUISANA-TRANSFORAMINAL L5/S1 Bilateral 3/14/2016    [facility]    INJECTION-STEROID-EPIDURAL-LUMBAR N/A 8/4/2014    [facility]    INJECTION-STEROID-EPIDURAL-LUMBAR, L5/S1 N/A 11/18/2014    [facility]    phaco/pciol Left 10/15/2014    [facility]    phaco/pciol Right 8/20/2014    [facility]    TONSILLECTOMY  1941       Family History   Problem Relation Age of Onset    Cancer Father         Rectal    Glaucoma Mother     Cancer Mother         lung, throat    Cancer Brother         throat    Hypertension Brother     Stroke Brother     Diabetes Sister     Stroke Sister     Amblyopia Neg Hx     Blindness Neg Hx      Cataracts Neg Hx     Macular degeneration Neg Hx     Retinal detachment Neg Hx     Strabismus Neg Hx     Thyroid disease Neg Hx     Nephrolithiasis Neg Hx        Social History     Socioeconomic History    Marital status:      Spouse name: None    Number of children: None    Years of education: None    Highest education level: None   Social Needs    Financial resource strain: None    Food insecurity - worry: None    Food insecurity - inability: None    Transportation needs - medical: None    Transportation needs - non-medical: None   Occupational History    None   Tobacco Use    Smoking status: Former Smoker     Packs/day: 0.25     Start date: 1961     Last attempt to quit: 1992     Years since quittin.4    Smokeless tobacco: Never Used   Substance and Sexual Activity    Alcohol use: No    Drug use: No    Sexual activity: None   Other Topics Concern    None   Social History Narrative    None       Current Outpatient Medications   Medication Sig Dispense Refill    acetaminophen (TYLENOL) 500 MG tablet Take 500 mg by mouth every 6 (six) hours as needed for Pain.      donepezil (ARICEPT) 10 MG tablet TAKE 1 TABLET BY MOUTH EVERY EVENING 30 tablet 11    gabapentin (NEURONTIN) 100 MG capsule Take 2 capsules (200 mg total) by mouth every evening. 60 capsule 3    oxybutynin (DITROPAN XL) 15 MG TR24 Take 1 tablet (15 mg total) by mouth once daily. 30 tablet 11    JUAN/INULIN/C-LOSE/MV-MN/FA (FIBER PLUS MULITVITAMIN ORAL) No Sig Provided      aspirin 81 mg Tab Every day      calcium-vitamin D (CALCIUM-VITAMIN D) 500 mg(1,250mg) -200 unit per tablet Every day      diclofenac sodium (VOLTAREN) 1 % Gel Apply 2 g topically 3 (three) times daily. 1 Tube 3    fexofenadine (ALLEGRA) 30 MG tablet Take 30 mg by mouth 2 (two) times daily. As needed      fluticasone (FLONASE) 50 mcg/actuation nasal spray 2 sprays by Each Nare route once daily. 16 g 11    lidocaine-prilocaine  (EMLA) cream As directed      meloxicam (MOBIC) 7.5 MG tablet Take 1 tablet (7.5 mg total) by mouth once daily. (Patient taking differently: Take 7.5 mg by mouth once daily. ) 30 tablet 0    omega-3 fatty acids-vitamin E (FISH OIL) 1,000 mg Cap Every day      PROPYLENE GLYCOL/ (SYSTANE OPHT) Place 1 drop into both eyes 2 (two) times daily.       No current facility-administered medications for this visit.        Review of patient's allergies indicates:   Allergen Reactions    Brimonidine Other (See Comments)     Redness and irritation of eyes       Physical examination  Vitals Reviewed  Gen. Well-dressed well-nourished   Skin warm dry and intact.  No rashes noted.  HEENT.  TM intact bilateral with normal light reflex.  No mastoid tenderness during percussion.  Nares patent bilateral.  Pharynx is unremarkable except postnasal drip.  No maxillary or frontal sinus tenderness when percussed.    Neck is supple without adenopathy  Chest.  Respirations are even unlabored.  Lungs are clear to auscultation.  Cardiac regular rate and rhythm.  No chest wall adenopathy noted.  Neuro. Awake alert oriented x4.  Normal judgment and cognition noted.  Extremities no clubbing cyanosis or edema noted.     Call or return to clinic prn if these symptoms worsen or fail to improve as anticipated.

## 2018-11-26 RX ORDER — GABAPENTIN 100 MG/1
CAPSULE ORAL
Qty: 60 CAPSULE | Refills: 0 | Status: SHIPPED | OUTPATIENT
Start: 2018-11-26 | End: 2019-02-07 | Stop reason: SDUPTHER

## 2019-02-07 RX ORDER — GABAPENTIN 100 MG/1
CAPSULE ORAL
Qty: 60 CAPSULE | Refills: 0 | Status: SHIPPED | OUTPATIENT
Start: 2019-02-07 | End: 2019-04-01 | Stop reason: SDUPTHER

## 2019-02-22 DIAGNOSIS — R41.3 MEMORY LOSS: ICD-10-CM

## 2019-02-22 RX ORDER — DONEPEZIL HYDROCHLORIDE 10 MG/1
TABLET, FILM COATED ORAL
Qty: 30 TABLET | Refills: 0 | Status: SHIPPED | OUTPATIENT
Start: 2019-02-22 | End: 2019-02-22 | Stop reason: SDUPTHER

## 2019-02-22 RX ORDER — DONEPEZIL HYDROCHLORIDE 10 MG/1
TABLET, FILM COATED ORAL
Qty: 90 TABLET | Refills: 0 | Status: SHIPPED | OUTPATIENT
Start: 2019-02-22 | End: 2020-01-02 | Stop reason: SDUPTHER

## 2019-03-15 ENCOUNTER — TELEPHONE (OUTPATIENT)
Dept: FAMILY MEDICINE | Facility: CLINIC | Age: 84
End: 2019-03-15

## 2019-03-15 NOTE — TELEPHONE ENCOUNTER
----- Message from Dia Adorno sent at 3/15/2019  2:38 PM CDT -----  Contact: daughter  Daughter Loree - [phone] ([facility] phone numberr - use 1) is calling/she sent the wrong information previously - on dad - but should have been on mom)/patient's pain is keeping everyone awake including patient can not sleep/the arthritis is very bad and the Gabapentin 100mg - takes two tablets in the evening - is not helping pain/should this be increased or changed?/please call daughter to discuss

## 2019-03-19 ENCOUNTER — TELEPHONE (OUTPATIENT)
Dept: FAMILY MEDICINE | Facility: CLINIC | Age: 84
End: 2019-03-19

## 2019-03-19 NOTE — TELEPHONE ENCOUNTER
85-year-old complaining of significant weakness. I would start by scheduling an appointment. If symptoms are significant, and if they are worsening, then go to the emergency room. If she's having dark stools, possibly melanoma then this may even be an emergency and need immediate ER.

## 2019-03-19 NOTE — TELEPHONE ENCOUNTER
Notified patient that id sx are worsening and stools are dark then that may warrant immediate ER attention. Advised to go to ER if so. Daughter verbalized understanding.

## 2019-03-19 NOTE — TELEPHONE ENCOUNTER
Pt is weak and having dark colored (very dark brown) and huge. Daughter is concerned as pt is getting weaker since Last ov on 11-. daughter would like advise on how to proceed.

## 2019-03-19 NOTE — TELEPHONE ENCOUNTER
----- Message from Marleny Parham sent at 3/19/2019 11:47 AM CDT -----  Contact: Daughter/Loree  ..Type: Needs Medical Advice    Who Called: Daughter/Loree  Symptoms (please be specific):  Dark Stool  How long has patient had these symptoms: 1 day  Best Call Back Number: [phone]   Additional Information: Loree stated her mom passed dark stool on Saturday, needs advise on what to do  Please call to advise  Thanks

## 2019-03-25 ENCOUNTER — TELEPHONE (OUTPATIENT)
Dept: PAIN MEDICINE | Facility: CLINIC | Age: 84
End: 2019-03-25

## 2019-03-25 ENCOUNTER — OFFICE VISIT (OUTPATIENT)
Dept: PAIN MEDICINE | Facility: CLINIC | Age: 84
End: 2019-03-25
Payer: MEDICARE

## 2019-03-25 ENCOUNTER — TELEPHONE (OUTPATIENT)
Dept: FAMILY MEDICINE | Facility: CLINIC | Age: 84
End: 2019-03-25

## 2019-03-25 VITALS
TEMPERATURE: 97 F | DIASTOLIC BLOOD PRESSURE: 68 MMHG | OXYGEN SATURATION: 96 % | SYSTOLIC BLOOD PRESSURE: 132 MMHG | RESPIRATION RATE: 18 BRPM | HEART RATE: 80 BPM

## 2019-03-25 DIAGNOSIS — M48.062 SPINAL STENOSIS OF LUMBAR REGION WITH NEUROGENIC CLAUDICATION: ICD-10-CM

## 2019-03-25 DIAGNOSIS — M16.11 PRIMARY OSTEOARTHRITIS OF RIGHT HIP: Primary | ICD-10-CM

## 2019-03-25 DIAGNOSIS — M25.551 RIGHT HIP PAIN: ICD-10-CM

## 2019-03-25 DIAGNOSIS — M54.16 LUMBAR RADICULOPATHY: ICD-10-CM

## 2019-03-25 PROCEDURE — 99214 OFFICE O/P EST MOD 30 MIN: CPT | Mod: PBBFAC,PN | Performed by: PHYSICIAN ASSISTANT

## 2019-03-25 PROCEDURE — 99999 PR PBB SHADOW E&M-EST. PATIENT-LVL IV: CPT | Mod: PBBFAC,,, | Performed by: PHYSICIAN ASSISTANT

## 2019-03-25 PROCEDURE — 99999 PR PBB SHADOW E&M-EST. PATIENT-LVL IV: ICD-10-PCS | Mod: PBBFAC,,, | Performed by: PHYSICIAN ASSISTANT

## 2019-03-25 PROCEDURE — 99214 PR OFFICE/OUTPT VISIT, EST, LEVL IV, 30-39 MIN: ICD-10-PCS | Mod: S$PBB,,, | Performed by: PHYSICIAN ASSISTANT

## 2019-03-25 PROCEDURE — 99214 OFFICE O/P EST MOD 30 MIN: CPT | Mod: S$PBB,,, | Performed by: PHYSICIAN ASSISTANT

## 2019-03-25 RX ORDER — ALPRAZOLAM 0.5 MG/1
1 TABLET, ORALLY DISINTEGRATING ORAL ONCE AS NEEDED
Status: CANCELLED | OUTPATIENT
Start: 2019-04-03 | End: 2030-08-29

## 2019-03-25 NOTE — TELEPHONE ENCOUNTER
----- Message from Dia Adorno sent at 3/25/2019 11:24 AM CDT -----  Contact: daughter  Daughter - Loree Jacobs - [phone] ([facility]) is calling/patient's  - Latasha Culp MRN [id number] has an appt today at 3:30pm with Mr Palomo/patient has also seen Mr Palomo previously and daughter is asking if she could be worked in with spouse today as she is having a lot of hip pain from the arthritis/please advise

## 2019-03-25 NOTE — TELEPHONE ENCOUNTER
----- Message from Dia Adorno sent at 3/25/2019 11:21 AM CDT -----  Contact: daughter  Daughter - Loree Jacobs - [phone] ([facility] - have to dial a [phone]) is calling/she is asking if Dr Bill would please order home health for patient and the home health she is wanting to use is [facility]/please advise

## 2019-03-25 NOTE — PROGRESS NOTES
This note was completed with dictation software and grammatical errors may exist.    CC:Back pain    HPI: The patient is an 85-year-old woman with a history of osteoporosis, low back pain who initially presented in referral from Autumn Mccormick NP for worsening low back pain and right leg pain. She returns in follow-up today with right hip pain. The patient is a poor historian but is accompanied by her daughter to help with history.  The patient is unable to tell me specifically where her pain is but the daughter states that she always complains about her right groin and rubs her right anterior thigh.  She complains of weakness in her legs but is currently denying back pain. She has intermittent incontinence.  She denies numbness.    Pain intervention history: She takes Mobic and Tylenol without relief, no recent physical therapy.  She is status post L5/S1 interlaminar epidural steroid injection on 8/4/14 with 50% relief of her back pain and 100% relief of her leg pain.  She is status post L5/S1 interlaminar epidural steroid injection 11/18/14 with about 20% relief.  She is status post bilateral L5 transforaminal epidural steroid injections on 3/14/16 with a little less than 50% relief.    ROS:She reports fevers chills, weight loss, rash some itching, color change, texture change her skin, vision change double vision swallowing plans running nose stomach pain appetite change easy bruising and back pain.  Balance of review of systems is negative.    Medical, surgical, family and social history reviewed elsewhere in record.     Medications/Allergies: See med card    Vitals:    03/25/19 1535   BP: 132/68   Pulse: 80   Resp: 18   Temp: 97 °F (36.1 °C)   TempSrc: Oral   SpO2: 96%   PainSc:   6   PainLoc: Hip         Physical exam:  Gen: A and O x3, pleasant, well-groomed  Skin: No rashes or obvious lesions  HEENT: PERRLA  CVS: Regular rate and rhythm, normal S1 and S2, no murmurs.  Resp: Clear to auscultation bilaterally,  no wheezes or rales.  Abdomen: Soft, NT/ND, normal bowel sounds present.  Musculoskeletal: Presents in a wheelchair, unable to stand without assistance.    Neuro:  Lower extremities: 4/5 strength bilaterally  Reflexes: Patellar 1+, Achilles 0+ bilaterally.  Sensory:  Intact and symmetrical to light touch and pinprick in L2-S1 dermatomes bilaterally.    Lumbar spine:  Unable to perform range of motion.  Spike's test causes no increased pain on either side.    Straight leg raise is negative bilaterally.  Internal and external rotation of the hip causes right groin pain during internal rotation.  Myofascial exam: No tenderness to palpation across lumbar paraspinous muscles.    Imaging:  3/11/14 Xray:  Intervertebral disk height loss is noted at the L5-S1 level. Mild anterior listhesis of the L5 on S1 vertebral body appears to present of 4 mm. Atherosclerotic calcifications are noted within the aorta. Anterior bridging osteophytes are noted at the L1-L2 and L2-L3 levels. Vertebral body heights appear well preserved. Facet arthropathy is noted at the L4-L5 and L5-S1 levels.    11/20/17 Xray L-spine  The bones are osteopenic.  There is advanced multilevel degenerative change of the lumbar spine in the form of marginal osteophyte formation, facet arthropathy, and disc space narrowing.  Space narrowing is most pronounced at L5-S1.  No acute lumbar compression fracture or osseous destructive process.  No definite spondylolisthesis.  There is a prominent right-sided lateral osteophyte present at L2-L3.  The sacroiliac joints are unremarkable.  There is advanced degenerative change of both hip joints resulting in moderate hip joint space narrowing, right greater than left.    2/7/18 MRI L-spine  T12-L1: There is no spinal canal or foraminal stenosis.  There is no change.  L1-2: There is no spinal canal or foraminal stenosis.  There is mild facet joint arthropathy.  There is no significant change.  L2-3: There is disc space  narrowing.  There is a diffuse disc bulge with mild to moderate facet joint arthropathy and ligamentum flavum thickening.  There is borderline spinal stenosis with mild bilateral foraminal stenosis without significant change.  L3-4: There is a diffuse disc bulge, facet joint arthropathy with ligamentum flavum thickening resulting in borderline to mild spinal stenosis with mild to moderate bilateral foraminal stenosis, slightly progressed.  L4-5:There is disc space narrowing.  There is moderate diffuse disc bulge with osteophytic ridging and superimposed right foraminal disc protrusion.  There is facet joint arthropathy with ligamentum flavum thickening.  There is mild to moderate central spinal stenosis with moderate to marked right and moderate left foraminal stenosis without significant change.  L5-S1: There is mild anterolisthesis of L5 on S1.  There is disc space narrowing.  There is moderate marked facet joint arthropathy with ligamentum flavum thickening.  There is unroofing of a moderate diffuse disc bulge with osteophytic ridging.  There is mild spinal stenosis.  There is moderate to marked bilateral lateral recess stenosis with severe bilateral foraminal stenosis without significant change.      Assessment:   The patient is an 85-year-old woman with a history of osteoporosis, low back pain who initially presented in referral from Autumn Mccormick NP for worsening low back pain and right leg pain.     1. Primary osteoarthritis of right hip     2. Spinal stenosis of lumbar region with neurogenic claudication     3. Lumbar radiculopathy         Plan:  1.  I reviewed the patient's hip x-rays from several years ago we discussed that she has arthritic changes likely causing her right groin and anterior thigh pain.  I will schedule her for a right hip joint injection.  2.  If her low back pain and bilateral leg pain returns we can repeat bilateral L5/S1 transforaminal epidural steroid injections.  3.  She will  continue to take gabapentin and Tylenol.  4.  Follow-up in 4 weeks postprocedure or sooner as needed.

## 2019-03-25 NOTE — TELEPHONE ENCOUNTER
Daughter states that patient is needing HH orders from [facility] for PT. States that she will not get out of bed, legs are weak, having to change in the bed and feed in the bed. Last Office Visit was 11/2018. Would you like NP to see patient?

## 2019-03-26 NOTE — TELEPHONE ENCOUNTER
I can order home health although if she does not come in for a face to face visit it's possible that payment will be denied by Medicare. I don't make these rules   I believe Medicare still requires a face to face visit provider within 30 days.  Let me know

## 2019-03-27 ENCOUNTER — TELEPHONE (OUTPATIENT)
Dept: PAIN MEDICINE | Facility: CLINIC | Age: 84
End: 2019-03-27

## 2019-03-27 NOTE — TELEPHONE ENCOUNTER
----- Message from Alisia Soto sent at 3/27/2019 10:58 AM CDT -----  Contact: Daughter Loree   Daughter Loree called, she want to know what time patient upcoming procedure is scheduled for. Please call back at [phone]. Please put a 1 before the area code

## 2019-03-29 ENCOUNTER — TELEPHONE (OUTPATIENT)
Dept: PAIN MEDICINE | Facility: CLINIC | Age: 84
End: 2019-03-29

## 2019-03-29 NOTE — TELEPHONE ENCOUNTER
Spoke with patient's daughter and informed her the pre-op center will call 1-2 days prior with the arrival time.

## 2019-04-01 RX ORDER — GABAPENTIN 100 MG/1
CAPSULE ORAL
Qty: 60 CAPSULE | Refills: 0 | Status: SHIPPED | OUTPATIENT
Start: 2019-04-01 | End: 2020-01-02 | Stop reason: SDUPTHER

## 2019-04-02 DIAGNOSIS — M25.551 RIGHT HIP PAIN: Primary | ICD-10-CM

## 2019-04-03 ENCOUNTER — HOSPITAL ENCOUNTER (OUTPATIENT)
Facility: HOSPITAL | Age: 84
Discharge: HOME | End: 2019-04-03
Attending: ANESTHESIOLOGY | Admitting: ANESTHESIOLOGY
Payer: MEDICARE

## 2019-04-03 ENCOUNTER — HOSPITAL ENCOUNTER (OUTPATIENT)
Dept: RADIOLOGY | Facility: HOSPITAL | Age: 84
Discharge: HOME | End: 2019-04-03
Attending: ANESTHESIOLOGY
Payer: MEDICARE

## 2019-04-03 VITALS
SYSTOLIC BLOOD PRESSURE: 183 MMHG | DIASTOLIC BLOOD PRESSURE: 82 MMHG | RESPIRATION RATE: 16 BRPM | HEART RATE: 88 BPM | TEMPERATURE: 98 F | OXYGEN SATURATION: 98 %

## 2019-04-03 DIAGNOSIS — M16.11 PRIMARY OSTEOARTHRITIS OF RIGHT HIP: Primary | ICD-10-CM

## 2019-04-03 DIAGNOSIS — M25.551 RIGHT HIP PAIN: ICD-10-CM

## 2019-04-03 PROCEDURE — 25000003 PHARM REV CODE 250: Mod: PO | Performed by: ANESTHESIOLOGY

## 2019-04-03 PROCEDURE — 20610 DRAIN/INJ JOINT/BURSA W/O US: CPT | Mod: PO | Performed by: ANESTHESIOLOGY

## 2019-04-03 PROCEDURE — 20610 PR DRAIN/INJECT LARGE JOINT/BURSA: ICD-10-PCS | Mod: RT,,, | Performed by: ANESTHESIOLOGY

## 2019-04-03 PROCEDURE — 20610 DRAIN/INJ JOINT/BURSA W/O US: CPT | Mod: RT,,, | Performed by: ANESTHESIOLOGY

## 2019-04-03 PROCEDURE — 25500020 PHARM REV CODE 255: Mod: PO | Performed by: ANESTHESIOLOGY

## 2019-04-03 PROCEDURE — 76000 FLUOROSCOPY <1 HR PHYS/QHP: CPT | Mod: TC,PO

## 2019-04-03 RX ORDER — ALPRAZOLAM 0.5 MG/1
1 TABLET, ORALLY DISINTEGRATING ORAL ONCE AS NEEDED
Status: COMPLETED | OUTPATIENT
Start: 2019-04-03 | End: 2019-04-03

## 2019-04-03 RX ORDER — TRAMADOL HYDROCHLORIDE 50 MG/1
50 TABLET ORAL NIGHTLY PRN
Qty: 30 TABLET | Refills: 0 | Status: SHIPPED | OUTPATIENT
Start: 2019-04-03 | End: 2020-03-12 | Stop reason: SDUPTHER

## 2019-04-03 RX ORDER — BUPIVACAINE HYDROCHLORIDE 2.5 MG/ML
INJECTION, SOLUTION EPIDURAL; INFILTRATION; INTRACAUDAL
Status: DISCONTINUED | OUTPATIENT
Start: 2019-04-03 | End: 2019-04-03 | Stop reason: HOSPADM

## 2019-04-03 RX ORDER — LIDOCAINE HYDROCHLORIDE 10 MG/ML
INJECTION, SOLUTION EPIDURAL; INFILTRATION; INTRACAUDAL; PERINEURAL
Status: DISCONTINUED | OUTPATIENT
Start: 2019-04-03 | End: 2019-04-03 | Stop reason: HOSPADM

## 2019-04-03 RX ADMIN — ALPRAZOLAM 0.5 MG: 0.5 TABLET, ORALLY DISINTEGRATING ORAL at 01:04

## 2019-04-03 NOTE — OP NOTE
PROCEDURE DATE: 4/3/2019    PROCEDURE:  Right5  Hip joint injection under fluoroscopy.      Diagnosis: Right hip pain  Post Op diagnosis: Same       PHYSICIAN: Tashi Morrison MD                                                                               Local anesthetic:  Lidocaine 1%, 3 ml total                                                                                                              MEDICATIONS INJECTED:  Methylprednisone 40mg and bupivacaine 0.25% 2 mL                                                                             ESTIMATED BLOOD LOSS:  none                                                                                                                             COMPLICATIONS:  none                                                                                                                                    TECHNIQUE: A time-out taken to identify patient and procedure side prior to starting the procedure.  With the patient lying in the supine position, the right greater trochanter, femoral neck and femoral head were visualized. The area was prepped and draped in the usual sterile fashion.  Local anesthetic was used, given by raising a wheal and going down to the hub of the 25-gauge needle 1.5inch needle.  A 3.5inch 22-gauge needle was introduced under fluoroscopy to the lateral portion of the femoral neck and then walked medially to enter the hip joint capsule.  When the tip of the needle was presumed to be in appropriate position, omnipaque contrast was injected and noted to spread throughout the joint space.  After negative aspiration for blood, the medication as noted above was then injected slowly.  The patient tolerated the procedure well.                                                                                                                                                                                                The patient was monitored after the  procedure and was given post procedure and discharge instructions to follow at home. The patient was discharged in a stable condition.

## 2019-04-03 NOTE — DISCHARGE SUMMARY
[facility]  Discharge Note  Short Stay    Admit Date: 4/3/2019    Discharge Date: 4/3/2019    Attending Physician: Tashi Morrison MD     Discharge Provider: Tashi Morrison    Diagnoses:  Active Hospital Problems    Diagnosis  POA    *Primary osteoarthritis of right hip [M16.11]  Yes      Resolved Hospital Problems   No resolved problems to display.       Discharged Condition: good    Final Diagnoses: Right hip pain [M25.551]    Disposition: Home or Self Care    Hospital Course: no complications, uneventful    Outcome of Hospitalization, Treatment, Procedure, or Surgery:  Patient was admitted for outpatient procedure. The patient underwent procedure without complications and are discharged home    Follow up/Patient Instructions:  Follow up as scheduled in Pain Management clinic in 3-4 weeks/Patient has received instructions and follow up date and time    Medications:  Continue previous medications    Discharge Procedure Orders   Call MD for:  temperature >100.4     Call MD for:  severe uncontrolled pain     Call MD for:  redness, tenderness, or signs of infection (pain, swelling, redness, odor or green/yellow discharge around incision site)     Call MD for:  severe persistent headache     No dressing needed         Discharge Procedure Orders (must include Diet, Follow-up, Activity):   Discharge Procedure Orders (must include Diet, Follow-up, Activity)   Call MD for:  temperature >100.4     Call MD for:  severe uncontrolled pain     Call MD for:  redness, tenderness, or signs of infection (pain, swelling, redness, odor or green/yellow discharge around incision site)     Call MD for:  severe persistent headache     No dressing needed

## 2019-04-17 ENCOUNTER — OFFICE VISIT (OUTPATIENT)
Dept: FAMILY MEDICINE | Facility: CLINIC | Age: 84
End: 2019-04-17
Payer: MEDICARE

## 2019-04-17 VITALS — SYSTOLIC BLOOD PRESSURE: 126 MMHG | DIASTOLIC BLOOD PRESSURE: 84 MMHG | HEART RATE: 112 BPM

## 2019-04-17 DIAGNOSIS — S31.000A WOUND OF SACRAL REGION, INITIAL ENCOUNTER: Primary | ICD-10-CM

## 2019-04-17 DIAGNOSIS — F03.90 DEMENTIA WITHOUT BEHAVIORAL DISTURBANCE, UNSPECIFIED DEMENTIA TYPE: ICD-10-CM

## 2019-04-17 DIAGNOSIS — R13.10 SWALLOWING PROBLEM: ICD-10-CM

## 2019-04-17 DIAGNOSIS — R13.10 DYSPHAGIA, UNSPECIFIED TYPE: ICD-10-CM

## 2019-04-17 DIAGNOSIS — F44.4 FUNCTIONAL PARAPARESIS: ICD-10-CM

## 2019-04-17 DIAGNOSIS — S31.000A SACRAL WOUND, INITIAL ENCOUNTER: Primary | ICD-10-CM

## 2019-04-17 DIAGNOSIS — F03.C0 ADVANCED DEMENTIA: ICD-10-CM

## 2019-04-17 DIAGNOSIS — E46 MALNUTRITION, UNSPECIFIED TYPE: ICD-10-CM

## 2019-04-17 PROCEDURE — 99999 PR PBB SHADOW E&M-EST. PATIENT-LVL III: CPT | Mod: PBBFAC,,, | Performed by: FAMILY MEDICINE

## 2019-04-17 PROCEDURE — 99999 PR PBB SHADOW E&M-EST. PATIENT-LVL III: ICD-10-PCS | Mod: PBBFAC,,, | Performed by: FAMILY MEDICINE

## 2019-04-17 PROCEDURE — 99214 OFFICE O/P EST MOD 30 MIN: CPT | Mod: S$PBB,,, | Performed by: FAMILY MEDICINE

## 2019-04-17 PROCEDURE — 99214 PR OFFICE/OUTPT VISIT, EST, LEVL IV, 30-39 MIN: ICD-10-PCS | Mod: S$PBB,,, | Performed by: FAMILY MEDICINE

## 2019-04-17 PROCEDURE — 99213 OFFICE O/P EST LOW 20 MIN: CPT | Mod: PBBFAC,PN | Performed by: FAMILY MEDICINE

## 2019-04-17 NOTE — PROGRESS NOTES
Assessment and Plan:    1. Sacral wound, initial encounter  Cont. kevin's   Rotate sitting position  Add cushion to chair  Home health wound care    2. Advanced dementia  Needs home health services including : Skilled nursing, social work, speech therapy, wound care  Discussed future issues including palliative options    3. Functional paraparesis  Home health    4. Dysphagia, unspecified type  Speech therapy  Discussed future treatment modalities : pleasure feeds vs g-tube ( poor candidate)  Ensure  Thickened liquids and pureed diet.       ______________________________________________________________________  Subjective:    Chief Complaint:  Chief Complaint   Patient presents with    Home Health Review     face to face interview    Wound Check     pt has sacral wound first noticed redness on Friday and started using butt paste barrier and over the weekend when caretaker wiped pt the skin tore and they have continued to use barrier cream.         HPI:  Breanne LEÓN is a 85 y.o. year old     -- Dementia, Functional Paraplegia  She lives with her  in a private home  Caretakers during the day, Daughter at night  Takes multiple medications  Has sacral wound.     -- Dysphagia   Duration : 1 week  Pocketing foods  No h/o CVA  Having trouble swallowing liquids and solids.   Has been speaking gibberish since onset.   No focal defects noted.     -- Sacral Wound  Duration : unknown  Is wheelchair bound  No symptom of fever.   No wound care or home health.         Past Medical History:  Past Medical History:   Diagnosis Date    Anticoagulant long-term use     ASA 81mg, Fish Oil    Arthritis     Cataract     OU done//    DDD (degenerative disc disease), lumbar     Glaucoma     suspect    Hyperlipidemia     Low back pain     Osteoporosis     Positive PPD 1970    Seasonal allergies        Past Surgical History:  Past Surgical History:   Procedure Laterality Date    BREAST BIOPSY Right     2012 neg     CATARACT EXTRACTION W/  INTRAOCULAR LENS IMPLANT Bilateral 10/15/14     Dr Wright    COLONOSCOPY      Epidural Steroid injection      Pain Management    LUISANA-TRANSFORAMINAL L5/S1 Bilateral 3/14/2016    [facility]    Injection, Joint, Hip Right 4/3/2019    [facility]    INJECTION-STEROID-EPIDURAL-LUMBAR N/A 2014    [facility]    INJECTION-STEROID-EPIDURAL-LUMBAR, L5/S1 N/A 2014    [facility] at [facility]    phaco/pciol Left 10/15/2014    [facility]    phaco/pciol Right 2014    [facility] at [facility]    TONSILLECTOMY  194       Family History:  Family History   Problem Relation Age of Onset    Cancer Father         Rectal    Glaucoma Mother     Cancer Mother         lung, throat    Cancer Brother         throat    Hypertension Brother     Stroke Brother     Diabetes Sister     Stroke Sister     Amblyopia Neg Hx     Blindness Neg Hx     Cataracts Neg Hx     Macular degeneration Neg Hx     Retinal detachment Neg Hx     Strabismus Neg Hx     Thyroid disease Neg Hx     Nephrolithiasis Neg Hx        Social History:  Social History     Socioeconomic History    Marital status:      Spouse name: Not on file    Number of children: Not on file    Years of education: Not on file    Highest education level: Not on file   Occupational History    Not on file   Social Needs    Financial resource strain: Not on file    Food insecurity:     Worry: Not on file     Inability: Not on file    Transportation needs:     Medical: Not on file     Non-medical: Not on file   Tobacco Use    Smoking status: Former Smoker     Packs/day: 0.25     Start date: 1961     Last attempt to quit: 1992     Years since quittin.8    Smokeless tobacco: Never Used   Substance and Sexual  Activity    Alcohol use: No    Drug use: No    Sexual activity: Not on file   Lifestyle    Physical activity:     Days per week: Not on file     Minutes per session: Not on file    Stress: Not on file   Relationships    Social connections:     Talks on phone: Not on file     Gets together: Not on file     Attends Restorationism service: Not on file     Active member of club or organization: Not on file     Attends meetings of clubs or organizations: Not on file     Relationship status: Not on file   Other Topics Concern    Not on file   Social History Narrative    Not on file       Medications:  Current Outpatient Medications on File Prior to Visit   Medication Sig Dispense Refill    acetaminophen (TYLENOL) 500 MG tablet Take 500 mg by mouth every 6 (six) hours as needed for Pain.      aspirin 81 mg Tab Every day      diclofenac sodium (VOLTAREN) 1 % Gel Apply 2 g topically 3 (three) times daily. 1 Tube 3    donepezil (ARICEPT) 10 MG tablet TAKE 1 TABLET BY MOUTH EVERY EVENING 90 tablet 0    fexofenadine (ALLEGRA) 30 MG tablet Take 30 mg by mouth 2 (two) times daily. As needed      fluticasone (FLONASE) 50 mcg/actuation nasal spray 2 sprays by Each Nare route once daily. 16 g 11    gabapentin (NEURONTIN) 100 MG capsule TAKE 2 CAPSULES BY MOUTH EVERY EVENING 60 capsule 0    lidocaine-prilocaine (EMLA) cream As directed      oxybutynin (DITROPAN XL) 15 MG TR24 Take 1 tablet (15 mg total) by mouth once daily. 30 tablet 11    traMADol (ULTRAM) 50 mg tablet Take 1 tablet (50 mg total) by mouth nightly as needed for Pain. 30 tablet 0     No current facility-administered medications on file prior to visit.        Allergies:  Brimonidine    Immunizations:  Immunization History   Administered Date(s) Administered    Influenza 10/16/2007, 10/24/2008, 11/25/2009    Influenza - High Dose 09/15/2011, 11/20/2017, 11/23/2018    Pneumococcal Conjugate - 13 Valent 11/20/2017    Pneumococcal Polysaccharide - 23  Elaina 09/10/2014       Review of Systems:  Review of Systems   Constitutional: Negative for fever.   Respiratory: Negative for cough and shortness of breath.    Cardiovascular: Negative for chest pain.   Gastrointestinal: Negative for abdominal pain, diarrhea, nausea and vomiting.        Pocketing food  Trouble swallowing   Skin: Negative for rash.   Psychiatric/Behavioral: Negative for dysphoric mood.       Objective:    Vitals:  Vitals:    04/17/19 1533   BP: 126/84   Pulse: (!) 112   PainSc:   4   PainLoc: Leg       Physical Exam   Constitutional: She appears distressed.   Calling out in pain  Cachectic    HENT:   Head: Normocephalic and atraumatic.   Food in mouth  No tongue deviation  Speaking clearly  Poor at following direction   Eyes: Pupils are equal, round, and reactive to light. EOM are normal.   Neck: Neck supple.   Cardiovascular: Normal rate and regular rhythm. Exam reveals no friction rub.   No murmur heard.  Pulmonary/Chest: Effort normal and breath sounds normal.   Abdominal: Soft. Bowel sounds are normal. She exhibits no distension. There is no tenderness.   Musculoskeletal:   Diffuse muscle atrophy   Neurological: She is alert.   Disoriented  Does not follow direction   Skin: Skin is warm and dry. No rash noted.   See stg 2 wound below   Psychiatric: She has a normal mood and affect. Her behavior is normal.                 Walter Feng MD  Family Medicine

## 2019-04-18 NOTE — PROGRESS NOTES
I attended a home health order. Please verify that it is [facility] which appears based out of [place]

## 2019-04-24 ENCOUNTER — TELEPHONE (OUTPATIENT)
Dept: FAMILY MEDICINE | Facility: CLINIC | Age: 84
End: 2019-04-24

## 2019-05-15 ENCOUNTER — NURSE TRIAGE (OUTPATIENT)
Dept: ADMINISTRATIVE | Facility: CLINIC | Age: 84
End: 2019-05-15

## 2019-05-15 ENCOUNTER — TELEPHONE (OUTPATIENT)
Dept: FAMILY MEDICINE | Facility: CLINIC | Age: 84
End: 2019-05-15

## 2019-05-15 NOTE — TELEPHONE ENCOUNTER
----- Message from Gretchen Kruse sent at 5/15/2019  7:40 AM CDT -----  Type: Needs Medical Advice    Who Called:  Daughter- Loree Jacobs  Symptoms (please be specific):    How long has patient had these symptoms:  Pharmacy name and phone #:  Best Call Back Number: [phone]  Additional Information: Patient has feeding tube, patient 240 cc every three hours. Patient's daughter states after changing the patient's diaper after a bowel movement. The patient  continue to have bowel movement after first one. This continue all day yesterday.

## 2019-05-15 NOTE — TELEPHONE ENCOUNTER
Called the mdo earlier today, mother is on a feeding tube she's getting 240 cc bolus 5 times daily, which means they are giving a bolus every 3 hours.  Her mother is having large bowel movements.  Wants to know if this is too much in a short period of time?  We discussed residuals and she is not having high residuals, and she is not showing signs of any n/v, no coughing spells, andstools are large, but formed.  I advised she can stretch out the feedings until later in the night, perhaps making the last feeding at 9 pm, instead of 6 pm, but that it sounded like the volume was fine.  Advised that when she wipes her anus, this can stimulate her to continue having a bm.  Advised leaving her in a side lying position for 15 minutes, if she can tolerate it,  giving her time to complete the bm, then getting her cleaned up.    Reason for Disposition   Feeding tube, questions or concerns related to   Normal feeding tube function    Protocols used: SWALLOWING DIFFICULTY-A-AH, FEEDING TUBE SYMPTOMS AND [id number]

## 2019-05-15 NOTE — TELEPHONE ENCOUNTER
I am okay to reducing the feeds to every 4 hr.  Also, who originally started the feeds?  Did she have a registered dietitian assess her and give the appropriate recommendations?

## 2019-05-15 NOTE — TELEPHONE ENCOUNTER
Attempted to contact patient, no answer. Left voicemail to return call to clinic.    This is a relatively normal process as the tube feedings are processed differently by the GI tract and the feedings are more frequent than the patient is probably used to eating.

## 2019-05-15 NOTE — TELEPHONE ENCOUNTER
Tried to reach pt Care giver. No answer, will try again later and I left a message on answering machine.    Contacting to inform pt CG of recommendations per provider.

## 2019-05-15 NOTE — TELEPHONE ENCOUNTER
Patient's daughter states she was just put on 240cc bolus Tube Feedings every 3 hours while awake. Patient's daughter states that the patient is having loose, constant, stools. I advised daughter that this is normal, especially for the first sever weeks of tube feedings. I advised using barrier cream such as aquaphor/kevin's butt paste to prevent skin irritation. Patient's daughter is concerned if 240cc every 3 hours may be too much, or too frequent. Patient's daughter would like to know if reducing to every 4 hours would be okay, especially in the beginning? Please advise

## 2019-05-16 ENCOUNTER — NURSE TRIAGE (OUTPATIENT)
Dept: ADMINISTRATIVE | Facility: CLINIC | Age: 84
End: 2019-05-16

## 2019-05-17 NOTE — TELEPHONE ENCOUNTER
Reason for Disposition   Message left on unidentified voice mail.  Phone number verified.    Protocols used: NO CONTACT OR DUPLICATE CONTACT CALL-A-AH

## 2019-05-20 ENCOUNTER — TELEPHONE (OUTPATIENT)
Dept: ADMINISTRATIVE | Facility: CLINIC | Age: 84
End: 2019-05-20

## 2019-05-20 ENCOUNTER — TELEPHONE (OUTPATIENT)
Dept: FAMILY MEDICINE | Facility: CLINIC | Age: 84
End: 2019-05-20

## 2019-05-20 NOTE — TELEPHONE ENCOUNTER
I received an unusual refill request asking for three tablets of prednisone and eight tablets of plavix.   Is there a reason for these unusual dispense request

## 2019-05-20 NOTE — TELEPHONE ENCOUNTER
Home Health SOC 05/11/2019 - 07/09/2019 with [facility] - Dr. Frantz Bill. Patient received SN and OT services.

## 2019-05-21 RX ORDER — PREDNISONE 10 MG/1
TABLET ORAL
Qty: 3 TABLET | Refills: 0 | OUTPATIENT
Start: 2019-05-21

## 2019-05-21 NOTE — TELEPHONE ENCOUNTER
Tried to reach pt. No answer, will try again later and I left a message on answering machine.    Contacting to inform pt of recommendations per provider.

## 2019-05-21 NOTE — TELEPHONE ENCOUNTER
Per daughter:    1. Would like a refill on the plavix  (not predinsone)    2. Can pt still continue to take gabapentin?

## 2019-05-21 NOTE — TELEPHONE ENCOUNTER
----- Message from Katja Lancaster sent at 5/21/2019 11:44 AM CDT -----  Contact: patient daughter nevaeh sprague [phone]  Patient daughter nevaeh sprague [phone]  Returning phone call

## 2019-05-21 NOTE — TELEPHONE ENCOUNTER
Tried to reach pt CG. No answer, will try again later and I left a message on answering machine.    Contacting to follow up with request     Will wait for daughter to return call, can you please decline refills w/ note for pt to call office?

## 2019-05-21 NOTE — TELEPHONE ENCOUNTER
Regarding gabapentin, she is on a low dosage so if this is helping without adverse effects she absolutely can continue this.    Regarding Plavix, I could not find where I have prescribed that in the past.  It also looks like she is taking an aspirin daily.  I do worry about the risk of bleeding verses risk of benefit at this age.  It may be reasonable to just continue either the aspirin or Plavix , but not both, at this point unless her cardiologist specifically told her to take both.  And if that is the case it would probably be best if they prescribed this for her.    But if needed I am happy to help out anyway I can

## 2019-05-22 RX ORDER — CLOPIDOGREL BISULFATE 75 MG/1
TABLET ORAL
Qty: 90 TABLET | Refills: 0 | OUTPATIENT
Start: 2019-05-22

## 2019-05-22 NOTE — TELEPHONE ENCOUNTER
Spoke w/ pt caregiver. Informed pt caregiver about recommendations per provider. she verbalized understanding.    Daughter wanting to know about getting refill lorazepam 1 mg w/ directions to take nightly. Send the script to [facility] pharmacy. Pt does not rest very well, and daughter feels that this has been helping.

## 2019-05-22 NOTE — TELEPHONE ENCOUNTER
----- Message from Aric Joshi sent at 5/22/2019 11:21 AM CDT -----  Type:  Patient Returning Call    Who Called:  Daughter/Loree  Who Left Message for Patient:  Sonia  Does the patient know what this is regarding?:    Best Call Back Number:  [phone]  Additional Information:

## 2019-05-23 RX ORDER — LORAZEPAM 1 MG/1
TABLET ORAL
Qty: 30 TABLET | Refills: 1 | Status: ON HOLD | OUTPATIENT
Start: 2019-05-23 | End: 2020-09-16 | Stop reason: HOSPADM

## 2019-05-23 RX ORDER — LORAZEPAM 1 MG/1
1 TABLET ORAL NIGHTLY PRN
Refills: 0 | COMMUNITY
Start: 2019-05-10 | End: 2019-05-23 | Stop reason: SDUPTHER

## 2019-05-23 NOTE — TELEPHONE ENCOUNTER
----- Message from Allie Deshpande sent at 5/23/2019 10:06 AM CDT -----  Contact: Patient daughter Loree  Type:  Patient Returning Call    Who Called:  Patient daughter  Who Left Message for Patient:  Nurse Sommer  Does the patient know what this is regarding?:  Yes  Best Call Back Number:  [phone] states that you have to put a one in front of the number for it to go thru per daughter  Additional Information:  Please contact to advise

## 2019-05-23 NOTE — TELEPHONE ENCOUNTER
Family would like to know what other medication would assist with calming the patient, without addictive properties. Please advise.

## 2019-05-23 NOTE — TELEPHONE ENCOUNTER
I could not find a record of anyone here, myself nor my colleagues, prescribing lorazepam.  Who has been prescribing this and has she been on this recently?  1 mg is rather high dose for an 85-year-old

## 2019-05-23 NOTE — TELEPHONE ENCOUNTER
Pt was at [facility].     Calling to advise the followin. Lorazepam is a sedative, and that this can cause the patient to fall more often  2. This medication is addictive    Need questioned:  1. Do this help improve her quality of life?  2. Does family still with to refill this medication w/ knowing above information.

## 2019-05-24 ENCOUNTER — TELEPHONE (OUTPATIENT)
Dept: FAMILY MEDICINE | Facility: CLINIC | Age: 84
End: 2019-05-24

## 2019-05-24 NOTE — TELEPHONE ENCOUNTER
----- Message from Abby Arellano sent at 5/24/2019  3:13 PM CDT -----  [facility]  Type: Needs Medical Advice    Who Called:  Emelina  Best Call Back Number: [phone]  Additional Information: Emelina is calling to report the patient's heart rate this morning was 122,fluctuating temp 99.1 and 102.0 she was under a lot of blankets with winter socks, and no bowel movement in 5 days.Please call back and advise.

## 2019-05-24 NOTE — TELEPHONE ENCOUNTER
HR normal running at 94-99.    Temp at time of 102 HR, was 97.6.     Emelina is calling to report the patient's heart rate this morning was 122, fluctuating temp 99.1 and 102.0 (she was under a lot of blankets with winter socks & nightly), and no bowel movement in 5 days.    Pt has been sweating and hot, w/ her temp when covered up.     Calling daughter to advise to go to ED w/ pt.     Daughter states understanding. And will get pt to ED when she get off of work.

## 2019-06-05 ENCOUNTER — EXTERNAL HOME HEALTH (OUTPATIENT)
Dept: HOME HEALTH SERVICES | Facility: HOSPITAL | Age: 84
End: 2019-06-05
Payer: MEDICARE

## 2019-06-05 PROCEDURE — G0180 PR HOME HEALTH MD CERTIFICATION: ICD-10-PCS | Mod: ,,, | Performed by: FAMILY MEDICINE

## 2019-06-05 PROCEDURE — G0180 MD CERTIFICATION HHA PATIENT: HCPCS | Mod: ,,, | Performed by: FAMILY MEDICINE

## 2019-08-19 ENCOUNTER — HOSPITAL ENCOUNTER (INPATIENT)
Facility: HOSPITAL | Age: 84
LOS: 2 days | Discharge: NURSING, REHAB OR LONG-TERM CARE | DRG: 872 | End: 2019-08-22
Attending: EMERGENCY MEDICINE | Admitting: INTERNAL MEDICINE
Payer: MEDICARE

## 2019-08-19 DIAGNOSIS — N30.00 ACUTE CYSTITIS WITHOUT HEMATURIA: Primary | ICD-10-CM

## 2019-08-19 DIAGNOSIS — R00.0 TACHYCARDIA: ICD-10-CM

## 2019-08-19 DIAGNOSIS — A41.9 SEPSIS, DUE TO UNSPECIFIED ORGANISM: ICD-10-CM

## 2019-08-19 LAB
ALBUMIN SERPL BCP-MCNC: 3.2 G/DL (ref 3.5–5.2)
ALP SERPL-CCNC: 93 U/L (ref 55–135)
ALT SERPL W/O P-5'-P-CCNC: 16 U/L (ref 10–44)
ANION GAP SERPL CALC-SCNC: 14 MMOL/L (ref 8–16)
AST SERPL-CCNC: 20 U/L (ref 10–40)
BASOPHILS # BLD AUTO: 0.04 K/UL (ref 0–0.2)
BASOPHILS NFR BLD: 0.3 % (ref 0–1.9)
BILIRUB SERPL-MCNC: 0.5 MG/DL (ref 0.1–1)
BUN SERPL-MCNC: 26 MG/DL (ref 8–23)
CALCIUM SERPL-MCNC: 9.6 MG/DL (ref 8.7–10.5)
CHLORIDE SERPL-SCNC: 106 MMOL/L (ref 95–110)
CO2 SERPL-SCNC: 19 MMOL/L (ref 23–29)
CREAT SERPL-MCNC: 0.6 MG/DL (ref 0.5–1.4)
DIFFERENTIAL METHOD: ABNORMAL
EOSINOPHIL # BLD AUTO: 0.4 K/UL (ref 0–0.5)
EOSINOPHIL NFR BLD: 3.1 % (ref 0–8)
ERYTHROCYTE [DISTWIDTH] IN BLOOD BY AUTOMATED COUNT: 15 % (ref 11.5–14.5)
EST. GFR  (AFRICAN AMERICAN): >60 ML/MIN/1.73 M^2
EST. GFR  (NON AFRICAN AMERICAN): >60 ML/MIN/1.73 M^2
GLUCOSE SERPL-MCNC: 134 MG/DL (ref 70–110)
HCT VFR BLD AUTO: 35 % (ref 37–48.5)
HGB BLD-MCNC: 11.9 G/DL (ref 12–16)
IMM GRANULOCYTES # BLD AUTO: 0.1 K/UL (ref 0–0.04)
IMM GRANULOCYTES NFR BLD AUTO: 0.7 % (ref 0–0.5)
LACTATE SERPL-SCNC: 2 MMOL/L (ref 0.5–1.9)
LYMPHOCYTES # BLD AUTO: 2.4 K/UL (ref 1–4.8)
LYMPHOCYTES NFR BLD: 16.8 % (ref 18–48)
MCH RBC QN AUTO: 29 PG (ref 27–31)
MCHC RBC AUTO-ENTMCNC: 34 G/DL (ref 32–36)
MCV RBC AUTO: 85 FL (ref 82–98)
MONOCYTES # BLD AUTO: 1.1 K/UL (ref 0.3–1)
MONOCYTES NFR BLD: 7.4 % (ref 4–15)
NEUTROPHILS # BLD AUTO: 10.2 K/UL (ref 1.8–7.7)
NEUTROPHILS NFR BLD: 71.7 % (ref 38–73)
NRBC BLD-RTO: 0 /100 WBC
PLATELET # BLD AUTO: 457 K/UL (ref 150–350)
PMV BLD AUTO: 11.8 FL (ref 9.2–12.9)
POTASSIUM SERPL-SCNC: 3.5 MMOL/L (ref 3.5–5.1)
PROT SERPL-MCNC: 7.3 G/DL (ref 6–8.4)
RBC # BLD AUTO: 4.11 M/UL (ref 4–5.4)
SODIUM SERPL-SCNC: 139 MMOL/L (ref 136–145)
WBC # BLD AUTO: 14.25 K/UL (ref 3.9–12.7)

## 2019-08-19 PROCEDURE — 99285 EMERGENCY DEPT VISIT HI MDM: CPT | Mod: 25

## 2019-08-19 PROCEDURE — 87086 URINE CULTURE/COLONY COUNT: CPT

## 2019-08-19 PROCEDURE — 85025 COMPLETE CBC W/AUTO DIFF WBC: CPT

## 2019-08-19 PROCEDURE — 63600175 PHARM REV CODE 636 W HCPCS: Performed by: EMERGENCY MEDICINE

## 2019-08-19 PROCEDURE — 87186 SC STD MICRODIL/AGAR DIL: CPT

## 2019-08-19 PROCEDURE — 93005 ELECTROCARDIOGRAM TRACING: CPT

## 2019-08-19 PROCEDURE — 81001 URINALYSIS AUTO W/SCOPE: CPT

## 2019-08-19 PROCEDURE — 96367 TX/PROPH/DG ADDL SEQ IV INF: CPT

## 2019-08-19 PROCEDURE — 80053 COMPREHEN METABOLIC PANEL: CPT

## 2019-08-19 PROCEDURE — 96365 THER/PROPH/DIAG IV INF INIT: CPT

## 2019-08-19 PROCEDURE — 87077 CULTURE AEROBIC IDENTIFY: CPT

## 2019-08-19 PROCEDURE — 96361 HYDRATE IV INFUSION ADD-ON: CPT

## 2019-08-19 PROCEDURE — 36415 COLL VENOUS BLD VENIPUNCTURE: CPT

## 2019-08-19 PROCEDURE — 87040 BLOOD CULTURE FOR BACTERIA: CPT | Mod: 59

## 2019-08-19 PROCEDURE — 83605 ASSAY OF LACTIC ACID: CPT

## 2019-08-19 RX ORDER — ASCORBIC ACID 500 MG
500 TABLET ORAL 2 TIMES DAILY
Status: ON HOLD | COMMUNITY
End: 2020-08-13 | Stop reason: HOSPADM

## 2019-08-19 RX ORDER — OXYBUTYNIN CHLORIDE 5 MG/1
5 TABLET ORAL 3 TIMES DAILY
COMMUNITY
End: 2020-01-02 | Stop reason: SDUPTHER

## 2019-08-19 RX ORDER — VANCOMYCIN HCL IN 5 % DEXTROSE 1G/250ML
1000 PLASTIC BAG, INJECTION (ML) INTRAVENOUS ONCE
Status: COMPLETED | OUTPATIENT
Start: 2019-08-19 | End: 2019-08-20

## 2019-08-19 RX ORDER — MULTIVITAMIN
1 TABLET ORAL DAILY
Status: ON HOLD | COMMUNITY
End: 2020-09-16 | Stop reason: HOSPADM

## 2019-08-19 RX ORDER — ACETAMINOPHEN 10 MG/ML
1000 INJECTION, SOLUTION INTRAVENOUS ONCE
Status: COMPLETED | OUTPATIENT
Start: 2019-08-20 | End: 2019-08-20

## 2019-08-19 RX ORDER — SODIUM CHLORIDE 9 MG/ML
1000 INJECTION, SOLUTION INTRAVENOUS
Status: COMPLETED | OUTPATIENT
Start: 2019-08-19 | End: 2019-08-20

## 2019-08-19 RX ORDER — IPRATROPIUM BROMIDE 0.5 MG/2.5ML
500 SOLUTION RESPIRATORY (INHALATION) 4 TIMES DAILY
Status: ON HOLD | COMMUNITY
End: 2020-09-16 | Stop reason: HOSPADM

## 2019-08-19 RX ADMIN — ACETAMINOPHEN 1000 MG: 10 INJECTION, SOLUTION INTRAVENOUS at 11:08

## 2019-08-19 RX ADMIN — SODIUM CHLORIDE 1000 ML: 0.9 INJECTION, SOLUTION INTRAVENOUS at 11:08

## 2019-08-19 RX ADMIN — PIPERACILLIN AND TAZOBACTAM 3.38 G: 3; .375 INJECTION, POWDER, LYOPHILIZED, FOR SOLUTION INTRAVENOUS; PARENTERAL at 11:08

## 2019-08-20 PROBLEM — R41.3 MEMORY LOSS: Chronic | Status: ACTIVE | Noted: 2018-08-11

## 2019-08-20 PROBLEM — I63.9 CVA (CEREBRAL VASCULAR ACCIDENT): Chronic | Status: ACTIVE | Noted: 2019-08-20

## 2019-08-20 PROBLEM — Z97.8 FOLEY CATHETER IN PLACE ON ADMISSION: Chronic | Status: ACTIVE | Noted: 2019-08-20

## 2019-08-20 PROBLEM — A41.9 SEPSIS: Status: ACTIVE | Noted: 2019-08-20

## 2019-08-20 PROBLEM — N30.00 ACUTE CYSTITIS WITHOUT HEMATURIA: Status: ACTIVE | Noted: 2019-08-20

## 2019-08-20 PROBLEM — E87.20 METABOLIC ACIDOSIS: Status: ACTIVE | Noted: 2019-08-20

## 2019-08-20 PROBLEM — I16.0 HYPERTENSIVE URGENCY: Status: ACTIVE | Noted: 2019-08-20

## 2019-08-20 LAB
ANION GAP SERPL CALC-SCNC: 10 MMOL/L (ref 8–16)
BACTERIA #/AREA URNS HPF: ABNORMAL /HPF
BASOPHILS # BLD AUTO: 0.05 K/UL (ref 0–0.2)
BASOPHILS NFR BLD: 0.4 % (ref 0–1.9)
BILIRUB UR QL STRIP: NEGATIVE
BUN SERPL-MCNC: 18 MG/DL (ref 8–23)
CALCIUM SERPL-MCNC: 8.2 MG/DL (ref 8.7–10.5)
CHLORIDE SERPL-SCNC: 112 MMOL/L (ref 95–110)
CLARITY UR: ABNORMAL
CO2 SERPL-SCNC: 18 MMOL/L (ref 23–29)
COLOR UR: YELLOW
CREAT SERPL-MCNC: 0.5 MG/DL (ref 0.5–1.4)
DIFFERENTIAL METHOD: ABNORMAL
EOSINOPHIL # BLD AUTO: 0.1 K/UL (ref 0–0.5)
EOSINOPHIL NFR BLD: 0.8 % (ref 0–8)
ERYTHROCYTE [DISTWIDTH] IN BLOOD BY AUTOMATED COUNT: 15.3 % (ref 11.5–14.5)
EST. GFR  (AFRICAN AMERICAN): >60 ML/MIN/1.73 M^2
EST. GFR  (NON AFRICAN AMERICAN): >60 ML/MIN/1.73 M^2
GLUCOSE SERPL-MCNC: 169 MG/DL (ref 70–110)
GLUCOSE UR QL STRIP: NEGATIVE
HCT VFR BLD AUTO: 34.4 % (ref 37–48.5)
HGB BLD-MCNC: 11.3 G/DL (ref 12–16)
HGB UR QL STRIP: ABNORMAL
HYALINE CASTS #/AREA URNS LPF: ABNORMAL /LPF
IMM GRANULOCYTES # BLD AUTO: 0.07 K/UL (ref 0–0.04)
IMM GRANULOCYTES NFR BLD AUTO: 0.6 % (ref 0–0.5)
KETONES UR QL STRIP: NEGATIVE
LACTATE SERPL-SCNC: 1.5 MMOL/L (ref 0.5–1.9)
LACTATE SERPL-SCNC: 2.2 MMOL/L (ref 0.5–1.9)
LEUKOCYTE ESTERASE UR QL STRIP: ABNORMAL
LYMPHOCYTES # BLD AUTO: 1.1 K/UL (ref 1–4.8)
LYMPHOCYTES NFR BLD: 9 % (ref 18–48)
MAGNESIUM SERPL-MCNC: 1.7 MG/DL (ref 1.6–2.6)
MCH RBC QN AUTO: 28.8 PG (ref 27–31)
MCHC RBC AUTO-ENTMCNC: 32.8 G/DL (ref 32–36)
MCV RBC AUTO: 88 FL (ref 82–98)
MICROSCOPIC COMMENT: ABNORMAL
MONOCYTES # BLD AUTO: 0.8 K/UL (ref 0.3–1)
MONOCYTES NFR BLD: 6.1 % (ref 4–15)
NEUTROPHILS # BLD AUTO: 10.3 K/UL (ref 1.8–7.7)
NEUTROPHILS NFR BLD: 83.1 % (ref 38–73)
NITRITE UR QL STRIP: NEGATIVE
NRBC BLD-RTO: 0 /100 WBC
PH UR STRIP: >8 [PH] (ref 5–8)
PLATELET # BLD AUTO: 435 K/UL (ref 150–350)
PMV BLD AUTO: 11.5 FL (ref 9.2–12.9)
POTASSIUM SERPL-SCNC: 3.3 MMOL/L (ref 3.5–5.1)
PROT UR QL STRIP: ABNORMAL
RBC # BLD AUTO: 3.93 M/UL (ref 4–5.4)
RBC #/AREA URNS HPF: 10 /HPF (ref 0–4)
SODIUM SERPL-SCNC: 140 MMOL/L (ref 136–145)
SP GR UR STRIP: 1.01 (ref 1–1.03)
T4 FREE SERPL-MCNC: 1.31 NG/DL (ref 0.71–1.51)
TSH SERPL DL<=0.005 MIU/L-ACNC: 1.05 UIU/ML (ref 0.34–5.6)
URN SPEC COLLECT METH UR: ABNORMAL
UROBILINOGEN UR STRIP-ACNC: NEGATIVE EU/DL
WBC # BLD AUTO: 12.42 K/UL (ref 3.9–12.7)
WBC #/AREA URNS HPF: 100 /HPF (ref 0–5)

## 2019-08-20 PROCEDURE — 96365 THER/PROPH/DIAG IV INF INIT: CPT | Mod: 59

## 2019-08-20 PROCEDURE — 25000242 PHARM REV CODE 250 ALT 637 W/ HCPCS: Performed by: INTERNAL MEDICINE

## 2019-08-20 PROCEDURE — 84443 ASSAY THYROID STIM HORMONE: CPT

## 2019-08-20 PROCEDURE — 96367 TX/PROPH/DG ADDL SEQ IV INF: CPT

## 2019-08-20 PROCEDURE — 94640 AIRWAY INHALATION TREATMENT: CPT

## 2019-08-20 PROCEDURE — 96361 HYDRATE IV INFUSION ADD-ON: CPT

## 2019-08-20 PROCEDURE — 83605 ASSAY OF LACTIC ACID: CPT | Mod: 91

## 2019-08-20 PROCEDURE — 80048 BASIC METABOLIC PNL TOTAL CA: CPT

## 2019-08-20 PROCEDURE — 85025 COMPLETE CBC W/AUTO DIFF WBC: CPT

## 2019-08-20 PROCEDURE — 96366 THER/PROPH/DIAG IV INF ADDON: CPT

## 2019-08-20 PROCEDURE — 63600175 PHARM REV CODE 636 W HCPCS: Performed by: INTERNAL MEDICINE

## 2019-08-20 PROCEDURE — 84439 ASSAY OF FREE THYROXINE: CPT

## 2019-08-20 PROCEDURE — 96375 TX/PRO/DX INJ NEW DRUG ADDON: CPT

## 2019-08-20 PROCEDURE — 25000003 PHARM REV CODE 250: Performed by: INTERNAL MEDICINE

## 2019-08-20 PROCEDURE — 12000002 HC ACUTE/MED SURGE SEMI-PRIVATE ROOM

## 2019-08-20 PROCEDURE — 63600175 PHARM REV CODE 636 W HCPCS: Performed by: EMERGENCY MEDICINE

## 2019-08-20 PROCEDURE — 94761 N-INVAS EAR/PLS OXIMETRY MLT: CPT

## 2019-08-20 PROCEDURE — 83735 ASSAY OF MAGNESIUM: CPT

## 2019-08-20 PROCEDURE — 63600175 PHARM REV CODE 636 W HCPCS: Performed by: PHYSICIAN ASSISTANT

## 2019-08-20 PROCEDURE — 36415 COLL VENOUS BLD VENIPUNCTURE: CPT

## 2019-08-20 RX ORDER — ONDANSETRON 4 MG/1
8 TABLET, ORALLY DISINTEGRATING ORAL EVERY 8 HOURS PRN
Status: DISCONTINUED | OUTPATIENT
Start: 2019-08-20 | End: 2019-08-22 | Stop reason: HOSPADM

## 2019-08-20 RX ORDER — IPRATROPIUM BROMIDE 0.5 MG/2.5ML
500 SOLUTION RESPIRATORY (INHALATION) 4 TIMES DAILY
Status: DISCONTINUED | OUTPATIENT
Start: 2019-08-20 | End: 2019-08-20

## 2019-08-20 RX ORDER — SODIUM,POTASSIUM PHOSPHATES 280-250MG
2 POWDER IN PACKET (EA) ORAL
Status: DISCONTINUED | OUTPATIENT
Start: 2019-08-20 | End: 2019-08-22 | Stop reason: HOSPADM

## 2019-08-20 RX ORDER — LISINOPRIL 5 MG/1
5 TABLET ORAL DAILY
Status: DISCONTINUED | OUTPATIENT
Start: 2019-08-20 | End: 2019-08-22 | Stop reason: HOSPADM

## 2019-08-20 RX ORDER — METOPROLOL TARTRATE 25 MG/1
25 TABLET, FILM COATED ORAL 2 TIMES DAILY
Status: DISCONTINUED | OUTPATIENT
Start: 2019-08-20 | End: 2019-08-22 | Stop reason: HOSPADM

## 2019-08-20 RX ORDER — ASPIRIN 81 MG/1
81 TABLET ORAL DAILY
Status: DISCONTINUED | OUTPATIENT
Start: 2019-08-20 | End: 2019-08-22 | Stop reason: HOSPADM

## 2019-08-20 RX ORDER — SODIUM CHLORIDE 0.9 % (FLUSH) 0.9 %
10 SYRINGE (ML) INJECTION
Status: DISCONTINUED | OUTPATIENT
Start: 2019-08-20 | End: 2019-08-22 | Stop reason: HOSPADM

## 2019-08-20 RX ORDER — IPRATROPIUM BROMIDE 0.5 MG/2.5ML
0.5 SOLUTION RESPIRATORY (INHALATION) 4 TIMES DAILY
Status: DISCONTINUED | OUTPATIENT
Start: 2019-08-20 | End: 2019-08-20

## 2019-08-20 RX ORDER — HYDRALAZINE HYDROCHLORIDE 20 MG/ML
10 INJECTION INTRAMUSCULAR; INTRAVENOUS
Status: COMPLETED | OUTPATIENT
Start: 2019-08-20 | End: 2019-08-20

## 2019-08-20 RX ORDER — ONDANSETRON 2 MG/ML
4 INJECTION INTRAMUSCULAR; INTRAVENOUS EVERY 8 HOURS PRN
Status: DISCONTINUED | OUTPATIENT
Start: 2019-08-20 | End: 2019-08-22 | Stop reason: HOSPADM

## 2019-08-20 RX ORDER — HYDRALAZINE HYDROCHLORIDE 20 MG/ML
10 INJECTION INTRAMUSCULAR; INTRAVENOUS EVERY 4 HOURS PRN
Status: DISCONTINUED | OUTPATIENT
Start: 2019-08-20 | End: 2019-08-22 | Stop reason: HOSPADM

## 2019-08-20 RX ORDER — OXYBUTYNIN CHLORIDE 5 MG/1
5 TABLET ORAL 3 TIMES DAILY
Status: DISCONTINUED | OUTPATIENT
Start: 2019-08-20 | End: 2019-08-22 | Stop reason: HOSPADM

## 2019-08-20 RX ORDER — GABAPENTIN 100 MG/1
200 CAPSULE ORAL NIGHTLY
Status: DISCONTINUED | OUTPATIENT
Start: 2019-08-20 | End: 2019-08-22 | Stop reason: HOSPADM

## 2019-08-20 RX ORDER — IPRATROPIUM BROMIDE 0.5 MG/2.5ML
0.5 SOLUTION RESPIRATORY (INHALATION) EVERY 6 HOURS
Status: DISCONTINUED | OUTPATIENT
Start: 2019-08-20 | End: 2019-08-21

## 2019-08-20 RX ORDER — LANOLIN ALCOHOL/MO/W.PET/CERES
800 CREAM (GRAM) TOPICAL
Status: DISCONTINUED | OUTPATIENT
Start: 2019-08-20 | End: 2019-08-22 | Stop reason: HOSPADM

## 2019-08-20 RX ORDER — ACETAMINOPHEN 325 MG/1
650 TABLET ORAL EVERY 8 HOURS PRN
Status: DISCONTINUED | OUTPATIENT
Start: 2019-08-20 | End: 2019-08-22 | Stop reason: HOSPADM

## 2019-08-20 RX ORDER — DONEPEZIL HYDROCHLORIDE 5 MG/1
10 TABLET, FILM COATED ORAL NIGHTLY
Status: DISCONTINUED | OUTPATIENT
Start: 2019-08-20 | End: 2019-08-22 | Stop reason: HOSPADM

## 2019-08-20 RX ORDER — POTASSIUM CHLORIDE 20 MEQ/15ML
40 SOLUTION ORAL
Status: DISCONTINUED | OUTPATIENT
Start: 2019-08-20 | End: 2019-08-22 | Stop reason: HOSPADM

## 2019-08-20 RX ORDER — ASCORBIC ACID 500 MG
500 TABLET ORAL 2 TIMES DAILY
Status: DISCONTINUED | OUTPATIENT
Start: 2019-08-20 | End: 2019-08-22 | Stop reason: HOSPADM

## 2019-08-20 RX ORDER — ACETAMINOPHEN 325 MG/1
650 TABLET ORAL EVERY 4 HOURS PRN
Status: DISCONTINUED | OUTPATIENT
Start: 2019-08-20 | End: 2019-08-22 | Stop reason: HOSPADM

## 2019-08-20 RX ORDER — GLUCAGON 1 MG
1 KIT INJECTION
Status: DISCONTINUED | OUTPATIENT
Start: 2019-08-20 | End: 2019-08-22 | Stop reason: HOSPADM

## 2019-08-20 RX ORDER — ENOXAPARIN SODIUM 100 MG/ML
40 INJECTION SUBCUTANEOUS EVERY 24 HOURS
Status: DISCONTINUED | OUTPATIENT
Start: 2019-08-20 | End: 2019-08-22 | Stop reason: HOSPADM

## 2019-08-20 RX ORDER — IBUPROFEN 200 MG
16 TABLET ORAL
Status: DISCONTINUED | OUTPATIENT
Start: 2019-08-20 | End: 2019-08-22 | Stop reason: HOSPADM

## 2019-08-20 RX ORDER — POTASSIUM CHLORIDE 20 MEQ/15ML
60 SOLUTION ORAL
Status: DISCONTINUED | OUTPATIENT
Start: 2019-08-20 | End: 2019-08-22 | Stop reason: HOSPADM

## 2019-08-20 RX ORDER — TRAMADOL HYDROCHLORIDE 50 MG/1
50 TABLET ORAL NIGHTLY PRN
Status: DISCONTINUED | OUTPATIENT
Start: 2019-08-20 | End: 2019-08-22 | Stop reason: HOSPADM

## 2019-08-20 RX ORDER — SODIUM CHLORIDE 9 MG/ML
INJECTION, SOLUTION INTRAVENOUS CONTINUOUS
Status: DISCONTINUED | OUTPATIENT
Start: 2019-08-20 | End: 2019-08-22 | Stop reason: HOSPADM

## 2019-08-20 RX ORDER — IBUPROFEN 200 MG
24 TABLET ORAL
Status: DISCONTINUED | OUTPATIENT
Start: 2019-08-20 | End: 2019-08-22 | Stop reason: HOSPADM

## 2019-08-20 RX ADMIN — IPRATROPIUM BROMIDE 0.5 MG: 0.5 SOLUTION RESPIRATORY (INHALATION) at 07:08

## 2019-08-20 RX ADMIN — LISINOPRIL 5 MG: 5 TABLET ORAL at 05:08

## 2019-08-20 RX ADMIN — METOPROLOL TARTRATE 25 MG: 25 TABLET ORAL at 11:08

## 2019-08-20 RX ADMIN — GABAPENTIN 200 MG: 100 CAPSULE ORAL at 08:08

## 2019-08-20 RX ADMIN — GABAPENTIN 200 MG: 100 CAPSULE ORAL at 04:08

## 2019-08-20 RX ADMIN — IPRATROPIUM BROMIDE 0.5 MG: 0.5 SOLUTION RESPIRATORY (INHALATION) at 08:08

## 2019-08-20 RX ADMIN — POTASSIUM CHLORIDE 40 MEQ: 20 SOLUTION ORAL at 09:08

## 2019-08-20 RX ADMIN — MULTIPLE VITAMINS W/ MINERALS TAB 1 TABLET: TAB at 12:08

## 2019-08-20 RX ADMIN — ENOXAPARIN SODIUM 40 MG: 100 INJECTION SUBCUTANEOUS at 05:08

## 2019-08-20 RX ADMIN — IPRATROPIUM BROMIDE 0.5 MG: 0.5 SOLUTION RESPIRATORY (INHALATION) at 01:08

## 2019-08-20 RX ADMIN — OXYBUTYNIN CHLORIDE 5 MG: 5 TABLET ORAL at 08:08

## 2019-08-20 RX ADMIN — HYDRALAZINE HYDROCHLORIDE 10 MG: 20 INJECTION INTRAMUSCULAR; INTRAVENOUS at 01:08

## 2019-08-20 RX ADMIN — SODIUM CHLORIDE 1000 ML: 0.9 INJECTION, SOLUTION INTRAVENOUS at 01:08

## 2019-08-20 RX ADMIN — CEFTRIAXONE 1 G: 1 INJECTION, SOLUTION INTRAVENOUS at 04:08

## 2019-08-20 RX ADMIN — POTASSIUM CHLORIDE 40 MEQ: 20 SOLUTION ORAL at 12:08

## 2019-08-20 RX ADMIN — OXYCODONE HYDROCHLORIDE AND ACETAMINOPHEN 500 MG: 500 TABLET ORAL at 09:08

## 2019-08-20 RX ADMIN — DONEPEZIL HYDROCHLORIDE 10 MG: 5 TABLET, FILM COATED ORAL at 04:08

## 2019-08-20 RX ADMIN — ASPIRIN 81 MG: 81 TABLET, COATED ORAL at 09:08

## 2019-08-20 RX ADMIN — OXYCODONE HYDROCHLORIDE AND ACETAMINOPHEN 500 MG: 500 TABLET ORAL at 08:08

## 2019-08-20 RX ADMIN — SODIUM CHLORIDE: 0.9 INJECTION, SOLUTION INTRAVENOUS at 03:08

## 2019-08-20 RX ADMIN — OXYBUTYNIN CHLORIDE 5 MG: 5 TABLET ORAL at 05:08

## 2019-08-20 RX ADMIN — SODIUM CHLORIDE: 0.9 INJECTION, SOLUTION INTRAVENOUS at 04:08

## 2019-08-20 RX ADMIN — DONEPEZIL HYDROCHLORIDE 10 MG: 5 TABLET, FILM COATED ORAL at 08:08

## 2019-08-20 RX ADMIN — SODIUM CHLORIDE 605 ML: 0.9 INJECTION, SOLUTION INTRAVENOUS at 12:08

## 2019-08-20 RX ADMIN — METOPROLOL TARTRATE 25 MG: 25 TABLET ORAL at 08:08

## 2019-08-20 RX ADMIN — OXYBUTYNIN CHLORIDE 5 MG: 5 TABLET ORAL at 09:08

## 2019-08-20 RX ADMIN — VANCOMYCIN HYDROCHLORIDE 1000 MG: 1 INJECTION, POWDER, LYOPHILIZED, FOR SOLUTION INTRAVENOUS at 12:08

## 2019-08-20 NOTE — SUBJECTIVE & OBJECTIVE
Interval History:  Patient seen and examined this morning.  Laying in bed.  Patient is responded with a good morning upon wishing her.  After that when I asked patient if she is in pain or she has any complain she did not reply.  Asked for a smile patient did not reply id or followed commands.  Wound care nurse contacted regarding a new finding of possible abscess on the left gluteal in the left.  Patient does have a healing sacral ulcer.  Discussed will consult General surgery for evaluation for possible I and D if indicated.  Patient does not show much emotion/does not communicate    Review of Systems   Unable to perform ROS: Patient nonverbal     Objective:     Vital Signs (Most Recent):  Temp: 98.8 °F (37.1 °C) (08/20/19 1215)  Pulse: (!) 115 (08/20/19 1339)  Resp: 20 (08/20/19 1339)  BP: (!) 159/90 (08/20/19 1215)  SpO2: 98 % (08/20/19 1339) Vital Signs (24h Range):  Temp:  [98.7 °F (37.1 °C)-100 °F (37.8 °C)] 98.8 °F (37.1 °C)  Pulse:  [115-139] 115  Resp:  [18-28] 20  SpO2:  [96 %-99 %] 98 %  BP: (127-197)/() 159/90     Weight: 53.5 kg (117 lb 15.1 oz)  Body mass index is 20.89 kg/m².    Intake/Output Summary (Last 24 hours) at 8/20/2019 1652  Last data filed at 8/20/2019 1215  Gross per 24 hour   Intake 3055 ml   Output 1325 ml   Net 1730 ml      Physical Exam   Constitutional: She appears well-developed. No distress.   HENT:   Head: Normocephalic and atraumatic.   Mouth/Throat: Oropharynx is clear and moist.   Eyes: Pupils are equal, round, and reactive to light. EOM are normal.   Neck: Normal range of motion.   Cardiovascular:   No murmur heard.  Sinus tachycardia    Pulmonary/Chest: Effort normal and breath sounds normal. She has no wheezes.   Abdominal: Soft. She exhibits no distension. There is no tenderness. There is no rebound and no guarding.   Genitourinary:   Genitourinary Comments: Scherer catheter   Musculoskeletal:   Moves arms but not legs unable to assess as patient is not following  commands   Neurological: She is alert.   Expressive aphasia. Not consistently following commands.    Skin: No rash noted.   Healing appearing Stage I sacral decubitus POA  A new finding of questionable abscess on the medial and of left glut   Psychiatric:   Sometimes mild sometime does not   Nursing note and vitals reviewed.      Significant Labs:   CBC:   Recent Labs   Lab 08/19/19 2228 08/20/19 0600   WBC 14.25* 12.42   HGB 11.9* 11.3*   HCT 35.0* 34.4*   * 435*     CMP:   Recent Labs   Lab 08/19/19 2228 08/20/19 0600    140   K 3.5 3.3*    112*   CO2 19* 18*   * 169*   BUN 26* 18   CREATININE 0.6 0.5   CALCIUM 9.6 8.2*   PROT 7.3  --    ALBUMIN 3.2*  --    BILITOT 0.5  --    ALKPHOS 93  --    AST 20  --    ALT 16  --    ANIONGAP 14 10   EGFRNONAA >60.0 >60.0     Urine Culture:   Recent Labs   Lab 08/19/19 2358   LABURIN No growth to date     Urine Studies:   Recent Labs   Lab 08/19/19 2358   COLORU Yellow   APPEARANCEUA Cloudy*   PHUR >8.0*   SPECGRAV 1.010   PROTEINUA 2+*   GLUCUA Negative   KETONESU Negative   BILIRUBINUA Negative   OCCULTUA Trace*   NITRITE Negative   UROBILINOGEN Negative   LEUKOCYTESUR 3+*   RBCUA 10*   WBCUA 100*   BACTERIA Many*   HYALINECASTS FEW*       Significant Imaging: I have reviewed all pertinent imaging results/findings within the past 24 hours.     Microbiology Results (last 7 days)     Procedure Component Value Units Date/Time    Clostridium difficile EIA [[id number]] Collected:  08/20/19 1130    Order Status:  Canceled Specimen:  Stool     Urine culture [[id number]] Collected:  08/19/19 2358    Order Status:  Completed Specimen:  Urine Updated:  08/20/19 0658     Urine Culture, Routine No growth to date    Narrative:       Preferred Collection Type->Urine, Clean Catch  Specimen Source->Urine    Blood culture x two cultures. Draw prior to antibiotics. [[id number]] Collected:  08/19/19 2218    Order Status:  Completed Specimen:  Blood from Peripheral,  Hand, Right Updated:  08/20/19 0517     Blood Culture, Routine No Growth to date    Narrative:       Aerobic and anaerobic    Blood culture x two cultures. Draw prior to antibiotics. [[id number]] Collected:  08/19/19 2210    Order Status:  Completed Specimen:  Blood from Peripheral, Antecubital, Right Updated:  08/20/19 0517     Blood Culture, Routine No Growth to date    Narrative:       Aerobic and anaerobic

## 2019-08-20 NOTE — ED NOTES
Daughter states has a fever of 102 off and on for the past few days. She has diarrhea and complaining of stomach pain. The daughter stated since her mothers stroke she has not been very verbal. The patient wasn't able to answer my questions.

## 2019-08-20 NOTE — ED NOTES
RN removed aponte which was from another facility. The aponte had heavy sediment present in the tubing. Upon deflation of the balloon, urine gushed out which is suspicious for aponte being clogged.

## 2019-08-20 NOTE — ED PROVIDER NOTES
Encounter Date: 8/19/2019       History     Chief Complaint   Patient presents with    Fever     Chief complaint is fever HPI [facility]  The daughter noticed that she felt warm.  She had 100 degree fever here.  She is tachycardic at 133.  Her blood pressure is 132/95.  She has a history of a stroke.  She has a history of being nonverbal.  She has a history of decubitus ulcer that is healed to the sacrum.        Review of patient's allergies indicates:   Allergen Reactions    Brimonidine Other (See Comments)     Redness and irritation of eyes     Past Medical History:   Diagnosis Date    Anticoagulant long-term use     ASA 81mg, Fish Oil    Arthritis     Cataract     OU done//    DDD (degenerative disc disease), lumbar     Glaucoma     suspect    Hyperlipidemia     Low back pain     Osteoporosis     Positive PPD 1970    Seasonal allergies      Past Surgical History:   Procedure Laterality Date    BREAST BIOPSY Right     2012 neg    CATARACT EXTRACTION W/  INTRAOCULAR LENS IMPLANT Bilateral 10/15/14     Dr Wright    COLONOSCOPY      Epidural Steroid injection      Pain Management    LUISANA-TRANSFORAMINAL L5/S1 Bilateral 3/14/2016    [facility]    Injection, Joint, Hip Right 4/3/2019    [facility] at [facility]    INJECTION-STEROID-EPIDURAL-LUMBAR N/A 8/4/2014    [facility] at [facility]    INJECTION-STEROID-EPIDURAL-LUMBAR, L5/S1 N/A 11/18/2014    [facility] at [facility]    phaco/pciol Left 10/15/2014    [facility]    phaco/pciol Right 8/20/2014    [facility]    TONSILLECTOMY  1941     Family History   Problem Relation Age of Onset    Cancer Father         Rectal    Glaucoma Mother     Cancer Mother         lung, throat    Cancer Brother         throat    Hypertension Brother      Stroke Brother     Diabetes Sister     Stroke Sister     Amblyopia Neg Hx     Blindness Neg Hx     Cataracts Neg Hx     Macular degeneration Neg Hx     Retinal detachment Neg Hx     Strabismus Neg Hx     Thyroid disease Neg Hx     Nephrolithiasis Neg Hx      Social History     Tobacco Use    Smoking status: Former Smoker     Packs/day: 0.25     Start date: 1961     Last attempt to quit: 1992     Years since quittin.2    Smokeless tobacco: Never Used   Substance Use Topics    Alcohol use: No    Drug use: No     Review of Systems   Constitutional: Positive for fever. Negative for chills.   HENT: Negative for ear pain, rhinorrhea and sore throat.    Eyes: Negative for pain and visual disturbance.   Respiratory: Negative for cough and shortness of breath.    Cardiovascular: Negative for chest pain and palpitations.   Gastrointestinal: Negative for abdominal pain, constipation, diarrhea, nausea and vomiting.   Genitourinary: Negative for dysuria, frequency, hematuria and urgency.   Musculoskeletal: Negative for back pain, joint swelling and myalgias.   Skin: Negative for rash.   Neurological: Negative for dizziness, seizures, weakness and headaches.   Psychiatric/Behavioral: Negative for dysphoric mood. The patient is not nervous/anxious.        Physical Exam     Initial Vitals [19]   BP Pulse Resp Temp SpO2   (!) 132/95 (!) 133 (!) 26 100 °F (37.8 °C) 97 %      MAP       --         Physical Exam    Nursing note and vitals reviewed.  Constitutional: She appears well-developed and well-nourished.   HENT:   Head: Normocephalic and atraumatic.   Eyes: Conjunctivae, EOM and lids are normal. Pupils are equal, round, and reactive to light.   Neck: Trachea normal and normal range of motion. Neck supple. No thyroid mass and no thyromegaly present.   Cardiovascular: Regular rhythm and normal heart sounds.   Tachycardic   Pulmonary/Chest: Effort normal and breath sounds normal.    Abdominal: Soft. Normal appearance and bowel sounds are normal. There is no tenderness.   Musculoskeletal:   Decreased range of motion   Neurological: She is alert. No cranial nerve deficit or sensory deficit.   Skin: Skin is warm and dry.   Psychiatric: Her speech is normal.         ED Course   Procedures  Labs Reviewed   CULTURE, BLOOD   CBC W/ AUTO DIFFERENTIAL   COMPREHENSIVE METABOLIC PANEL   LACTIC ACID, PLASMA   URINALYSIS, REFLEX TO URINE CULTURE          Imaging Results    None           I have spoken to the hospitalist/consultant and have discussed the patient's chief complaint as well as physical exam and labs.  Pending recommendation.                          Clinical Impression:       ICD-10-CM ICD-9-CM   1. Sepsis, due to unspecified organism A41.9 038.9     995.91   2. Tachycardia R00.0 785.0                                Musa Marlow MD  08/20/19 0102       Musa Marlow MD  08/20/19 0105

## 2019-08-20 NOTE — SUBJECTIVE & OBJECTIVE
Past Medical History:   Diagnosis Date    Anticoagulant long-term use     ASA 81mg, Fish Oil    Arthritis     Cataract     OU done//    COPD (chronic obstructive pulmonary disease)     Coronary artery disease     DDD (degenerative disc disease), lumbar     Glaucoma     suspect    Hyperlipidemia     Low back pain     Osteoporosis     Positive PPD 1970    Seasonal allergies     Stroke        Past Surgical History:   Procedure Laterality Date    BREAST BIOPSY Right     2012 neg    CATARACT EXTRACTION W/  INTRAOCULAR LENS IMPLANT Bilateral 10/15/14     Dr Wright    COLONOSCOPY      Epidural Steroid injection      Pain Management    LUISANA-TRANSFORAMINAL L5/S1 Bilateral 3/14/2016    [facility]    Injection, Joint, Hip Right 4/3/2019    [facility] Tsahi [facility] at [facility] OR    INJECTION-STEROID-EPIDURAL-LUMBAR N/A 8/4/2014    [facility] at [facility]    INJECTION-STEROID-EPIDURAL-LUMBAR, L5/S1 N/A 11/18/2014    [facility] at [facility] OR    phaco/pciol Left 10/15/2014    [facility]    phaco/pciol Right 8/20/2014    [facility] at [facility] OR    TONSILLECTOMY  1941       Review of patient's allergies indicates:   Allergen Reactions    Brimonidine Other (See Comments)     Redness and irritation of eyes       No current facility-administered medications on file prior to encounter.      Current Outpatient Medications on File Prior to Encounter   Medication Sig    acetaminophen (TYLENOL) 500 MG tablet Take 500 mg by mouth every 6 (six) hours as needed for Pain.    ascorbic acid, vitamin C, (VITAMIN C) 500 MG tablet Take 500 mg by mouth 2 (two) times daily.    aspirin 81 mg Tab Every day    donepezil (ARICEPT) 10 MG tablet TAKE 1 TABLET BY MOUTH EVERY EVENING (Patient taking differently: TAKE 1 TABLET BY MOUTH morning)    food supplemt,  lactose-reduced (ENSURE) Liqd Take 118 mLs by mouth 2 (two) times daily.    gabapentin (NEURONTIN) 100 MG capsule TAKE 2 CAPSULES BY MOUTH EVERY EVENING    ipratropium (ATROVENT) 0.02 % nebulizer solution Take 500 mcg by nebulization 4 (four) times daily. Rescue    LORazepam (ATIVAN) 1 MG tablet 1/2 to 1 po QHS prn    multivitamin (THERAGRAN) per tablet Take 1 tablet by mouth once daily.    oxybutynin (DITROPAN) 5 MG Tab Take 5 mg by mouth 3 (three) times daily.    traMADol (ULTRAM) 50 mg tablet Take 1 tablet (50 mg total) by mouth nightly as needed for Pain.    diclofenac sodium (VOLTAREN) 1 % Gel Apply 2 g topically 3 (three) times daily.    fexofenadine (ALLEGRA) 30 MG tablet Take 30 mg by mouth 2 (two) times daily. As needed    fluticasone (FLONASE) 50 mcg/actuation nasal spray 2 sprays by Each Nare route once daily.    lidocaine-prilocaine (EMLA) cream As directed    oxybutynin (DITROPAN XL) 15 MG TR24 Take 1 tablet (15 mg total) by mouth once daily.     Family History     Problem Relation (Age of Onset)    Cancer Father, Mother, Brother    Diabetes Sister    Glaucoma Mother    Hypertension Brother    Stroke Brother, Sister        Tobacco Use    Smoking status: Former Smoker     Packs/day: 0.25     Start date: 1961     Last attempt to quit: 1992     Years since quittin.2    Smokeless tobacco: Never Used   Substance and Sexual Activity    Alcohol use: No    Drug use: No    Sexual activity: Not on file     Review of Systems   Unable to perform ROS: Patient nonverbal     Objective:     Vital Signs (Most Recent):  Temp: 99.9 °F (37.7 °C) (19 0000)  Pulse: (!) 134 (19 0230)  Resp: 20 (19 0200)  BP: (!) 171/79 (19 0230)  SpO2: 99 % (19 0000) Vital Signs (24h Range):  Temp:  [99.9 °F (37.7 °C)-100 °F (37.8 °C)] 99.9 °F (37.7 °C)  Pulse:  [116-137] 134  Resp:  [20-28] 20  SpO2:  [97 %-99 %] 99 %  BP: (132-197)/() 171/79     Weight: 53.5 kg (118  lb)  Body mass index is 20.9 kg/m².    Physical Exam   Constitutional: She appears well-developed. No distress.   HENT:   Head: Normocephalic and atraumatic.   Mouth/Throat: Oropharynx is clear and moist.   Eyes: Pupils are equal, round, and reactive to light. EOM are normal.   Neck: Normal range of motion.   Cardiovascular:   No murmur heard.  Sinus tachycardia    Pulmonary/Chest: Effort normal and breath sounds normal. She has no wheezes.   Abdominal: Soft. She exhibits no distension. There is no tenderness. There is no rebound and no guarding.   Genitourinary:   Genitourinary Comments: Scherer catheter   Musculoskeletal: Normal range of motion.   Neurological: She is alert.   Expressive aphasia. Not consistently following commands.    Skin: No rash noted.   Healing appearing Stage I sacral decubitus POA   Psychiatric: She has a normal mood and affect.   Nursing note and vitals reviewed.        CRANIAL NERVES     CN III, IV, VI   Pupils are equal, round, and reactive to light.  Extraocular motions are normal.        Significant Labs:   CBC:   Recent Labs   Lab 08/19/19  2228   WBC 14.25*   HGB 11.9*   HCT 35.0*   *     CMP:   Recent Labs   Lab 08/19/19  2228      K 3.5      CO2 19*   *   BUN 26*   CREATININE 0.6   CALCIUM 9.6   PROT 7.3   ALBUMIN 3.2*   BILITOT 0.5   ALKPHOS 93   AST 20   ALT 16   ANIONGAP 14   EGFRNONAA >60.0       Significant Imaging: CXR: I have reviewed all pertinent results/findings within the past 24 hours and my personal findings are:  No consolidation

## 2019-08-20 NOTE — PROGRESS NOTES
08/20/19 1154 08/20/19 1200        Wound 08/20/19 1158 Abscess lower Buttocks   Date First Assessed/Time First Assessed: 08/20/19 1158   Pre-existing: Yes  Primary Wound Type: (c) Abscess  Side: Left  Orientation: lower  Location: Buttocks   Wound Image  --     Appearance  --  Red;Other (see comments)   Tissue loss description  --    (firm tender to touch)   Care  --  Cleansed with:;Soap and water        Pressure Injury 08/20/19 1150  upper Sacral spine Stage 2   Date First Assessed/Time First Assessed: 08/20/19 1150   Pressure Injury Present on Admission: yes  Side: (c)   Orientation: upper  Location: Sacral spine  Is this injury device related?: No  Staging: (c) Stage 2   Wound Image   --    Staging Stage 2  --    Dressing Appearance Intact  --    Drainage Amount None  --    Appearance White;Pink;Other (see comments)  (macerated)  --    Periwound Area Scar tissue;Pink  --    Wound Length (cm) 0.2 cm  --    Wound Width (cm) 0.2 cm  --    Wound Depth (cm) 0.1 cm  --    Wound Volume (cm^3) 0 cm^3  --    Wound Surface Area (cm^2) 0.04 cm^2  --    Care Cleansed with:;Soap and water;Other (see comments)  (received order for triad)  --    [place]/border  --    called Dr Mobley orders received.

## 2019-08-20 NOTE — NURSING
Tube feeding Jevity 1.2 hanging at this time running at 50ml/hr with 110ml flush every 4 hours, Tolerating well at this time.

## 2019-08-20 NOTE — ASSESSMENT & PLAN NOTE
I have reviewed [facility] records and do not see that she has a Hx of HTN or is on antihypertensives.    Started metoprolol 25 mg po bid   Prn Hydralazine for now.

## 2019-08-20 NOTE — ASSESSMENT & PLAN NOTE
I have reviewed [facility] records and do not see that she has a Hx of HTN or is on antihypertensives.  Prn Hydralazine for now.

## 2019-08-20 NOTE — ASSESSMENT & PLAN NOTE
Patient will be admitted for Sepsis secondary to UTI.  CXR did not reveal pneumonia. BCx, UCx performed in the ED. Tachycardia has improved from 130 to 118 with tylenol and fluids.  She is not hypotensive- in fact she is hypertensive.  IV abx started.  Scherer changed in the ED with RN reporting a gushing out of urine. Possible obstruction or partial obstruction? Scherer in place presumably due to sacral decub POA though that is not documented and would need to be confirmed. LA is 2.  Repeating in 4 hours.  Further IVFs to be given. Patient is DNR but is not comfort care.

## 2019-08-20 NOTE — PROGRESS NOTES
[facility]  Progress Note    Patient Name: Breanne Culp  MRN: [id number]  Patient Class: IP- Inpatient   Admission Date: 8/19/2019  Length of Stay: 0 days  Attending Physician: Mauricio Mobley MD  Primary Care Provider: Frantz Bill MD        Subjective:     Principal Problem:Sepsis        HPI:  85 year old female transferred from [facility] presents with tachycardia and fever.  Patient is non verbal and cannot provide history.  There is no family currently at bedside.  History obtained from ED MD and [facility] records.  Per report, patient felt warm to her daughter, was noted to be tachycardic and thus sent to the ED.  [facility] records list patient with Hx of CVA, aphasia and dysphagia/peg. Patient noted to have 100.0 temperature and sinus tachycardia 130s. Patient appears at time to be able to non verbally indicate yes and no to me but not consistently.  She does say a few words and is alert and tracks me but communication is not clear and not consistent. She did not seem to indicate to me chest pain or SOB.  She does seem to indicate to me abdominal pain but abdominal exam was benign. ED RN reports patient arrived with aponte catheter and had sediment in the tubing. When we were in the process of changing to aponte catheter, she had a gush of urine out during that process possibly suggesting partial obstruction.    Overview/Hospital Course:  No notes on file    Interval History:  Patient seen and examined this morning.  Laying in bed.  Patient is responded with a good morning upon wishing her.  After that when I asked patient if she is in pain or she has any complain she did not reply.  Asked for a smile patient did not reply id or followed commands.  Wound care nurse contacted regarding a new finding of possible abscess on the left gluteal in the left.  Patient does have a healing sacral ulcer.  Discussed will consult General surgery for evaluation for possible  I and D if indicated.  Patient does not show much emotion/does not communicate    Review of Systems   Unable to perform ROS: Patient nonverbal     Objective:     Vital Signs (Most Recent):  Temp: 98.8 °F (37.1 °C) (08/20/19 1215)  Pulse: (!) 115 (08/20/19 1339)  Resp: 20 (08/20/19 1339)  BP: (!) 159/90 (08/20/19 1215)  SpO2: 98 % (08/20/19 1339) Vital Signs (24h Range):  Temp:  [98.7 °F (37.1 °C)-100 °F (37.8 °C)] 98.8 °F (37.1 °C)  Pulse:  [115-139] 115  Resp:  [18-28] 20  SpO2:  [96 %-99 %] 98 %  BP: (127-197)/() 159/90     Weight: 53.5 kg (117 lb 15.1 oz)  Body mass index is 20.89 kg/m².    Intake/Output Summary (Last 24 hours) at 8/20/2019 1652  Last data filed at 8/20/2019 1215  Gross per 24 hour   Intake 3055 ml   Output 1325 ml   Net 1730 ml      Physical Exam   Constitutional: She appears well-developed. No distress.   HENT:   Head: Normocephalic and atraumatic.   Mouth/Throat: Oropharynx is clear and moist.   Eyes: Pupils are equal, round, and reactive to light. EOM are normal.   Neck: Normal range of motion.   Cardiovascular:   No murmur heard.  Sinus tachycardia    Pulmonary/Chest: Effort normal and breath sounds normal. She has no wheezes.   Abdominal: Soft. She exhibits no distension. There is no tenderness. There is no rebound and no guarding.   Genitourinary:   Genitourinary Comments: Scherer catheter   Musculoskeletal:   Moves arms but not legs unable to assess as patient is not following commands   Neurological: She is alert.   Expressive aphasia. Not consistently following commands.    Skin: No rash noted.   Healing appearing Stage I sacral decubitus POA  A new finding of questionable abscess on the medial and of left glut   Psychiatric:   Sometimes mild sometime does not   Nursing note and vitals reviewed.      Significant Labs:   CBC:   Recent Labs   Lab 08/19/19  2228 08/20/19  0600   WBC 14.25* 12.42   HGB 11.9* 11.3*   HCT 35.0* 34.4*   * 435*     CMP:   Recent Labs   Lab  08/19/19 2228 08/20/19  0600    140   K 3.5 3.3*    112*   CO2 19* 18*   * 169*   BUN 26* 18   CREATININE 0.6 0.5   CALCIUM 9.6 8.2*   PROT 7.3  --    ALBUMIN 3.2*  --    BILITOT 0.5  --    ALKPHOS 93  --    AST 20  --    ALT 16  --    ANIONGAP 14 10   EGFRNONAA >60.0 >60.0     Urine Culture:   Recent Labs   Lab 08/19/19 2358   LABURIN No growth to date     Urine Studies:   Recent Labs   Lab 08/19/19 2358   COLORU Yellow   APPEARANCEUA Cloudy*   PHUR >8.0*   SPECGRAV 1.010   PROTEINUA 2+*   GLUCUA Negative   KETONESU Negative   BILIRUBINUA Negative   OCCULTUA Trace*   NITRITE Negative   UROBILINOGEN Negative   LEUKOCYTESUR 3+*   RBCUA 10*   WBCUA 100*   BACTERIA Many*   HYALINECASTS FEW*       Significant Imaging: I have reviewed all pertinent imaging results/findings within the past 24 hours.     Microbiology Results (last 7 days)     Procedure Component Value Units Date/Time    Clostridium difficile EIA [[id number]] Collected:  08/20/19 1130    Order Status:  Canceled Specimen:  Stool     Urine culture [[id number]] Collected:  08/19/19 2358    Order Status:  Completed Specimen:  Urine Updated:  08/20/19 0658     Urine Culture, Routine No growth to date    Narrative:       Preferred Collection Type->Urine, Clean Catch  Specimen Source->Urine    Blood culture x two cultures. Draw prior to antibiotics. [[id number]] Collected:  08/19/19 2218    Order Status:  Completed Specimen:  Blood from Peripheral, Hand, Right Updated:  08/20/19 0517     Blood Culture, Routine No Growth to date    Narrative:       Aerobic and anaerobic    Blood culture x two cultures. Draw prior to antibiotics. [[id number]] Collected:  08/19/19 2210    Order Status:  Completed Specimen:  Blood from Peripheral, Antecubital, Right Updated:  08/20/19 0517     Blood Culture, Routine No Growth to date    Narrative:       Aerobic and anaerobic          Assessment/Plan:      * Sepsis  Patient admitted for Sepsis secondary to UTI.    UA  looks infected but urine cx so far negative  CXR did not reveal pneumonia.   BCx, UCx performed in the ED. Both so far negative .   Tachycardia still maintaining between 116-136 , sinus tachy.  Started metoprolol 25 mg BID, if did not responded, will consult card  She is not hypotensive- in fact she is hypertensive.   On IV abx   Scherer changed in the ED with RN reporting a gushing out of urine. Possible obstruction or partial obstruction? Scherer in place presumably due to sacral decub POA. Now has some sediments, possibly RBC, will monitor     Further IVFs to be given.   Patient is DNR but is not comfort care.      CVA (cerebral vascular accident)    Chronic medical condition.  Continuing home medication.  Monitoring.      Hypertensive urgency  I have reviewed [facility] records and do not see that she has a Hx of HTN or is on antihypertensives.    Started metoprolol 25 mg po bid   Prn Hydralazine for now.      Metabolic acidosis  Trending lactic acid.  IVFs.        Scherer catheter in place on admission    Changed in the ED.    Acute cystitis without hematuria    IV abx.  UCx and BCx in progress.    Memory loss  Chronic medical condition.  Continuing home medication.  Monitoring.        Hyperlipidemia  Chronic medical condition.  Continuing home medication.  Monitoring.        Senile osteoporosis        Lumbar spondylosis  Chronic medical condition.  Continuing home medication.  Monitoring.          Patient Active Problem List   Diagnosis    Nonspecific (abnormal) findings on radiological and other examination of genitourinary organs    Posterior subcapsular polar senile cataract    DDD (degenerative disc disease), lumbar    Lumbar spondylosis    Degeneration of lumbar or lumbosacral intervertebral disc    Idiopathic peripheral neuropathy    Dystrophic nail    Onychomycosis due to dermatophyte    Lumbar stenosis    Lumbar radiculopathy    Pain of left thumb    Closed nondisplaced fracture of lateral  malleolus of right fibula with routine healing    Seasonal allergies    Senile osteoporosis    Hyperlipidemia    Memory loss    Primary osteoarthritis of right hip    Sepsis    Acute cystitis without hematuria    Scherer catheter in place on admission    Metabolic acidosis    Hypertensive urgency    CVA (cerebral vascular accident)       VTE Risk Mitigation (From admission, onward)        Ordered     enoxaparin injection 40 mg  Daily      08/20/19 0332     IP VTE HIGH RISK PATIENT  Once      08/20/19 0332                Mauricio Mobley MD  Department of Hospital Medicine   [facility]

## 2019-08-20 NOTE — HPI
85 year old female transferred from [facility] presents with tachycardia and fever.  Patient is non verbal and cannot provide history.  There is no family currently at bedside.  History obtained from ED MD and [facility] records.  Per report, patient felt warm to her daughter, was noted to be tachycardic and thus sent to the ED.  [facility] records list patient with Hx of CVA, aphasia and dysphagia/peg. Patient noted to have 100.0 temperature and sinus tachycardia 130s. Patient appears at time to be able to non verbally indicate yes and no to me but not consistently.  She does say a few words and is alert and tracks me but communication is not clear and not consistent. She did not seem to indicate to me chest pain or SOB.  She does seem to indicate to me abdominal pain but abdominal exam was benign. ED RN reports patient arrived with aponte catheter and had sediment in the tubing. When we were in the process of changing to aponte catheter, she had a gush of urine out during that process possibly suggesting partial obstruction.

## 2019-08-20 NOTE — ASSESSMENT & PLAN NOTE
Patient admitted for Sepsis secondary to UTI.    UA looks infected but urine cx so far negative  CXR did not reveal pneumonia.   BCx, UCx performed in the ED. Both so far negative .   Tachycardia still maintaining between 116-136 , sinus tachy.  Started metoprolol 25 mg BID, if did not responded, will consult card  She is not hypotensive- in fact she is hypertensive.   On IV abx   Scherer changed in the ED with RN reporting a gushing out of urine. Possible obstruction or partial obstruction? Scherer in place presumably due to sacral decub POA. Now has some sediments, possibly RBC, will monitor     Further IVFs to be given.   Patient is DNR but is not comfort care.

## 2019-08-20 NOTE — CONSULTS
"  [facility]  Adult Nutrition  Consult Note    SUMMARY     Recommendations    Recommendation/Intervention: Jevity 1.2 at 50 ml/hr to provide 1440 kcal, 67 gm protein plus Liquacel bid providing 180 kcal and 32 gms protein  Goals: 1. Provision of adequate nutrition. 2. Patient's ability to tolerate feeding  Nutrition Goal Status: new  Communication of AIYANA Recs: reviewed with RN    Reason for Assessment    Reason For Assessment: consult  Diagnosis: other (see comments)(Sepsis)  Relevant Medical History: Patient admitted with peg tube from nursing home.  Interdisciplinary Rounds: attended    Nutrition Risk Screen    Nutrition Risk Screen: tube feeding or parenteral nutrition    Nutrition/Diet History    Patient Reported Diet/Restrictions/Preferences: other (see comments)  Typical Food/Fluid Intake: Fibersource HN via peg tube  Spiritual, Cultural Beliefs, [employer]al Practices, Values that Affect Care: no  Vitamin/Mineral/Herbal Supplements: Vitamin C, multivitamin  Food Allergies: NKFA  Factors Affecting Nutritional Intake: difficulty/impaired swallowing  Nutrition Support Formula Prior to Admit: Other (see comments)(Fibersource HN @ 50 ml/hr.  Liquacel bid)  Nutrition Support Rate Prior to Admit: 50 (ml)  Nutrtion Support Frequency Prior to Admit: 24 hrs a day  Nutrition Support Provision Prior to Admit: 1400 kcal/day    Anthropometrics    Temp: 98.7 °F (37.1 °C)  Height: 5' 3" (160 cm)  Height (inches): 63 in  Weight Method: Bed Scale  Weight: 53.5 kg (117 lb 15.1 oz)  Weight (lb): 117.95 lb  Ideal Body Weight (IBW), Female: 115 lb  % Ideal Body Weight, Female (lb): 102.57 lb  BMI (Calculated): 20.9       Lab/Procedures/Meds    Pertinent Labs Comments: HCT 34.4, HGB 11.3, K 3L3, Cl 112, Glu 169  Pertinent Medications Comments: Multivitamin, Vit C        Estimated/Assessed Needs    Weight Used For Calorie Calculations: 53.5 kg (117 lb 15.1 oz)  Energy Calorie Requirements (kcal): 1337.5-1498 (25-28 " kcal/kgm)  Energy Need Method: Kcal/kg  Protein Requirements: 64-80 gms (1.2-1.5 gms/kg)  Weight Used For Protein Calculations: 53.5 kg (117 lb 15.1 oz)        RDA Method (mL): 1337.5         Nutrition Prescription Ordered    Current Diet Order: NPO    Evaluation of Received Nutrient/Fluid Intake    Energy Calories Required: not meeting needs  Protein Required: not meeting needs  Fluid Required: not meeting needs  % Intake of Estimated Energy Needs: 0 - 25 %  % Meal Intake: 0 - 25 %    Nutrition Risk    Level of Risk/Frequency of Follow-up: high     Assessment and Plan    No new Assessment & Plan notes have been filed under this hospital service since the last note was generated.  Service: Nutrition       Monitor and Evaluation    Food and Nutrient Intake: enteral nutrition intake  Food and Nutrient Adminstration: enteral and parenteral nutrition administration  Anthropometric Measurements: weight  Biochemical Data, Medical Tests and Procedures: electrolyte and renal panel, glucose/endocrine profile  Nutrition-Focused Physical Findings: overall appearance                 Nutrition Follow-Up    RD Follow-up?: Yes   Darlene Lombardo   08/20/2019    10:15 AM

## 2019-08-20 NOTE — CARE UPDATE
08/20/19 0730   Patient Assessment/Suction   Level of Consciousness (AVPU) alert   Respiratory Effort Normal   All Lung Fields Breath Sounds clear;diminished   PRE-TX-O2   O2 Device (Oxygen Therapy) room air   SpO2 98 %   Pulse Oximetry Type Intermittent   $ Pulse Oximetry - Multiple Charge Pulse Oximetry - Multiple   Pulse (!) 121   Resp 20   Aerosol Therapy   $ Aerosol Therapy Charges Aerosol Treatment   Daily Review of Necessity (SVN) completed   Respiratory Treatment Status (SVN) given   Treatment Route (SVN) mask   Patient Position (SVN) semi-Garcia's   Post Treatment Assessment (SVN) breath sounds unchanged   Signs of Intolerance (SVN) none   Breath Sounds Post-Respiratory Treatment   Throughout All Fields Post-Treatment All Fields   Throughout All Fields Post-Treatment clear;diminished   Post-treatment Heart Rate (beats/min) 118   Post-treatment Resp Rate (breaths/min) 22

## 2019-08-20 NOTE — PLAN OF CARE
Problem: Feeding Intolerance (Enteral Nutrition)  Goal: Feeding Tolerance  Outcome: Ongoing (interventions implemented as appropriate)  No oral feeds.   Intervention: Prevent and Manage Feeding Intolerance  Tube feeding to be started plus Liquacel bid

## 2019-08-21 PROBLEM — L89.151 DECUBITUS ULCER OF SACRAL REGION, STAGE 1: Chronic | Status: ACTIVE | Noted: 2019-08-21

## 2019-08-21 LAB
ANION GAP SERPL CALC-SCNC: 9 MMOL/L (ref 8–16)
BUN SERPL-MCNC: 13 MG/DL (ref 8–23)
CALCIUM SERPL-MCNC: 7.9 MG/DL (ref 8.7–10.5)
CHLORIDE SERPL-SCNC: 108 MMOL/L (ref 95–110)
CO2 SERPL-SCNC: 18 MMOL/L (ref 23–29)
CREAT SERPL-MCNC: 0.5 MG/DL (ref 0.5–1.4)
ERYTHROCYTE [DISTWIDTH] IN BLOOD BY AUTOMATED COUNT: 15.6 % (ref 11.5–14.5)
EST. GFR  (AFRICAN AMERICAN): >60 ML/MIN/1.73 M^2
EST. GFR  (NON AFRICAN AMERICAN): >60 ML/MIN/1.73 M^2
GLUCOSE SERPL-MCNC: 164 MG/DL (ref 70–110)
HCT VFR BLD AUTO: 28.5 % (ref 37–48.5)
HGB BLD-MCNC: 9.2 G/DL (ref 12–16)
MAGNESIUM SERPL-MCNC: 1.6 MG/DL (ref 1.6–2.6)
MCH RBC QN AUTO: 28.6 PG (ref 27–31)
MCHC RBC AUTO-ENTMCNC: 32.3 G/DL (ref 32–36)
MCV RBC AUTO: 89 FL (ref 82–98)
PHOSPHATE SERPL-MCNC: 2.9 MG/DL (ref 2.7–4.5)
PLATELET # BLD AUTO: 355 K/UL (ref 150–350)
PMV BLD AUTO: 11.1 FL (ref 9.2–12.9)
POTASSIUM SERPL-SCNC: 3.7 MMOL/L (ref 3.5–5.1)
RBC # BLD AUTO: 3.22 M/UL (ref 4–5.4)
SODIUM SERPL-SCNC: 135 MMOL/L (ref 136–145)
WBC # BLD AUTO: 7.67 K/UL (ref 3.9–12.7)

## 2019-08-21 PROCEDURE — 84100 ASSAY OF PHOSPHORUS: CPT

## 2019-08-21 PROCEDURE — 12000002 HC ACUTE/MED SURGE SEMI-PRIVATE ROOM

## 2019-08-21 PROCEDURE — 85027 COMPLETE CBC AUTOMATED: CPT

## 2019-08-21 PROCEDURE — 94640 AIRWAY INHALATION TREATMENT: CPT

## 2019-08-21 PROCEDURE — 36415 COLL VENOUS BLD VENIPUNCTURE: CPT

## 2019-08-21 PROCEDURE — 94761 N-INVAS EAR/PLS OXIMETRY MLT: CPT

## 2019-08-21 PROCEDURE — 25000003 PHARM REV CODE 250: Performed by: INTERNAL MEDICINE

## 2019-08-21 PROCEDURE — 80048 BASIC METABOLIC PNL TOTAL CA: CPT

## 2019-08-21 PROCEDURE — 83735 ASSAY OF MAGNESIUM: CPT

## 2019-08-21 PROCEDURE — 63600175 PHARM REV CODE 636 W HCPCS: Performed by: INTERNAL MEDICINE

## 2019-08-21 PROCEDURE — 25000242 PHARM REV CODE 250 ALT 637 W/ HCPCS: Performed by: INTERNAL MEDICINE

## 2019-08-21 RX ORDER — IPRATROPIUM BROMIDE 0.5 MG/2.5ML
0.5 SOLUTION RESPIRATORY (INHALATION) EVERY 6 HOURS PRN
Status: DISCONTINUED | OUTPATIENT
Start: 2019-08-21 | End: 2019-08-22 | Stop reason: HOSPADM

## 2019-08-21 RX ADMIN — OXYCODONE HYDROCHLORIDE AND ACETAMINOPHEN 500 MG: 500 TABLET ORAL at 09:08

## 2019-08-21 RX ADMIN — METOPROLOL TARTRATE 25 MG: 25 TABLET ORAL at 09:08

## 2019-08-21 RX ADMIN — TRAMADOL HYDROCHLORIDE 50 MG: 50 TABLET, COATED ORAL at 09:08

## 2019-08-21 RX ADMIN — IPRATROPIUM BROMIDE 0.5 MG: 0.5 SOLUTION RESPIRATORY (INHALATION) at 07:08

## 2019-08-21 RX ADMIN — IPRATROPIUM BROMIDE 0.5 MG: 0.5 SOLUTION RESPIRATORY (INHALATION) at 12:08

## 2019-08-21 RX ADMIN — OXYBUTYNIN CHLORIDE 5 MG: 5 TABLET ORAL at 05:08

## 2019-08-21 RX ADMIN — DONEPEZIL HYDROCHLORIDE 10 MG: 5 TABLET, FILM COATED ORAL at 09:08

## 2019-08-21 RX ADMIN — SODIUM CHLORIDE: 0.9 INJECTION, SOLUTION INTRAVENOUS at 12:08

## 2019-08-21 RX ADMIN — ASPIRIN 81 MG: 81 TABLET, COATED ORAL at 09:08

## 2019-08-21 RX ADMIN — MULTIPLE VITAMINS W/ MINERALS TAB 1 TABLET: TAB at 05:08

## 2019-08-21 RX ADMIN — IPRATROPIUM BROMIDE 0.5 MG: 0.5 SOLUTION RESPIRATORY (INHALATION) at 01:08

## 2019-08-21 RX ADMIN — OXYBUTYNIN CHLORIDE 5 MG: 5 TABLET ORAL at 09:08

## 2019-08-21 RX ADMIN — ENOXAPARIN SODIUM 40 MG: 100 INJECTION SUBCUTANEOUS at 05:08

## 2019-08-21 RX ADMIN — SODIUM CHLORIDE: 0.9 INJECTION, SOLUTION INTRAVENOUS at 01:08

## 2019-08-21 RX ADMIN — LISINOPRIL 5 MG: 5 TABLET ORAL at 09:08

## 2019-08-21 RX ADMIN — CEFTRIAXONE 1 G: 1 INJECTION, SOLUTION INTRAVENOUS at 04:08

## 2019-08-21 RX ADMIN — GABAPENTIN 200 MG: 100 CAPSULE ORAL at 09:08

## 2019-08-21 NOTE — NURSING
No acute events during the night, patient remains pleasantly confused but alert. Heart rate remains elevated.

## 2019-08-21 NOTE — PROGRESS NOTES
[facility] Medicine  Progress Note    Patient Name: Breanne Culp  MRN: [id number]  Patient Class: IP- Inpatient   Admission Date: 8/19/2019  Length of Stay: 1 days  Attending Physician: Mauricio Mobley MD  Primary Care Provider: Frantz Bill MD        Subjective:     Principal Problem:Sepsis        HPI:  85 year old female transferred from [facility] presents with tachycardia and fever.  Patient is non verbal and cannot provide history.  There is no family currently at bedside.  History obtained from ED MD and [facility] records.  Per report, patient felt warm to her daughter, was noted to be tachycardic and thus sent to the ED.  [facility] records list patient with Hx of CVA, aphasia and dysphagia/peg. Patient noted to have 100.0 temperature and sinus tachycardia 130s. Patient appears at time to be able to non verbally indicate yes and no to me but not consistently.  She does say a few words and is alert and tracks me but communication is not clear and not consistent. She did not seem to indicate to me chest pain or SOB.  She does seem to indicate to me abdominal pain but abdominal exam was benign. ED RN reports patient arrived with aponte catheter and had sediment in the tubing. When we were in the process of changing to aponte catheter, she had a gush of urine out during that process possibly suggesting partial obstruction.    Overview/Hospital Course:  No notes on file    Interval History:  Patient seen and examined this morning.  Patient comfortably sleeping in her bed..  The patient did not wake up for the examination.     Review of Systems   Unable to perform ROS: Patient nonverbal     Objective:     Vital Signs (Most Recent):  Temp: 98.9 °F (37.2 °C) (08/21/19 0455)  Pulse: 105 (08/21/19 0726)  Resp: 20 (08/21/19 0726)  BP: (!) 161/91 (08/21/19 0455)  SpO2: 97 % (08/21/19 0726) Vital Signs (24h Range):  Temp:  [98.8 °F (37.1 °C)-99.4 °F (37.4 °C)] 98.9 °F (37.2  °C)  Pulse:  [] 105  Resp:  [18-22] 20  SpO2:  [95 %-99 %] 97 %  BP: (120-161)/(60-91) 161/91     Weight: 53.5 kg (117 lb 15.1 oz)  Body mass index is 20.89 kg/m².    Intake/Output Summary (Last 24 hours) at 8/21/2019 0820  Last data filed at 8/21/2019 0600  Gross per 24 hour   Intake 800 ml   Output 1075 ml   Net -275 ml      Physical Exam   Constitutional: She appears well-developed. No distress.   HENT:   Head: Normocephalic and atraumatic.   Mouth/Throat: Oropharynx is clear and moist.   Eyes: Pupils are equal, round, and reactive to light. EOM are normal.   Neck: Normal range of motion.   Cardiovascular:   No murmur heard.  Sinus tachycardia    Pulmonary/Chest: Effort normal and breath sounds normal. She has no wheezes.   Abdominal: Soft. She exhibits no distension. There is no tenderness. There is no rebound and no guarding.   Genitourinary:   Genitourinary Comments: Scherer catheter   Musculoskeletal:   Moves arms but not legs unable to assess as patient is not following commands   Neurological: She is alert.   Expressive aphasia. Not consistently following commands.    Skin: No rash noted.   Healing appearing Stage I sacral decubitus POA  A new finding of questionable abscess on the medial and of left glut   Psychiatric:   Sometimes mild sometime does not   Nursing note and vitals reviewed.      Significant Labs:   CBC:   Recent Labs   Lab 08/19/19 2228 08/20/19  0600 08/21/19  0628   WBC 14.25* 12.42 7.67   HGB 11.9* 11.3* 9.2*   HCT 35.0* 34.4* 28.5*   * 435* 355*     CMP:   Recent Labs   Lab 08/19/19 2228 08/20/19  0600 08/21/19  0628    140 135*   K 3.5 3.3* 3.7    112* 108   CO2 19* 18* 18*   * 169* 164*   BUN 26* 18 13   CREATININE 0.6 0.5 0.5   CALCIUM 9.6 8.2* 7.9*   PROT 7.3  --   --    ALBUMIN 3.2*  --   --    BILITOT 0.5  --   --    ALKPHOS 93  --   --    AST 20  --   --    ALT 16  --   --    ANIONGAP 14 10 9   EGFRNONAA >60.0 >60.0 >60.0     Magnesium:   Recent  Labs   Lab 08/20/19  0600 08/21/19  0628   MG 1.7 1.6       Significant Imaging: I have reviewed all pertinent imaging results/findings within the past 24 hours.     Microbiology Results (last 7 days)     Procedure Component Value Units Date/Time    Urine culture [[id number]]  (Abnormal) Collected:  08/19/19 2358    Order Status:  Completed Specimen:  Urine Updated:  08/21/19 0637     Urine Culture, Routine GRAM NEGATIVE SANDY, LACTOSE   >100,000 cfu/ml  50,000 - 99,999 cf  Identification and susceptibility pending      Narrative:       Preferred Collection Type->Urine, Clean Catch  Specimen Source->Urine    Blood culture x two cultures. Draw prior to antibiotics. [[id number]] Collected:  08/19/19 2210    Order Status:  Completed Specimen:  Blood from Peripheral, Antecubital, Right Updated:  08/21/19 0232     Blood Culture, Routine No Growth to date      No Growth to date    Narrative:       Aerobic and anaerobic    Blood culture x two cultures. Draw prior to antibiotics. [[id number]] Collected:  08/19/19 2218    Order Status:  Completed Specimen:  Blood from Peripheral, Hand, Right Updated:  08/21/19 0232     Blood Culture, Routine No Growth to date      No Growth to date    Narrative:       Aerobic and anaerobic    Clostridium difficile EIA [[id number]] Collected:  08/20/19 1130    Order Status:  Canceled Specimen:  Stool           Assessment/Plan:      * Sepsis  Patient admitted for Sepsis secondary to UTI.    UA looks infected urine culture shows gram-negative rods, lactose , greater than 100,000 CFU per mL, identification and susceptibility pending  CXR did not reveal pneumonia.   BCx so far negative With no growth   Tachycardia improving with metoprolol 25 bid , sinus tachy.   She is not hypotensive- in fact she is hypertensive. Started Lisinopril 5 mg po daily, will see how she respond or otherwise will adjust dose   On IV abx rocephin 1 g daily  Scherer changed in the ED with RN reporting a gushing  out of urine. Possible obstruction or partial obstruction? Scherer in place presumably due to sacral decub POA. Now has some sediments, possibly RBC, will monitor     Further IVFs to be given.   Patient is DNR but is not comfort care.      Decubitus ulcer of sacral region, stage 1  Wound care on board, healing  New finding of ?abscess on the medial aspect of the left gluteal cleft, General surgery consulted, Dr Adams to evaluate        CVA (cerebral vascular accident)    Chronic medical condition.  Continuing home medication.  Monitoring.      Hypertensive urgency  I have reviewed [facility] records and do not see that she has a Hx of HTN or is on antihypertensives.    Started metoprolol 25 mg po bid   Prn Hydralazine for now.      Metabolic acidosis  Trending lactic acid.  IVFs.        Scherer catheter in place on admission    Changed in the ED.    Acute cystitis without hematuria  IV abx.  UCx shows greater than 100,000 CFU per mL lactose .  Identity and sensitivity still pending  BCx so far no growth    Memory loss  Chronic medical condition.  Continuing home medication.  Monitoring.        Hyperlipidemia  Chronic medical condition.  Continuing home medication.  Monitoring.        Senile osteoporosis        Lumbar spondylosis  Chronic medical condition.  Continuing home medication.  Monitoring.          Patient Active Problem List   Diagnosis    Nonspecific (abnormal) findings on radiological and other examination of genitourinary organs    Posterior subcapsular polar senile cataract    DDD (degenerative disc disease), lumbar    Lumbar spondylosis    Degeneration of lumbar or lumbosacral intervertebral disc    Idiopathic peripheral neuropathy    Dystrophic nail    Onychomycosis due to dermatophyte    Lumbar stenosis    Lumbar radiculopathy    Pain of left thumb    Closed nondisplaced fracture of lateral malleolus of right fibula with routine healing    Seasonal allergies    Senile  osteoporosis    Hyperlipidemia    Memory loss    Primary osteoarthritis of right hip    Sepsis    Acute cystitis without hematuria    Scherer catheter in place on admission    Metabolic acidosis    Hypertensive urgency    CVA (cerebral vascular accident)    Decubitus ulcer of sacral region, stage 1       VTE Risk Mitigation (From admission, onward)        Ordered     enoxaparin injection 40 mg  Daily      08/20/19 0332     IP VTE HIGH RISK PATIENT  Once      08/20/19 0332                Mauricio Mobley MD  Department of Hospital Medicine   [facility]

## 2019-08-21 NOTE — PLAN OF CARE
Problem: Feeding Intolerance (Enteral Nutrition)  Goal: Feeding Tolerance  Outcome: Ongoing (interventions implemented as appropriate)  Patient tolerating feeding.

## 2019-08-21 NOTE — SUBJECTIVE & OBJECTIVE
Interval History:  Patient seen and examined this morning.  Patient comfortably sleeping in her bed..  The patient did not wake up for the examination.     Review of Systems   Unable to perform ROS: Patient nonverbal     Objective:     Vital Signs (Most Recent):  Temp: 98.9 °F (37.2 °C) (08/21/19 0455)  Pulse: 105 (08/21/19 0726)  Resp: 20 (08/21/19 0726)  BP: (!) 161/91 (08/21/19 0455)  SpO2: 97 % (08/21/19 0726) Vital Signs (24h Range):  Temp:  [98.8 °F (37.1 °C)-99.4 °F (37.4 °C)] 98.9 °F (37.2 °C)  Pulse:  [] 105  Resp:  [18-22] 20  SpO2:  [95 %-99 %] 97 %  BP: (120-161)/(60-91) 161/91     Weight: 53.5 kg (117 lb 15.1 oz)  Body mass index is 20.89 kg/m².    Intake/Output Summary (Last 24 hours) at 8/21/2019 0820  Last data filed at 8/21/2019 0600  Gross per 24 hour   Intake 800 ml   Output 1075 ml   Net -275 ml      Physical Exam   Constitutional: She appears well-developed. No distress.   HENT:   Head: Normocephalic and atraumatic.   Mouth/Throat: Oropharynx is clear and moist.   Eyes: Pupils are equal, round, and reactive to light. EOM are normal.   Neck: Normal range of motion.   Cardiovascular:   No murmur heard.  Sinus tachycardia    Pulmonary/Chest: Effort normal and breath sounds normal. She has no wheezes.   Abdominal: Soft. She exhibits no distension. There is no tenderness. There is no rebound and no guarding.   Genitourinary:   Genitourinary Comments: Scherer catheter   Musculoskeletal:   Moves arms but not legs unable to assess as patient is not following commands   Neurological: She is alert.   Expressive aphasia. Not consistently following commands.    Skin: No rash noted.   Healing appearing Stage I sacral decubitus POA  A new finding of questionable abscess on the medial and of left glut   Psychiatric:   Sometimes mild sometime does not   Nursing note and vitals reviewed.      Significant Labs:   CBC:   Recent Labs   Lab 08/19/19 2228 08/20/19  0600 08/21/19  0628   WBC 14.25* 12.42 7.67    HGB 11.9* 11.3* 9.2*   HCT 35.0* 34.4* 28.5*   * 435* 355*     CMP:   Recent Labs   Lab 08/19/19 2228 08/20/19  0600 08/21/19  0628    140 135*   K 3.5 3.3* 3.7    112* 108   CO2 19* 18* 18*   * 169* 164*   BUN 26* 18 13   CREATININE 0.6 0.5 0.5   CALCIUM 9.6 8.2* 7.9*   PROT 7.3  --   --    ALBUMIN 3.2*  --   --    BILITOT 0.5  --   --    ALKPHOS 93  --   --    AST 20  --   --    ALT 16  --   --    ANIONGAP 14 10 9   EGFRNONAA >60.0 >60.0 >60.0     Magnesium:   Recent Labs   Lab 08/20/19 0600 08/21/19  0628   MG 1.7 1.6       Significant Imaging: I have reviewed all pertinent imaging results/findings within the past 24 hours.     Microbiology Results (last 7 days)     Procedure Component Value Units Date/Time    Urine culture [[id number]]  (Abnormal) Collected:  08/19/19 2358    Order Status:  Completed Specimen:  Urine Updated:  08/21/19 0637     Urine Culture, Routine GRAM NEGATIVE SANDY, LACTOSE   >100,000 cfu/ml  50,000 - 99,999 cf  Identification and susceptibility pending      Narrative:       Preferred Collection Type->Urine, Clean Catch  Specimen Source->Urine    Blood culture x two cultures. Draw prior to antibiotics. [[id number]] Collected:  08/19/19 2210    Order Status:  Completed Specimen:  Blood from Peripheral, Antecubital, Right Updated:  08/21/19 0232     Blood Culture, Routine No Growth to date      No Growth to date    Narrative:       Aerobic and anaerobic    Blood culture x two cultures. Draw prior to antibiotics. [[id number]] Collected:  08/19/19 2218    Order Status:  Completed Specimen:  Blood from Peripheral, Hand, Right Updated:  08/21/19 0232     Blood Culture, Routine No Growth to date      No Growth to date    Narrative:       Aerobic and anaerobic    Clostridium difficile EIA [[id number]] Collected:  08/20/19 1130    Order Status:  Canceled Specimen:  Stool

## 2019-08-21 NOTE — ASSESSMENT & PLAN NOTE
Wound care on board, healing  New finding of ?abscess on the medial aspect of the left gluteal cleft, General surgery consulted, Dr Adams to evaluate

## 2019-08-21 NOTE — CARE UPDATE
08/20/19 2011   Patient Assessment/Suction   Level of Consciousness (AVPU) alert   Respiratory Effort Normal;Unlabored   Expansion/Accessory Muscles/Retractions no use of accessory muscles   All Lung Fields Breath Sounds coarse;diminished   PRE-TX-O2   SpO2 96 %   Pulse 82   Resp 18   Aerosol Therapy   $ Aerosol Therapy Charges Aerosol Treatment   Daily Review of Necessity (SVN) completed   Respiratory Treatment Status (SVN) given   Treatment Route (SVN) mask   Patient Position (SVN) semi-Garcia's   Post Treatment Assessment (SVN) increased aeration   Signs of Intolerance (SVN) none   Breath Sounds Post-Respiratory Treatment   Throughout All Fields Post-Treatment All Fields   Throughout All Fields Post-Treatment aeration increased   Post-treatment Heart Rate (beats/min) 98   Post-treatment Resp Rate (breaths/min) 20

## 2019-08-21 NOTE — CONSULTS
[facility]  General Surgery  Consult Note    Inpatient consult to General Surgery  Consult performed by: Enzo Adams III, MD  Consult ordered by: Mauricio Mobley MD  Reason for consult: to evaluate buttocks         Subjective:     Chief Complaint/Reason for Admission: Fever    History of Present Illness:  Patient is 85-year-old female admitted on August 19th as a transfer from [facility] with tachycardia and fever.  She has history of stroke.  She is nonverbal.  She is PEG and Scherer dependent.  She was found to have UTI, acute cystitis.  She has been started on IV antibiotics and IV fluid resuscitation.  Her Scherer catheter was changed.  Patient also has known history of stage I sacral decubitus wound. Wound care noted that the left buttock also appeared abnormal.  Surgery consulted for evaluation on opinion on abscess versus any other issue.  Patient is nonverbal.  She did not respond or communicate understanding of any further questioning.    No current facility-administered medications on file prior to encounter.      Current Outpatient Medications on File Prior to Encounter   Medication Sig    acetaminophen (TYLENOL) 500 MG tablet Take 500 mg by mouth every 6 (six) hours as needed for Pain.    ascorbic acid, vitamin C, (VITAMIN C) 500 MG tablet Take 500 mg by mouth 2 (two) times daily.    aspirin 81 mg Tab Every day    donepezil (ARICEPT) 10 MG tablet TAKE 1 TABLET BY MOUTH EVERY EVENING (Patient taking differently: TAKE 1 TABLET BY MOUTH morning)    food supplemt, lactose-reduced (ENSURE) Liqd Take 118 mLs by mouth 2 (two) times daily.    gabapentin (NEURONTIN) 100 MG capsule TAKE 2 CAPSULES BY MOUTH EVERY EVENING    ipratropium (ATROVENT) 0.02 % nebulizer solution Take 500 mcg by nebulization 4 (four) times daily. Rescue    LORazepam (ATIVAN) 1 MG tablet 1/2 to 1 po QHS prn    multivitamin (THERAGRAN) per tablet Take 1 tablet by mouth once daily.    oxybutynin (DITROPAN) 5 MG Tab  Take 5 mg by mouth 3 (three) times daily.    traMADol (ULTRAM) 50 mg tablet Take 1 tablet (50 mg total) by mouth nightly as needed for Pain.    diclofenac sodium (VOLTAREN) 1 % Gel Apply 2 g topically 3 (three) times daily.    fexofenadine (ALLEGRA) 30 MG tablet Take 30 mg by mouth 2 (two) times daily. As needed    fluticasone (FLONASE) 50 mcg/actuation nasal spray 2 sprays by Each Nare route once daily.    lidocaine-prilocaine (EMLA) cream As directed    oxybutynin (DITROPAN XL) 15 MG TR24 Take 1 tablet (15 mg total) by mouth once daily.       Review of patient's allergies indicates:   Allergen Reactions    Brimonidine Other (See Comments)     Redness and irritation of eyes       Past Medical History:   Diagnosis Date    Anticoagulant long-term use     ASA 81mg, Fish Oil    Arthritis     Cataract     OU done//    COPD (chronic obstructive pulmonary disease)     Coronary artery disease     DDD (degenerative disc disease), lumbar     Glaucoma     suspect    Hyperlipidemia     Low back pain     Osteoporosis     Positive PPD 1970    Seasonal allergies     Stroke      Past Surgical History:   Procedure Laterality Date    BREAST BIOPSY Right     2012 neg    CATARACT EXTRACTION W/  INTRAOCULAR LENS IMPLANT Bilateral 10/15/14     Dr Wright    COLONOSCOPY      Epidural Steroid injection      Pain Management    LUISANA-TRANSFORAMINAL L5/S1 Bilateral 3/14/2016    [facility]    Injection, Joint, Hip Right 4/3/2019    [facility] at [facility]    INJECTION-STEROID-EPIDURAL-LUMBAR N/A 8/4/2014    [facility] at [facility]    INJECTION-STEROID-EPIDURAL-LUMBAR, L5/S1 N/A 11/18/2014    [facility] at [facility]    phaco/pciol Left 10/15/2014    [facility]    phaco/pciol Right 8/20/2014    [facility]    TONSILLECTOMY  1941      Family History     Problem Relation (Age of Onset)    Cancer Father, Mother, Brother    Diabetes Sister    Glaucoma Mother    Hypertension Brother    Stroke Brother, Sister        Tobacco Use    Smoking status: Former Smoker     Packs/day: 0.25     Start date: 1961     Last attempt to quit: 1992     Years since quittin.2    Smokeless tobacco: Never Used   Substance and Sexual Activity    Alcohol use: No    Drug use: No    Sexual activity: Not on file     Review of Systems   Unable to perform ROS: Patient nonverbal     Objective:     Vital Signs (Most Recent):  Temp: 98.7 °F (37.1 °C) (19 1216)  Pulse: (!) 113 (19 1344)  Resp: 18 (19 1344)  BP: 132/73 (19 1216)  SpO2: 99 % (19 1344) Vital Signs (24h Range):  Temp:  [98.4 °F (36.9 °C)-99.4 °F (37.4 °C)] 98.7 °F (37.1 °C)  Pulse:  [] 113  Resp:  [16-22] 18  SpO2:  [95 %-99 %] 99 %  BP: (120-161)/(60-91) 132/73     Weight: 53.5 kg (117 lb 15.1 oz)  Body mass index is 20.89 kg/m².      Intake/Output Summary (Last 24 hours) at 2019 1547  Last data filed at 2019 0600  Gross per 24 hour   Intake 800 ml   Output 650 ml   Net 150 ml       Physical Exam   Constitutional: She appears well-developed.  Non-toxic appearance. She has a sickly appearance. No distress.   HENT:   Head: Normocephalic and atraumatic. Head is without abrasion and without laceration.   Right Ear: External ear normal.   Left Ear: External ear normal.   Nose: Nose normal.   Mouth/Throat: Oropharynx is clear and moist.   Eyes: Pupils are equal, round, and reactive to light. EOM are normal.   Neck: Trachea normal. Neck supple. No tracheal deviation and normal range of motion present.   Cardiovascular: Normal rate and regular rhythm.   Pulmonary/Chest: Effort normal. No accessory muscle usage. No tachypnea. No respiratory distress.   Abdominal: Soft. Normal appearance. She exhibits no distension and no mass. There is no tenderness. There is  no rigidity, no rebound and no guarding.       PEG in place.   Genitourinary:         Genitourinary Comments: The left medial buttock appears to have some superficial skin breakdown.  There is no underlying induration or fluctuance.  There is no cellulitis.  There is no purulent drainage.   Neurological: Coordination and gait normal.   Skin: Skin is warm and intact.   Psychiatric: She has a normal mood and affect. Her speech is normal and behavior is normal.       Significant Labs:  CBC:   Recent Labs   Lab 08/21/19  0628   WBC 7.67   RBC 3.22*   HGB 9.2*   HCT 28.5*   *   MCV 89   MCH 28.6   MCHC 32.3     CMP:   Recent Labs   Lab 08/19/19  2228  08/21/19  0628   *   < > 164*   CALCIUM 9.6   < > 7.9*   ALBUMIN 3.2*  --   --    PROT 7.3  --   --       < > 135*   K 3.5   < > 3.7   CO2 19*   < > 18*      < > 108   BUN 26*   < > 13   CREATININE 0.6   < > 0.5   ALKPHOS 93  --   --    ALT 16  --   --    AST 20  --   --    BILITOT 0.5  --   --     < > = values in this interval not displayed.         Assessment/Plan:     Active Diagnoses:    Diagnosis Date Noted POA    PRINCIPAL PROBLEM:  Sepsis [A41.9] 08/20/2019 Yes    Decubitus ulcer of sacral region, stage 1 [L89.151] 08/21/2019 Yes     Chronic    Acute cystitis without hematuria [N30.00] 08/20/2019 Yes    Scherer catheter in place on admission [Z96.0] 08/20/2019 Not Applicable     Chronic    Metabolic acidosis [E87.2] 08/20/2019 Yes    Hypertensive urgency [I16.0] 08/20/2019 Yes    CVA (cerebral vascular accident) [I63.9] 08/20/2019 Yes     Chronic    Memory loss [R41.3] 08/11/2018 Yes     Chronic    Senile osteoporosis [M81.0]  Yes     Chronic    Hyperlipidemia [E78.5]  Yes     Chronic    Lumbar spondylosis [M47.816] 07/13/2014 Yes     Chronic      Problems Resolved During this Admission:     It appears that the abnormal appearing skin in the left buttock is superficial skin breakdown.  It did not appear to be an abscess or  cellulitic in my opinion.  There is no underlying induration.  There is no underlying fluctuance.  The buttock is soft.  There is no drainage. Recommend to continue wound care.  Thank you for your consult. I will sign off. Please contact us if you have any additional questions.    Enzo Adams III, MD  General Surgery  [facility]

## 2019-08-21 NOTE — PROGRESS NOTES
"[facility]  Adult Nutrition  Progress Note    SUMMARY       Recommendations    Recommendation/Intervention: Jevity 1.2 at 50 ml/hr to provide 1440 kcal, 67 gm protein plus Liquacel bid providing 180 kcal and 32 gms protein  Goals: 1. Provision of adequate nutrition. 2. Patient's ability to tolerate feeding  Nutrition Goal Status: new  Communication of RD Recs: reviewed with RN    Reason for Assessment    Reason For Assessment: RD follow-up  Diagnosis: other (see comments)(Sepsis)  Relevant Medical History: Patient admitted with peg tube from nursing home.  Interdisciplinary Rounds: attended    Nutrition Risk Screen    Nutrition Risk Screen: tube feeding or parenteral nutrition    Nutrition/Diet History    Patient Reported Diet/Restrictions/Preferences: other (see comments)  Typical Food/Fluid Intake: Fibersource HN via peg tube  Spiritual, Cultural Beliefs, Evangelical Practices, Values that Affect Care: no  Vitamin/Mineral/Herbal Supplements: Vitamin C, multivitamin  Food Allergies: NKFA  Factors Affecting Nutritional Intake: difficulty/impaired swallowing  Nutrition Support Formula Prior to Admit: Other (see comments)(Fibersource HN @ 50 ml/hr.  Liquacel bid)  Nutrition Support Rate Prior to Admit: 50 (ml)  Nutrtion Support Frequency Prior to Admit: 24 hrs a day  Nutrition Support Provision Prior to Admit: 1400 kcal/day    Anthropometrics    Temp: 98.4 °F (36.9 °C)  Height: 5' 3" (160 cm)  Height (inches): 63 in  Weight Method: Bed Scale  Weight: 53.5 kg (117 lb 15.1 oz)  Weight (lb): 117.95 lb  Ideal Body Weight (IBW), Female: 115 lb  % Ideal Body Weight, Female (lb): 102.57 lb  BMI (Calculated): 20.9       Lab/Procedures/Meds    Pertinent Labs Comments: HCT 34.4, HGB 11.3, K 3L3, Cl 112, Glu 169  Pertinent Medications Comments: Multivitamin, Vit C    Physical Findings/Assessment         Estimated/Assessed Needs    Weight Used For Calorie Calculations: 53.5 kg (117 lb 15.1 oz)  Energy Calorie " Requirements (kcal): 6098-0020 kcal (25-30 kcal/kgm)  Energy Need Method: Kcal/kg  Protein Requirements: 64-80 gms (1.2-1.5 gms/kg)  Weight Used For Protein Calculations: 53.5 kg (117 lb 15.1 oz)        RDA Method (mL): 1337         Nutrition Prescription Ordered    Current Diet Order: Jevity 1.2 at 50 ml/hr with 110 ml water flush qid with Liquacel bid  Nutrition Order Comments: Patient at goal rate and tolerating feeding.  Current Nutrition Support Rate Ordered: 50 (ml)  Current Nutrition Support Frequency Ordered: Jevity 1.2 at 50 ml/hr    Evaluation of Received Nutrient/Fluid Intake    Enteral Calories (kcal): 1440  Enteral (Free Water) Fluid (mL): 440  Other Calories (kcal): 180  Total Calories (kcal/kg): 1620  Total Protein (gm/kg): 99  Energy Calories Required: meeting needs  Protein Required: meeting needs  Fluid Required: meeting needs  Tolerance: tolerating  % Intake of Estimated Energy Needs: 75 - 100 %  % Meal Intake: 75 - 100 %    Nutrition Risk    Level of Risk/Frequency of Follow-up: high     Assessment and Plan    No new Assessment & Plan notes have been filed under this hospital service since the last note was generated.  Service: Nutrition       Monitor and Evaluation    Food and Nutrient Intake: enteral nutrition intake  Food and Nutrient Adminstration: enteral and parenteral nutrition administration  Anthropometric Measurements: weight  Biochemical Data, Medical Tests and Procedures: electrolyte and renal panel, glucose/endocrine profile  Nutrition-Focused Physical Findings: overall appearance          Nutrition Follow-Up    RD Follow-up?: Yes   Darlene Lombardo  08/21/2019  11:56 AM

## 2019-08-21 NOTE — PLAN OF CARE
Problem: Infection  Goal: Infection Symptom Resolution  Outcome: Ongoing (interventions implemented as appropriate)  Daughter explained the disease process and route of infection along with plan of care for her mother and medications used to treat. Encouraged to ask questions, her daughter voiced understanding and was tankful for the explanation.

## 2019-08-21 NOTE — PLAN OF CARE
08/21/19 0726   Patient Assessment/Suction   Level of Consciousness (AVPU) alert   Respiratory Effort Normal;Unlabored   Expansion/Accessory Muscles/Retractions expansion symmetric   All Lung Fields Breath Sounds Anterior:   IKER Breath Sounds clear   LLL Breath Sounds diminished   RUL Breath Sounds clear   RML Breath Sounds diminished   RLL Breath Sounds diminished   Rhythm/Pattern, Respiratory pattern regular;depth regular   PRE-TX-O2   O2 Device (Oxygen Therapy) room air   SpO2 97 %   Pulse Oximetry Type Intermittent   $ Pulse Oximetry - Multiple Charge Pulse Oximetry - Multiple   Pulse 105   Resp 20   Positioning HOB elevated 45 degrees   Aerosol Therapy   $ Aerosol Therapy Charges Aerosol Treatment   Respiratory Treatment Status (SVN) given   Treatment Route (SVN) mask   Patient Position (SVN) semi-Garcia's   Post Treatment Assessment (SVN) breath sounds improved;increased aeration   Signs of Intolerance (SVN) none   Breath Sounds Post-Respiratory Treatment   Throughout All Fields Post-Treatment All Fields   Throughout All Fields Post-Treatment aeration increased;equal bilaterally   Post-treatment Heart Rate (beats/min) 101   Post-treatment Resp Rate (breaths/min) 18

## 2019-08-21 NOTE — PLAN OF CARE
08/21/19 1159   Discharge Reassessment   Assessment Type Discharge Planning Reassessment   THIS CM PRESENTED TO THE PATIENT'S ROOM TO DO AN INITIAL DC PLANNING ASSESSMENT.  THE PATIENT SLEEPING VERY HARD AND COULD NOT BE AWAKENED.  THIS CM THEN TRIED TO CONTACT THE PATIENT'S SPOUSE, BUT HAD TO LEAVE A VOICEMAIL MESSAGE.  WILL FOLLOW UP.

## 2019-08-21 NOTE — ASSESSMENT & PLAN NOTE
IV abx.  UCx shows greater than 100,000 CFU per mL lactose .  Identity and sensitivity still pending  BCx so far no growth

## 2019-08-21 NOTE — ASSESSMENT & PLAN NOTE
Patient admitted for Sepsis secondary to UTI.    UA looks infected urine culture shows gram-negative rods, lactose , greater than 100,000 CFU per mL, identification and susceptibility pending  CXR did not reveal pneumonia.   BCx so far negative With no growth   Tachycardia improving with metoprolol 25 bid , sinus tachy.   She is not hypotensive- in fact she is hypertensive. Started Lisinopril 5 mg po daily, will see how she respond or otherwise will adjust dose   On IV abx rocephin 1 g daily  Scherer changed in the ED with RN reporting a gushing out of urine. Possible obstruction or partial obstruction? Scherer in place presumably due to sacral decub POA. Now has some sediments, possibly RBC, will monitor     Further IVFs to be given.   Patient is DNR but is not comfort care.

## 2019-08-22 VITALS
WEIGHT: 117.94 LBS | DIASTOLIC BLOOD PRESSURE: 55 MMHG | HEIGHT: 63 IN | BODY MASS INDEX: 20.9 KG/M2 | OXYGEN SATURATION: 98 % | HEART RATE: 96 BPM | TEMPERATURE: 98 F | RESPIRATION RATE: 18 BRPM | SYSTOLIC BLOOD PRESSURE: 128 MMHG

## 2019-08-22 PROBLEM — A41.9 SEPSIS: Status: RESOLVED | Noted: 2019-08-20 | Resolved: 2019-08-22

## 2019-08-22 PROBLEM — E87.20 METABOLIC ACIDOSIS: Status: RESOLVED | Noted: 2019-08-20 | Resolved: 2019-08-22

## 2019-08-22 PROBLEM — N39.0 UTI (URINARY TRACT INFECTION): Status: ACTIVE | Noted: 2019-08-22

## 2019-08-22 LAB
ANION GAP SERPL CALC-SCNC: 5 MMOL/L (ref 8–16)
BACTERIA UR CULT: ABNORMAL
BUN SERPL-MCNC: 13 MG/DL (ref 8–23)
CALCIUM SERPL-MCNC: 7.9 MG/DL (ref 8.7–10.5)
CHLORIDE SERPL-SCNC: 111 MMOL/L (ref 95–110)
CO2 SERPL-SCNC: 20 MMOL/L (ref 23–29)
CREAT SERPL-MCNC: 0.4 MG/DL (ref 0.5–1.4)
ERYTHROCYTE [DISTWIDTH] IN BLOOD BY AUTOMATED COUNT: 15.4 % (ref 11.5–14.5)
EST. GFR  (AFRICAN AMERICAN): >60 ML/MIN/1.73 M^2
EST. GFR  (NON AFRICAN AMERICAN): >60 ML/MIN/1.73 M^2
GLUCOSE SERPL-MCNC: 132 MG/DL (ref 70–110)
HCT VFR BLD AUTO: 25.8 % (ref 37–48.5)
HGB BLD-MCNC: 8.3 G/DL (ref 12–16)
MAGNESIUM SERPL-MCNC: 1.7 MG/DL (ref 1.6–2.6)
MCH RBC QN AUTO: 28.2 PG (ref 27–31)
MCHC RBC AUTO-ENTMCNC: 32.2 G/DL (ref 32–36)
MCV RBC AUTO: 88 FL (ref 82–98)
MRSA SCREEN BY PCR: NEGATIVE
PHOSPHATE SERPL-MCNC: 3.6 MG/DL (ref 2.7–4.5)
PLATELET # BLD AUTO: 316 K/UL (ref 150–350)
PMV BLD AUTO: 10.9 FL (ref 9.2–12.9)
POTASSIUM SERPL-SCNC: 3.4 MMOL/L (ref 3.5–5.1)
RBC # BLD AUTO: 2.94 M/UL (ref 4–5.4)
SODIUM SERPL-SCNC: 136 MMOL/L (ref 136–145)
WBC # BLD AUTO: 5.67 K/UL (ref 3.9–12.7)

## 2019-08-22 PROCEDURE — 85027 COMPLETE CBC AUTOMATED: CPT

## 2019-08-22 PROCEDURE — 25000003 PHARM REV CODE 250: Performed by: INTERNAL MEDICINE

## 2019-08-22 PROCEDURE — 63600175 PHARM REV CODE 636 W HCPCS: Performed by: INTERNAL MEDICINE

## 2019-08-22 PROCEDURE — 84100 ASSAY OF PHOSPHORUS: CPT

## 2019-08-22 PROCEDURE — 80048 BASIC METABOLIC PNL TOTAL CA: CPT

## 2019-08-22 PROCEDURE — 87641 MR-STAPH DNA AMP PROBE: CPT

## 2019-08-22 PROCEDURE — 83735 ASSAY OF MAGNESIUM: CPT

## 2019-08-22 PROCEDURE — 99900035 HC TECH TIME PER 15 MIN (STAT)

## 2019-08-22 PROCEDURE — 36415 COLL VENOUS BLD VENIPUNCTURE: CPT

## 2019-08-22 RX ORDER — METOPROLOL TARTRATE 25 MG/1
25 TABLET, FILM COATED ORAL 2 TIMES DAILY
Qty: 60 TABLET | Refills: 3
Start: 2019-08-22 | End: 2020-01-02 | Stop reason: SDUPTHER

## 2019-08-22 RX ORDER — LISINOPRIL 5 MG/1
5 TABLET ORAL DAILY
Qty: 30 TABLET | Refills: 3
Start: 2019-08-23 | End: 2020-01-02 | Stop reason: SDUPTHER

## 2019-08-22 RX ORDER — CIPROFLOXACIN 500 MG/1
500 TABLET ORAL EVERY 12 HOURS
Qty: 14 TABLET | Refills: 0 | Status: ON HOLD | OUTPATIENT
Start: 2019-08-22 | End: 2019-08-30 | Stop reason: HOSPADM

## 2019-08-22 RX ORDER — POTASSIUM CHLORIDE 20 MEQ/15ML
20 SOLUTION ORAL DAILY
Refills: 0
Start: 2019-08-22 | End: 2019-08-25

## 2019-08-22 RX ORDER — CIPROFLOXACIN 500 MG/1
500 TABLET ORAL EVERY 12 HOURS
Status: DISCONTINUED | OUTPATIENT
Start: 2019-08-22 | End: 2019-08-22 | Stop reason: HOSPADM

## 2019-08-22 RX ADMIN — LISINOPRIL 5 MG: 5 TABLET ORAL at 09:08

## 2019-08-22 RX ADMIN — METOPROLOL TARTRATE 25 MG: 25 TABLET ORAL at 09:08

## 2019-08-22 RX ADMIN — SODIUM CHLORIDE: 0.9 INJECTION, SOLUTION INTRAVENOUS at 09:08

## 2019-08-22 RX ADMIN — OXYBUTYNIN CHLORIDE 5 MG: 5 TABLET ORAL at 04:08

## 2019-08-22 RX ADMIN — POTASSIUM CHLORIDE 40 MEQ: 20 SOLUTION ORAL at 09:08

## 2019-08-22 RX ADMIN — CEFTRIAXONE 1 G: 1 INJECTION, SOLUTION INTRAVENOUS at 03:08

## 2019-08-22 RX ADMIN — ENOXAPARIN SODIUM 40 MG: 100 INJECTION SUBCUTANEOUS at 04:08

## 2019-08-22 RX ADMIN — ASPIRIN 81 MG: 81 TABLET, COATED ORAL at 09:08

## 2019-08-22 RX ADMIN — OXYBUTYNIN CHLORIDE 5 MG: 5 TABLET ORAL at 09:08

## 2019-08-22 RX ADMIN — THERA TABS 1 TABLET: TAB at 09:08

## 2019-08-22 RX ADMIN — OXYCODONE HYDROCHLORIDE AND ACETAMINOPHEN 500 MG: 500 TABLET ORAL at 09:08

## 2019-08-22 NOTE — PLAN OF CARE
08/22/19 0031   PRE-TX-O2   O2 Device (Oxygen Therapy) room air   SpO2 98 %   Pulse Oximetry Type Intermittent   Pulse 98   Resp 18   Aerosol Therapy   $ Aerosol Therapy Charges PRN treatment not required  (resting quietly with nadn)   Daily Review of Necessity (SVN) completed

## 2019-08-22 NOTE — PLAN OF CARE
08/22/19 1434   Discharge Reassessment   Assessment Type Discharge Planning Reassessment   Anticipated Discharge Disposition SNF   THIS CM CALLED AND SPOKE TO LOLITA WITH [facility]; MADE HER AWARE THAT THE HOSPITALIST WOULD LIKE TO DISCHARGE THIS PATIENT BACK TO THE NURSING HOME TODAY.  LOLITA STATED THAT THE PATIENT WAS SNF PRIOR TO COMING TO [facility] AND SHE WOULD NEED TO SPEAK WITH IT REP [place] TO SEE IF SHE COULD COME BACK TODAY; LOLITA STATED SHE WOULD CALL ME BACK.  THIS CM ACKNOWLEDGED UNDERSTANDING.

## 2019-08-22 NOTE — DISCHARGE SUMMARY
[facility]  Discharge Summary      Patient Name: Breanne Culp  MRN: [id number]  Admission Date: 8/19/2019  Hospital Length of Stay: 2 days  Discharge Date and Time:  08/22/2019 4:51 PM  Attending Physician: Mauricio Mobley MD   Discharging Provider: Mauricio Mobley MD  Primary Care Provider: Frantz Bill MD      HPI:   85 year old female transferred from [facility] presents with tachycardia and fever.  Patient is non verbal and cannot provide history.  There is no family currently at bedside.  History obtained from ED MD and [facility] records.  Per report, patient felt warm to her daughter, was noted to be tachycardic and thus sent to the ED.  [facility] records list patient with Hx of CVA, aphasia and dysphagia/peg. Patient noted to have 100.0 temperature and sinus tachycardia 130s. Patient appears at time to be able to non verbally indicate yes and no to me but not consistently.  She does say a few words and is alert and tracks me but communication is not clear and not consistent. She did not seem to indicate to me chest pain or SOB.  She does seem to indicate to me abdominal pain but abdominal exam was benign. ED RN reports patient arrived with aponte catheter and had sediment in the tubing. When we were in the process of changing to aponte catheter, she had a gush of urine out during that process possibly suggesting partial obstruction.    * No surgery found *      Hospital Course:   85-year-old female admitted 08/19/2019 through [facility] sniff unit with tachycardia and fever.  Patient was admitted in the hospital on evaluation patient had a for the catheter with sediments in it.  Aponte catheter was changed in the ER which upon removing a gush of urine came out..  Aponte was replaced.  Patient urine was collected and found to have UTI as well.  Firs day patient did had some blood in her urine but which cleared of over the hospital stay.  Patient remained  tachycardic despite afebrile.  EKG shows sinus tach.  Also noted was patient's high blood pressure which was not documented as a history in her records from [facility].  Lisinopril 5 mg p.o. daily as well as metoprolol 25 mg p.o. b.i.d. was started which controlled her blood pressure as well as her heart rate.  Patient sitter/caregiver Kelley who states with the patient at home informed patient does not have a Aponte at home and aponte was placed at only because patient was transferred to [facility] for decubitus ulcer.  Kwame also informed patient's daughter Loree Jacobs would like to speak with me.  Her number is [phone].  Spoke to Ms. Jacobs discussed patient condition, inform her urinary tract infection is E coli and is sensitive to Cipro so I will discharge her back to [facility] with oral prescription of Cipro 500 mg p.o. b.i.d. for 7 days. Also informed daughter that I will send her home with Liiswnopril 5 mg daily and metoprolol 25 mg BID. Daughter verbalized understanding     Consults:   Consults (From admission, onward)        Status Ordering Provider     Inpatient consult to General Surgery  Once     Provider:  Enzo Adams III, MD    Completed SHUBHAM PARKER     Inpatient consult to Registered Dietitian/Nutritionist  Once     Provider:  (Not yet assigned)    KENDELL Stewart          * UTI (urinary tract infection)  E.coli sensitive to cipro, will D/C to [facility] with Cipro 500 MG per peg bid x 7 days         Final Active Diagnoses:    Diagnosis Date Noted POA    PRINCIPAL PROBLEM:  UTI (urinary tract infection) [N39.0] 08/22/2019 Yes    Decubitus ulcer of sacral region, stage 1 [L89.151] 08/21/2019 Yes     Chronic    Acute cystitis without hematuria [N30.00] 08/20/2019 Yes    Aponte catheter in place on admission [Z96.0] 08/20/2019 Not Applicable     Chronic    Hypertensive urgency [I16.0] 08/20/2019 Yes    CVA (cerebral vascular accident) [I63.9] 08/20/2019 Yes      Chronic    Memory loss [R41.3] 08/11/2018 Yes     Chronic    Senile osteoporosis [M81.0]  Yes     Chronic    Hyperlipidemia [E78.5]  Yes     Chronic    Lumbar spondylosis [M47.816] 07/13/2014 Yes     Chronic      Problems Resolved During this Admission:    Diagnosis Date Noted Date Resolved POA    Sepsis [A41.9] 08/20/2019 08/22/2019 Yes    Metabolic acidosis [E87.2] 08/20/2019 08/22/2019 Yes       Discharged Condition: fair    Disposition: Skilled Nursing Facility    Follow Up:  Follow-up Information     Frantz Bill MD.    Specialty:  Family Medicine  Why:  AS SCHEDULED PREVIOSULY   Contact information:  [address] [place] [id number] [phone]                 Patient Instructions:      Diet Cardiac     Notify your health care provider if you experience any of the following:  temperature >100.4     Notify your health care provider if you experience any of the following:  severe uncontrolled pain     Notify your health care provider if you experience any of the following:  difficulty breathing or increased cough     Notify your health care provider if you experience any of the following:  increased confusion or weakness     Change dressing (specify)   Order Comments: Dressing change: one times per day as she was getting the wound care at SNF     Activity as tolerated       Significant Diagnostic Studies: Labs:   BMP:   Recent Labs   Lab 08/21/19 0628 08/22/19  0547   * 132*   * 136   K 3.7 3.4*    111*   CO2 18* 20*   BUN 13 13   CREATININE 0.5 0.4*   CALCIUM 7.9* 7.9*   MG 1.6 1.7   , CBC   Recent Labs   Lab 08/21/19 0628 08/22/19  0547   WBC 7.67 5.67   HGB 9.2* 8.3*   HCT 28.5* 25.8*   * 316    and All labs within the past 24 hours have been reviewed  Microbiology:   Urine Culture    Lab Results   Component Value Date    LABURIN ESCHERICHIA COLI  >100,000 cfu/ml   (A) 08/19/2019       Pending Diagnostic Studies:     None          Medications:  Reconciled Home Medications:      Medication List      START taking these medications    lisinopril 5 MG tablet  Commonly known as:  PRINIVIL,ZESTRIL  1 tablet (5 mg total) by Per G Tube route once daily.  Start taking on:  8/23/2019     metoprolol tartrate 25 MG tablet  Commonly known as:  LOPRESSOR  1 tablet (25 mg total) by Per G Tube route 2 (two) times daily.     potassium chloride 10% 20 mEq/15 mL oral solution  Commonly known as:  KAYCIEL  15 mLs (20 mEq total) by Per G Tube route once daily. for 3 days        CHANGE how you take these medications    donepezil 10 MG tablet  Commonly known as:  ARICEPT  TAKE 1 TABLET BY MOUTH EVERY EVENING  What changed:    · how much to take  · how to take this  · when to take this        CONTINUE taking these medications    acetaminophen 500 MG tablet  Commonly known as:  TYLENOL  Take 500 mg by mouth every 6 (six) hours as needed for Pain.     aspirin 81 mg Tab  Every day     diclofenac sodium 1 % Gel  Commonly known as:  VOLTAREN  Apply 2 g topically 3 (three) times daily.     fexofenadine 30 MG tablet  Commonly known as:  ALLEGRA  Take 30 mg by mouth 2 (two) times daily. As needed     fluticasone propionate 50 mcg/actuation nasal spray  Commonly known as:  FLONASE  2 sprays by Each Nare route once daily.     food supplemt, lactose-reduced Liqd  Commonly known as:  ENSURE  Take 118 mLs by mouth 2 (two) times daily.     gabapentin 100 MG capsule  Commonly known as:  NEURONTIN  TAKE 2 CAPSULES BY MOUTH EVERY EVENING     ipratropium 0.02 % nebulizer solution  Commonly known as:  ATROVENT  Take 500 mcg by nebulization 4 (four) times daily. Rescue     lidocaine-prilocaine cream  Commonly known as:  EMLA  As directed     LORazepam 1 MG tablet  Commonly known as:  ATIVAN  1/2 to 1 po QHS prn     multivitamin per tablet  Commonly known as:  THERAGRAN  Take 1 tablet by mouth once daily.     * oxybutynin 5 MG Tab  Commonly known as:  DITROPAN  Take 5 mg by mouth 3  (three) times daily.       Commonly known as:  DITROPAN XL  Take 1 tablet (15 mg total) by mouth once daily.     traMADol 50 mg tablet  Commonly known as:  ULTRAM  Take 1 tablet (50 mg total) by mouth nightly as needed for Pain.     VITAMIN C 500 MG tablet  Generic drug:  ascorbic acid (vitamin C)  Take 500 mg by mouth 2 (two) times daily.                     Indwelling Lines/Drains at time of discharge:   Lines/Drains/Airways     Drain                 Gastrostomy/Enterostomy   Percutaneous endoscopic gastrostomy (PEG) midline feeding -- days         Urethral Catheter 08/20/19 0000 Double-lumen 16 Fr. 2 days          Pressure Ulcer                 Pressure Injury 08/20/19 1150  upper Sacral spine Stage 2 2 days                Time spent on the discharge of patient: 38 minutes  Patient was seen and examined on the date of discharge and determined to be suitable for discharge.         Mauricio Mobley MD  Department of Hospital Medicine  [facility]

## 2019-08-22 NOTE — PLAN OF CARE
08/22/19 1707   Discharge Reassessment   Assessment Type Discharge Planning Reassessment   Anticipated Discharge Disposition SNF   RECEIVED A CALL BACK FROM [facility] STATING REPORT COULD BE CALLED TO BEVERLEY AMAYA AT [facility], [phone]; THE ABOVE INFORMATION WAS GIVEN TO THE PATIENT'S [facility] NURSE, SHANEKA.

## 2019-08-22 NOTE — PLAN OF CARE
08/22/19 1646   Discharge Reassessment   Assessment Type Discharge Planning Reassessment   Anticipated Discharge Disposition SNF   THIS CM CALLED AND SPOKE TO ANGELO AT [facility] TO FIND OUT ABOUT TRANSPORT OF THE PATIENT BACK TO [facility]; ANGELO STATED THAT THEIR VAN WILL  THE PATIENT.  THIS CM ACKNOWLEDGED UNDERSTANDING AND REPORTED THE ABOVE TO THE PATIENT'S [facility] NURSE.

## 2019-08-22 NOTE — NURSING
Patient d/c to facility via w/c with facility . Discharged paper work given to . Scherer cath left in place, IV D/C to left hand and right forearm. Jevity 1.2 disconnected at that time also.

## 2019-08-22 NOTE — HOSPITAL COURSE
85-year-old female admitted 08/19/2019 through [facility] unit with tachycardia and fever.  Patient was admitted in the hospital on evaluation patient had a for the catheter with sediments in it.  Aponte catheter was changed in the ER which upon removing a gush of urine came out..  Aponte was replaced.  Patient urine was collected and found to have UTI as well.  Firs day patient did had some blood in her urine but which cleared of over the hospital stay.  Patient remained tachycardic despite afebrile.  EKG shows sinus tach.  Also noted was patient's high blood pressure which was not documented as a history in her records from [facility].  Lisinopril 5 mg p.o. daily as well as metoprolol 25 mg p.o. b.i.d. was started which controlled her blood pressure as well as her heart rate.  Patient sitter/caregiver Kelley who states with the patient at home informed patient does not have a Aponte at home and aponte was placed at only because patient was transferred to [facility] for decubitus ulcer.  Kwame also informed patient's daughter Loree Jacobs would like to speak with me.  Her number is [phone].  Spoke to Ms. Jacobs discussed patient condition, inform her urinary tract infection is E coli and is sensitive to Cipro so I will discharge her back to [facility] with oral prescription of Cipro 500 mg p.o. b.i.d. for 7 days. Also informed daughter that I will send her home with Liiswnopril 5 mg daily and metoprolol 25 mg BID. Daughter verbalized understanding

## 2019-08-22 NOTE — PLAN OF CARE
08/22/19 1643   Discharge Reassessment   Assessment Type Discharge Planning Reassessment   Anticipated Discharge Disposition SNF   DISCHARGE INFORMATION MANUALLY FAXED TO ANGELO AT [facility].  AWAIT RESPONSE.

## 2019-08-22 NOTE — PLAN OF CARE
08/22/19 1437   Discharge Reassessment   Assessment Type Discharge Planning Reassessment   Anticipated Discharge Disposition SNF   RECEIVED A CALL BACK FROM LOLITA WITH [facility] STATING THEY COULD ACCEPT THE PATIENT BACK TO THEIR FACILITY TODAY.  THIS CM ACKNOWLEDGED UNDERSTANDING.

## 2019-08-25 LAB
BACTERIA BLD CULT: NORMAL
BACTERIA BLD CULT: NORMAL

## 2019-08-28 ENCOUNTER — HOSPITAL ENCOUNTER (OUTPATIENT)
Facility: HOSPITAL | Age: 84
Discharge: NURSING, REHAB OR LONG-TERM CARE | End: 2019-08-30
Attending: EMERGENCY MEDICINE | Admitting: INTERNAL MEDICINE
Payer: MEDICARE

## 2019-08-28 DIAGNOSIS — I63.9 CEREBROVASCULAR ACCIDENT (CVA), UNSPECIFIED MECHANISM: Chronic | ICD-10-CM

## 2019-08-28 DIAGNOSIS — K52.9 COLITIS, ACUTE: Primary | ICD-10-CM

## 2019-08-28 DIAGNOSIS — L89.152 PRESSURE INJURY OF SACRAL REGION, STAGE 2: Chronic | ICD-10-CM

## 2019-08-28 DIAGNOSIS — E86.0 DEHYDRATION: ICD-10-CM

## 2019-08-28 PROBLEM — S82.64XD CLOSED NONDISPLACED FRACTURE OF LATERAL MALLEOLUS OF RIGHT FIBULA WITH ROUTINE HEALING: Status: RESOLVED | Noted: 2018-08-07 | Resolved: 2019-08-28

## 2019-08-28 PROBLEM — M16.11 PRIMARY OSTEOARTHRITIS OF RIGHT HIP: Chronic | Status: ACTIVE | Noted: 2019-04-03

## 2019-08-28 PROBLEM — M79.645 PAIN OF LEFT THUMB: Status: RESOLVED | Noted: 2017-01-10 | Resolved: 2019-08-28

## 2019-08-28 PROBLEM — I16.0 HYPERTENSIVE URGENCY: Status: RESOLVED | Noted: 2019-08-20 | Resolved: 2019-08-28

## 2019-08-28 PROBLEM — E87.5 HYPERKALEMIA: Status: ACTIVE | Noted: 2019-08-28

## 2019-08-28 PROBLEM — K59.00 CONSTIPATION: Status: ACTIVE | Noted: 2019-08-28

## 2019-08-28 PROBLEM — N30.00 ACUTE CYSTITIS WITHOUT HEMATURIA: Status: RESOLVED | Noted: 2019-08-20 | Resolved: 2019-08-28

## 2019-08-28 PROBLEM — N39.0 UTI (URINARY TRACT INFECTION): Status: RESOLVED | Noted: 2019-08-22 | Resolved: 2019-08-28

## 2019-08-28 LAB
ALBUMIN SERPL BCP-MCNC: 3.2 G/DL (ref 3.5–5.2)
ALP SERPL-CCNC: 72 U/L (ref 55–135)
ALT SERPL W/O P-5'-P-CCNC: 15 U/L (ref 10–44)
ANION GAP SERPL CALC-SCNC: 11 MMOL/L (ref 8–16)
AST SERPL-CCNC: 20 U/L (ref 10–40)
BACTERIA #/AREA URNS HPF: NEGATIVE /HPF
BACTERIA #/AREA URNS HPF: NEGATIVE /HPF
BASOPHILS # BLD AUTO: 0.03 K/UL (ref 0–0.2)
BASOPHILS NFR BLD: 0.5 % (ref 0–1.9)
BILIRUB SERPL-MCNC: 0.6 MG/DL (ref 0.1–1)
BILIRUB UR QL STRIP: NEGATIVE
BILIRUB UR QL STRIP: NEGATIVE
BUN SERPL-MCNC: 31 MG/DL (ref 8–23)
CALCIUM SERPL-MCNC: 9.7 MG/DL (ref 8.7–10.5)
CHLORIDE SERPL-SCNC: 106 MMOL/L (ref 95–110)
CLARITY UR: ABNORMAL
CLARITY UR: CLEAR
CO2 SERPL-SCNC: 23 MMOL/L (ref 23–29)
COLOR UR: YELLOW
COLOR UR: YELLOW
CREAT SERPL-MCNC: 0.7 MG/DL (ref 0.5–1.4)
DIFFERENTIAL METHOD: ABNORMAL
EOSINOPHIL # BLD AUTO: 0.2 K/UL (ref 0–0.5)
EOSINOPHIL NFR BLD: 3.9 % (ref 0–8)
ERYTHROCYTE [DISTWIDTH] IN BLOOD BY AUTOMATED COUNT: 15.8 % (ref 11.5–14.5)
EST. GFR  (AFRICAN AMERICAN): >60 ML/MIN/1.73 M^2
EST. GFR  (NON AFRICAN AMERICAN): >60 ML/MIN/1.73 M^2
GLUCOSE SERPL-MCNC: 107 MG/DL (ref 70–110)
GLUCOSE UR QL STRIP: NEGATIVE
GLUCOSE UR QL STRIP: NEGATIVE
HCT VFR BLD AUTO: 32.7 % (ref 37–48.5)
HGB BLD-MCNC: 10.5 G/DL (ref 12–16)
HGB UR QL STRIP: ABNORMAL
HGB UR QL STRIP: NEGATIVE
HYALINE CASTS #/AREA URNS LPF: 20 /LPF
HYALINE CASTS #/AREA URNS LPF: 4 /LPF
IMM GRANULOCYTES # BLD AUTO: 0.03 K/UL (ref 0–0.04)
IMM GRANULOCYTES NFR BLD AUTO: 0.5 % (ref 0–0.5)
KETONES UR QL STRIP: NEGATIVE
KETONES UR QL STRIP: NEGATIVE
LEUKOCYTE ESTERASE UR QL STRIP: ABNORMAL
LEUKOCYTE ESTERASE UR QL STRIP: ABNORMAL
LYMPHOCYTES # BLD AUTO: 1.8 K/UL (ref 1–4.8)
LYMPHOCYTES NFR BLD: 31.1 % (ref 18–48)
MCH RBC QN AUTO: 28.1 PG (ref 27–31)
MCHC RBC AUTO-ENTMCNC: 32.1 G/DL (ref 32–36)
MCV RBC AUTO: 87 FL (ref 82–98)
MICROSCOPIC COMMENT: ABNORMAL
MICROSCOPIC COMMENT: ABNORMAL
MONOCYTES # BLD AUTO: 0.5 K/UL (ref 0.3–1)
MONOCYTES NFR BLD: 9 % (ref 4–15)
NEUTROPHILS # BLD AUTO: 3.3 K/UL (ref 1.8–7.7)
NEUTROPHILS NFR BLD: 55 % (ref 38–73)
NITRITE UR QL STRIP: NEGATIVE
NITRITE UR QL STRIP: NEGATIVE
NRBC BLD-RTO: 0 /100 WBC
PH UR STRIP: 7 [PH] (ref 5–8)
PH UR STRIP: 8 [PH] (ref 5–8)
PLATELET # BLD AUTO: 435 K/UL (ref 150–350)
PMV BLD AUTO: 10.3 FL (ref 9.2–12.9)
POTASSIUM SERPL-SCNC: 5.2 MMOL/L (ref 3.5–5.1)
PROT SERPL-MCNC: 6.8 G/DL (ref 6–8.4)
PROT UR QL STRIP: ABNORMAL
PROT UR QL STRIP: NEGATIVE
RBC # BLD AUTO: 3.74 M/UL (ref 4–5.4)
RBC #/AREA URNS HPF: 2 /HPF (ref 0–4)
RBC #/AREA URNS HPF: 5 /HPF (ref 0–4)
SODIUM SERPL-SCNC: 140 MMOL/L (ref 136–145)
SP GR UR STRIP: 1 (ref 1–1.03)
SP GR UR STRIP: 1.02 (ref 1–1.03)
SQUAMOUS #/AREA URNS HPF: 27 /HPF
SQUAMOUS #/AREA URNS HPF: 4 /HPF
URN SPEC COLLECT METH UR: ABNORMAL
URN SPEC COLLECT METH UR: ABNORMAL
UROBILINOGEN UR STRIP-ACNC: NEGATIVE EU/DL
UROBILINOGEN UR STRIP-ACNC: NEGATIVE EU/DL
WBC # BLD AUTO: 5.92 K/UL (ref 3.9–12.7)
WBC #/AREA URNS HPF: 33 /HPF (ref 0–5)
WBC #/AREA URNS HPF: 6 /HPF (ref 0–5)

## 2019-08-28 PROCEDURE — 63600175 PHARM REV CODE 636 W HCPCS: Performed by: INTERNAL MEDICINE

## 2019-08-28 PROCEDURE — 81001 URINALYSIS AUTO W/SCOPE: CPT | Mod: 91

## 2019-08-28 PROCEDURE — 63600175 PHARM REV CODE 636 W HCPCS: Performed by: NURSE PRACTITIONER

## 2019-08-28 PROCEDURE — 36415 COLL VENOUS BLD VENIPUNCTURE: CPT

## 2019-08-28 PROCEDURE — 96374 THER/PROPH/DIAG INJ IV PUSH: CPT

## 2019-08-28 PROCEDURE — 99900035 HC TECH TIME PER 15 MIN (STAT)

## 2019-08-28 PROCEDURE — 80053 COMPREHEN METABOLIC PANEL: CPT

## 2019-08-28 PROCEDURE — 51702 INSERT TEMP BLADDER CATH: CPT

## 2019-08-28 PROCEDURE — 63600175 PHARM REV CODE 636 W HCPCS: Performed by: EMERGENCY MEDICINE

## 2019-08-28 PROCEDURE — 81001 URINALYSIS AUTO W/SCOPE: CPT

## 2019-08-28 PROCEDURE — 99285 EMERGENCY DEPT VISIT HI MDM: CPT | Mod: 25

## 2019-08-28 PROCEDURE — 85025 COMPLETE CBC W/AUTO DIFF WBC: CPT

## 2019-08-28 PROCEDURE — 87086 URINE CULTURE/COLONY COUNT: CPT

## 2019-08-28 PROCEDURE — 25000003 PHARM REV CODE 250: Performed by: NURSE PRACTITIONER

## 2019-08-28 PROCEDURE — G0378 HOSPITAL OBSERVATION PER HR: HCPCS

## 2019-08-28 PROCEDURE — 25000003 PHARM REV CODE 250: Performed by: INTERNAL MEDICINE

## 2019-08-28 PROCEDURE — 96375 TX/PRO/DX INJ NEW DRUG ADDON: CPT

## 2019-08-28 PROCEDURE — S0030 INJECTION, METRONIDAZOLE: HCPCS | Performed by: INTERNAL MEDICINE

## 2019-08-28 RX ORDER — ASCORBIC ACID 500 MG
500 TABLET ORAL 2 TIMES DAILY
Status: DISCONTINUED | OUTPATIENT
Start: 2019-08-28 | End: 2019-08-30 | Stop reason: HOSPADM

## 2019-08-28 RX ORDER — ONDANSETRON 2 MG/ML
4 INJECTION INTRAMUSCULAR; INTRAVENOUS EVERY 8 HOURS PRN
Status: DISCONTINUED | OUTPATIENT
Start: 2019-08-28 | End: 2019-08-30 | Stop reason: HOSPADM

## 2019-08-28 RX ORDER — SODIUM CHLORIDE 9 MG/ML
1000 INJECTION, SOLUTION INTRAVENOUS
Status: COMPLETED | OUTPATIENT
Start: 2019-08-28 | End: 2019-08-28

## 2019-08-28 RX ORDER — ACETAMINOPHEN 325 MG/1
650 TABLET ORAL EVERY 6 HOURS PRN
Status: ON HOLD | COMMUNITY
End: 2020-09-16 | Stop reason: HOSPADM

## 2019-08-28 RX ORDER — DONEPEZIL HYDROCHLORIDE 5 MG/1
10 TABLET, FILM COATED ORAL NIGHTLY
Status: DISCONTINUED | OUTPATIENT
Start: 2019-08-28 | End: 2019-08-30 | Stop reason: HOSPADM

## 2019-08-28 RX ORDER — IPRATROPIUM BROMIDE AND ALBUTEROL SULFATE 2.5; .5 MG/3ML; MG/3ML
3 SOLUTION RESPIRATORY (INHALATION) EVERY 6 HOURS PRN
COMMUNITY
End: 2020-02-05 | Stop reason: SDUPTHER

## 2019-08-28 RX ORDER — FLUTICASONE PROPIONATE 50 MCG
2 SPRAY, SUSPENSION (ML) NASAL DAILY
Status: DISCONTINUED | OUTPATIENT
Start: 2019-08-29 | End: 2019-08-30 | Stop reason: HOSPADM

## 2019-08-28 RX ORDER — SODIUM CHLORIDE 9 MG/ML
INJECTION, SOLUTION INTRAVENOUS CONTINUOUS
Status: DISCONTINUED | OUTPATIENT
Start: 2019-08-28 | End: 2019-08-30 | Stop reason: HOSPADM

## 2019-08-28 RX ORDER — CETIRIZINE HYDROCHLORIDE 10 MG/1
10 TABLET ORAL DAILY
Status: DISCONTINUED | OUTPATIENT
Start: 2019-08-29 | End: 2019-08-30 | Stop reason: HOSPADM

## 2019-08-28 RX ORDER — POTASSIUM CHLORIDE 20 MEQ/15ML
40 SOLUTION ORAL
Status: DISCONTINUED | OUTPATIENT
Start: 2019-08-28 | End: 2019-08-30 | Stop reason: HOSPADM

## 2019-08-28 RX ORDER — METRONIDAZOLE 500 MG/100ML
500 INJECTION, SOLUTION INTRAVENOUS
Status: COMPLETED | OUTPATIENT
Start: 2019-08-28 | End: 2019-08-28

## 2019-08-28 RX ORDER — GABAPENTIN 100 MG/1
200 CAPSULE ORAL NIGHTLY
Status: DISCONTINUED | OUTPATIENT
Start: 2019-08-28 | End: 2019-08-30 | Stop reason: HOSPADM

## 2019-08-28 RX ORDER — POTASSIUM CHLORIDE 20 MEQ/15ML
60 SOLUTION ORAL
Status: DISCONTINUED | OUTPATIENT
Start: 2019-08-28 | End: 2019-08-30 | Stop reason: HOSPADM

## 2019-08-28 RX ORDER — SODIUM CHLORIDE 0.9 % (FLUSH) 0.9 %
10 SYRINGE (ML) INJECTION
Status: DISCONTINUED | OUTPATIENT
Start: 2019-08-28 | End: 2019-08-30 | Stop reason: HOSPADM

## 2019-08-28 RX ORDER — METOPROLOL TARTRATE 25 MG/1
25 TABLET, FILM COATED ORAL 2 TIMES DAILY
Status: DISCONTINUED | OUTPATIENT
Start: 2019-08-28 | End: 2019-08-30 | Stop reason: HOSPADM

## 2019-08-28 RX ORDER — LANOLIN ALCOHOL/MO/W.PET/CERES
800 CREAM (GRAM) TOPICAL
Status: DISCONTINUED | OUTPATIENT
Start: 2019-08-28 | End: 2019-08-30 | Stop reason: HOSPADM

## 2019-08-28 RX ORDER — OXYBUTYNIN CHLORIDE 5 MG/1
5 TABLET ORAL 3 TIMES DAILY
Status: DISCONTINUED | OUTPATIENT
Start: 2019-08-28 | End: 2019-08-30 | Stop reason: HOSPADM

## 2019-08-28 RX ORDER — SODIUM CHLORIDE, SODIUM LACTATE, POTASSIUM CHLORIDE, CALCIUM CHLORIDE 600; 310; 30; 20 MG/100ML; MG/100ML; MG/100ML; MG/100ML
INJECTION, SOLUTION INTRAVENOUS CONTINUOUS
Status: DISCONTINUED | OUTPATIENT
Start: 2019-08-28 | End: 2019-08-28

## 2019-08-28 RX ORDER — METRONIDAZOLE 500 MG/100ML
500 INJECTION, SOLUTION INTRAVENOUS
Status: DISCONTINUED | OUTPATIENT
Start: 2019-08-28 | End: 2019-08-28

## 2019-08-28 RX ORDER — ASPIRIN 81 MG/1
81 TABLET ORAL DAILY
Status: DISCONTINUED | OUTPATIENT
Start: 2019-08-29 | End: 2019-08-30 | Stop reason: HOSPADM

## 2019-08-28 RX ORDER — ACETAMINOPHEN 325 MG/1
650 TABLET ORAL EVERY 4 HOURS PRN
Status: DISCONTINUED | OUTPATIENT
Start: 2019-08-28 | End: 2019-08-30 | Stop reason: HOSPADM

## 2019-08-28 RX ORDER — LISINOPRIL 5 MG/1
5 TABLET ORAL DAILY
Status: DISCONTINUED | OUTPATIENT
Start: 2019-08-29 | End: 2019-08-30 | Stop reason: HOSPADM

## 2019-08-28 RX ORDER — IPRATROPIUM BROMIDE AND ALBUTEROL SULFATE 2.5; .5 MG/3ML; MG/3ML
3 SOLUTION RESPIRATORY (INHALATION) EVERY 6 HOURS PRN
Status: DISCONTINUED | OUTPATIENT
Start: 2019-08-28 | End: 2019-08-30 | Stop reason: HOSPADM

## 2019-08-28 RX ORDER — POTASSIUM CHLORIDE 20 MEQ/15ML
20 SOLUTION ORAL DAILY
Status: ON HOLD | COMMUNITY
Start: 2019-08-23 | End: 2020-09-16 | Stop reason: HOSPADM

## 2019-08-28 RX ADMIN — PIPERACILLIN AND TAZOBACTAM 3.38 G: 3; .375 INJECTION, POWDER, LYOPHILIZED, FOR SOLUTION INTRAVENOUS; PARENTERAL at 02:08

## 2019-08-28 RX ADMIN — DONEPEZIL HYDROCHLORIDE 10 MG: 5 TABLET, FILM COATED ORAL at 09:08

## 2019-08-28 RX ADMIN — SODIUM CHLORIDE: 0.9 INJECTION, SOLUTION INTRAVENOUS at 05:08

## 2019-08-28 RX ADMIN — SODIUM PHOSPHATE 1 ENEMA: 7; 19 ENEMA RECTAL at 06:08

## 2019-08-28 RX ADMIN — METRONIDAZOLE 500 MG: 500 INJECTION, SOLUTION INTRAVENOUS at 09:08

## 2019-08-28 RX ADMIN — SODIUM CHLORIDE 1000 ML: 0.9 INJECTION, SOLUTION INTRAVENOUS at 02:08

## 2019-08-28 RX ADMIN — OXYCODONE HYDROCHLORIDE AND ACETAMINOPHEN 500 MG: 500 TABLET ORAL at 09:08

## 2019-08-28 RX ADMIN — METOPROLOL TARTRATE 25 MG: 25 TABLET ORAL at 09:08

## 2019-08-28 RX ADMIN — CEFTRIAXONE 1 G: 1 INJECTION, SOLUTION INTRAVENOUS at 09:08

## 2019-08-28 RX ADMIN — GABAPENTIN 200 MG: 100 CAPSULE ORAL at 09:08

## 2019-08-28 RX ADMIN — OXYBUTYNIN CHLORIDE 5 MG: 5 TABLET ORAL at 09:08

## 2019-08-28 NOTE — ED PROVIDER NOTES
Encounter Date: 8/28/2019       History     Chief Complaint   Patient presents with    Hip Pain     Patient here with reported onset of right inguinal pain unknown duration sent from [facility] for evaluation    The history is provided by the [facility]. The history is limited by the condition of the patient.     Review of patient's allergies indicates:   Allergen Reactions    Brimonidine Other (See Comments)     Redness and irritation of eyes     Past Medical History:   Diagnosis Date    Anticoagulant long-term use     ASA 81mg, Fish Oil    Arthritis     Cataract     OU done//    COPD (chronic obstructive pulmonary disease)     Coronary artery disease     DDD (degenerative disc disease), lumbar     Glaucoma     suspect    Hyperlipidemia     Low back pain     Osteoporosis     Positive PPD 1970    Seasonal allergies     Stroke      Past Surgical History:   Procedure Laterality Date    BREAST BIOPSY Right     2012 neg    CATARACT EXTRACTION W/  INTRAOCULAR LENS IMPLANT Bilateral 10/15/14     Dr Wright    COLONOSCOPY      Epidural Steroid injection      Pain Management    LUISANA-TRANSFORAMINAL L5/S1 Bilateral 3/14/2016    Performed by [facility] at [facility] OR    Injection, Joint, Hip Right 4/3/2019    Performed by [facility] MD at [facility] OR    INJECTION-STEROID-EPIDURAL-LUMBAR N/A 8/4/2014    Performed by [facility] at [facility] OR    INJECTION-STEROID-EPIDURAL-LUMBAR, L5/S1 N/A 11/18/2014    Performed by [facility] Morrison MD at [facility] OR    phaco/pciol Left 10/15/2014    Performed by [facility] at [facility] OR    phaco/pciol Right 8/20/2014    Performed by [facility] MD at [facility] OR    TONSILLECTOMY  1941     Family History   Problem Relation Age of Onset    Cancer Father         Rectal    Glaucoma Mother     Cancer Mother         lung, throat    Cancer Brother         throat    Hypertension Brother     Stroke Brother     Diabetes  Sister     Stroke Sister     Amblyopia Neg Hx     Blindness Neg Hx     Cataracts Neg Hx     Macular degeneration Neg Hx     Retinal detachment Neg Hx     Strabismus Neg Hx     Thyroid disease Neg Hx     Nephrolithiasis Neg Hx      Social History     Tobacco Use    Smoking status: Former Smoker     Packs/day: 0.25     Start date: 1961     Last attempt to quit: 1992     Years since quittin.2    Smokeless tobacco: Never Used   Substance Use Topics    Alcohol use: No    Drug use: No     Review of Systems   Unable to perform ROS: Dementia       Physical Exam     Initial Vitals [19 1148]   BP Pulse Resp Temp SpO2   (!) 120/56 88 18 -- 100 %      MAP       --         Physical Exam    Constitutional:   Chronically ill-appearing   HENT:   Head: Normocephalic and atraumatic.   Mouth/Throat: Oropharynx is clear and moist. No oropharyngeal exudate.   Eyes: Pupils are equal, round, and reactive to light.   Cardiovascular: Normal rate, regular rhythm and normal heart sounds.   Pulmonary/Chest: She has rhonchi.   Right basilar rhonchi otherwise clear   Abdominal: Soft. Bowel sounds are normal. She exhibits no distension. There is tenderness. There is no rebound and no guarding.   Peg in place tenderness over the suprapubic region   Genitourinary:   Genitourinary Comments: Scherer catheter in place   Musculoskeletal:   Atrophy extremity rigidity   Lymphadenopathy:     She has no cervical adenopathy.   Skin: Skin is warm and dry. Capillary refill takes less than 2 seconds.   Psychiatric: Her affect is blunt.         ED Course   Procedures  Labs Reviewed   URINALYSIS   URINALYSIS, REFLEX TO URINE CULTURE   CBC W/ AUTO DIFFERENTIAL   COMPREHENSIVE METABOLIC PANEL          Imaging Results    None                            ED Course as of Aug 29 0615   Wed Aug 28, 2019   1244 WBC, UA(!): 33 [AT]   1244 Bacteria, UA: Negative [AT]   1244 Hyaline Casts, UA(!): 20 [AT]   1244 Appearance, UA(!): Hazy [AT]    1244 RBC, UA(!): 5 [AT]   1409 WBC: 5.92 [AT]   1409 Hemoglobin(!): 10.5 [AT]   1409 Hematocrit(!): 32.7 [AT]   1409 Creatinine: 0.7 [AT]   1409 Potassium(!): 5.2 [AT]   1409 Patient shows signs of dehydration CT scan is positive for colitis with constipation urinalysis shows pyuria    [AT]   1410 Discussed patient's care with hospitalist will admit for IV fluid resuscitation and IV antibiotics med given her dose of Zosyn and Flagyl in the emergency department    [AT]      ED Course User Index  [AT] Sarbjit Murcia MD     Clinical Impression:       ICD-10-CM ICD-9-CM   1. Colitis, acute K52.9 558.9   2. Dehydration E86.0 276.51                                Sarbjit Murcia MD  08/29/19 0616       Sarbjit Murcia MD  08/29/19 0616

## 2019-08-28 NOTE — ED TRIAGE NOTES
"Patient BIBA from [facility] for "side pain" baseline pt aphasic and moans. Urinary catheter in place, cloudy. Afebrile. Hx of UTI. Stage 1 pressure ulcer on coccyx region.  "

## 2019-08-28 NOTE — ASSESSMENT & PLAN NOTE
Admit to med/surg  Attempt enema - d/t pt's neurological status, I am unsure she will be able to hold the fluid long enough for effective relief  Consult GI   Clears for now until stool ball is cleared

## 2019-08-28 NOTE — SUBJECTIVE & OBJECTIVE
Past Medical History:   Diagnosis Date    Anticoagulant long-term use     ASA 81mg, Fish Oil    Arthritis     Cataract     OU done//    COPD (chronic obstructive pulmonary disease)     Coronary artery disease     DDD (degenerative disc disease), lumbar     Glaucoma     suspect    Hyperlipidemia     Low back pain     Osteoporosis     Positive PPD 1970    Seasonal allergies     Stroke        Past Surgical History:   Procedure Laterality Date    BREAST BIOPSY Right     2012 neg    CATARACT EXTRACTION W/  INTRAOCULAR LENS IMPLANT Bilateral 10/15/14     Dr rWight    COLONOSCOPY      Epidural Steroid injection      Pain Management    LUISANA-TRANSFORAMINAL L5/S1 Bilateral 3/14/2016    [facility] by [facility] at [facility] OR    Injection, Joint, Hip Right 4/3/2019    [facility] at [facility] OR    INJECTION-STEROID-EPIDURAL-LUMBAR N/A 8/4/2014    [facility] at [facility] OR    INJECTION-STEROID-EPIDURAL-LUMBAR, L5/S1 N/A 11/18/2014    [facility] at [facility] OR    phaco/pciol Left 10/15/2014    [facility]    phaco/pciol Right 8/20/2014    [facility] at [facility] OR    TONSILLECTOMY  1941       Review of patient's allergies indicates:   Allergen Reactions    Brimonidine Other (See Comments)     Redness and irritation of eyes       No current facility-administered medications on file prior to encounter.      Current Outpatient Medications on File Prior to Encounter   Medication Sig    acetaminophen (TYLENOL) 500 MG tablet Take 500 mg by mouth every 6 (six) hours as needed for Pain.    ascorbic acid, vitamin C, (VITAMIN C) 500 MG tablet Take 500 mg by mouth 2 (two) times daily.    aspirin 81 mg Tab Every day    ciprofloxacin HCl (CIPRO) 500 MG tablet 1 tablet (500 mg total) by Per G Tube route every 12 (twelve) hours. for 7 days    diclofenac sodium (VOLTAREN) 1 % Gel  Apply 2 g topically 3 (three) times daily.    donepezil (ARICEPT) 10 MG tablet TAKE 1 TABLET BY MOUTH EVERY EVENING (Patient taking differently: TAKE 1 TABLET BY MOUTH morning)    fexofenadine (ALLEGRA) 30 MG tablet Take 30 mg by mouth 2 (two) times daily. As needed    fluticasone (FLONASE) 50 mcg/actuation nasal spray 2 sprays by Each Nare route once daily.    food supplemt, lactose-reduced (ENSURE) Liqd Take 118 mLs by mouth 2 (two) times daily.    gabapentin (NEURONTIN) 100 MG capsule TAKE 2 CAPSULES BY MOUTH EVERY EVENING    ipratropium (ATROVENT) 0.02 % nebulizer solution Take 500 mcg by nebulization 4 (four) times daily. Rescue    lidocaine-prilocaine (EMLA) cream As directed    lisinopril (PRINIVIL,ZESTRIL) 5 MG tablet 1 tablet (5 mg total) by Per G Tube route once daily.    LORazepam (ATIVAN) 1 MG tablet 1/2 to 1 po QHS prn    metoprolol tartrate (LOPRESSOR) 25 MG tablet 1 tablet (25 mg total) by Per G Tube route 2 (two) times daily.    multivitamin (THERAGRAN) per tablet Take 1 tablet by mouth once daily.    oxybutynin (DITROPAN) 5 MG Tab Take 5 mg by mouth 3 (three) times daily.    traMADol (ULTRAM) 50 mg tablet Take 1 tablet (50 mg total) by mouth nightly as needed for Pain.     Family History     Problem Relation (Age of Onset)    Cancer Father, Mother, Brother    Diabetes Sister    Glaucoma Mother    Hypertension Brother    Stroke Brother, Sister        Tobacco Use    Smoking status: Former Smoker     Packs/day: 0.25     Start date: 1961     Last attempt to quit: 1992     Years since quittin.2    Smokeless tobacco: Never Used   Substance and Sexual Activity    Alcohol use: No    Drug use: No    Sexual activity: Not on file     Review of Systems   Unable to perform ROS: Dementia (pt is nonverbal)     Objective:     Vital Signs (Most Recent):  Pulse: 88 (19 1148)  Resp: 18 (19 1148)  BP: (!) 120/56 (19 1148)  SpO2: 100 % (19 1148) Vital Signs (24h  Range):  Pulse:  [88] 88  Resp:  [18] 18  SpO2:  [100 %] 100 %  BP: (120)/(56) 120/56        Body mass index is 20.89 kg/m².    Physical Exam   Constitutional: She appears well-developed and well-nourished.   HENT:   Head: Normocephalic and atraumatic.   Eyes: Pupils are equal, round, and reactive to light. EOM are normal.   Neck: Normal range of motion. Neck supple.   Cardiovascular: Normal rate, regular rhythm, normal heart sounds and intact distal pulses.   No murmur heard.  Pulmonary/Chest: Effort normal and breath sounds normal. No stridor. No respiratory distress. She has no wheezes.   Abdominal: Soft. Bowel sounds are normal. She exhibits no distension. There is tenderness.   Musculoskeletal: Normal range of motion.   Neurological: She is alert.   Unable to assess orientation status as patient is nonverbal   Skin: Skin is warm and dry. Capillary refill takes less than 2 seconds.   Healing decub on sacrum   Psychiatric: Her behavior is normal.   Nursing note and vitals reviewed.        CRANIAL NERVES     CN III, IV, VI   Pupils are equal, round, and reactive to light.  Extraocular motions are normal.        Significant Labs:   CBC:   Recent Labs   Lab 08/28/19  1237   WBC 5.92   HGB 10.5*   HCT 32.7*   *     CMP:   Recent Labs   Lab 08/28/19  1237      K 5.2*      CO2 23      BUN 31*   CREATININE 0.7   CALCIUM 9.7   PROT 6.8   ALBUMIN 3.2*   BILITOT 0.6   ALKPHOS 72   AST 20   ALT 15   ANIONGAP 11   EGFRNONAA >60.0       Significant Imaging: I have reviewed all pertinent imaging results/findings within the past 24 hours.   X-ray Chest Ap Portable    Result Date: 8/20/2019  EXAMINATION: XR CHEST AP PORTABLE CLINICAL HISTORY: Sepsis; FINDINGS: Portable chest radiograph at 22:29 hours compared to 05/24/2019 shows the cardiomediastinal silhouette and pulmonary vasculature are within normal limits. The lungs are normally expanded, with no consolidation, pleural effusion, or evidence of  pulmonary edema. No confluent infiltrates or pneumothorax.  No acute osseous abnormalities.     No evidence of acute cardiopulmonary disease. Electronically signed by: Leopoldo Chopra MD Date:    08/20/2019 Time:    07:44    Ct Renal Stone Study Abd Pelvis Wo    Result Date: 8/28/2019  EXAMINATION: CT RENAL STONE STUDY ABD PELVIS WO CLINICAL HISTORY: Abdominal pain, unspecified; TECHNIQUE: CMS Mandated Quality Data-CT Radiation Dose-436 All CT scans at this facility dose modulation, iterative reconstruction, and or weight-based dosing when appropriate to reduce radiation dose to as low as reasonably achievable. COMPARISON: Contrast-enhanced CT abdomen and pelvis 05/24/2019 FINDINGS: Images through the lung bases demonstrate a small intrapulmonary lymph node associated with the right minor fissure, stable.  Minimal dependent atelectasis within both lungs, with possible lower lobe bronchiectasis.  Respiratory motion artifact degrades image quality.  Bone window images demonstrate osteopenia and degenerative changes of the spine.  Severe osteoarthrosis of the right hip noted.  No acute or aggressive osseous abnormality. Within the limitations of unenhanced technique, there is a subcentimeter hypodense lesion involving the subcapsular aspect of the left lobe, stable compared to the reference exam.  No other focal hepatic abnormality.  Gallbladder is mildly distended.  No intra or extrahepatic bile duct dilation.  Spleen and pancreas are unremarkable.  Adrenal glands appear normal.  No renal calculi or hydronephrosis.  Ureters are nondilated.  Urinary bladder is thick-walled, and collapsed around a Scherer catheter.  Gas within the urinary bladder is most likely iatrogenic in nature considering Scherer catheter placement. Stomach is collapsed.  Percutaneous gastrostomy tube is in place.  No evidence of bowel obstruction.  Normal appendix.  Moderate gas and fecal material within the colon.  Diverticula of the descending and  sigmoid colon without evidence of acute diverticulitis.  Large volume fecal material noted within the rectum.  Perirectal fat stranding is also evident.  Uterus is atrophic.  Adnexal regions are grossly unremarkable. Atherosclerotic calcification of the abdominal aorta and its branch vessels.  No free fluid or pathologically enlarged lymph nodes within the abdomen or pelvis.     Large volume fecal material within the colon, with mild perirectal fat stranding.  Findings could indicate stercoral colitis. Negative for hydronephrosis or urinary tract calculi. Atherosclerosis, distal colonic diverticula, lumbar spondylosis, severe osteoarthritis of the right hip, and other incidental findings as above. Electronically signed by: Mikael Sanders MD Date:    08/28/2019 Time:    13:07

## 2019-08-28 NOTE — H&P
[facility] Medicine  History & Physical    Patient Name: Breanne Culp  MRN: [id number]  Admission Date: 8/28/2019  Attending Physician: Dr. Herron  Primary Care Provider: Frantz Bill MD         Patient information was obtained from relative(s), caregiver / friend and ER records.     Subjective:     Principal Problem:Colitis, acute    Chief Complaint:   Chief Complaint   Patient presents with    Hip Pain        HPI: Ms. Culp presents today with her daughter with complaints of pain. It is moderate. She is nonverbal, has advanced dementia, and occasionally nods to yes and no questions. She will yell out with manipulation of her extremities, moderate palpation, and turning. Her neurological status is at baseline. She lives at [facility] and has a personal sitter. The sitter is at bedside and states that last night she began expressing pain and when asked if her stomach and hips hurt she nodded yes. She was discharged 6 days ago following a UTI. She has a chronic indwelling aponte to aid in healing of a sacral decub. Her sitter states her last BM was on Monday, but was small. Her daughter is at bedside and confirms her DNR status.    Past Medical History:   Diagnosis Date    Anticoagulant long-term use     ASA 81mg, Fish Oil    Arthritis     Cataract     OU done//    COPD (chronic obstructive pulmonary disease)     Coronary artery disease     DDD (degenerative disc disease), lumbar     Glaucoma     suspect    Hyperlipidemia     Low back pain     Osteoporosis     Positive PPD 1970    Seasonal allergies     Stroke        Past Surgical History:   Procedure Laterality Date    BREAST BIOPSY Right     2012 neg    CATARACT EXTRACTION W/  INTRAOCULAR LENS IMPLANT Bilateral 10/15/14     Dr Wright    COLONOSCOPY      Epidural Steroid injection      Pain Management    LUISANA-TRANSFORAMINAL L5/S1 Bilateral 3/14/2016    [facility]     Injection, Joint, Hip Right 4/3/2019    [facility]    INJECTION-STEROID-EPIDURAL-LUMBAR N/A 8/4/2014    [facility] at [facility] OR    INJECTION-STEROID-EPIDURAL-LUMBAR, L5/S1 N/A 11/18/2014    [facility]formed by [facility] at [facility] OR    phaco/pciol Left 10/15/2014    [facility] OR    phaco/pciol Right 8/20/2014    [facility] OR    TONSILLECTOMY  1941       Review of patient's allergies indicates:   Allergen Reactions    Brimonidine Other (See Comments)     Redness and irritation of eyes       No current facility-administered medications on file prior to encounter.      Current Outpatient Medications on File Prior to Encounter   Medication Sig    acetaminophen (TYLENOL) 500 MG tablet Take 500 mg by mouth every 6 (six) hours as needed for Pain.    ascorbic acid, vitamin C, (VITAMIN C) 500 MG tablet Take 500 mg by mouth 2 (two) times daily.    aspirin 81 mg Tab Every day    ciprofloxacin HCl (CIPRO) 500 MG tablet 1 tablet (500 mg total) by Per G Tube route every 12 (twelve) hours. for 7 days    diclofenac sodium (VOLTAREN) 1 % Gel Apply 2 g topically 3 (three) times daily.    donepezil (ARICEPT) 10 MG tablet TAKE 1 TABLET BY MOUTH EVERY EVENING (Patient taking differently: TAKE 1 TABLET BY MOUTH morning)    fexofenadine (ALLEGRA) 30 MG tablet Take 30 mg by mouth 2 (two) times daily. As needed    fluticasone (FLONASE) 50 mcg/actuation nasal spray 2 sprays by Each Nare route once daily.    food supplemt, lactose-reduced (ENSURE) Liqd Take 118 mLs by mouth 2 (two) times daily.    gabapentin (NEURONTIN) 100 MG capsule TAKE 2 CAPSULES BY MOUTH EVERY EVENING    ipratropium (ATROVENT) 0.02 % nebulizer solution Take 500 mcg by nebulization 4 (four) times daily. Rescue    lidocaine-prilocaine (EMLA) cream As directed    lisinopril (PRINIVIL,ZESTRIL) 5 MG tablet 1 tablet  (5 mg total) by Per G Tube route once daily.    LORazepam (ATIVAN) 1 MG tablet 1/2 to 1 po QHS prn    metoprolol tartrate (LOPRESSOR) 25 MG tablet 1 tablet (25 mg total) by Per G Tube route 2 (two) times daily.    multivitamin (THERAGRAN) per tablet Take 1 tablet by mouth once daily.    oxybutynin (DITROPAN) 5 MG Tab Take 5 mg by mouth 3 (three) times daily.    traMADol (ULTRAM) 50 mg tablet Take 1 tablet (50 mg total) by mouth nightly as needed for Pain.     Family History     Problem Relation (Age of Onset)    Cancer Father, Mother, Brother    Diabetes Sister    Glaucoma Mother    Hypertension Brother    Stroke Brother, Sister        Tobacco Use    Smoking status: Former Smoker     Packs/day: 0.25     Start date: 1961     Last attempt to quit: 1992     Years since quittin.2    Smokeless tobacco: Never Used   Substance and Sexual Activity    Alcohol use: No    Drug use: No    Sexual activity: Not on file     Review of Systems   Unable to perform ROS: Dementia (pt is nonverbal)     Objective:     Vital Signs (Most Recent):  Pulse: 88 (19 1148)  Resp: 18 (19 1148)  BP: (!) 120/56 (19 1148)  SpO2: 100 % (19 1148) Vital Signs (24h Range):  Pulse:  [88] 88  Resp:  [18] 18  SpO2:  [100 %] 100 %  BP: (120)/(56) 120/56        Body mass index is 20.89 kg/m².    Physical Exam   Constitutional: She appears well-developed and well-nourished.   HENT:   Head: Normocephalic and atraumatic.   Eyes: Pupils are equal, round, and reactive to light. EOM are normal.   Neck: Normal range of motion. Neck supple.   Cardiovascular: Normal rate, regular rhythm, normal heart sounds and intact distal pulses.   No murmur heard.  Pulmonary/Chest: Effort normal and breath sounds normal. No stridor. No respiratory distress. She has no wheezes.   Abdominal: Soft. Bowel sounds are normal. She exhibits no distension. There is tenderness.   Musculoskeletal: Normal range of motion.   Neurological: She  is alert.   Unable to assess orientation status as patient is nonverbal   Skin: Skin is warm and dry. Capillary refill takes less than 2 seconds.   Healing decub on sacrum   Psychiatric: Her behavior is normal.   Nursing note and vitals reviewed.        CRANIAL NERVES     CN III, IV, VI   Pupils are equal, round, and reactive to light.  Extraocular motions are normal.        Significant Labs:   CBC:   Recent Labs   Lab 08/28/19  1237   WBC 5.92   HGB 10.5*   HCT 32.7*   *     CMP:   Recent Labs   Lab 08/28/19  1237      K 5.2*      CO2 23      BUN 31*   CREATININE 0.7   CALCIUM 9.7   PROT 6.8   ALBUMIN 3.2*   BILITOT 0.6   ALKPHOS 72   AST 20   ALT 15   ANIONGAP 11   EGFRNONAA >60.0       Significant Imaging: I have reviewed all pertinent imaging results/findings within the past 24 hours.   X-ray Chest Ap Portable    Result Date: 8/20/2019  EXAMINATION: XR CHEST AP PORTABLE CLINICAL HISTORY: Sepsis; FINDINGS: Portable chest radiograph at 22:29 hours compared to 05/24/2019 shows the cardiomediastinal silhouette and pulmonary vasculature are within normal limits. The lungs are normally expanded, with no consolidation, pleural effusion, or evidence of pulmonary edema. No confluent infiltrates or pneumothorax.  No acute osseous abnormalities.     No evidence of acute cardiopulmonary disease. Electronically signed by: Leopoldo Chopra MD Date:    08/20/2019 Time:    07:44    Ct Renal Stone Study Abd Pelvis Wo    Result Date: 8/28/2019  EXAMINATION: CT RENAL STONE STUDY ABD PELVIS WO CLINICAL HISTORY: Abdominal pain, unspecified; TECHNIQUE: CMS Mandated Quality Data-CT Radiation Dose-436 All CT scans at this facility dose modulation, iterative reconstruction, and or weight-based dosing when appropriate to reduce radiation dose to as low as reasonably achievable. COMPARISON: Contrast-enhanced CT abdomen and pelvis 05/24/2019 FINDINGS: Images through the lung bases demonstrate a small intrapulmonary  lymph node associated with the right minor fissure, stable.  Minimal dependent atelectasis within both lungs, with possible lower lobe bronchiectasis.  Respiratory motion artifact degrades image quality.  Bone window images demonstrate osteopenia and degenerative changes of the spine.  Severe osteoarthrosis of the right hip noted.  No acute or aggressive osseous abnormality. Within the limitations of unenhanced technique, there is a subcentimeter hypodense lesion involving the subcapsular aspect of the left lobe, stable compared to the reference exam.  No other focal hepatic abnormality.  Gallbladder is mildly distended.  No intra or extrahepatic bile duct dilation.  Spleen and pancreas are unremarkable.  Adrenal glands appear normal.  No renal calculi or hydronephrosis.  Ureters are nondilated.  Urinary bladder is thick-walled, and collapsed around a Scherer catheter.  Gas within the urinary bladder is most likely iatrogenic in nature considering Scherer catheter placement. Stomach is collapsed.  Percutaneous gastrostomy tube is in place.  No evidence of bowel obstruction.  Normal appendix.  Moderate gas and fecal material within the colon.  Diverticula of the descending and sigmoid colon without evidence of acute diverticulitis.  Large volume fecal material noted within the rectum.  Perirectal fat stranding is also evident.  Uterus is atrophic.  Adnexal regions are grossly unremarkable. Atherosclerotic calcification of the abdominal aorta and its branch vessels.  No free fluid or pathologically enlarged lymph nodes within the abdomen or pelvis.     Large volume fecal material within the colon, with mild perirectal fat stranding.  Findings could indicate stercoral colitis. Negative for hydronephrosis or urinary tract calculi. Atherosclerosis, distal colonic diverticula, lumbar spondylosis, severe osteoarthritis of the right hip, and other incidental findings as above. Electronically signed by: Mikael Sanders MD  Date:    08/28/2019 Time:    13:07      Assessment/Plan:     * Stercoral colitis  Admit to med/surg  Attempt enema - d/t pt's neurological status, I am unsure she will be able to hold the fluid long enough for effective relief  Consult GI   Clears for now until stool ball is cleared      Dehydration  IV hydration  Repeat labs in AM       Hyperkalemia  Mild 5.2  Repeat in AM       Constipation  Enema      Pressure injury of sacral region, stage 2  Healing   POA  Continue wound care, and turn q 2h      Scherer catheter in place on admission  Culture urine       Addendum by Dr. Herron:  UA also suggests UTI.  I'm starting IV abx and urine culture ordered.    VTE Risk Mitigation (From admission, onward)    None             Christina Herrera, NP  Department of Hospital Medicine   [facility]

## 2019-08-28 NOTE — HPI
Ms. Culp presents today with her daughter with complaints of pain. It is moderate. She is nonverbal, has advanced dementia, and occasionally nods to yes and no questions. She will yell out with manipulation of her extremities, moderate palpation, and turning. Her neurological status is at baseline. She lives at [facility] and has a personal sitter. The sitter is at bedside and states that last night she began expressing pain and when asked if her stomach and hips hurt she nodded yes. She was discharged 6 days ago following a UTI. She has a chronic indwelling aponte to aid in healing of a sacral decub. Her sitter states her last BM was on Monday, but was small. Her daughter is at bedside and confirms her DNR status.

## 2019-08-29 LAB
ANION GAP SERPL CALC-SCNC: 8 MMOL/L (ref 8–16)
BUN SERPL-MCNC: 19 MG/DL (ref 8–23)
CALCIUM SERPL-MCNC: 9.1 MG/DL (ref 8.7–10.5)
CHLORIDE SERPL-SCNC: 111 MMOL/L (ref 95–110)
CO2 SERPL-SCNC: 20 MMOL/L (ref 23–29)
CREAT SERPL-MCNC: 0.5 MG/DL (ref 0.5–1.4)
EST. GFR  (AFRICAN AMERICAN): >60 ML/MIN/1.73 M^2
EST. GFR  (NON AFRICAN AMERICAN): >60 ML/MIN/1.73 M^2
GLUCOSE SERPL-MCNC: 107 MG/DL (ref 70–110)
MAGNESIUM SERPL-MCNC: 2.1 MG/DL (ref 1.6–2.6)
POTASSIUM SERPL-SCNC: 4 MMOL/L (ref 3.5–5.1)
SODIUM SERPL-SCNC: 139 MMOL/L (ref 136–145)

## 2019-08-29 PROCEDURE — 25000242 PHARM REV CODE 250 ALT 637 W/ HCPCS: Performed by: NURSE PRACTITIONER

## 2019-08-29 PROCEDURE — 80048 BASIC METABOLIC PNL TOTAL CA: CPT

## 2019-08-29 PROCEDURE — 63600175 PHARM REV CODE 636 W HCPCS: Performed by: INTERNAL MEDICINE

## 2019-08-29 PROCEDURE — 25000003 PHARM REV CODE 250: Performed by: INTERNAL MEDICINE

## 2019-08-29 PROCEDURE — 83735 ASSAY OF MAGNESIUM: CPT

## 2019-08-29 PROCEDURE — G0378 HOSPITAL OBSERVATION PER HR: HCPCS

## 2019-08-29 PROCEDURE — 36415 COLL VENOUS BLD VENIPUNCTURE: CPT

## 2019-08-29 PROCEDURE — 25000003 PHARM REV CODE 250: Performed by: NURSE PRACTITIONER

## 2019-08-29 PROCEDURE — 63600175 PHARM REV CODE 636 W HCPCS: Performed by: NURSE PRACTITIONER

## 2019-08-29 PROCEDURE — 96375 TX/PRO/DX INJ NEW DRUG ADDON: CPT

## 2019-08-29 PROCEDURE — 96376 TX/PRO/DX INJ SAME DRUG ADON: CPT

## 2019-08-29 RX ORDER — POLYETHYLENE GLYCOL 3350, SODIUM CHLORIDE, SODIUM BICARBONATE, POTASSIUM CHLORIDE 420; 11.2; 5.72; 1.48 G/4L; G/4L; G/4L; G/4L
2000 POWDER, FOR SOLUTION ORAL ONCE
Status: COMPLETED | OUTPATIENT
Start: 2019-08-29 | End: 2019-08-29

## 2019-08-29 RX ADMIN — SODIUM CHLORIDE: 0.9 INJECTION, SOLUTION INTRAVENOUS at 04:08

## 2019-08-29 RX ADMIN — POLYETHYLENE GLYCOL 3350, SODIUM CHLORIDE, SODIUM BICARBONATE AND POTASSIUM CHLORIDE 2000 ML: KIT ORAL at 12:08

## 2019-08-29 RX ADMIN — DONEPEZIL HYDROCHLORIDE 10 MG: 5 TABLET, FILM COATED ORAL at 09:08

## 2019-08-29 RX ADMIN — METOPROLOL TARTRATE 25 MG: 25 TABLET ORAL at 09:08

## 2019-08-29 RX ADMIN — OXYBUTYNIN CHLORIDE 5 MG: 5 TABLET ORAL at 03:08

## 2019-08-29 RX ADMIN — ONDANSETRON 4 MG: 2 INJECTION INTRAMUSCULAR; INTRAVENOUS at 02:08

## 2019-08-29 RX ADMIN — OXYCODONE HYDROCHLORIDE AND ACETAMINOPHEN 500 MG: 500 TABLET ORAL at 09:08

## 2019-08-29 RX ADMIN — GABAPENTIN 200 MG: 100 CAPSULE ORAL at 09:08

## 2019-08-29 RX ADMIN — FLUTICASONE PROPIONATE 100 MCG: 50 SPRAY, METERED NASAL at 09:08

## 2019-08-29 RX ADMIN — OXYBUTYNIN CHLORIDE 5 MG: 5 TABLET ORAL at 09:08

## 2019-08-29 RX ADMIN — ASPIRIN 81 MG: 81 TABLET, COATED ORAL at 09:08

## 2019-08-29 RX ADMIN — CETIRIZINE HYDROCHLORIDE 10 MG: 10 TABLET, FILM COATED ORAL at 09:08

## 2019-08-29 RX ADMIN — CEFTRIAXONE 1 G: 1 INJECTION, SOLUTION INTRAVENOUS at 09:08

## 2019-08-29 RX ADMIN — LISINOPRIL 5 MG: 5 TABLET ORAL at 09:08

## 2019-08-29 NOTE — PROGRESS NOTES
08/29/19 1000        Pressure Injury 08/20/19 1150  upper Sacral spine Stage 2   Date First Assessed/Time First Assessed: 08/20/19 1150   Pressure Injury Present on Admission: yes  Side: (c)   Orientation: upper  Location: Sacral spine  Is this injury device related?: No  Staging: (c) Stage 2   Wound Image     Appearance Pink;Yellow;Other (see comments)   Tissue loss description   (scarred no open area at this time. right buttock scarred)   Care Cleansed with:;Soap and water   Periwound Care Other (see comments)  (nurse to apply barrier cream after prep, mepilex to sacrum)     Recommendations: Continue to turn and float heels. Apply moisturizer to bilateral legs and feet, use heel protectors. Barrier Cream(zinc or calmoseptine)

## 2019-08-29 NOTE — CONSULTS
"  [facility]  Adult Nutrition  Consult Note    SUMMARY     Recommendations    Recommendation/Intervention: 1.) Rec'd resume PEG feedings as medically indicated. Rec'd Jevity 1.2 @ 50 ml/hr as goal rate (to provide: 1440 kcals, 66 g pro, & 912 ml free h20. Flush 30 ml Q 4 hrs. 2.)  RD to follow and monitor  Goals: 1.) PEG feedings resumed w/in 24-48 hrs. to goal rate.   Nutrition Goal Status: progressing towards goal  Communication of RD Recs: reviewed with RN    Reason for Assessment    Reason For Assessment: consult  Diagnosis: other (see comments)(sterocoral colitis)  Relevant Medical History: hx: NH resident, PEG, dementia  Interdisciplinary Rounds: attended    Nutrition Risk Screen    Nutrition Risk Screen: tube feeding or parenteral nutrition, dysphagia or difficulty swallowing    Nutrition/Diet History    Spiritual, Cultural Beliefs, Islam Practices, Values that Affect Care: yes(Cheondoism)  Food Allergies: NKFA  Factors Affecting Nutritional Intake: NPO  Nutrition Support Formula Prior to Admit: Other (see comments)(Fibersource HN)  Nutrition Support Rate Prior to Admit: 50 (ml)  Nutrtion Support Frequency Prior to Admit: ml/hr, continuous via PEG  Nutrition Support Provision Prior to Admit: provides: 1440 kcals, 64.8 g pro, & 969 ml free h20    Anthropometrics    Temp: 98.8 °F (37.1 °C)  Height Method: Stated  Height: 5' 3" (160 cm)  Height (inches): 63 in  Weight Method: Bed Scale  Weight: 55.5 kg (122 lb 5.7 oz)  Weight (lb): 122.36 lb  Ideal Body Weight (IBW), Female: 115 lb  % Ideal Body Weight, Female (lb): 106.4 lb  BMI (Calculated): 21.7  BMI Grade: 18.5-24.9 - normal       Lab/Procedures/Meds    Pertinent Labs Reviewed: reviewed    BMP  Lab Results   Component Value Date     08/29/2019    K 4.0 08/29/2019     (H) 08/29/2019    CO2 20 (L) 08/29/2019    BUN 19 08/29/2019    CREATININE 0.5 08/29/2019    CALCIUM 9.1 08/29/2019    ANIONGAP 8 08/29/2019    ESTGFRAFRICA >60.0 " 08/29/2019    EGFRNONAA >60.0 08/29/2019     Lab Results   Component Value Date    WBC 5.92 08/28/2019    HGB 10.5 (L) 08/28/2019    HCT 32.7 (L) 08/28/2019    MCV 87 08/28/2019     (H) 08/28/2019       Pertinent Medications Reviewed: reviewed  Scheduled Meds:   ascorbic acid (vitamin C)  500 mg Oral BID    aspirin  81 mg Per G Tube Daily    cefTRIAXone (ROCEPHIN) IVPB  1 g Intravenous Q24H    cetirizine  10 mg Oral Daily    donepezil  10 mg Oral QHS    fluticasone propionate  2 spray Each Nostril Daily    gabapentin  200 mg Oral QHS    lisinopril  5 mg Per G Tube Daily    metoprolol tartrate  25 mg Per G Tube BID    oxybutynin  5 mg Oral TID    peg-electrolyte soln  2,000 mL Gastrostomy Tube Once     Continuous Infusions:   sodium chloride 0.9% 125 mL/hr at 08/28/19 1743       Estimated/Assessed Needs    Weight Used For Calorie Calculations: 55.5 kg (122 lb 5.7 oz)  Energy Calorie Requirements (kcal): 8439-1804 (25-30 kcal/kg)  Energy Need Method: Kcal/kg  Protein Requirements: 66-83 g (1.2-1.5 g/kg)  Weight Used For Protein Calculations: 55.5 kg (122 lb 5.7 oz)     Estimated Fluid Requirement Method: RDA Method  RDA Method (mL): 1387         Nutrition Prescription Ordered    Current Diet Order: clear liquids    Evaluation of Received Nutrient/Fluid Intake    IV Fluid (mL): 3000(NS @ 125 ml/hr)  Energy Calories Required: not meeting needs  Protein Required: not meeting needs  Fluid Required: meeting needs  Tolerance: other (see comments)(NPO)  % Meal Intake: NPO (not taking clears)      Assessment    Noted pt disimpacted this AM per GI notes. Plans for Golytely today; hopefully can resume TFs tomorrow.    Nutrition Risk    Level of Risk/Frequency of Follow-up: high       Monitor and Evaluation    Food and Nutrient Intake: enteral nutrition intake  Food and Nutrient Adminstration: enteral and parenteral nutrition administration  Anthropometric Measurements: weight change  Biochemical Data, Medical  Tests and Procedures: glucose/endocrine profile, gastrointestinal profile, electrolyte and renal panel  Nutrition-Focused Physical Findings: overall appearance     Nutrition Follow-Up    RD Follow-up?: Yes     Lora Vasquez  08/29/2019  9:44 AM

## 2019-08-29 NOTE — PLAN OF CARE
Problem: Feeding Intolerance (Enteral Nutrition)  Goal: Feeding Tolerance  Outcome: Ongoing (interventions implemented as appropriate)  Pt assessed. PEG feedings on hold; rec. Resume PEG feeds as appropriate. Rec. Jevity 1.2 @ 50 ml/hr as goal rate, 30 ml H20 flush Q 4 hrs.

## 2019-08-29 NOTE — PROGRESS NOTES
[facility]  Progress Note    Patient Name: Breanne Culp  MRN: [id number]  Patient Class: OP- Observation   Admission Date: 8/28/2019  Length of Stay: 0 days  Attending Physician: Frantz Mayes MD  Primary Care Provider: Frantz Bill MD        Subjective:     Principal Problem:Colitis, acute        HPI:  Ms. Culp presents today with her daughter with complaints of pain. It is moderate. She is nonverbal, has advanced dementia, and occasionally nods to yes and no questions. She will yell out with manipulation of her extremities, moderate palpation, and turning. Her neurological status is at baseline. She lives at [facility] and has a personal sitter. The sitter is at bedside and states that last night she began expressing pain and when asked if her stomach and hips hurt she nodded yes. She was discharged 6 days ago following a UTI. She has a chronic indwelling aponte to aid in healing of a sacral decub. Her sitter states her last BM was on Monday, but was small. Her daughter is at bedside and confirms her DNR status.    Overview/Hospital Course:  Patient admitted to regular med/surg floor. Was seen by GI this AM and disimpacted. Patient personally discussed with GI: Golytely today, then d/c on Miralax.    Interval History: stable    Review of Systems   Unable to perform ROS: Dementia     Objective:     Vital Signs (Most Recent):  Temp: 98.5 °F (36.9 °C) (08/29/19 0410)  Pulse: 104 (08/29/19 0830)  Resp: 16 (08/29/19 0830)  BP: 122/87 (08/29/19 0830)  SpO2: 99 % (08/28/19 2345) Vital Signs (24h Range):  Temp:  [97.8 °F (36.6 °C)-98.6 °F (37 °C)] 98.5 °F (36.9 °C)  Pulse:  [] 104  Resp:  [14-18] 16  SpO2:  [98 %-100 %] 99 %  BP: ()/(54-87) 122/87     Weight: 55.5 kg (122 lb 5.7 oz)  Body mass index is 21.67 kg/m².    Intake/Output Summary (Last 24 hours) at 8/29/2019 0854  Last data filed at 8/29/2019 0600  Gross per 24 hour   Intake 1475 ml   Output 750 ml    Net 725 ml      Physical Exam   Constitutional:   Chronic ill; calling out, grunting   HENT:   Head: Normocephalic and atraumatic.   Eyes: Pupils are equal, round, and reactive to light. Conjunctivae and EOM are normal.   Neck: Normal range of motion. Neck supple.   Cardiovascular: Normal rate, regular rhythm, normal heart sounds and intact distal pulses.   Pulmonary/Chest: Effort normal and breath sounds normal.   Abdominal:   Soft, voluntary guarding, BS+   Musculoskeletal:   Muscle wasing   Neurological:   Severe dementia   Skin: Skin is warm and dry. Capillary refill takes less than 2 seconds.   Psychiatric:   Unable to assess   Nursing note and vitals reviewed.      Significant Labs:   BMP:   Recent Labs   Lab 08/29/19  0432         K 4.0   *   CO2 20*   BUN 19   CREATININE 0.5   CALCIUM 9.1   MG 2.1     CBC:   Recent Labs   Lab 08/28/19  1237   WBC 5.92   HGB 10.5*   HCT 32.7*   *       Significant Imaging: I have reviewed all pertinent imaging results/findings within the past 24 hours.      Assessment/Plan:      * Stercoral colitis  Admit to med/surg  Golytely; Miralax as above      Dehydration  IV hydration  Repeat labs in AM       Hyperkalemia  K = 4 today; labs in AM      Constipation  As above      Pressure injury of sacral region, stage 2  Healing   POA  Continue wound care, and turn q 2h      Scherer catheter in place on admission  Culture urine         VTE Risk Mitigation (From admission, onward)        Ordered     IP VTE HIGH RISK PATIENT  Once      08/28/19 1557     Place KAITY hose  Until discontinued      08/28/19 1557     Place sequential compression device  Until discontinued      08/28/19 1557     Reason for No Pharmacological VTE Prophylaxis  Once      08/28/19 1557                Frantz Mayes MD  Department of Hospital Medicine   [facility]

## 2019-08-29 NOTE — PLAN OF CARE
Problem: Skin Injury Risk Increased  Goal: Skin Health and Integrity  Turned q 2 hours for patient comfort and skin prevention measures. Barrier cream applied to backside. No new skin breakdown noted.

## 2019-08-29 NOTE — CONSULTS
GASTROENTEROLOGY INPATIENT CONSULT NOTE  Patient Name: Breanne Culp  Patient MRN: [id number]  Patient : 1934    Admit Date: 2019  Service date: 2019    Reason for Consult: constipation / colitis    PCP: Frantz Bill MD    Chief Complaint   Patient presents with    Hip Pain       HPI: Patient is a 85 y.o. female with PMHx OA, COPD, HLD, CAD/CVA (ASA), HTN, dementia, chronic aponte, sacral decub presents for evaluation of abdominal pain. Acute / subacute, intermittent, progressive over past week w/ worsened constipation. Limited hx from chart due to dementia    CHART REVIEW;   CT Abd  - large fecal volume w/ suspected stercoral colitis; vascular dz; tics; OA, normal GB / biliary / pancreas;     Past Medical History:  Past Medical History:   Diagnosis Date    Anticoagulant long-term use     ASA 81mg, Fish Oil    Arthritis     Cataract     OU done//    COPD (chronic obstructive pulmonary disease)     Coronary artery disease     DDD (degenerative disc disease), lumbar     Glaucoma     suspect    Hyperlipidemia     Low back pain     Osteoporosis     Positive PPD 1970    Seasonal allergies     Stroke         Past Surgical History:  Past Surgical History:   Procedure Laterality Date    BREAST BIOPSY Right      neg    CATARACT EXTRACTION W/  INTRAOCULAR LENS IMPLANT Bilateral 10/15/14     Dr Wright    COLONOSCOPY      Epidural Steroid injection      Pain Management    LUISANA-TRANSFORAMINAL L5/S1 Bilateral 3/14/2016    [facility]    Injection, Joint, Hip Right 4/3/2019    [facility] at [facility]    INJECTION-STEROID-EPIDURAL-LUMBAR N/A 2014    [facility] at [facility] OR    INJECTION-STEROID-EPIDURAL-LUMBAR, L5/S1 N/A 2014    [facility] by [facility] at [facility] OR    phaco/pciol Left 10/15/2014    [facility] at [facility]    phaco/pciol Right  8/20/2014    [facility] at [facility]    TONSILLECTOMY  1941        Home Medications:  Medications Prior to Admission   Medication Sig Dispense Refill Last Dose    acetaminophen (TYLENOL) 325 MG tablet Take 650 mg by mouth every 6 (six) hours as needed for Pain. Per peg   8/28/2019 at 10:23    albuterol-ipratropium (DUO-NEB) 2.5 mg-0.5 mg/3 mL nebulizer solution Take 3 mLs by nebulization every 6 (six) hours as needed for Wheezing. Rescue   8/28/2019 at 05:00    aspirin 81 mg Tab 1 tablet once daily. Every day per peg   8/28/2019 at 05:00    ciprofloxacin HCl (CIPRO) 500 MG tablet 1 tablet (500 mg total) by Per G Tube route every 12 (twelve) hours. for 7 days (Patient taking differently: 500 mg by Per G Tube route every 12 (twelve) hours. For 7 days start 08-22-19 to 08-29-19) 14 tablet 0 8/28/2019 at 05:00    donepezil (ARICEPT) 10 MG tablet TAKE 1 TABLET BY MOUTH EVERY EVENING (Patient taking differently: TAKE 1 TABLET BY MOUTH morning) 90 tablet 0 8/28/2019 at 05;00    gabapentin (NEURONTIN) 100 MG capsule TAKE 2 CAPSULES BY MOUTH EVERY EVENING 60 capsule 0 8/27/2019 at 20:00    lisinopril (PRINIVIL,ZESTRIL) 5 MG tablet 1 tablet (5 mg total) by Per G Tube route once daily. 30 tablet 3 8/28/2019 at 05:00    LORazepam (ATIVAN) 1 MG tablet 1/2 to 1 po QHS prn (Patient taking differently: 1 mg every evening. ) 30 tablet 1 8/27/2019 at 20:00    metoprolol tartrate (LOPRESSOR) 25 MG tablet 1 tablet (25 mg total) by Per G Tube route 2 (two) times daily. 60 tablet 3 8/28/2019 at 05;00    oxybutynin (DITROPAN) 5 MG Tab Take 5 mg by mouth 3 (three) times daily.   8/28/2019 at 13:00    potassium chloride 10% (KAYCIEL) 20 mEq/15 mL oral solution Take 20 mEq by mouth once daily. For 3 days   8/25/2019    Zinc Mth/Copper/Saw Palm/Gnsg (PROSTATE HEALTH FORMULA ORAL) Take 30 mLs by mouth 2 (two) times daily.   8/28/2019 at 05:00    acetaminophen (TYLENOL) 500 MG tablet Take 500 mg by  mouth every 6 (six) hours as needed for Pain.   8/19/2019 at 12:24    ascorbic acid, vitamin C, (VITAMIN C) 500 MG tablet Take 500 mg by mouth 2 (two) times daily.   8/23/2019    diclofenac sodium (VOLTAREN) 1 % Gel Apply 2 g topically 3 (three) times daily. 1 Tube 3 Taking    fexofenadine (ALLEGRA) 30 MG tablet Take 30 mg by mouth 2 (two) times daily. As needed   Taking    fluticasone (FLONASE) 50 mcg/actuation nasal spray 2 sprays by Each Nare route once daily. 16 g 11 Taking    food supplemt, lactose-reduced (ENSURE) Liqd Take 118 mLs by mouth 2 (two) times daily. 60 Bottle 11 Unknown    ipratropium (ATROVENT) 0.02 % nebulizer solution Take 500 mcg by nebulization 4 (four) times daily. Rescue   8/19/2019 at 16:18    lidocaine-prilocaine (EMLA) cream As directed   Taking    multivitamin (THERAGRAN) per tablet Take 1 tablet by mouth once daily.   8/23/2019 at 05;00    traMADol (ULTRAM) 50 mg tablet Take 1 tablet (50 mg total) by mouth nightly as needed for Pain. (Patient taking differently: Take 25 mg by mouth nightly as needed for Pain. ) 30 tablet 0 8/20/2019 at 20:00       Inpatient Medications:   ascorbic acid (vitamin C)  500 mg Oral BID    aspirin  81 mg Per G Tube Daily    cefTRIAXone (ROCEPHIN) IVPB  1 g Intravenous Q24H    cetirizine  10 mg Oral Daily    donepezil  10 mg Oral QHS    fluticasone propionate  2 spray Each Nostril Daily    gabapentin  200 mg Oral QHS    lisinopril  5 mg Per G Tube Daily    metoprolol tartrate  25 mg Per G Tube BID    oxybutynin  5 mg Oral TID     acetaminophen, albuterol-ipratropium, magnesium oxide, magnesium oxide, ondansetron, potassium chloride 10%, potassium chloride 10%, potassium chloride 10%, sodium chloride 0.9%, traMADol    Review of patient's allergies indicates:   Allergen Reactions    Brimonidine Other (See Comments)     Redness and irritation of eyes       Social History:   Social History     Occupational History    Not on file   Tobacco Use  "   Smoking status: Former Smoker     Packs/day: 0.25     Start date: 1961     Last attempt to quit: 1992     Years since quittin.2    Smokeless tobacco: Never Used   Substance and Sexual Activity    Alcohol use: No    Drug use: No    Sexual activity: Not on file       Family History:   Family History   Problem Relation Age of Onset    Cancer Father         Rectal    Glaucoma Mother     Cancer Mother         lung, throat    Cancer Brother         throat    Hypertension Brother     Stroke Brother     Diabetes Sister     Stroke Sister     Amblyopia Neg Hx     Blindness Neg Hx     Cataracts Neg Hx     Macular degeneration Neg Hx     Retinal detachment Neg Hx     Strabismus Neg Hx     Thyroid disease Neg Hx     Nephrolithiasis Neg Hx        Review of Systems:  A 10 point review of systems was performed and was normal, except as mentioned in the HPI, including constitutional, HEENT, heme, lymph, cardiovascular, respiratory, gastrointestinal, genitourinary, neurologic, endocrine, psychiatric and musculoskeletal.      OBJECTIVE:    Physical Exam:  24 Hour Vital Sign Ranges: Temp:  [97.8 °F (36.6 °C)-98.6 °F (37 °C)] 98.5 °F (36.9 °C)  Pulse:  [] 102  Resp:  [14-18] 18  SpO2:  [98 %-100 %] 99 %  BP: ()/(54-83) 154/54  Most recent vitals: BP (!) 154/54   Pulse 102   Temp 98.5 °F (36.9 °C)   Resp 18   Ht 5' 3" (1.6 m)   Wt 55.5 kg (122 lb 5.7 oz)   LMP  (LMP Unknown)   SpO2 99%   Breastfeeding? No   BMI 21.67 kg/m²    GEN: thin chronically ill appearing BF; Non-communicative  HEENT: PERRL, sclera anicteric, oral mucosa pink and moist without lesion  NECK: trachea midline; Good ROM  CV: regular rate and rhythm, no murmurs or gallops  RESP: clear to auscultation bilaterally, no wheezes, rhonci or rales  ABD: soft, non-tender, non-distended, normal bowel sounds, no high pitched   EXT: no swelling or edema, 2+ pulses distally  SKIN: no rashes or jaundice  PSYCH: flatl " affect  RECTAL: formed stool s/p disimpaction; no blood or mass    Labs:   Recent Labs     08/28/19  1237   WBC 5.92   MCV 87   *     Recent Labs     08/28/19  1237 08/29/19  0432    139   K 5.2* 4.0    111*   CO2 23 20*   BUN 31* 19    107     No results for input(s): ALB in the last 72 hours.    Invalid input(s): ALKP, SGOT, SGPT, TBIL, DBIL, TPRO  No results for input(s): PT, INR, PTT in the last 72 hours.      Radiology Review:  CT Renal Stone Study ABD Pelvis WO   Final Result      Large volume fecal material within the colon, with mild perirectal fat stranding.  Findings could indicate stercoral colitis.      Negative for hydronephrosis or urinary tract calculi.      Atherosclerosis, distal colonic diverticula, lumbar spondylosis, severe osteoarthritis of the right hip, and other incidental findings as above.         Electronically signed by: Mikael Sanders MD   Date:    08/28/2019   Time:    13:07            IMPRESSION / RECOMMENDATIONS:  85 y.o. female with PMHx OA, COPD, HLD, CAD/CVA (ASA), HTN, dementia, chronic aponte, sacral decub presents for evaluation of abdominal pain and constipation with suspected colits. S/p disimpaction today    -Give high dose bowel prep today as no signs of obstruction  -Daily miralax indefinitely on d/c  -Avoid endoscopic eval given age, co-morbidities, lack of bleeding and suspected stercoral colitis per hx    Thank you for this consult.    Gustavo Alfonso III  8/29/2019  8:07 AM

## 2019-08-29 NOTE — PLAN OF CARE
08/28/19 2300   Patient Assessment/Suction   Level of Consciousness (AVPU) alert   Respiratory Effort Unlabored   Expansion/Accessory Muscles/Retractions no use of accessory muscles   All Lung Fields Breath Sounds clear   PRE-TX-O2   O2 Device (Oxygen Therapy) room air   SpO2 98 %   Pulse 104   Resp 18   Aerosol Therapy   $ Aerosol Therapy Charges PRN treatment not required   Respiratory Treatment Status (SVN) PRN treatment not required

## 2019-08-29 NOTE — SUBJECTIVE & OBJECTIVE
Interval History: stable    Review of Systems   Unable to perform ROS: Dementia     Objective:     Vital Signs (Most Recent):  Temp: 98.5 °F (36.9 °C) (08/29/19 0410)  Pulse: 104 (08/29/19 0830)  Resp: 16 (08/29/19 0830)  BP: 122/87 (08/29/19 0830)  SpO2: 99 % (08/28/19 2345) Vital Signs (24h Range):  Temp:  [97.8 °F (36.6 °C)-98.6 °F (37 °C)] 98.5 °F (36.9 °C)  Pulse:  [] 104  Resp:  [14-18] 16  SpO2:  [98 %-100 %] 99 %  BP: ()/(54-87) 122/87     Weight: 55.5 kg (122 lb 5.7 oz)  Body mass index is 21.67 kg/m².    Intake/Output Summary (Last 24 hours) at 8/29/2019 0854  Last data filed at 8/29/2019 0600  Gross per 24 hour   Intake 1475 ml   Output 750 ml   Net 725 ml      Physical Exam   Constitutional:   Chronic ill; calling out, grunting   HENT:   Head: Normocephalic and atraumatic.   Eyes: Pupils are equal, round, and reactive to light. Conjunctivae and EOM are normal.   Neck: Normal range of motion. Neck supple.   Cardiovascular: Normal rate, regular rhythm, normal heart sounds and intact distal pulses.   Pulmonary/Chest: Effort normal and breath sounds normal.   Abdominal:   Soft, voluntary guarding, BS+   Musculoskeletal:   Muscle wasing   Neurological:   Severe dementia   Skin: Skin is warm and dry. Capillary refill takes less than 2 seconds.   Psychiatric:   Unable to assess   Nursing note and vitals reviewed.      Significant Labs:   BMP:   Recent Labs   Lab 08/29/19  0432         K 4.0   *   CO2 20*   BUN 19   CREATININE 0.5   CALCIUM 9.1   MG 2.1     CBC:   Recent Labs   Lab 08/28/19  1237   WBC 5.92   HGB 10.5*   HCT 32.7*   *       Significant Imaging: I have reviewed all pertinent imaging results/findings within the past 24 hours.

## 2019-08-29 NOTE — PLAN OF CARE
Problem: Infection  Goal: Infection Symptom Resolution    Intervention: Prevent or Manage Infection  IV antibiotics administered as per scheduled MD orders.

## 2019-08-29 NOTE — HOSPITAL COURSE
Patient admitted to regular med/surg floor. Was seen by GI this AM and disimpacted. Patient personally discussed with GI: Golytely today, then d/c on Miralax.  8/30: Patient tolerating tube feeds with no resifual. Had large BM with laxitive

## 2019-08-30 VITALS
DIASTOLIC BLOOD PRESSURE: 62 MMHG | HEART RATE: 98 BPM | WEIGHT: 122.38 LBS | OXYGEN SATURATION: 100 % | RESPIRATION RATE: 18 BRPM | BODY MASS INDEX: 21.68 KG/M2 | TEMPERATURE: 99 F | HEIGHT: 63 IN | SYSTOLIC BLOOD PRESSURE: 111 MMHG

## 2019-08-30 PROBLEM — E86.0 DEHYDRATION: Status: RESOLVED | Noted: 2019-08-28 | Resolved: 2019-08-30

## 2019-08-30 PROBLEM — K59.00 CONSTIPATION: Status: RESOLVED | Noted: 2019-08-28 | Resolved: 2019-08-30

## 2019-08-30 PROBLEM — E87.5 HYPERKALEMIA: Status: RESOLVED | Noted: 2019-08-28 | Resolved: 2019-08-30

## 2019-08-30 PROBLEM — K52.9 COLITIS, ACUTE: Status: RESOLVED | Noted: 2019-08-28 | Resolved: 2019-08-30

## 2019-08-30 LAB
ANION GAP SERPL CALC-SCNC: 8 MMOL/L (ref 8–16)
ANION GAP SERPL CALC-SCNC: 8 MMOL/L (ref 8–16)
BUN SERPL-MCNC: 10 MG/DL (ref 8–23)
BUN SERPL-MCNC: 10 MG/DL (ref 8–23)
CALCIUM SERPL-MCNC: 8.6 MG/DL (ref 8.7–10.5)
CALCIUM SERPL-MCNC: 8.6 MG/DL (ref 8.7–10.5)
CHLORIDE SERPL-SCNC: 113 MMOL/L (ref 95–110)
CHLORIDE SERPL-SCNC: 113 MMOL/L (ref 95–110)
CO2 SERPL-SCNC: 20 MMOL/L (ref 23–29)
CO2 SERPL-SCNC: 20 MMOL/L (ref 23–29)
CREAT SERPL-MCNC: 0.5 MG/DL (ref 0.5–1.4)
CREAT SERPL-MCNC: 0.5 MG/DL (ref 0.5–1.4)
ERYTHROCYTE [DISTWIDTH] IN BLOOD BY AUTOMATED COUNT: 15.6 % (ref 11.5–14.5)
EST. GFR  (AFRICAN AMERICAN): >60 ML/MIN/1.73 M^2
EST. GFR  (AFRICAN AMERICAN): >60 ML/MIN/1.73 M^2
EST. GFR  (NON AFRICAN AMERICAN): >60 ML/MIN/1.73 M^2
EST. GFR  (NON AFRICAN AMERICAN): >60 ML/MIN/1.73 M^2
GLUCOSE SERPL-MCNC: 90 MG/DL (ref 70–110)
GLUCOSE SERPL-MCNC: 90 MG/DL (ref 70–110)
HCT VFR BLD AUTO: 28.3 % (ref 37–48.5)
HGB BLD-MCNC: 9.2 G/DL (ref 12–16)
MAGNESIUM SERPL-MCNC: 1.8 MG/DL (ref 1.6–2.6)
MCH RBC QN AUTO: 28.3 PG (ref 27–31)
MCHC RBC AUTO-ENTMCNC: 32.5 G/DL (ref 32–36)
MCV RBC AUTO: 87 FL (ref 82–98)
PLATELET # BLD AUTO: 359 K/UL (ref 150–350)
PMV BLD AUTO: 10.7 FL (ref 9.2–12.9)
POTASSIUM SERPL-SCNC: 3.4 MMOL/L (ref 3.5–5.1)
POTASSIUM SERPL-SCNC: 3.4 MMOL/L (ref 3.5–5.1)
RBC # BLD AUTO: 3.25 M/UL (ref 4–5.4)
SODIUM SERPL-SCNC: 141 MMOL/L (ref 136–145)
SODIUM SERPL-SCNC: 141 MMOL/L (ref 136–145)
WBC # BLD AUTO: 5.18 K/UL (ref 3.9–12.7)

## 2019-08-30 PROCEDURE — G0378 HOSPITAL OBSERVATION PER HR: HCPCS

## 2019-08-30 PROCEDURE — 25000003 PHARM REV CODE 250: Performed by: NURSE PRACTITIONER

## 2019-08-30 PROCEDURE — 94761 N-INVAS EAR/PLS OXIMETRY MLT: CPT

## 2019-08-30 PROCEDURE — 83735 ASSAY OF MAGNESIUM: CPT

## 2019-08-30 PROCEDURE — 99900035 HC TECH TIME PER 15 MIN (STAT)

## 2019-08-30 PROCEDURE — 85027 COMPLETE CBC AUTOMATED: CPT

## 2019-08-30 PROCEDURE — 36415 COLL VENOUS BLD VENIPUNCTURE: CPT

## 2019-08-30 PROCEDURE — 80048 BASIC METABOLIC PNL TOTAL CA: CPT

## 2019-08-30 RX ORDER — POLYETHYLENE GLYCOL 3350 17 G/17G
17 POWDER, FOR SOLUTION ORAL DAILY
Qty: 30 EACH | Refills: 0 | Status: SHIPPED | OUTPATIENT
Start: 2019-08-30 | End: 2019-09-29

## 2019-08-30 RX ORDER — CEPHALEXIN 250 MG/5ML
250 POWDER, FOR SUSPENSION ORAL 4 TIMES DAILY
Qty: 140 ML | Refills: 0 | Status: SHIPPED | OUTPATIENT
Start: 2019-08-30 | End: 2019-09-06

## 2019-08-30 RX ADMIN — OXYBUTYNIN CHLORIDE 5 MG: 5 TABLET ORAL at 02:08

## 2019-08-30 RX ADMIN — LISINOPRIL 5 MG: 5 TABLET ORAL at 08:08

## 2019-08-30 RX ADMIN — CETIRIZINE HYDROCHLORIDE 10 MG: 10 TABLET, FILM COATED ORAL at 08:08

## 2019-08-30 RX ADMIN — OXYBUTYNIN CHLORIDE 5 MG: 5 TABLET ORAL at 08:08

## 2019-08-30 RX ADMIN — METOPROLOL TARTRATE 25 MG: 25 TABLET ORAL at 08:08

## 2019-08-30 RX ADMIN — OXYCODONE HYDROCHLORIDE AND ACETAMINOPHEN 500 MG: 500 TABLET ORAL at 08:08

## 2019-08-30 RX ADMIN — ASPIRIN 81 MG: 81 TABLET, COATED ORAL at 08:08

## 2019-08-30 RX ADMIN — FLUTICASONE PROPIONATE 100 MCG: 50 SPRAY, METERED NASAL at 09:08

## 2019-08-30 NOTE — PLAN OF CARE
"   08/30/19 1723   Discharge Reassessment   Assessment Type Discharge Planning Reassessment   Anticipated Discharge Disposition SNF   THIS CM CALLED AND SPOKE TO ANGELO AT [facility] TO ASCERTAIN IF THE DC INFO WAS RECEIVED.  SHE STATED "YES" AND THE [facility] NURSE COULD CALL REPORT TO [facility] AT [facility].  ANGELO ALSO STATED THAT THEY WILL SEND THEIR TRANSPORT VAN TO  THE PATIENT.  THE ABOVE INFORMATION WAS REPORTED TO THE PATIENT'S NURSE SURENDRA AND THE CHARGE NURSE SAMSON; AN EMAIL WAS SENT TO [facility] REGARDING THE ABOVE.  "

## 2019-08-30 NOTE — PLAN OF CARE
Problem: Adult Inpatient Plan of Care  Goal: Plan of Care Review  Outcome: Ongoing (interventions implemented as appropriate)  Pt free of accidental injury during shift. No s/s of distress noted. Resp even and unlabored. Private sitter at bedside.  Jevity 1.2 started at 50 ml/hr via peg tube. Tolerating well. IVF infusing as ordered. Repositioned q 2 hrs and as needed. Dressing to sacrum CDI. Scherer to gravity draining cloudy yellow urine. Safety measures maintained. Frequent rounds made. Bed alarm activated. Will continue to monitor

## 2019-08-30 NOTE — PROGRESS NOTES
"[facility]  Adult Nutrition  Progress Note    SUMMARY       Recommendations    Recommendation/Intervention: 1.) Rec'd resume PEG TFs of Jevity 1.2 @ 50 ml/hr as goal rate, flush 30 ml H20 Q 4 hrs while receiving IVFs. 2.) RD to follow and monitor.  Goals: 1. PEG feedings resumed and goal reached w/in 24 hrs.  Nutrition Goal Status: progressing towards goal  Communication of RD Recs: reviewed with RN    Reason for Assessment    Reason For Assessment: RD follow-up  Diagnosis: other (see comments)(sterocoral colitis)  Relevant Medical History: hx: NH resident, PEG, dementia  Interdisciplinary Rounds: attended    Nutrition Risk Screen    Nutrition Risk Screen: tube feeding or parenteral nutrition, dysphagia or difficulty swallowing    Nutrition/Diet History    Spiritual, Cultural Beliefs, Faith Practices, Values that Affect Care: yes(Pentecostalism)  Food Allergies: NKFA  Factors Affecting Nutritional Intake: NPO  Nutrition Support Formula Prior to Admit: Other (see comments)(Fibersource HN)  Nutrition Support Rate Prior to Admit: 50 (ml)  Nutrtion Support Frequency Prior to Admit: ml/hr, continuous via PEG  Nutrition Support Provision Prior to Admit: provides: 1440 kcals, 64.8 g pro, & 969 ml free h20    Anthropometrics    Temp: 98.5 °F (36.9 °C)  Height Method: Stated  Height: 5' 3" (160 cm)  Height (inches): 63 in  Weight Method: Bed Scale  Weight: 55.5 kg (122 lb 5.7 oz)  Weight (lb): 122.36 lb  Ideal Body Weight (IBW), Female: 115 lb  % Ideal Body Weight, Female (lb): 106.4 lb  BMI (Calculated): 21.7  BMI Grade: 18.5-24.9 - normal       Lab/Procedures/Meds    Pertinent Labs Reviewed: reviewed    CMP  Sodium   Date Value Ref Range Status   08/30/2019 141 136 - 145 mmol/L Final   08/30/2019 141 136 - 145 mmol/L Final     Potassium   Date Value Ref Range Status   08/30/2019 3.4 (L) 3.5 - 5.1 mmol/L Final   08/30/2019 3.4 (L) 3.5 - 5.1 mmol/L Final     Chloride   Date Value Ref Range Status   08/30/2019 " 113 (H) 95 - 110 mmol/L Final   08/30/2019 113 (H) 95 - 110 mmol/L Final     CO2   Date Value Ref Range Status   08/30/2019 20 (L) 23 - 29 mmol/L Final   08/30/2019 20 (L) 23 - 29 mmol/L Final     Glucose   Date Value Ref Range Status   08/30/2019 90 70 - 110 mg/dL Final   08/30/2019 90 70 - 110 mg/dL Final     BUN, Bld   Date Value Ref Range Status   08/30/2019 10 8 - 23 mg/dL Final   08/30/2019 10 8 - 23 mg/dL Final     Creatinine   Date Value Ref Range Status   08/30/2019 0.5 0.5 - 1.4 mg/dL Final   08/30/2019 0.5 0.5 - 1.4 mg/dL Final     Calcium   Date Value Ref Range Status   08/30/2019 8.6 (L) 8.7 - 10.5 mg/dL Final   08/30/2019 8.6 (L) 8.7 - 10.5 mg/dL Final     Total Protein   Date Value Ref Range Status   08/28/2019 6.8 6.0 - 8.4 g/dL Final     Albumin   Date Value Ref Range Status   08/28/2019 3.2 (L) 3.5 - 5.2 g/dL Final     Total Bilirubin   Date Value Ref Range Status   08/28/2019 0.6 0.1 - 1.0 mg/dL Final     Comment:     For infants and newborns, interpretation of results should be based  on gestational age, weight and in agreement with clinical  observations.  Premature Infant recommended reference ranges:  Up to 24 hours.............<8.0 mg/dL  Up to 48 hours............<12.0 mg/dL  3-5 days..................<15.0 mg/dL  6-29 days.................<15.0 mg/dL       Alkaline Phosphatase   Date Value Ref Range Status   08/28/2019 72 55 - 135 U/L Final     AST   Date Value Ref Range Status   08/28/2019 20 10 - 40 U/L Final     ALT   Date Value Ref Range Status   08/28/2019 15 10 - 44 U/L Final     Anion Gap   Date Value Ref Range Status   08/30/2019 8 8 - 16 mmol/L Final   08/30/2019 8 8 - 16 mmol/L Final     eGFR if    Date Value Ref Range Status   08/30/2019 >60.0 >60 mL/min/1.73 m^2 Final   08/30/2019 >60.0 >60 mL/min/1.73 m^2 Final     eGFR if non    Date Value Ref Range Status   08/30/2019 >60.0 >60 mL/min/1.73 m^2 Final     Comment:     Calculation used to obtain  the estimated glomerular filtration  rate (eGFR) is the CKD-EPI equation.      08/30/2019 >60.0 >60 mL/min/1.73 m^2 Final     Comment:     Calculation used to obtain the estimated glomerular filtration  rate (eGFR) is the CKD-EPI equation.        Lab Results   Component Value Date    WBC 5.18 08/30/2019    HGB 9.2 (L) 08/30/2019    HCT 28.3 (L) 08/30/2019    MCV 87 08/30/2019     (H) 08/30/2019       Pertinent Medications Reviewed: reviewed   Scheduled Meds:   ascorbic acid (vitamin C)  500 mg Oral BID    aspirin  81 mg Per G Tube Daily    cefTRIAXone (ROCEPHIN) IVPB  1 g Intravenous Q24H    cetirizine  10 mg Oral Daily    donepezil  10 mg Oral QHS    fluticasone propionate  2 spray Each Nostril Daily    gabapentin  200 mg Oral QHS    lisinopril  5 mg Per G Tube Daily    metoprolol tartrate  25 mg Per G Tube BID    oxybutynin  5 mg Oral TID     Continuous Infusions:   sodium chloride 0.9% 125 mL/hr at 08/29/19 1631       Estimated/Assessed Needs    Weight Used For Calorie Calculations: 55.5 kg (122 lb 5.7 oz)  Energy Calorie Requirements (kcal): 5978-6865 (25-30 kcal/kg)  Energy Need Method: Kcal/kg  Protein Requirements: 66-83 g (1.2-1.5 g/kg)  Weight Used For Protein Calculations: 55.5 kg (122 lb 5.7 oz)     Estimated Fluid Requirement Method: RDA Method  RDA Method (mL): 1387         Nutrition Prescription Ordered    Current Diet Order: NPO    Evaluation of Received Nutrient/Fluid Intake    IV Fluid (mL): 3000(NS @ 125 ml/hr)  Energy Calories Required: not meeting needs  Protein Required: not meeting needs  Fluid Required: not meeting needs  Tolerance: other (see comments)(unable to assess)      Nutrition Risk    Level of Risk/Frequency of Follow-up: high     Assessment and Plan    Pt with BM x 2 per RN. Discussed with RN plans to resume PEG feedings today. RN aware of RD recs on sticky note.     Monitor and Evaluation    Food and Nutrient Intake: enteral nutrition intake  Food and Nutrient  Adminstration: enteral and parenteral nutrition administration  Anthropometric Measurements: weight change  Biochemical Data, Medical Tests and Procedures: gastrointestinal profile, glucose/endocrine profile, electrolyte and renal panel  Nutrition-Focused Physical Findings: overall appearance       Nutrition Follow-Up    RD Follow-up?: Yes    Lora Vasquez  08/30/2019  9:21 AM

## 2019-08-30 NOTE — PLAN OF CARE
08/30/19 0722   Patient Assessment/Suction   Level of Consciousness (AVPU) alert  (pt asleep)   Respiratory Effort Unlabored   All Lung Fields Breath Sounds diminished   Rhythm/Pattern, Respiratory no shortness of breath reported   PRE-TX-O2   O2 Device (Oxygen Therapy) room air   SpO2 99 %   Pulse Oximetry Type Intermittent   $ Pulse Oximetry - Multiple Charge Pulse Oximetry - Multiple   Pulse 81   Resp 16   Aerosol Therapy   $ Aerosol Therapy Charges PRN treatment not required

## 2019-08-30 NOTE — DISCHARGE SUMMARY
[facility] Medicine  Discharge Summary      Patient Name: Breanne Culp  MRN: [id number]  Admission Date: 8/28/2019  Hospital Length of Stay: 0 days  Discharge Date and Time:  08/30/2019 5:03 PM  Attending Physician: Frantz Mayes MD   Discharging Provider: Frantz Mayes MD  Primary Care Provider: Frantz Bill MD      HPI:   Ms. Culp presents today with her daughter with complaints of pain. It is moderate. She is nonverbal, has advanced dementia, and occasionally nods to yes and no questions. She will yell out with manipulation of her extremities, moderate palpation, and turning. Her neurological status is at baseline. She lives at [facility] and has a personal sitter. The sitter is at bedside and states that last night she began expressing pain and when asked if her stomach and hips hurt she nodded yes. She was discharged 6 days ago following a UTI. She has a chronic indwelling aponte to aid in healing of a sacral decub. Her sitter states her last BM was on Monday, but was small. Her daughter is at bedside and confirms her DNR status.    * No surgery found *      Hospital Course:   Patient admitted to regular med/surg floor. Was seen by GI this AM and disimpacted. Patient personally discussed with GI: Golytely today, then d/c on Miralax.  8/30: Patient tolerating tube feeds with no resifual. Had large BM with laxitive     Consults:   Consults (From admission, onward)        Status Ordering Provider     Inpatient consult to Gastroenterology  Once     Provider:  Gustavo Alfonso III, MD    Completed ALYX CORTES     Inpatient consult to Registered Dietitian/Nutritionist  Once     Provider:  (Not yet assigned)    Completed KENDELL ELIZABETH     Inpatient consult to Registered Dietitian/Nutritionist  Once     Provider:  (Not yet assigned)    Completed KENDELL ELIZABETH     IP consult to case management  Once     Provider:  (Not yet assigned)    Acknowledged KENDELL ELIZABETH  N.          No new Assessment & Plan notes have been filed under this hospital service since the last note was generated.  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] 08/21/2019 Yes     Chronic    Scherer catheter in place on admission [Z96.0] 08/20/2019 Not Applicable     Chronic      Problems Resolved During this Admission:    Diagnosis Date Noted Date Resolved POA    PRINCIPAL PROBLEM:  Stercoral colitis [K52.9] 08/28/2019 08/30/2019 Yes    Constipation [K59.00] 08/28/2019 08/30/2019 Yes    Hyperkalemia [E87.5] 08/28/2019 08/30/2019 Yes    Dehydration [E86.0] 08/28/2019 08/30/2019 Yes       Discharged Condition: fair    Disposition: Home or Self Care    Follow Up:    Patient Instructions:      Tube Feedings/Formulas   Order Comments: Jevity 1.2 at 50 ml/hr     Order Specific Question Answer Comments   Select Adult Formula: Other    Route: Gastrostomy      Activity as tolerated       Significant Diagnostic Studies: Labs:   BMP:   Recent Labs   Lab 08/29/19  0432 08/30/19  0506    90  90    141  141   K 4.0 3.4*  3.4*   * 113*  113*   CO2 20* 20*  20*   BUN 19 10  10   CREATININE 0.5 0.5  0.5   CALCIUM 9.1 8.6*  8.6*   MG 2.1 1.8    and CBC   Recent Labs   Lab 08/30/19  0506   WBC 5.18   HGB 9.2*   HCT 28.3*   *       Pending Diagnostic Studies:     None         Medications:  Reconciled Home Medications:      Medication List      START taking these medications    cephALEXin 250 mg/5 mL suspension  Commonly known as:  KEFLEX  5 mLs (250 mg total) by Per G Tube route 4 (four) times daily. for 7 days     polyethylene glycol 17 gram Pwpk  Commonly known as:  GLYCOLAX  17 g by Per G Tube route once daily.        CHANGE how you take these medications    donepezil 10 MG tablet  Commonly known as:  ARICEPT  TAKE 1 TABLET BY MOUTH EVERY EVENING  What changed:    · how much to take  · how to take  this  · when to take this     LORazepam 1 MG tablet  Commonly known as:  ATIVAN  1/2 to 1 po QHS prn  What changed:    · how much to take  · when to take this  · additional instructions     traMADol 50 mg tablet  Commonly known as:  ULTRAM  Take 1 tablet (50 mg total) by mouth nightly as needed for Pain.  What changed:  how much to take        CONTINUE taking these medications    * acetaminophen 500 MG tablet  Commonly known as:  TYLENOL  Take 500 mg by mouth every 6 (six) hours as needed for Pain.     * acetaminophen 325 MG tablet  Commonly known as:  TYLENOL  Take 650 mg by mouth every 6 (six) hours as needed for Pain. Per peg     albuterol-ipratropium 2.5 mg-0.5 mg/3 mL nebulizer solution  Commonly known as:  DUO-NEB  Take 3 mLs by nebulization every 6 (six) hours as needed for Wheezing. Rescue     aspirin 81 mg Tab  1 tablet once daily. Every day per peg     diclofenac sodium 1 % Gel  Commonly known as:  VOLTAREN  Apply 2 g topically 3 (three) times daily.     fexofenadine 30 MG tablet  Commonly known as:  ALLEGRA  Take 30 mg by mouth 2 (two) times daily. As needed     fluticasone propionate 50 mcg/actuation nasal spray  Commonly known as:  FLONASE  2 sprays by Each Nare route once daily.     food supplemt, lactose-reduced Liqd  Commonly known as:  ENSURE  Take 118 mLs by mouth 2 (two) times daily.     gabapentin 100 MG capsule  Commonly known as:  NEURONTIN  TAKE 2 CAPSULES BY MOUTH EVERY EVENING     ipratropium 0.02 % nebulizer solution  Commonly known as:  ATROVENT  Take 500 mcg by nebulization 4 (four) times daily. Rescue     lidocaine-prilocaine cream  Commonly known as:  EMLA  As directed     lisinopril 5 MG tablet  Commonly known as:  PRINIVIL,ZESTRIL  1 tablet (5 mg total) by Per G Tube route once daily.     metoprolol tartrate 25 MG tablet  Commonly known as:  LOPRESSOR  1 tablet (25 mg total) by Per G Tube route 2 (two) times daily.     multivitamin per tablet  Commonly known as:  THERAGRAN  Take 1  tablet by mouth once daily.     oxybutynin 5 MG Tab  Commonly known as:  DITROPAN  Take 5 mg by mouth 3 (three) times daily.     potassium chloride 10% 20 mEq/15 mL oral solution  Commonly known as:  KAYCIEL  Take 20 mEq by mouth once daily. For 3 days     PROSTATE HEALTH FORMULA ORAL  Take 30 mLs by mouth 2 (two) times daily.     VITAMIN C 500 MG tablet  Generic drug:  ascorbic acid (vitamin C)  Take 500 mg by mouth 2 (two) times daily.         * This list has 2 medication(s) that are the same as other medications prescribed for you. Read the directions carefully, and ask your doctor or other care provider to review them with you.            STOP taking these medications    ciprofloxacin HCl 500 MG tablet  Commonly known as:  CIPRO            Indwelling Lines/Drains at time of discharge:   Lines/Drains/Airways     Drain                 Gastrostomy/Enterostomy   Percutaneous endoscopic gastrostomy (PEG) midline feeding -- days         Urethral Catheter 08/20/19 0000 Double-lumen 16 Fr. 10 days         Urethral Catheter 08/28/19 1231 Straight-tip 16 Fr. 2 days          Pressure Ulcer                 Pressure Injury 08/20/19 1150  upper Sacral spine Stage 2 10 days                Time spent on the discharge of patient: 32 minutes  Patient was seen and examined on the date of discharge and determined to be suitable for discharge.         Frantz Mayes MD  Department of Hospital Medicine  [facility]

## 2019-08-30 NOTE — PLAN OF CARE
"   08/30/19 1708   Discharge Reassessment   Assessment Type Discharge Planning Reassessment   Anticipated Discharge Disposition SNF   DISCHARGE INFORMATION MANUALLY FAXED TO ANGELO AT [facility].  THIS CM CALLED AND SPOKE TO ANGELO AT [facility] TO MAKE HER AWARE THAT THE PATIENT WAS RETURNING THIS EVENING.   PER ANGELO, THE PATIENT WILL NEED TO RETURN VIA AMBULANCE BECAUSE THE LAST TIME THE PATIENT RETURNED VIA THEIR VAN, THE PATIENT'S DAUGHTER HAD A "FIT".  I INFORMED ANGELO THAT [facility] WOULD NOT BE FINANCIALLY RESPONSIBLE FOR THIS AMBULANCE TRANSPORT.  ANGELO ACKNOWLEDGED UNDERSTANDING AND STATED THAT THE [facility] NUR[facility], NOT [facility], FOR THIS TRANSPORT.  THE ABOVE INFORMATION GIVEN TO THE PATIENT'S NURSE, SURENDRA AND THE CHARGE NURSE SAMSON.  EMAIL ALSO SENT TO YUE RAE, DIRECTOR OF CASE MANAGEMENT REGARDING THE ABOVE.  "

## 2019-08-30 NOTE — PLAN OF CARE
Problem: Feeding Intolerance (Enteral Nutrition)  Goal: Feeding Tolerance  Outcome: Ongoing (interventions implemented as appropriate)  Resume TFs as medically appropriate, Jevity 1.2 @ 50 ml/hr. (goal rate). RD to follow.

## 2019-09-03 LAB — BACTERIA UR CULT: ABNORMAL

## 2019-09-18 ENCOUNTER — TELEPHONE (OUTPATIENT)
Dept: FAMILY MEDICINE | Facility: CLINIC | Age: 84
End: 2019-09-18

## 2019-09-18 NOTE — TELEPHONE ENCOUNTER
----- Message from Salinas Zuñiga sent at 9/18/2019  2:30 PM CDT -----  Contact: pt's daughter Loree  Type: Needs Medical Advice    Who Called:  Loree    Best Call Back Number:[phone]  Additional Information: States the pt is being discharged from a skilled nursing facility and would like to discuss getting a hospital bed for the pt and the pt's  (also a pt of Dr. Bill). Loree sates that the beds that they did have had to get picked up due the ptatients being in the facility for 4 months. Please call to advise.

## 2019-09-19 NOTE — TELEPHONE ENCOUNTER
Spoke to Jazmine at [facility] ([phone]) Silvia with discharge planning is not in today, but will return tomorrow. Both Latasha and Breanne Culp need hospital beds delivered prior to discharge and Mrs. Raymundo needs pump for tube feedings. Patient's daughter states she will not be able to bring patient's in for visit in clinic due to their conditions. We will need to see if facility MD will be able to write these orders prior to discharge, to allow for Medicare to cover and have sent to patient's home.

## 2019-12-26 ENCOUNTER — OFFICE VISIT (OUTPATIENT)
Dept: HOME HEALTH SERVICES | Facility: CLINIC | Age: 84
End: 2019-12-26
Payer: MEDICARE

## 2019-12-26 VITALS — HEART RATE: 80 BPM | SYSTOLIC BLOOD PRESSURE: 116 MMHG | DIASTOLIC BLOOD PRESSURE: 67 MMHG

## 2019-12-26 DIAGNOSIS — Z74.01 BEDBOUND: ICD-10-CM

## 2019-12-26 DIAGNOSIS — R26.9 ALTERATION IN MOBILITY DUE TO ACUTE CEREBROVASCULAR ACCIDENT (CVA): ICD-10-CM

## 2019-12-26 DIAGNOSIS — Z00.00 ENCOUNTER FOR PREVENTIVE HEALTH EXAMINATION: Primary | ICD-10-CM

## 2019-12-26 DIAGNOSIS — I69.391 HEMIPARESIS, APHASIA, AND DYSPHAGIA AS LATE EFFECT OF CEREBROVASCULAR ACCIDENT (CVA): ICD-10-CM

## 2019-12-26 DIAGNOSIS — Z99.89 DEPENDENCE ON OTHER ENABLING MACHINES AND DEVICES: ICD-10-CM

## 2019-12-26 DIAGNOSIS — G60.9 IDIOPATHIC PERIPHERAL NEUROPATHY: Chronic | ICD-10-CM

## 2019-12-26 DIAGNOSIS — J44.9 CHRONIC OBSTRUCTIVE PULMONARY DISEASE, UNSPECIFIED COPD TYPE: ICD-10-CM

## 2019-12-26 DIAGNOSIS — I69.359 HEMIPARESIS, APHASIA, AND DYSPHAGIA AS LATE EFFECT OF CEREBROVASCULAR ACCIDENT (CVA): ICD-10-CM

## 2019-12-26 DIAGNOSIS — M51.36 DDD (DEGENERATIVE DISC DISEASE), LUMBAR: Chronic | ICD-10-CM

## 2019-12-26 DIAGNOSIS — E46 PROTEIN-CALORIE MALNUTRITION, UNSPECIFIED SEVERITY: ICD-10-CM

## 2019-12-26 DIAGNOSIS — R26.9 ABNORMALITY OF GAIT AND MOBILITY: ICD-10-CM

## 2019-12-26 DIAGNOSIS — L89.152 PRESSURE INJURY OF SACRAL REGION, STAGE 2: Chronic | ICD-10-CM

## 2019-12-26 DIAGNOSIS — Z97.8 CHRONIC INDWELLING FOLEY CATHETER: ICD-10-CM

## 2019-12-26 DIAGNOSIS — I70.0 ABDOMINAL AORTIC ATHEROSCLEROSIS: ICD-10-CM

## 2019-12-26 DIAGNOSIS — Z93.1 PEG (PERCUTANEOUS ENDOSCOPIC GASTROSTOMY) STATUS: ICD-10-CM

## 2019-12-26 DIAGNOSIS — I69.320 HEMIPARESIS, APHASIA, AND DYSPHAGIA AS LATE EFFECT OF CEREBROVASCULAR ACCIDENT (CVA): ICD-10-CM

## 2019-12-26 DIAGNOSIS — G30.9 ALZHEIMER'S DEMENTIA WITHOUT BEHAVIORAL DISTURBANCE, UNSPECIFIED TIMING OF DEMENTIA ONSET: ICD-10-CM

## 2019-12-26 DIAGNOSIS — I63.9 ALTERATION IN MOBILITY DUE TO ACUTE CEREBROVASCULAR ACCIDENT (CVA): ICD-10-CM

## 2019-12-26 DIAGNOSIS — I63.9 CEREBROVASCULAR ACCIDENT (CVA), UNSPECIFIED MECHANISM: Chronic | ICD-10-CM

## 2019-12-26 DIAGNOSIS — M16.11 PRIMARY OSTEOARTHRITIS OF RIGHT HIP: Chronic | ICD-10-CM

## 2019-12-26 DIAGNOSIS — F02.80 ALZHEIMER'S DEMENTIA WITHOUT BEHAVIORAL DISTURBANCE, UNSPECIFIED TIMING OF DEMENTIA ONSET: ICD-10-CM

## 2019-12-26 DIAGNOSIS — E78.5 HYPERLIPIDEMIA, UNSPECIFIED HYPERLIPIDEMIA TYPE: Chronic | ICD-10-CM

## 2019-12-26 PROBLEM — G30.0 ALZHEIMER'S DISEASE WITH EARLY ONSET (CODE): Status: ACTIVE | Noted: 2019-12-26

## 2019-12-26 PROBLEM — F02.818 ALZHEIMER'S DEMENTIA WITH BEHAVIORAL DISTURBANCE: Status: ACTIVE | Noted: 2019-12-26

## 2019-12-26 PROCEDURE — G0439 PPPS, SUBSEQ VISIT: HCPCS | Mod: S$GLB,,, | Performed by: NURSE PRACTITIONER

## 2019-12-26 PROCEDURE — G0439 PR MEDICARE ANNUAL WELLNESS SUBSEQUENT VISIT: ICD-10-PCS | Mod: S$GLB,,, | Performed by: NURSE PRACTITIONER

## 2019-12-27 NOTE — PATIENT INSTRUCTIONS
Counseling and Referral of Other Preventative  (Italic type indicates deductible and co-insurance are waived)    Patient Name: Breanne Culp  Today's Date: 12/26/2019    Health Maintenance       Date Due Completion Date    TETANUS VACCINE 02/28/1952 ---    Shingles Vaccine (1 of 2) 02/28/1984 ---    DEXA SCAN 09/04/2015 9/4/2012    Lipid Panel 07/28/2019 7/28/2014    Influenza Vaccine (1) 09/01/2019 11/23/2018        No orders of the defined types were placed in this encounter.    The following information is provided to all patients.  This information is to help you find resources for any of the problems found today that may be affecting your health:                Living healthy guide: www.[facility].[place].[facility]      Understanding Diabetes: www.diabetes.org      Eating healthy: www.cdc.gov/healthyweight      [facility] home safety checklist: www.cdc.gov/steadi/patient.html      Agency on Aging: www.goea.[place]      Alcoholics anonymous (AA): www.aa.org      Physical Activity: www.nu.nih.gov/sv0psto      Tobacco use: www.quitwithusla.org     Counseling and Referral of Other Preventative  (Italic type indicates deductible and co-insurance are waived)    Patient Name: Breanne Culp  Today's Date: 12/26/2019    Health Maintenance       Date Due Completion Date    TETANUS VACCINE 02/28/1952 ---    Shingles Vaccine (1 of 2) 02/28/1984 ---    DEXA SCAN 09/04/2015 9/4/2012    Lipid Panel 07/28/2019 7/28/2014    Influenza Vaccine (1) 09/01/2019 11/23/2018        No orders of the defined types were placed in this encounter.    The following information is provided to all patients.  This information is to help you find resources for any of the problems found today that may be affecting your health:                Living healthy guide: www.[facility].[place].[facility]      Understanding Diabetes: www.diabetes.org      Eating healthy: www.cdc.gov/healthyweight      CDC home safety checklist: www.cdc.gov/steadi/patient.html       Agency on Aging: www.goea.[place].[facility]      Alcoholics anonymous (AA): www.aa.org      Physical Activity: www.nu.nih.gov/xq1rjpj      Tobacco use: www.quitwithusla.org

## 2019-12-27 NOTE — PROGRESS NOTES
Breanne Culp presented for a  Medicare AWV and comprehensive Health Risk Assessment today. The following components were reviewed and updated:    · Medical history  · Family History  · Social history  · Allergies and Current Medications  · Health Risk Assessment  · Health Maintenance  · Care Team     ** See Completed Assessments for Annual Wellness Visit within the encounter summary.**       The following assessments were completed:  · Living Situation  · CAGE  · Depression Screening - unable to perform  · Timed Get Up and Go - bedbound  · Whisper Test - unable to perform  · Cognitive Function Screening - unable to perform  · Nutrition Screening - Peg tube  · ADL Screening - bedbound  · PAQ Screening    Vitals:    12/26/19 1208   BP: 116/67   Pulse: 80     There is no height or weight on file to calculate BMI.  Physical Exam   Constitutional: Vital signs are normal. She appears lethargic. She is sleeping.   Bedbound, non-verbal at visit - questions answered per daughter Loree.   HENT:   Head: Normocephalic.   Eyes: Pupils are equal, round, and reactive to light.   Neck: Normal range of motion.   Cardiovascular: Normal rate, regular rhythm, normal heart sounds and intact distal pulses.   Pulmonary/Chest: Effort normal and breath sounds normal. She has no wheezes.   Abdominal: Soft. Bowel sounds are normal. She exhibits no distension. There is no tenderness.   Peg tube intact   Genitourinary:   Genitourinary Comments: Scherer intact.   Musculoskeletal: She exhibits no edema.   Neurological: She appears lethargic.   Daughter reports patient verbal and answers questions on occasion.   Skin: Skin is warm and dry.   Sacral ulcer healing   Vitals reviewed.        Diagnoses and health risks identified today and associated recommendations/orders:    1. Encounter for preventive health examination  AWV Completed.    2. Alzheimer's dementia without behavioral disturbance, unspecified timing of dementia onset  Stable, followed  by PCP.    3. Cerebrovascular accident (CVA), unspecified mechanism  Chronic deficits managed and followed by PCP.    4. Hemiparesis, aphasia, and dysphagia as late effect of cerebrovascular accident (CVA)  Stable, followed by PCP.    5. PEG (percutaneous endoscopic gastrostomy) status  Intact, managed by PCP & Home Health.    6. Protein-calorie malnutrition, unspecified severity  Stable, followed by PCP & Home Health.    7. Alteration in mobility due to acute cerebrovascular accident (CVA)  Discussion on frequent turns, excessive moisture and avoiding pressure points to reduce risk of pressure ulcers and skin breakdown.    8. Bedbound  Discussion on frequent turns, excessive moisture and avoiding pressure points to reduce risk of pressure ulcers and skin breakdown.    9. Abdominal aortic atherosclerosis  Stable, followed by PCP.    10. Hyperlipidemia, unspecified hyperlipidemia type  Stable, followed by PCP.    11. Pressure injury of sacral region, stage 2  Stable, healing well, followed by PCP & Home Health.    12. Chronic obstructive pulmonary disease, unspecified COPD type  Stable, followed by PCP.    13. Chronic indwelling Scherer catheter  Intact, managed per Home Health.    14. Primary osteoarthritis of right hip  Stable, followed by Orthopedics.    15. Idiopathic peripheral neuropathy  Stable, followed by Orthopedics.    16. DDD (degenerative disc disease), lumbar  Stable, followed by Orthopedics.      Provided daughter Loree with a 5-10 year written screening schedule and personal prevention plan. Recommendations were developed using the USPSTF age appropriate recommendations. Education, counseling, and referrals were provided as needed. After Visit Summary printed and given to patient which includes a list of additional screenings\tests needed.    Follow up in about 1 year (around 12/26/2020) for your next annual wellness visit.    Waleska Hankins NP

## 2020-01-02 DIAGNOSIS — R41.3 MEMORY LOSS: ICD-10-CM

## 2020-01-02 RX ORDER — GABAPENTIN 100 MG/1
200 CAPSULE ORAL NIGHTLY
Qty: 60 CAPSULE | Refills: 0 | Status: SHIPPED | OUTPATIENT
Start: 2020-01-02 | End: 2020-02-22

## 2020-01-02 RX ORDER — DONEPEZIL HYDROCHLORIDE 10 MG/1
10 TABLET, FILM COATED ORAL NIGHTLY
Qty: 90 TABLET | Refills: 0 | Status: SHIPPED | OUTPATIENT
Start: 2020-01-02 | End: 2020-04-09

## 2020-01-02 RX ORDER — LISINOPRIL 5 MG/1
5 TABLET ORAL DAILY
Qty: 30 TABLET | Refills: 3 | Status: SHIPPED | OUTPATIENT
Start: 2020-01-02 | End: 2020-05-31

## 2020-01-02 RX ORDER — METOPROLOL TARTRATE 25 MG/1
25 TABLET, FILM COATED ORAL 2 TIMES DAILY
Qty: 60 TABLET | Refills: 3 | Status: SHIPPED | OUTPATIENT
Start: 2020-01-02 | End: 2020-05-17

## 2020-01-02 RX ORDER — OXYBUTYNIN CHLORIDE 5 MG/1
5 TABLET ORAL 3 TIMES DAILY
Qty: 90 TABLET | Refills: 0 | Status: SHIPPED | OUTPATIENT
Start: 2020-01-02 | End: 2020-04-09

## 2020-01-02 NOTE — TELEPHONE ENCOUNTER
----- Message from Yaa Barr sent at 1/2/2020  2:58 PM CST -----  Contact: [facility] calling states pt in need of Rx-refills and wants to know if the Dr can call them in,pt is bed bound....[phone]

## 2020-01-02 NOTE — TELEPHONE ENCOUNTER
Emelina with [facility] states pt and her spouse are both bed bound at this point They have home health as well as private sitters that are keeping them turned q2h. Pt is in need of refills.

## 2020-01-08 ENCOUNTER — TELEPHONE (OUTPATIENT)
Dept: FAMILY MEDICINE | Facility: CLINIC | Age: 85
End: 2020-01-08

## 2020-01-08 NOTE — TELEPHONE ENCOUNTER
----- Message from Casandra Jerome sent at 1/8/2020  4:23 PM CST -----  Contact: daughter/loree  Type: Needs Medical Advice    Who Called:  Loree  Best Call Back Number: [phone]  Additional Information: Loree states patient is out of [facility] and also Latasha Hoopern mrnMRN:  [id number] patient's .  These patient's are home bound and homehealthcare is coming but both patient's need to see doctor for continued care of homehealth but pateint's are bed bound.  Please call to advise.  Thanks!

## 2020-01-08 NOTE — TELEPHONE ENCOUNTER
Pt and her  are home from SNF and Medicare is requiring them to be seen by PCP to continue HH. They are both bedbound and in hospital beds. Pt is on feeding tube and has catheter and their daughter does not know how she could possibly get them into the office for a visit. She is hoping Dr. Bill or an a NP can go out to see them so they can continue getting HH care.

## 2020-01-09 NOTE — TELEPHONE ENCOUNTER
Reviewed with pt's daughter, she states they are using [facility]. Will call them tomorrow as office is closed this evening.

## 2020-01-09 NOTE — TELEPHONE ENCOUNTER
I have no objection to signing the orders.  And I thought that Medicare made some changes to make exceptions for patientd that just could not get out of bed.  My interpretation of the rules is that as long as the patient was discharged by a physician who recommended home health then that physician counts as being seen, I can sign and take over orders.  If I am misinterpreting this or if the home health company does not agree with this then this might be challenging.

## 2020-01-10 ENCOUNTER — TELEPHONE (OUTPATIENT)
Dept: FAMILY MEDICINE | Facility: CLINIC | Age: 85
End: 2020-01-10

## 2020-01-10 NOTE — TELEPHONE ENCOUNTER
I spoke with  at [facility], she states pt and her  are in no immediate danger of their HH ending. They have at least 60 days remaining. She will check with her Medicare biller and review guidelines and find out if the Care at Home visit that pt had last month will be sufficient or what they can do to keep pt active on HH. She will give us a call back next week.     LM for pt's daughter to call back to keep her informed.

## 2020-01-10 NOTE — TELEPHONE ENCOUNTER
----- Message from Aylin Guerrero sent at 1/10/2020 10:22 AM CST -----  Type:  Patient Returning Call    Who Called:  Loree - daughter  Who Left Message for Patient:  Bridgette Mandujano  Does the patient know what this is regarding?:  Previous message  Best Call Back Number:  [phone]  Additional Information:

## 2020-01-13 ENCOUNTER — TELEPHONE (OUTPATIENT)
Dept: HOME HEALTH SERVICES | Facility: HOSPITAL | Age: 85
End: 2020-01-13

## 2020-01-19 ENCOUNTER — HOSPITAL ENCOUNTER (EMERGENCY)
Facility: HOSPITAL | Age: 85
Discharge: HOME | End: 2020-01-19
Attending: EMERGENCY MEDICINE
Payer: MEDICARE

## 2020-01-19 VITALS
DIASTOLIC BLOOD PRESSURE: 58 MMHG | HEART RATE: 74 BPM | SYSTOLIC BLOOD PRESSURE: 124 MMHG | OXYGEN SATURATION: 100 % | RESPIRATION RATE: 16 BRPM | TEMPERATURE: 98 F

## 2020-01-19 DIAGNOSIS — K59.00 CONSTIPATION: ICD-10-CM

## 2020-01-19 DIAGNOSIS — Z46.6 CATHETER (URINE) CHANGE REQUIRED: ICD-10-CM

## 2020-01-19 DIAGNOSIS — K59.00 CONSTIPATION, UNSPECIFIED CONSTIPATION TYPE: ICD-10-CM

## 2020-01-19 DIAGNOSIS — N39.0 URINARY TRACT INFECTION WITHOUT HEMATURIA, SITE UNSPECIFIED: Primary | ICD-10-CM

## 2020-01-19 LAB
BACTERIA #/AREA URNS HPF: ABNORMAL /HPF
BILIRUB UR QL STRIP: NEGATIVE
CLARITY UR: ABNORMAL
COLOR UR: YELLOW
GLUCOSE UR QL STRIP: NEGATIVE
HGB UR QL STRIP: ABNORMAL
KETONES UR QL STRIP: NEGATIVE
LEUKOCYTE ESTERASE UR QL STRIP: ABNORMAL
MICROSCOPIC COMMENT: ABNORMAL
NITRITE UR QL STRIP: NEGATIVE
PH UR STRIP: >8 [PH] (ref 5–8)
PROT UR QL STRIP: ABNORMAL
RBC #/AREA URNS HPF: 5 /HPF (ref 0–4)
SP GR UR STRIP: 1.01 (ref 1–1.03)
URN SPEC COLLECT METH UR: ABNORMAL
UROBILINOGEN UR STRIP-ACNC: NEGATIVE EU/DL
WBC #/AREA URNS HPF: 2 /HPF (ref 0–5)

## 2020-01-19 PROCEDURE — 96365 THER/PROPH/DIAG IV INF INIT: CPT

## 2020-01-19 PROCEDURE — 81001 URINALYSIS AUTO W/SCOPE: CPT

## 2020-01-19 PROCEDURE — 99284 EMERGENCY DEPT VISIT MOD MDM: CPT | Mod: 25

## 2020-01-19 PROCEDURE — 96366 THER/PROPH/DIAG IV INF ADDON: CPT

## 2020-01-19 PROCEDURE — 63600175 PHARM REV CODE 636 W HCPCS: Performed by: NURSE PRACTITIONER

## 2020-01-19 RX ORDER — NITROFURANTOIN 25; 75 MG/1; MG/1
100 CAPSULE ORAL 2 TIMES DAILY
Qty: 20 CAPSULE | Refills: 0 | Status: SHIPPED | OUTPATIENT
Start: 2020-01-19 | End: 2020-01-29

## 2020-01-19 RX ADMIN — CEFTRIAXONE SODIUM 1 G: 1 INJECTION, POWDER, FOR SOLUTION INTRAMUSCULAR; INTRAVENOUS at 02:01

## 2020-01-19 NOTE — ED NOTES
"Pts daughter states that pts aponte catheter is leaking. Pt is coming from home, had catheter placed one week ago. EMS states that daughter also c/o pt having cough, but they never heard pt cough en route an lung sounds were clear. Pt is resting on stretcher, NAD, aaax3 and is alone in room.    Daughter also states pt is having "klaus-like" stools that are staying in the rectal area. She states shes afraid there may be a "blockage" and that some stool is "going around the blockage."  "

## 2020-01-19 NOTE — ED NOTES
Bed: 26  Expected date:   Expected time:   Means of arrival:   Comments:  Scherer catheter problem

## 2020-01-19 NOTE — ED PROVIDER NOTES
Encounter Date: 1/19/2020       History     Chief Complaint   Patient presents with    Scherer catheter problem     Presents with request to change out catheter  Daughter states the urine is leaking around the catheter.  Was changed 3 days ago by the home health nurse  She is also requesting a chest x-ray due to the pt coughing Denies fever        Review of patient's allergies indicates:   Allergen Reactions    Brimonidine Other (See Comments)     Redness and irritation of eyes     Past Medical History:   Diagnosis Date    Anticoagulant long-term use     ASA 81mg, Fish Oil    Arthritis     Cataract     OU done//    COPD (chronic obstructive pulmonary disease)     Coronary artery disease     DDD (degenerative disc disease), lumbar     Glaucoma     suspect    Hyperlipidemia     Low back pain     Onychomycosis due to dermatophyte 11/26/2014    Osteoporosis     Positive PPD 1970    Seasonal allergies     Stroke      Past Surgical History:   Procedure Laterality Date    BREAST BIOPSY Right     2012 neg    CATARACT EXTRACTION W/  INTRAOCULAR LENS IMPLANT Bilateral 10/15/14     Dr Wright    COLONOSCOPY      Epidural Steroid injection      Pain Management    INJECTION OF JOINT Right 4/3/2019    Procedure: Injection, Joint, Hip;  Surgeon: Tashi Morrison MD;  Location: [facility] OR;  Service: Pain Management;  Laterality: Right;    TONSILLECTOMY  1941     Family History   Problem Relation Age of Onset    Cancer Father         Rectal    Glaucoma Mother     Cancer Mother         lung, throat    Cancer Brother         throat    Hypertension Brother     Stroke Brother     Diabetes Sister     Stroke Sister     Amblyopia Neg Hx     Blindness Neg Hx     Cataracts Neg Hx     Macular degeneration Neg Hx     Retinal detachment Neg Hx     Strabismus Neg Hx     Thyroid disease Neg Hx     Nephrolithiasis Neg Hx      Social History     Tobacco Use    Smoking status: Former Smoker     Packs/day: 0.25      Start date: 1961     Last attempt to quit: 1992     Years since quittin.6    Smokeless tobacco: Never Used   Substance Use Topics    Alcohol use: No    Drug use: No     Review of Systems   Constitutional: Negative for fever.   Respiratory: Positive for cough. Negative for shortness of breath and wheezing.    Cardiovascular: Negative for chest pain, palpitations and leg swelling.   Gastrointestinal: Negative for abdominal pain, diarrhea, nausea and vomiting.   Genitourinary: Negative for dysuria and hematuria.   Musculoskeletal: Negative for back pain.   Skin: Negative for rash.   Neurological: Negative for weakness.       Physical Exam     Initial Vitals [20 1136]   BP Pulse Resp Temp SpO2   (!) 100/58 74 18 98 °F (36.7 °C) 98 %      MAP       --         Physical Exam    Constitutional: She appears well-developed and well-nourished.   HENT:   Head: Normocephalic and atraumatic.   Eyes: Conjunctivae are normal.   Neck: Normal range of motion. Neck supple.   Cardiovascular: Normal rate and regular rhythm.   Pulmonary/Chest: Breath sounds normal. No respiratory distress.   Abdominal: Soft. Bowel sounds are normal.   Peg tube in place and dry    Genitourinary:   Genitourinary Comments: There is urine stain in the brief where the catheter leaked   Musculoskeletal: Normal range of motion.   Neurological: She is alert and oriented to person, place, and time. GCS eye subscore is 4. GCS verbal subscore is 5. GCS motor subscore is 6.   Pt is flaccid to the right side from a stroke. She is non verbal   Skin: Skin is warm and dry. Capillary refill takes less than 2 seconds.   Psychiatric: She has a normal mood and affect. Thought content normal.         ED Course   Procedures  Labs Reviewed   URINALYSIS, REFLEX TO URINE CULTURE - Abnormal; Notable for the following components:       Result Value    Appearance, UA Hazy (*)     pH, UA >8.0 (*)     Protein, UA Trace (*)     Occult Blood UA 3+ (*)      Leukocytes, UA 3+ (*)     All other components within normal limits    Narrative:     Preferred Collection Type->Urine, Clean Catch  Specimen Source->Urine   URINALYSIS MICROSCOPIC - Abnormal; Notable for the following components:    RBC, UA 5 (*)     All other components within normal limits    Narrative:     Preferred Collection Type->Urine, Clean Catch  Specimen Source->Urine          Imaging Results          X-Ray Chest AP Portable (Final result)  Result time 01/19/20 13:29:12    Final result by Calixto Bartholomew MD (01/19/20 13:29:12)                 Impression:      No acute cardiac or pulmonary process.      Electronically signed by: Calixto Bartholomew MD  Date:    01/19/2020  Time:    13:29             Narrative:    CLINICAL HISTORY:  ([id number])84 y/o  (2/28/1934) F    cough;    TECHNIQUE:  (A#[id number], exam time 1/19/2020 13:25)    XR CHEST AP PORTABLE [id number]    COMPARISON:  Most recent from 08/19/2019    FINDINGS:  The lungs are clear. Costophrenic angles are seen without effusion. No pneumothorax is identified. The heart is normal in size. The mediastinum is within normal limits. Osseous structures show degenerative disc disease and degenerative changes in the shoulders. The visualized upper abdomen is unremarkable.                               X-Ray Abdomen AP 1 View (KUB) (Final result)  Result time 01/19/20 13:28:07    Final result by Calixto Bartholomew MD (01/19/20 13:28:07)                 Impression:      1.  Peg tube with balloon projecting to the upper abdomen.    2.  Moderate volume of stool and gas seen in the colon with a nonobstructive bowel gas pattern.      Electronically signed by: Calixto Bartholomew MD  Date:    01/19/2020  Time:    13:28             Narrative:    CLINICAL HISTORY:  ([id number])84 y/o  (2/28/1934) F    Constipation, unspecified    TECHNIQUE:  (A#[id number], exam time 1/19/2020 13:24)    XR ABDOMEN AP 1 VIEW [id number]    COMPARISON:  Most recent CT from  08/28/2019    FINDINGS:  There is a PEG tube balloon projecting to the left of midline in the upper abdomen.    There are no abnormally dilated gas filled loops of large or small bowel to suggest bowel obstruction. There is no pneumatosis or gross pneumoperitoneum. Moderate volume of stool and gas is seen in the colon. Osseous structures show degenerative changes in the spine.                                 Medical Decision Making:   Initial Assessment:   Cough - request for change in aponte  Nurse reports difficult time inserting catheter  Urine appeared to leak around the catheter after insertion  I have instructed the daughter that this catheter would probably leak as well and that she needs to be seen by a urologist               Attending Attestation:     Physician Attestation Statement for NP/PA:   I discussed this assessment and plan of this patient with the NP/PA, but I did not personally examine the patient. The face to face encounter was performed by the NP/PA.                                Clinical Impression:       ICD-10-CM ICD-9-CM   1. Urinary tract infection without hematuria, site unspecified N39.0 599.0   2. Constipation K59.00 564.00   3. Catheter (urine) change required Z46.6 V53.6   4. Constipation, unspecified constipation type K59.00 564.00                             Darlene Nguyen NP  01/19/20 1534       Sarbjit Murcia MD  01/20/20 3625

## 2020-02-04 ENCOUNTER — TELEPHONE (OUTPATIENT)
Dept: FAMILY MEDICINE | Facility: CLINIC | Age: 85
End: 2020-02-04

## 2020-02-04 NOTE — TELEPHONE ENCOUNTER
Please review msg from  nurse and advise of any recommendations. Should pt come in for lung eval and/or CXR or would you like to order neb machine and meds?  No fever. Cough is not productive. Pt is not getting out of bed except maybe once every few days. Nurse feels family is not likely elevating her enough since pt slides down into the bed if upright. Lungs sound fine when HH nurse listens to pt breathing normally.  Please review and advise how you would like to address the cough issue.  VS's today were:  BP  112/66  P 87  O2Sat: 95  Temp: 98.0    Pt went to ED on 1/19/20 and had norm CXR.  nurse would recommend poss hospice. However, pt and  were both in [facility] not long ago. The nursing home introduced the idea of hospice or long term residence in [facility] and the dtr declined and brought them home on home health.

## 2020-02-04 NOTE — TELEPHONE ENCOUNTER
I am okay authorize in a nebulizer and duoneb treatments QID, we just need to know where to send this and how to issue the prescription.      However if she is on continuous tube feeds and being laid flat on her back she could be having some chronic aspiration as well either way they need to elevate the head of the bed.

## 2020-02-04 NOTE — TELEPHONE ENCOUNTER
----- Message from Izaiah Pena sent at 2/4/2020 12:11 PM CST -----  Contact: Emelina ([facility])  Type: Needs Medical Advice    Who Called:  Emelina  Best Call Back Number: [phone]  Additional Information: Caller states patient has had an increased cough, tube feeder continuous, lung sounds are unable to be accurately measured due to inability to deep breathe secondary to cognitive imparement. Caller states family is requesting nebulizer treatments and machine. Please call to advise. Thanks!

## 2020-02-05 RX ORDER — NEBULIZER AND COMPRESSOR
EACH MISCELLANEOUS
Qty: 1 EACH | Refills: 0 | Status: SHIPPED | OUTPATIENT
Start: 2020-02-05 | End: 2020-02-18 | Stop reason: SDUPTHER

## 2020-02-05 RX ORDER — IPRATROPIUM BROMIDE AND ALBUTEROL SULFATE 2.5; .5 MG/3ML; MG/3ML
3 SOLUTION RESPIRATORY (INHALATION) EVERY 6 HOURS PRN
Qty: 360 ML | Status: SHIPPED | OUTPATIENT
Start: 2020-02-05 | End: 2020-02-18 | Stop reason: SDUPTHER

## 2020-02-05 NOTE — TELEPHONE ENCOUNTER
Spoke with Emelina, home health nurse,[facility] please send machine and duoneb to [facility] pharmacy.   States pt has a sitter, states education was provided on elevating the HOB, states also educated to hold feed during bahting and changing when HOB is lowered.

## 2020-02-12 PROCEDURE — G0180 PR HOME HEALTH MD CERTIFICATION: ICD-10-PCS | Mod: ,,, | Performed by: FAMILY MEDICINE

## 2020-02-12 PROCEDURE — G0180 MD CERTIFICATION HHA PATIENT: HCPCS | Mod: ,,, | Performed by: FAMILY MEDICINE

## 2020-02-13 ENCOUNTER — HOSPITAL ENCOUNTER (EMERGENCY)
Facility: HOSPITAL | Age: 85
Discharge: HOME | End: 2020-02-13
Attending: EMERGENCY MEDICINE
Payer: MEDICARE

## 2020-02-13 ENCOUNTER — TELEPHONE (OUTPATIENT)
Dept: HOME HEALTH SERVICES | Facility: HOSPITAL | Age: 85
End: 2020-02-13

## 2020-02-13 VITALS
WEIGHT: 125 LBS | SYSTOLIC BLOOD PRESSURE: 107 MMHG | RESPIRATION RATE: 16 BRPM | HEART RATE: 85 BPM | HEIGHT: 63 IN | TEMPERATURE: 98 F | DIASTOLIC BLOOD PRESSURE: 71 MMHG | OXYGEN SATURATION: 99 % | BODY MASS INDEX: 22.15 KG/M2

## 2020-02-13 DIAGNOSIS — N30.00 ACUTE CYSTITIS WITHOUT HEMATURIA: ICD-10-CM

## 2020-02-13 DIAGNOSIS — K56.41 FECAL IMPACTION IN RECTUM: Primary | ICD-10-CM

## 2020-02-13 DIAGNOSIS — R10.30 LOWER ABDOMINAL PAIN: ICD-10-CM

## 2020-02-13 LAB
ALBUMIN SERPL BCP-MCNC: 2.6 G/DL (ref 3.5–5.2)
ALP SERPL-CCNC: 97 U/L (ref 55–135)
ALT SERPL W/O P-5'-P-CCNC: 20 U/L (ref 10–44)
ANION GAP SERPL CALC-SCNC: 7 MMOL/L (ref 8–16)
ANISOCYTOSIS BLD QL SMEAR: SLIGHT
AST SERPL-CCNC: 14 U/L (ref 10–40)
BACTERIA #/AREA URNS HPF: ABNORMAL /HPF
BASOPHILS NFR BLD: 1 % (ref 0–1.9)
BILIRUB SERPL-MCNC: 0.2 MG/DL (ref 0.1–1)
BILIRUB UR QL STRIP: NEGATIVE
BUN SERPL-MCNC: 35 MG/DL (ref 8–23)
CALCIUM SERPL-MCNC: 8.9 MG/DL (ref 8.7–10.5)
CHLORIDE SERPL-SCNC: 107 MMOL/L (ref 95–110)
CLARITY UR: ABNORMAL
CO2 SERPL-SCNC: 25 MMOL/L (ref 23–29)
COLOR UR: YELLOW
CREAT SERPL-MCNC: 0.7 MG/DL (ref 0.5–1.4)
DIFFERENTIAL METHOD: ABNORMAL
EOSINOPHIL NFR BLD: 6 % (ref 0–8)
ERYTHROCYTE [DISTWIDTH] IN BLOOD BY AUTOMATED COUNT: 17.5 % (ref 11.5–14.5)
EST. GFR  (AFRICAN AMERICAN): >60 ML/MIN/1.73 M^2
EST. GFR  (NON AFRICAN AMERICAN): >60 ML/MIN/1.73 M^2
GLUCOSE SERPL-MCNC: 102 MG/DL (ref 70–110)
GLUCOSE UR QL STRIP: NEGATIVE
HCT VFR BLD AUTO: 29.7 % (ref 37–48.5)
HGB BLD-MCNC: 9.5 G/DL (ref 12–16)
HGB UR QL STRIP: ABNORMAL
HYALINE CASTS #/AREA URNS LPF: 0 /LPF
HYPOCHROMIA BLD QL SMEAR: ABNORMAL
IMM GRANULOCYTES # BLD AUTO: ABNORMAL K/UL (ref 0–0.04)
KETONES UR QL STRIP: NEGATIVE
LEUKOCYTE ESTERASE UR QL STRIP: ABNORMAL
LIPASE SERPL-CCNC: 5 U/L (ref 4–60)
LYMPHOCYTES NFR BLD: 23 % (ref 18–48)
MCH RBC QN AUTO: 28.5 PG (ref 27–31)
MCHC RBC AUTO-ENTMCNC: 32 G/DL (ref 32–36)
MCV RBC AUTO: 89 FL (ref 82–98)
MICROSCOPIC COMMENT: ABNORMAL
MONOCYTES NFR BLD: 11 % (ref 4–15)
NEUTROPHILS NFR BLD: 58 % (ref 38–73)
NEUTS BAND NFR BLD MANUAL: 1 %
NITRITE UR QL STRIP: NEGATIVE
NRBC BLD-RTO: 0 /100 WBC
PH UR STRIP: 8 [PH] (ref 5–8)
PLATELET # BLD AUTO: 439 K/UL (ref 150–350)
PMV BLD AUTO: 10.8 FL (ref 9.2–12.9)
POTASSIUM SERPL-SCNC: 4.6 MMOL/L (ref 3.5–5.1)
PROT SERPL-MCNC: 6.6 G/DL (ref 6–8.4)
PROT UR QL STRIP: ABNORMAL
RBC # BLD AUTO: 3.33 M/UL (ref 4–5.4)
RBC #/AREA URNS HPF: >100 /HPF (ref 0–4)
SODIUM SERPL-SCNC: 139 MMOL/L (ref 136–145)
SP GR UR STRIP: 1.01 (ref 1–1.03)
URN SPEC COLLECT METH UR: ABNORMAL
UROBILINOGEN UR STRIP-ACNC: 1 EU/DL
WBC # BLD AUTO: 7.63 K/UL (ref 3.9–12.7)
WBC #/AREA URNS HPF: >100 /HPF (ref 0–5)

## 2020-02-13 PROCEDURE — 87077 CULTURE AEROBIC IDENTIFY: CPT

## 2020-02-13 PROCEDURE — 87086 URINE CULTURE/COLONY COUNT: CPT

## 2020-02-13 PROCEDURE — 51702 INSERT TEMP BLADDER CATH: CPT

## 2020-02-13 PROCEDURE — 80053 COMPREHEN METABOLIC PANEL: CPT

## 2020-02-13 PROCEDURE — 25500020 PHARM REV CODE 255: Performed by: EMERGENCY MEDICINE

## 2020-02-13 PROCEDURE — 99285 EMERGENCY DEPT VISIT HI MDM: CPT | Mod: 25

## 2020-02-13 PROCEDURE — 83690 ASSAY OF LIPASE: CPT

## 2020-02-13 PROCEDURE — 87088 URINE BACTERIA CULTURE: CPT

## 2020-02-13 PROCEDURE — 25000003 PHARM REV CODE 250: Performed by: EMERGENCY MEDICINE

## 2020-02-13 PROCEDURE — 87186 SC STD MICRODIL/AGAR DIL: CPT

## 2020-02-13 PROCEDURE — 81000 URINALYSIS NONAUTO W/SCOPE: CPT

## 2020-02-13 PROCEDURE — 85007 BL SMEAR W/DIFF WBC COUNT: CPT

## 2020-02-13 PROCEDURE — 36415 COLL VENOUS BLD VENIPUNCTURE: CPT

## 2020-02-13 PROCEDURE — 93005 ELECTROCARDIOGRAM TRACING: CPT | Mod: 59

## 2020-02-13 PROCEDURE — 85027 COMPLETE CBC AUTOMATED: CPT

## 2020-02-13 RX ORDER — NITROFURANTOIN 25 MG/5ML
100 SUSPENSION ORAL EVERY 12 HOURS
Qty: 200 ML | Refills: 0 | Status: SHIPPED | OUTPATIENT
Start: 2020-02-13 | End: 2020-02-18

## 2020-02-13 RX ORDER — ACETAMINOPHEN 325 MG/1
650 TABLET ORAL
Status: DISCONTINUED | OUTPATIENT
Start: 2020-02-13 | End: 2020-02-13

## 2020-02-13 RX ORDER — ACETAMINOPHEN 650 MG/20.3ML
650 LIQUID ORAL
Status: COMPLETED | OUTPATIENT
Start: 2020-02-13 | End: 2020-02-13

## 2020-02-13 RX ADMIN — IOHEXOL 75 ML: 350 INJECTION, SOLUTION INTRAVENOUS at 03:02

## 2020-02-13 RX ADMIN — ACETAMINOPHEN ORAL SOLUTION 650 MG: 650 SOLUTION ORAL at 01:02

## 2020-02-13 NOTE — ED NOTES
Attempt to remove aponte cath 74cc light yellow urine noted in aponte bag, balloon deflated with empty 10cc syringe appx 1 cc removed  From aponte, unable to remove aponte from urethra, tubing to aponte cut per Imtiaz CALVIN RN catheter remains unable to remove, brief saturated with urine and large brown stool. MD brought to beside also unable to inflate, deflate or remove urinary catheter. During turning pt to perform lizet care and remove saturated linens aponte cath tip found intact with small spot of blood, laying in lizet area MD aware. 16F aponte inserted from rear due to put very stiff and will not open legs, small amount 10cc light yellow urine return 20CC inserted into aponte balloon. Assisted by Imtiaz CALVIN RN and daughter at bedside holding pts hand.

## 2020-02-13 NOTE — ED PROVIDER NOTES
Encounter Date: 2/13/2020    SCRIBE #1 NOTE: I, Jah Herrera, andrew scribing for, and in the presence of, Emerson Amaya MD.       History     Chief Complaint   Patient presents with    Abdominal Pain     Home dinesh nurse called ems concerned pt was moaning more this morning and appeared to have R lower abd pain, pt is non-verbal and home health reported to EMS pt was at baseline mental status without changed       Time seen by provider: 12:38 PM on 02/13/2020    Breanne Culp is a 85 y.o. female who presents to the ED via EMS with an onset of moaning and right lower abdominal pain. This patient is non-verbal, however her home health nurse reported that she is at her baseline mental status. She endorses pain and points to the location when prompted. PMHx of Alzheimer's, anticoagulant use, CAD, COPD, and dementia. The HPI and ROS are limited due to the patient's mental and verbal limitations.    The history is provided by the patient, the nursing home and the EMS personnel.     Review of patient's allergies indicates:   Allergen Reactions    Brimonidine Other (See Comments)     Redness and irritation of eyes     Past Medical History:   Diagnosis Date    Anticoagulant long-term use     ASA 81mg, Fish Oil    Arthritis     Cataract     OU done//    COPD (chronic obstructive pulmonary disease)     Coronary artery disease     DDD (degenerative disc disease), lumbar     Glaucoma     suspect    Hyperlipidemia     Low back pain     Onychomycosis due to dermatophyte 11/26/2014    Osteoporosis     Positive PPD 1970    Seasonal allergies     Stroke      Past Surgical History:   Procedure Laterality Date    BREAST BIOPSY Right     2012 neg    CATARACT EXTRACTION W/  INTRAOCULAR LENS IMPLANT Bilateral 10/15/14     Dr Wright    COLONOSCOPY      Epidural Steroid injection      Pain Management    INJECTION OF JOINT Right 4/3/2019    Procedure: Injection, Joint, Hip;  Surgeon: Tashi Morrison MD;   Location: [facility] OR;  Service: Pain Management;  Laterality: Right;    TONSILLECTOMY       Family History   Problem Relation Age of Onset    Cancer Father         Rectal    Glaucoma Mother     Cancer Mother         lung, throat    Cancer Brother         throat    Hypertension Brother     Stroke Brother     Diabetes Sister     Stroke Sister     Amblyopia Neg Hx     Blindness Neg Hx     Cataracts Neg Hx     Macular degeneration Neg Hx     Retinal detachment Neg Hx     Strabismus Neg Hx     Thyroid disease Neg Hx     Nephrolithiasis Neg Hx      Social History     Tobacco Use    Smoking status: Former Smoker     Packs/day: 0.25     Start date: 1961     Last attempt to quit: 1992     Years since quittin.6    Smokeless tobacco: Never Used   Substance Use Topics    Alcohol use: No    Drug use: No     Review of Systems   Unable to perform ROS: Dementia       Physical Exam     Initial Vitals [20 1226]   BP Pulse Resp Temp SpO2   117/63 76 16 98.2 °F (36.8 °C) 97 %      MAP       --         Physical Exam    Nursing note and vitals reviewed.  Constitutional: She appears well-nourished. She is not diaphoretic. No distress.   HENT:   Head: Normocephalic and atraumatic.   Eyes: Conjunctivae are normal.   Neck: Neck supple.   Cardiovascular: Normal rate and regular rhythm.   Pulmonary/Chest: No respiratory distress.   Abdominal: Soft. She exhibits distension. She exhibits no mass. There is tenderness. There is guarding. There is no rebound and no CVA tenderness. No hernia.   Abdomen is soft and mildly distended with apparent tenderness primarily to the suprapubic region. She presents with voluntary but no involuntary guarding. No palpable masses, hernias, or CVA tenderness.   Musculoskeletal: She exhibits no edema.   Lymphadenopathy:     She has no cervical adenopathy.   Neurological: She is alert. GCS eye subscore is 4. GCS verbal subscore is 4. GCS motor subscore is 5.   Limited  ability to respond to questions. Can only intermittently answer questions. Does not follow commands.         ED Course   Procedures  Labs Reviewed   URINALYSIS, REFLEX TO URINE CULTURE - Abnormal; Notable for the following components:       Result Value    Appearance, UA Cloudy (*)     Protein, UA 2+ (*)     Occult Blood UA 3+ (*)     Leukocytes, UA 2+ (*)     All other components within normal limits    Narrative:     Preferred Collection Type->Urine, Catheterized   CBC W/ AUTO DIFFERENTIAL - Abnormal; Notable for the following components:    RBC 3.33 (*)     Hemoglobin 9.5 (*)     Hematocrit 29.7 (*)     RDW 17.5 (*)     Platelets 439 (*)     All other components within normal limits   COMPREHENSIVE METABOLIC PANEL - Abnormal; Notable for the following components:    BUN, Bld 35 (*)     Albumin 2.6 (*)     Anion Gap 7 (*)     All other components within normal limits   URINALYSIS MICROSCOPIC - Abnormal; Notable for the following components:    RBC, UA >100 (*)     WBC, UA >100 (*)     Bacteria Moderate (*)     All other components within normal limits    Narrative:     Preferred Collection Type->Urine, Catheterized   CULTURE, URINE   LIPASE        ECG Results          EKG 12-lead (In process)  Result time 02/13/20 15:50:33    In process by Interface, Lab In [facility] (02/13/20 15:50:33)                 Narrative:    Test Reason : R10.30,    Vent. Rate : 081 BPM     Atrial Rate : 081 BPM     P-R Int : 126 ms          QRS Dur : 088 ms      QT Int : 398 ms       P-R-T Axes : 067 017 065 degrees     QTc Int : 462 ms    Normal sinus rhythm  Normal ECG  When compared with ECG of 19-AUG-2019 22:21,  Vent. rate has decreased BY  53 BPM    Referred By: [email or web address]   SELF           Confirmed By:                             Imaging Results          CT Abdomen Pelvis With Contrast (Final result)  Result time 02/13/20 15:33:23    Final result by Radha Vaz MD (02/13/20 15:33:23)                 Impression:      Large  amount of stool within and distending the rectum.  Diffuse bladder wall thickening.  Bladder decompressed with a Scherer catheter present.    Diverticulosis without CT findings of acute diverticulitis and additional findings as detailed above including peg tube present.  Stable appearance of the 6 mm nodule right lung base..      Electronically signed by: Radha Vaz MD  Date:    02/13/2020  Time:    15:33             Narrative:    EXAMINATION:  CT ABDOMEN PELVIS WITH CONTRAST    CLINICAL HISTORY:  RLQ pain, appendicitis suspected;    TECHNIQUE:  Low dose axial images, sagittal and coronal reformations were obtained from the lung bases to the pubic symphysis following the IV administration of 75 mL of Omnipaque 350 and without PO contrast    COMPARISON:  08/28/2019    FINDINGS:  6 mm nodular density right lung base remains not significantly changed.  There are mild dependent hypoventilatory changes bilaterally.  There are coronary artery calcifications    Liver unremarkable appearance.  No masses    No calcified stones the gallbladder or CT findings of acute cholecystitis.  No biliary duct dilatation    Spleen not enlarged    Adrenal glands unremarkable appearance    Pancreas mildly atrophied    Abdominal aorta no aneurysm.  Calcification the abdominal aorta and its major branches    Stomach, bowel, mesentery; peg tube present.  Normal appearance of the appendix.  No free intraperitoneal air or fluid.  No appreciable bowel wall thickening or inflammatory change.  Large amount of stool within and distending the rectum.  Diverticulosis without CT findings of acute diverticulitis.  No free intraperitoneal air or fluid    Kidneys, ureters, bladder; symmetrical renal enhancement with no hydronephrosis.  Urinary bladder decompressed with a Scherer catheter present bladder wall appears diffusely thick.    Reproductive organs unremarkable appearance    Osseous structures degenerative changes hips and.  Severe degenerative  change right hip and in the spine lumbar spine                                 Medical Decision Making:   History:   Old Medical Records: I decided to obtain old medical records.  Independently Interpreted Test(s):   I have ordered and independently interpreted EKG Reading(s) - see summary below  Clinical Tests:   Lab Tests: Ordered and Reviewed  Radiological Study: Ordered and Reviewed  Medical Tests: Ordered and Reviewed            Scribe Attestation:   Scribe #1: I performed the above scribed service and the documentation accurately describes the services I performed. I attest to the accuracy of the note.    I, Dr. Emerson Amaya, personally performed the services described in this documentation. All medical record entries made by the scribe were at my direction and in my presence.  I have reviewed the chart and agree that the record reflects my personal performance and is accurate and complete. Emerson Amaya MD.  6:02 PM 02/13/2020    Breanne Culp is a 85 y.o. female presenting with lower abdominal pain in this patient with limited historical ability secondary to chronic disabilities.  She is largely bed bound and nonverbal.  She appears to have some degree of mild stool impaction although did have a spontaneous bowel movement followed by this impaction.  CT of the abdomen and pelvis shows no sign of an acute process such as obstruction, perforation, volvulus, appendicitis, abscess, diverticulitis.  I do not think further surgical consultation or in hospital observation is indicated.  Scherer catheter removed with some difficulty requiring the catheter to be cut to deflate the balloon.  This was thereafter changed without incident.  Catheterized urine is positive with urine culture pending with nitrofurantoin initiated for treatment of possible acute cystitis.  I doubt pyelonephritis or sepsis.  Patient notably has no fever or leukocytosis.  I do not think she requires admission for IV antibiotics.   Follow up with PCP and Urology.  Family has questions regarding necessity of chronic catheter I encouraged her to discuss further with Urology.  Referral given.  Detailed, specific return precautions reviewed.        ED Course as of Feb 13 1802   Thu Feb 13, 2020   1307 EKG:  Normal sinus rhythm with ventricular rate of 81.  Normal axis.  Normal ST segments and T-waves.  Normal conduction.    [EH]   1549 Rectal exam performed with Gerri GAMEZ assist with stool impaction palpated and digitally removed.    [MR]   1703 Calculated creatinine clearance is adequate at 53 for nitrofurantoin therapy.    [MR]      ED Course User Index  [EH] Hernan Estes III, MD  [MR] Emerson Amaya MD                Clinical Impression:       ICD-10-CM ICD-9-CM   1. Fecal impaction in rectum K56.41 560.32   2. Lower abdominal pain R10.30 789.09   3. Acute cystitis without hematuria N30.00 595.0                             Emerson Amaya MD  02/13/20 5615

## 2020-02-13 NOTE — ED NOTES
Bladder scan 0 cc MD aware   Difficult to obtain scan correctly pt moving and firming up her abd during scan. Daughter remains at bedside throughout

## 2020-02-13 NOTE — ED NOTES
Order for ambulance transfer done per daughter request, daughter asking how much transfer will cost advised to call her insurance company provided with [facility] phone number to call.

## 2020-02-13 NOTE — ED NOTES
Daughter states that home health nurse told her that pt was moaning more than usual and lower abd is tender to touch. New aponte cath placed Monday by home health daughter states balloon was inflated to 25cc per home health nurse and that pt has been leaking urine into brief since Monday. Aponte was initially placed to reduce skin breakdown and healing to lizet area. Skin to buttock and lizet area looks intact with scars. Pt sleeping at this time not responsive to nurse or family speaking to her daughter states that she at her normal baseline after CVA. Deficits to right side post CVA. Daughter aware to notify nurse of needs or concerns.

## 2020-02-13 NOTE — DISCHARGE INSTRUCTIONS
Stool impaction noted here.  Consider laxative to assist bowel movements as needed at home.    Scherer catheter replaced here.  Discuss further management with PCP and urology.

## 2020-02-14 ENCOUNTER — EXTERNAL HOME HEALTH (OUTPATIENT)
Dept: HOME HEALTH SERVICES | Facility: HOSPITAL | Age: 85
End: 2020-02-14
Payer: COMMERCIAL

## 2020-02-14 NOTE — ED NOTES
Transferred to  per Imtiaz CALVIN RN and transferred to private auto the same. Daughter Given written and verbal DC instructions questions answered per MD aware to follow up with PCP encouraged to return if needed. Given RX with teaching.   Both IV's removed intact. Scherer draining clear yellow urine 150cc on DC. Brief CDI

## 2020-02-16 LAB — BACTERIA UR CULT: ABNORMAL

## 2020-02-17 ENCOUNTER — TELEPHONE (OUTPATIENT)
Dept: FAMILY MEDICINE | Facility: CLINIC | Age: 85
End: 2020-02-17

## 2020-02-17 DIAGNOSIS — N39.0 URINARY TRACT INFECTION WITHOUT HEMATURIA, SITE UNSPECIFIED: Primary | ICD-10-CM

## 2020-02-17 RX ORDER — AMOXICILLIN AND CLAVULANATE POTASSIUM 875; 125 MG/1; MG/1
1 TABLET, FILM COATED ORAL 2 TIMES DAILY
Qty: 14 TABLET | Refills: 0 | Status: SHIPPED | OUTPATIENT
Start: 2020-02-17 | End: 2020-04-02

## 2020-02-17 NOTE — TELEPHONE ENCOUNTER
----- Message from Jes Adamson NP sent at 2/16/2020 10:06 AM CST -----  Pt was discharged on macrobid. Tried to call and speak with pt's caregiver to have pt changed to augmentin but was told the number listed was incorrect. Certified letter will be sent.

## 2020-02-17 NOTE — TELEPHONE ENCOUNTER
----- Message from Reshma Elizabeth sent at 2/17/2020 10:33 AM CST -----  Contact: Daughter, Loree Ralph miss call from your office please call back at [phone]    Case number [id number]

## 2020-02-17 NOTE — TELEPHONE ENCOUNTER
The previous message was forwarded to me.  Apparently she went to the emergency room.  They needed to adjust the antibiotic prescribed.  I do not know whether the patient is taking the new antibiotic or not, I can't determine whether was sent in and or whether the patient was actually reached in time.

## 2020-02-17 NOTE — TELEPHONE ENCOUNTER
Spoke with pt's daughter, confirmed she is not aware of augmentin. She would like to sent to [facility].

## 2020-02-18 ENCOUNTER — TELEPHONE (OUTPATIENT)
Dept: HOME HEALTH SERVICES | Facility: HOSPITAL | Age: 85
End: 2020-02-18

## 2020-02-18 RX ORDER — NEBULIZER AND COMPRESSOR
EACH MISCELLANEOUS
Qty: 1 EACH | Refills: 0 | Status: SHIPPED | OUTPATIENT
Start: 2020-02-18 | End: 2020-04-13

## 2020-02-18 RX ORDER — IPRATROPIUM BROMIDE AND ALBUTEROL SULFATE 2.5; .5 MG/3ML; MG/3ML
3 SOLUTION RESPIRATORY (INHALATION) EVERY 6 HOURS PRN
Qty: 360 ML | Status: SHIPPED | OUTPATIENT
Start: 2020-02-18 | End: 2020-08-07 | Stop reason: CLARIF

## 2020-02-18 NOTE — TELEPHONE ENCOUNTER
Spoke with Tiburcio at [facility], he states Medicare is billing duoneb as Medicare Part B and they do not handle those claims. Rxs need to be resent to Mary.

## 2020-02-18 NOTE — TELEPHONE ENCOUNTER
----- Message from Odell Macdonald sent at 2/18/2020 10:29 AM CST -----  Contact: Tiburcio Kc ' [phone]  Tiburcio would like a call back from your office regarding the prescription albuterol-ipratropium (DUO-NEB) 2.5 mg-0.5 mg/3 mL nebulizer solution , states their pharmacy can not fill it due to insurance. Please Advise.

## 2020-02-18 NOTE — TELEPHONE ENCOUNTER
[facility] Order # [id number] [facility] - Dr. Frantz Bill

## 2020-02-19 ENCOUNTER — EXTERNAL HOME HEALTH (OUTPATIENT)
Dept: HOME HEALTH SERVICES | Facility: HOSPITAL | Age: 85
End: 2020-02-19
Payer: MEDICARE

## 2020-02-21 ENCOUNTER — TELEPHONE (OUTPATIENT)
Dept: FAMILY MEDICINE | Facility: CLINIC | Age: 85
End: 2020-02-21

## 2020-02-21 NOTE — TELEPHONE ENCOUNTER
If she has completed at least 3 days of the Augmentin then I would recommend stopping antibiotics.  Taking a probiotic may be beneficial.  If urinary symptoms persist or continue then we can repeat a culture next week.  If she has not completed 3 days of the antibiotic I can consider switching this but if she is doing well otherwise I am hesitant to start anything new until I know the diarrhea has resolved.

## 2020-02-21 NOTE — TELEPHONE ENCOUNTER
----- Message from Felipa Dong sent at 2/21/2020  4:21 PM CST -----  Contact: Emelina sarah/[facility] [phone]  Patient is taking Amoxicillin, her daughter called to report she is having diarrhea with it. She wants to know if she can take something else? Thank you!

## 2020-02-21 NOTE — TELEPHONE ENCOUNTER
----- Message from Katja Lancaster sent at 2/21/2020  4:51 PM CST -----  Contact: Loree Jacobs (daughter) [phone]   Loree Jacobs (daughter) [phone] requesting a call from the nurse stated she waiting to hear from the nurse in regards to the patient health.

## 2020-02-21 NOTE — TELEPHONE ENCOUNTER
----- Message from Yaa Barr sent at 2/21/2020 12:51 PM CST -----  Contact: loree-daughter  Pt daughter Loree calling states that she is needing to speak to the office concerning something that is going on with her mother....[phone]

## 2020-02-21 NOTE — TELEPHONE ENCOUNTER
Had to change abx that she left hospital with due to culture results.  Having severe diarrhea with Augmentin now.  Last dose was yesterday evening and still having diarrhea.States has 2 days and 1 pill left. Did not take the morning dose. Asking for recommendations. Need probiotic? New abx? Patient does have a feeding tube.

## 2020-02-22 RX ORDER — GABAPENTIN 100 MG/1
CAPSULE ORAL
Qty: 60 CAPSULE | Refills: 2 | Status: SHIPPED | OUTPATIENT
Start: 2020-02-22 | End: 2020-05-31

## 2020-03-02 ENCOUNTER — TELEPHONE (OUTPATIENT)
Dept: FAMILY MEDICINE | Facility: CLINIC | Age: 85
End: 2020-03-02

## 2020-03-02 DIAGNOSIS — R30.0 DYSURIA: Primary | ICD-10-CM

## 2020-03-02 NOTE — TELEPHONE ENCOUNTER
Spoke with Emelina, she states pt stopped augmentin due to diarrhea. Urine is leaking from catheter. HH is having to change catheter almost weekly which is exposing pt to higher risk of uti. She is currently having dysuria and they are requesting urine testing. HH nurse would like to discontinue catheter but family wants it in place bc pt has to be changed so often without it.

## 2020-03-02 NOTE — TELEPHONE ENCOUNTER
Spoke w/ Emelina w/ [facility]. Advised as recommended. She will have  go out for hospice eval and will contact us to discuss. Pt was in hospital almost all of last year. While pt was in rehab, inpatient, she was temp admitted to hospice while there and then dtr decided to just take her home from the facility. Pt is having more probs w/ her catheter coming out almost daily and could be causing the frequent UTI's. Her nurse feels hospice or AIM may be the way to go. They will consult with family and let them know pt will also need to see PCP or Urology in near future.

## 2020-03-02 NOTE — TELEPHONE ENCOUNTER
Okay to authorize urinalysis and culture.  I have not seen her since 2018 nor discussed with family recently.  Sounds like it may be time to consider hospice.  This is not something I would generally do over the phone but maybe the home health nurse can mention this topic and explore whether not it may be reasonable to investigate further.

## 2020-03-02 NOTE — TELEPHONE ENCOUNTER
----- Message from Aric Joshi sent at 3/2/2020  8:43 AM CST -----  Type: Needs Medical Advice    Who Called:  Aurora GEE    Best Call Back Number: [phone]  Additional Information: Caller states that there is a suspected re occurance of a UTI.  Requesting order to obtain a UA with C&S  Please fax signed order to [phone]

## 2020-03-03 ENCOUNTER — LAB VISIT (OUTPATIENT)
Dept: LAB | Facility: HOSPITAL | Age: 85
End: 2020-03-03
Attending: FAMILY MEDICINE
Payer: MEDICARE

## 2020-03-03 DIAGNOSIS — E43 ALIMENTARY EDEMA: ICD-10-CM

## 2020-03-03 DIAGNOSIS — I25.10 CORONARY ATHEROSCLEROSIS OF NATIVE CORONARY ARTERY: ICD-10-CM

## 2020-03-03 DIAGNOSIS — I69.320 APHASIA S/P CVA: ICD-10-CM

## 2020-03-03 DIAGNOSIS — Z46.6 FITTING AND ADJUSTMENT OF URINARY DEVICE: ICD-10-CM

## 2020-03-03 DIAGNOSIS — M51.36 DEGENERATION OF LUMBAR INTERVERTEBRAL DISC: ICD-10-CM

## 2020-03-03 DIAGNOSIS — R13.10 PROBLEMS WITH SWALLOWING AND MASTICATION: ICD-10-CM

## 2020-03-03 DIAGNOSIS — M16.11 PRIMARY OSTEOARTHRITIS OF RIGHT HIP: ICD-10-CM

## 2020-03-03 DIAGNOSIS — Z74.01 BED CONFINEMENT STATUS: ICD-10-CM

## 2020-03-03 DIAGNOSIS — M51.16 NEURITIS OR RADICULITIS DUE TO RUPTURE OF LUMBAR INTERVERTEBRAL DISC: ICD-10-CM

## 2020-03-03 DIAGNOSIS — G30.9 ALZHEIMER'S DISEASE: ICD-10-CM

## 2020-03-03 DIAGNOSIS — I69.391 DYSPHAGIA STATUS POST CEREBROVASCULAR ACCIDENT: Primary | ICD-10-CM

## 2020-03-03 DIAGNOSIS — Z43.1 ATTENTION TO GASTROSTOMY: ICD-10-CM

## 2020-03-03 DIAGNOSIS — F02.80 ALZHEIMER'S DISEASE: ICD-10-CM

## 2020-03-03 DIAGNOSIS — I10 ESSENTIAL HYPERTENSION, MALIGNANT: ICD-10-CM

## 2020-03-03 DIAGNOSIS — N32.81 DETRUSOR INSTABILITY OF BLADDER: ICD-10-CM

## 2020-03-03 DIAGNOSIS — I65.23 BILATERAL CAROTID ARTERY OCCLUSION: ICD-10-CM

## 2020-03-03 DIAGNOSIS — J44.89 OBSTRUCTIVE CHRONIC BRONCHITIS WITHOUT EXACERBATION: ICD-10-CM

## 2020-03-03 DIAGNOSIS — M48.061 SPINAL STENOSIS, LUMBAR REGION, WITHOUT NEUROGENIC CLAUDICATION: ICD-10-CM

## 2020-03-03 LAB
BACTERIA #/AREA URNS HPF: ABNORMAL /HPF
BILIRUB UR QL STRIP: NEGATIVE
CLARITY UR: ABNORMAL
COLOR UR: YELLOW
GLUCOSE UR QL STRIP: NEGATIVE
HGB UR QL STRIP: ABNORMAL
HYALINE CASTS #/AREA URNS LPF: 26 /LPF
KETONES UR QL STRIP: NEGATIVE
LEUKOCYTE ESTERASE UR QL STRIP: ABNORMAL
MICROSCOPIC COMMENT: ABNORMAL
NITRITE UR QL STRIP: NEGATIVE
PH UR STRIP: >8 [PH] (ref 5–8)
PROT UR QL STRIP: ABNORMAL
RBC #/AREA URNS HPF: 14 /HPF (ref 0–4)
SP GR UR STRIP: 1.01 (ref 1–1.03)
SQUAMOUS #/AREA URNS HPF: 61 /HPF
TRI-PHOS CRY URNS QL MICRO: ABNORMAL
URN SPEC COLLECT METH UR: ABNORMAL
UROBILINOGEN UR STRIP-ACNC: NEGATIVE EU/DL
WBC #/AREA URNS HPF: 7 /HPF (ref 0–5)

## 2020-03-03 PROCEDURE — 87086 URINE CULTURE/COLONY COUNT: CPT

## 2020-03-03 PROCEDURE — 87186 SC STD MICRODIL/AGAR DIL: CPT

## 2020-03-03 PROCEDURE — 87077 CULTURE AEROBIC IDENTIFY: CPT

## 2020-03-03 PROCEDURE — 81001 URINALYSIS AUTO W/SCOPE: CPT

## 2020-03-03 NOTE — TELEPHONE ENCOUNTER
----- Message from Princess ROMELIA Stoner sent at 3/2/2020  4:17 PM CST -----  [facility]  Type: Needs Medical Advice    Who CalledEmelina smith/ [facility]  Additional Information: Called to get the UA order sent back to the office and signed.  Fax

## 2020-03-05 ENCOUNTER — TELEPHONE (OUTPATIENT)
Dept: FAMILY MEDICINE | Facility: CLINIC | Age: 85
End: 2020-03-05

## 2020-03-05 DIAGNOSIS — F03.90 DEMENTIA WITHOUT BEHAVIORAL DISTURBANCE, UNSPECIFIED DEMENTIA TYPE: ICD-10-CM

## 2020-03-05 DIAGNOSIS — R26.2 IMPAIRED AMBULATION: ICD-10-CM

## 2020-03-05 DIAGNOSIS — N39.0 URINARY TRACT INFECTION WITHOUT HEMATURIA, SITE UNSPECIFIED: Primary | ICD-10-CM

## 2020-03-05 LAB — BACTERIA UR CULT: ABNORMAL

## 2020-03-05 RX ORDER — AMOXICILLIN 875 MG/1
875 TABLET, FILM COATED ORAL 2 TIMES DAILY
Qty: 20 TABLET | Refills: 0 | Status: SHIPPED | OUTPATIENT
Start: 2020-03-05 | End: 2020-03-15

## 2020-03-05 NOTE — TELEPHONE ENCOUNTER
Please call her daughter or home health nurse.  Urine culture is positive.  However it is sensitive to amoxicillin this time which should be easier on her stomach.  I sent in a prescription.

## 2020-03-05 NOTE — TELEPHONE ENCOUNTER
Reviewed with pt's daughter, she expressed understanding.   She also mentioned that pt is due for HH re-eval and needs a visit with PCP. She states it is too difficult to get her into clinic and is asking that NP come out to evaluate pt at home. Order pended for [facility]  She also mentioned that pt's spouse is currently in hospital.

## 2020-03-05 NOTE — TELEPHONE ENCOUNTER
conducted an initial consultation and Spiritual Assessment for Brenda Yarbrough, who is a 62 y.o.,female. Patients Primary Language is: [place]. According to the patients EMR Mandaeism Affiliation is: Shinto. The reason the Patient came to the hospital is:   Patient Active Problem List    Diagnosis Date Noted    BMI 37.0-37.9, adult 10/08/2018    GUILLERMO (obstructive sleep apnea) 10/08/2018    Acute cholecystitis 12/27/2017    Morbid obesity ([facility] [address] [facility] 66/86/4760    Complication of gastric banding 11/20/2017    H/O laparoscopic adjustable gastric banding 10/06/2017    Erosion of gastric band 10/06/2017    [facility]/2017        The  provided the following Interventions:  Initiated a relationship of care and support. Provided information about Spiritual Care Services. Offered prayer and assurance of continued prayers on patient's behalf. The following outcomes were achieved:  Patient expressed gratitude for 's visit. Assessment:  There are no further spiritual or Shinto issues which require intervention at this time. Plan:  Chaplains will continue to follow and will provide pastoral care on an as needed/requested basis. Faustino Babin M.Div.   , [facility]: [phone]/S: [phone] We can try

## 2020-03-12 ENCOUNTER — TELEPHONE (OUTPATIENT)
Dept: FAMILY MEDICINE | Facility: CLINIC | Age: 85
End: 2020-03-12

## 2020-03-12 RX ORDER — TRAMADOL HYDROCHLORIDE 50 MG/1
50 TABLET ORAL NIGHTLY PRN
Qty: 30 TABLET | Refills: 2 | Status: ON HOLD | OUTPATIENT
Start: 2020-03-12 | End: 2020-09-16 | Stop reason: HOSPADM

## 2020-03-12 NOTE — TELEPHONE ENCOUNTER
----- Message from Earlene Joseph sent at 3/12/2020 11:28 AM CDT -----  Contact: Partha with home health  Type: Needs Medical Advice    Who Called: Partha with home health  Symptoms (please be specific):    How long has patient had these symptoms:    Pharmacy name and phone #:   Best Call Back Number:   Additional Information: partha is calling for a medication request of tramadol 50 mg by mouth at bedtime every 8 hours as needed

## 2020-03-12 NOTE — TELEPHONE ENCOUNTER
Spoke with pt's  nurse, she states pt came home from NH with tramadol 50mg nightly. She is out now and  nurse states it really helps pt with her restlessness at night. She states NP is going out to see pt on the 19th and wasn't sure if Dr. Bill would be willing to call in a refill or have to wait until NP sees pt.

## 2020-03-19 ENCOUNTER — CARE AT HOME (OUTPATIENT)
Dept: HOME HEALTH SERVICES | Facility: CLINIC | Age: 85
End: 2020-03-19
Payer: MEDICARE

## 2020-03-19 VITALS — BODY MASS INDEX: 22.14 KG/M2 | WEIGHT: 125 LBS | RESPIRATION RATE: 16 BRPM

## 2020-03-19 DIAGNOSIS — Z93.1 PEG (PERCUTANEOUS ENDOSCOPIC GASTROSTOMY) STATUS: ICD-10-CM

## 2020-03-19 DIAGNOSIS — Z97.8 CHRONIC INDWELLING FOLEY CATHETER: ICD-10-CM

## 2020-03-19 DIAGNOSIS — J44.9 CHRONIC OBSTRUCTIVE PULMONARY DISEASE, UNSPECIFIED COPD TYPE: Primary | ICD-10-CM

## 2020-03-19 DIAGNOSIS — R26.2 IMPAIRED AMBULATION: ICD-10-CM

## 2020-03-19 DIAGNOSIS — R05.9 COUGH: ICD-10-CM

## 2020-03-19 DIAGNOSIS — R53.81 DEBILITY: ICD-10-CM

## 2020-03-19 DIAGNOSIS — F03.90 DEMENTIA WITHOUT BEHAVIORAL DISTURBANCE, UNSPECIFIED DEMENTIA TYPE: ICD-10-CM

## 2020-03-19 DIAGNOSIS — Z74.1 REQUIRES DAILY ASSISTANCE FOR ACTIVITIES OF DAILY LIVING (ADL) AND COMFORT NEEDS: ICD-10-CM

## 2020-03-19 DIAGNOSIS — B37.31 VAGINAL CANDIDIASIS: ICD-10-CM

## 2020-03-19 DIAGNOSIS — N39.0 FREQUENT UTI: ICD-10-CM

## 2020-03-19 DIAGNOSIS — I69.954 HEMIPLEGIA OF LEFT NONDOMINANT SIDE AS LATE EFFECT OF CEREBROVASCULAR DISEASE, UNSPECIFIED CEREBROVASCULAR DISEASE TYPE, UNSPECIFIED HEMIPLEGIA TYPE: ICD-10-CM

## 2020-03-19 PROCEDURE — 99349 PR HOME VISIT,ESTAB PATIENT,LEVEL III: ICD-10-PCS | Mod: S$GLB,,, | Performed by: NURSE PRACTITIONER

## 2020-03-19 PROCEDURE — 99349 HOME/RES VST EST MOD MDM 40: CPT | Mod: S$GLB,,, | Performed by: NURSE PRACTITIONER

## 2020-03-19 RX ORDER — GUAIFENESIN 100 MG/5ML
200 SOLUTION ORAL 3 TIMES DAILY PRN
Qty: 150 ML | Refills: 1 | Status: SHIPPED | OUTPATIENT
Start: 2020-03-19 | End: 2020-03-29

## 2020-03-19 RX ORDER — DOXYLAMINE SUCCINATE 25 MG
TABLET ORAL 2 TIMES DAILY
Qty: 42.5 G | Refills: 1 | Status: ON HOLD | OUTPATIENT
Start: 2020-03-19 | End: 2020-09-16 | Stop reason: HOSPADM

## 2020-03-19 RX ORDER — IPRATROPIUM BROMIDE AND ALBUTEROL SULFATE 2.5; .5 MG/3ML; MG/3ML
3 SOLUTION RESPIRATORY (INHALATION) EVERY 6 HOURS PRN
Qty: 1 BOX | Refills: 1 | Status: ON HOLD | OUTPATIENT
Start: 2020-03-19 | End: 2020-09-16 | Stop reason: HOSPADM

## 2020-03-19 NOTE — Clinical Note
Spoke with daughter about Palliative Care/AIM program and she is agreeable. If you are agreeable as well, please put an order in for Ambulatory referral/consult to [facility] and I can see her.Thank you.

## 2020-03-19 NOTE — PATIENT INSTRUCTIONS
Vaginal Infection: Yeast (Candidiasis)  Yeast infection occurs when yeast in the vagina increase and attacks the vaginal tissues. Yeast is a type of fungus. These infections are often caused by a type of yeast called Candida albicans. Other species of yeast can also cause infections. Factors that may make infection more likely include recent antibiotic use, douching, or increased sex. Yeast infections are more common in women who have diabetes, or are obese or pregnant, or have a weak immune system.  Symptoms of yeast infection  · Clumpy or thin, white discharge, which may look like cottage cheese  · No odor or minimal odor  · Severe vaginal itching or burning  · Burning with urination  · Swelling, redness of vulva  · Pain during sex  Treating yeast infection  Yeast infection is treated with a vaginal antifungal cream. In some cases, antifungal pills are prescribed instead. During treatment:  · Finish all of your medicine, even if your symptoms go away.  · Apply the cream before going to bed. Lie flat after applying so that it doesn't drip out.  · Do not douche or use tampons.  · Don't rely on a diaphragm or condoms, since the cream may weaken them.  · Avoid intercourse if advised by your healthcare provider.     Should I treat a yeast infection myself?  Discuss with your healthcare provider whether you should use over-the-counter medicines to treat a yeast infection. Self-treatment may depend on whether:  · You've had a yeast infection in the past.  · You're at risk for STDs.  Call your healthcare provider if symptoms do not go away or come back after treatment.   Date Last Reviewed: 3/1/2017  © 5488-5030 [employer]. [address] [place] [address], [place] [address]. All rights reserved. This information is not intended as a substitute for professional medical care. Always follow your healthcare professional's instructions.        Catheter-Associated Urinary Tract Infections     A small balloon keeps the  catheter in place inside the bladder.   A catheter-associated urinary tract infection (CAUTI) is an infection of the urinary system. CAUTI is caused by bacteria that enter the urinary tract when a urinary catheter is used. This is a tube thats placed into the bladder to drain urine.  The urinary system  This system includes the kidneys, ureters, bladder, and urethra. The kidneys filter blood and make urine. The ureters carry urine from the kidneys to the bladder. The bladder stores urine. The urethra carries urine from the bladder to the outside of the body.  What is a urinary catheter?  A urinary catheter is a thin, flexible tube. It is placed in the bladder to drain urine. Urine flows through the tube into a collecting bag outside of the body. There are different types of urinary catheters. The most common type is an indwelling catheter. This is also known as a urethral catheter. This is because its placed into the bladder through the urethra. This catheter is also called a Scherer catheter.  Why is a urinary catheter needed?  A urinary catheter is needed for any of the following:  · You cant get up to use the toilet because your mobility is limited. This may be due to surgery, an injury, or illness.  · You have a blockage in your urinary system.  · Your healthcare provider needs to measure the amount of urine you pass.  · The function of your kidneys and bladder is being tested.  · Youre not able to control your bladder (incontinence).  In most cases, the urinary catheter is temporary. You'll need it only until the problem that requires it is resolved.   How does a CAUTI develop?  Bacteria can enter the urinary tract as the catheter is put into the urethra. Bacteria can also get into the urinary tract while the catheter is in place. The common bacteria that cause a CAUTI are ones that live in the intestine. These bacteria dont normally cause problems in the intestine. But when they get into the urinary tract,  a CAUTI can result.  Why is a CAUTI of concern?  Left untreated, a CAUTI can lead to health problems. These problems may include bladder infection, prostate infection, and kidney infection. A CAUTI can prolong your hospital stay. If the infection is not treated in time, serious health complications may occur.  What are the symptoms of a CAUTI?  · A burning feeling, pressure, or pain in your lower abdomen  · Fever or chills  · Urine in the collecting bag is cloudy or bloody (pink or red)  · Burning feeling in the urethra or genital area  · Aching in the back (kidney area)  · Nausea and vomiting  · Person is confused, or is not alert, or has a change in behavior (mainly affects older patients)  · Note that sometimes a person wont have any symptoms but may still have a CAUTI.  Tell a healthcare provider right away if you or your loved one has any of these symptoms.  How is CAUTI diagnosed?  If you have symptoms of CAUTI your healthcare provider will order tests. These include a urine test, blood tests, and other tests as needed.  How is CAUTI treated?   Treatment may involve any of the following:  · Antibiotics. Your healthcare provider will likely prescribe antibiotics if you have symptoms. Be aware that if you dont have symptoms, you may not be given antibiotics. This is to prevent an increase in bacteria that resist (cant be killed by) certain antibiotics.  · Removing the catheter. The catheter will be removed when your healthcare provider decides its no longer needed. This usually helps stop the infection.  · Changing the catheter. If you still need a catheter, the old one will be removed. A new one will be put in. This may help stop the infection.  How do hospital and long-term facility staff prevent CAUTI?  To keep patients from getting a CAUTI, the staff follow certain procedures:  · Prescribe a catheter only when its needed. It is removed as soon as its no longer needed.  · Use sterile (clean) technique  when placing the catheter into the urinary tract. This means before putting the catheter in, the caregiver washes his or her hands with soap and water. He or she then puts on sterile gloves. A sterile catheter kit that has cleansers is used to cleanse the patients genital area.  · Before performing catheter care, caregivers also wash their hands or use an alcohol-based hand cleanser.  · Hang the bag lower than your bladder. This prevents urine from flowing back into your bladder.  · Ensure that the bag is emptied regularly.  What you can do as a patient to prevent CAUTI  You can help prevent yourself from getting a CAUTI by doing the following:  · Every day ask your healthcare provider how long you need to have the catheter. The longer you have a catheter, the higher your chance of getting a CAUTI.  · If a caregiver doesnt clean his or her hands and put on gloves before touching your catheter, ask them to do so.  · If youve been taught how to care for your catheter, be sure to wash your hands before and after each session.  · Make sure your bag is lower than your bladder. If its not, tell your caregiver.  · Dont disconnect the catheter and drain tube. Doing so allows germs to get into the catheter.  · Cleansing of the genital and perineal areas is very important to help decrease bacteria in areas surrounding the catheter. Ask your doctor what you should use and how often to clean these areas.  If you are discharged with an indwelling catheter  · Before you leave the hospital, make sure you understand the instructions on how to care for your catheter at home.  · Ask your healthcare provider how long you need the catheter. Also ask if you need to make a follow-up appointment to have the catheter removed.  · Always use sterile (clean) technique when caring for your catheter. Wash your hands before and after doing any catheter care.  · Call your healthcare provider right away if you develop symptoms of a CAUTI (see  above).   Date Last Reviewed: 1/1/2017  © 9682-6677 [employer]. [address] [place] [address], [place] [address]. All rights reserved. This information is not intended as a substitute for professional medical care. Always follow your healthcare professional's instructions.

## 2020-03-19 NOTE — PROGRESS NOTES
[facility]  Medical Home Visit    Visit Date: 3/19/2020  Encounter Provider: Autumn Wong NP  PCP:  Frantz Bill MD    Subjective:      Patient ID: Breanne Culp is a 86 y.o. female.    Consult Requested By:  Dr. Frantz Bill  Reason for Consult:  Homebound status/HomeHealth Re-certification visit needed    Breanne LEÓN is being seen at home due to physical limitations    Chief Complaint: Cough, Vaginal yeast infection    Breanne Culp is an 86 year old female with PMHX of CVA with residual left sided hemiparesis, bedbound status, s/p Peg tube, Alzheimer's disease, aphasia, dysphagia, protein calorie malnutrition, abdominal aortic atherosclerosis, hyperlipidemia, history of sacral pressure ulcer, COPD, chronic indwelling aponte catheter, frequent UTIs, osteoarthritis, idiopathic peripheral neuropathy and lumbar degenerative disc disease.    She is being seen today in her home because she is bedbound. She is followed by [facility] currently and needs a face to face encounter with a provider so that she can be re-certified for Home Health. It is difficult getting her out of the home to an office. She is cared for by sitters 24/7, lives at home with her  (who is currently in a nursing home for rehab) and her daughter Loree.     The patient if found lying in a hospital bed today and a sitter named Kelley is present. The patient is alert but speech is unintelligible. She pats herself down by her catheter and grimaces. Sitter reports that she does that frequently. Both the sitter and Loree noticed a slight vaginal discharge a few days ago and gave her Vagisil OTC. They feel like it helped some but the patient still is patting herself and grimacing. The patient has a history of pulling out her her catheter numerous times per Home Health notes as well as frequent UTIs (last treated 3/5/2020). Her urine is yellow today with a mild amount of sediment noted. No fever or chills are  "reported. Loree states that they have tried leaving the aponte catheter out and changing the patient frequently but her skin broke down on her sacrum quickly. In asking what other options are available, possible placement of a suprapubic catheter could be considered but Loree quickly stated she could not get her mother out to see a Urologist nor could she get her to a hospital for a procedure.    She is also reported and noted to have a frequent "wet" cough. On her med profile there is a nebulizer medication but she does not have this medication nor does she have a nebulizer. Will order this for patient. She apparently received nebulizer breathing treatments in the past when she was in a nursing home facility for rehab. Her daughter Loree is present via phone and is requesting a nebulizer for patient.     In speaking with Loree via telephone Hospice was mentioned. She states her mother has never been on Hospice. Further discussion indicated that Hospice was not a choice at this time. Palliative care/AIM was discussed and she agrees to this if PCP does.     Review of Systems   Constitutional: Negative for diaphoresis.   Eyes: Negative for discharge.   Respiratory: Positive for cough.    Gastrointestinal: Negative for diarrhea and vomiting.   Genitourinary: Positive for vaginal discharge.   Neurological: Positive for facial asymmetry, speech difficulty and weakness.   Psychiatric/Behavioral: Positive for confusion.   ROS limited due to patient dementia and aphasia, info obtained from sitter and daughter    Assessments:  · Environmental: clean, adequate lighting and temperature, no foul odors  · Functional Status: dependent on all ADLs  · Safety: no issues identified  · Nutritional: PEG tube feedings/Jevity at 50 cc/hr with 400 cc water boluses  · Home Health/DME/Supplies: [facility], Private sitters 24/7. Hospital bed, tube feeding equipment/pump    Objective:   Physical Exam   Constitutional:   Awake, alert, " bedbound, frail, speech unintelligible   HENT:   Head: Normocephalic and atraumatic.   Right Ear: External ear normal.   Left Ear: External ear normal.   Eyes: Pupils are equal, round, and reactive to light. Conjunctivae and EOM are normal.   Neck: Neck supple.   Cardiovascular: Normal rate, regular rhythm, normal heart sounds and intact distal pulses.   Pulmonary/Chest: Effort normal.   Occasional, strong, non-productive cough, occasional inspiratory wheeze heard   Abdominal: Soft. Bowel sounds are normal.   Genitourinary: Vaginal discharge (slight, whitish/yellow discharge noted, no foul odor) found.   Genitourinary Comments: Scherer catheter in place draining yellow urine with small amount of sediment noted in tubing   Musculoskeletal: She exhibits no edema.   Left sided hemiplegia   Neurological: She is alert.   Skin: Skin is warm and dry. Capillary refill takes 2 to 3 seconds.   Sacral pressure ulcer scarring but healed, skin intact   Psychiatric:   Follows no commands       Vitals:    03/19/20 0905   Resp: 16   Weight: 56.7 kg (125 lb)   PainSc: 0-No pain     Body mass index is 22.14 kg/m².    Assessment:     1. Chronic obstructive pulmonary disease, unspecified COPD type    2. Impaired ambulation    3. Dementia without behavioral disturbance, unspecified dementia type    4. Cough    5. Vaginal candidiasis    6. Frequent UTI    7. Chronic indwelling Scherer catheter    8. Debility    9. Requires daily assistance for activities of daily living (ADL) and comfort needs    10. PEG (percutaneous endoscopic gastrostomy) status    11. 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    Diagnoses and all orders for this visit:    Chronic obstructive pulmonary disease, unspecified COPD type  -     NEBULIZER FOR HOME USE  -     NEBULIZER KIT (SUPPLIES) FOR HOME  USE  Order sent to DME. Discussed appropriate use of medication with deysi Ralph and jodi Benson.  -     albuterol-ipratropium (DUO-NEB) 2.5 mg-0.5 mg/3 mL nebulizer solution; Take 3 mLs by nebulization every 6 (six) hours as needed for Wheezing. Rescue  Impaired ambulation  -     Ambulatory referral/consult to [facility]  Fall precautions and safety discussed.    Dementia without behavioral disturbance, unspecified dementia type  -     Ambulatory referral/consult to [facility]  Continue 24/7 sitter care.    Cough  -     NEBULIZER FOR HOME USE  -     NEBULIZER KIT (SUPPLIES) FOR HOME USE  -     guaifenesin 100 mg/5 ml (ROBITUSSIN) 100 mg/5 mL syrup; Take 10 mLs (200 mg total) by mouth 3 (three) times daily as needed for Cough  Vaginal candidiasis  -     miconazole (MICOTIN) 2 % cream; Apply topically 2 (two) times daily. for 7 days  Meticulous perineal care discussed.  Frequent UTI  Meticulous perineal care discussed.    Chronic indwelling Scherer catheter  Meticulous perineal care discussed.  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  No    Follow Up Appointments:   No future appointments.     Consent for visit obtained from deysi Ralph via phone and jodi Benson in person today.    Signature:  Autumn Wong NP     Attestation:   Screening criteria to assess the level of the patient's risk for infection with COVID-19 as recommended by the CDC at the time of the above documented home visit concluded appropriateness to proceed. Universal precautions were maintained at all times, including provider use of 60% alcohol gel hand  immediately prior  to entry and upon departure of patient's home as well as cleaning of equipment used in home visit with antibacterial/germicidal disposable wipes.

## 2020-03-31 ENCOUNTER — LAB VISIT (OUTPATIENT)
Dept: LAB | Facility: HOSPITAL | Age: 85
End: 2020-03-31
Attending: FAMILY MEDICINE
Payer: MEDICARE

## 2020-03-31 DIAGNOSIS — G30.9 ALZHEIMER'S DISEASE: ICD-10-CM

## 2020-03-31 DIAGNOSIS — M51.16 NEURITIS OR RADICULITIS DUE TO RUPTURE OF LUMBAR INTERVERTEBRAL DISC: ICD-10-CM

## 2020-03-31 DIAGNOSIS — M16.11 PRIMARY OSTEOARTHRITIS OF RIGHT HIP: ICD-10-CM

## 2020-03-31 DIAGNOSIS — N32.81 DETRUSOR INSTABILITY OF BLADDER: ICD-10-CM

## 2020-03-31 DIAGNOSIS — M51.36 DEGENERATION OF LUMBAR INTERVERTEBRAL DISC: ICD-10-CM

## 2020-03-31 DIAGNOSIS — M48.061 SPINAL STENOSIS, LUMBAR REGION, WITHOUT NEUROGENIC CLAUDICATION: ICD-10-CM

## 2020-03-31 DIAGNOSIS — I65.23 BILATERAL CAROTID ARTERY OCCLUSION: ICD-10-CM

## 2020-03-31 DIAGNOSIS — I25.10 CORONARY ATHEROSCLEROSIS OF NATIVE CORONARY ARTERY: ICD-10-CM

## 2020-03-31 DIAGNOSIS — Z46.6 FITTING AND ADJUSTMENT OF URINARY DEVICE: ICD-10-CM

## 2020-03-31 DIAGNOSIS — R13.10 PROBLEMS WITH SWALLOWING AND MASTICATION: ICD-10-CM

## 2020-03-31 DIAGNOSIS — Z43.1 ATTENTION TO GASTROSTOMY: ICD-10-CM

## 2020-03-31 DIAGNOSIS — J44.89 OBSTRUCTIVE CHRONIC BRONCHITIS WITHOUT EXACERBATION: ICD-10-CM

## 2020-03-31 DIAGNOSIS — I69.320 APHASIA S/P CVA: Primary | ICD-10-CM

## 2020-03-31 DIAGNOSIS — I10 ESSENTIAL HYPERTENSION, MALIGNANT: ICD-10-CM

## 2020-03-31 DIAGNOSIS — E43 ALIMENTARY EDEMA: ICD-10-CM

## 2020-03-31 DIAGNOSIS — F02.80 ALZHEIMER'S DISEASE: ICD-10-CM

## 2020-03-31 DIAGNOSIS — Z74.01 BED CONFINEMENT STATUS: ICD-10-CM

## 2020-03-31 PROCEDURE — 87077 CULTURE AEROBIC IDENTIFY: CPT

## 2020-03-31 PROCEDURE — 81001 URINALYSIS AUTO W/SCOPE: CPT

## 2020-03-31 PROCEDURE — 87086 URINE CULTURE/COLONY COUNT: CPT | Mod: GA

## 2020-03-31 PROCEDURE — 87186 SC STD MICRODIL/AGAR DIL: CPT

## 2020-04-01 LAB
BACTERIA #/AREA URNS HPF: ABNORMAL /HPF
BILIRUB UR QL STRIP: NEGATIVE
CLARITY UR: ABNORMAL
COLOR UR: YELLOW
GLUCOSE UR QL STRIP: NEGATIVE
HGB UR QL STRIP: NEGATIVE
HYALINE CASTS #/AREA URNS LPF: 16 /LPF
KETONES UR QL STRIP: NEGATIVE
LEUKOCYTE ESTERASE UR QL STRIP: ABNORMAL
MICROSCOPIC COMMENT: ABNORMAL
NITRITE UR QL STRIP: NEGATIVE
PH UR STRIP: 8 [PH] (ref 5–8)
PROT UR QL STRIP: NEGATIVE
RBC #/AREA URNS HPF: 0 /HPF (ref 0–4)
SP GR UR STRIP: 1 (ref 1–1.03)
SQUAMOUS #/AREA URNS HPF: 18 /HPF
URN SPEC COLLECT METH UR: ABNORMAL
UROBILINOGEN UR STRIP-ACNC: NEGATIVE EU/DL
WBC #/AREA URNS HPF: 12 /HPF (ref 0–5)

## 2020-04-02 ENCOUNTER — TELEPHONE (OUTPATIENT)
Dept: HOME HEALTH SERVICES | Facility: HOSPITAL | Age: 85
End: 2020-04-02

## 2020-04-02 ENCOUNTER — PATIENT MESSAGE (OUTPATIENT)
Dept: FAMILY MEDICINE | Facility: CLINIC | Age: 85
End: 2020-04-02

## 2020-04-02 DIAGNOSIS — N39.0 URINARY TRACT INFECTION WITHOUT HEMATURIA, SITE UNSPECIFIED: Primary | ICD-10-CM

## 2020-04-02 LAB — BACTERIA UR CULT: ABNORMAL

## 2020-04-02 RX ORDER — AMOXICILLIN 875 MG/1
875 TABLET, FILM COATED ORAL 2 TIMES DAILY
Qty: 14 TABLET | Refills: 0 | Status: SHIPPED | OUTPATIENT
Start: 2020-04-02 | End: 2020-04-09

## 2020-04-02 NOTE — TELEPHONE ENCOUNTER
Reviewed with pt's daughter, she expressed understanding. Pt is getting 3 16.9 oz bottles of water a day through feeding tube when she gets her meds and daughter wants to know if that is enough.

## 2020-04-02 NOTE — TELEPHONE ENCOUNTER
That is roughly 50 oz which is not bad, and meets average requirements for most sedentary adult woman.

## 2020-04-02 NOTE — TELEPHONE ENCOUNTER
The urinalysis and culture have returned and there is a evidence of infection.  I sent in a prescription for amoxicillin.  Family should also try to push fluids as best as possible

## 2020-04-03 RX ORDER — NYSTATIN 100000 U/G
CREAM TOPICAL 2 TIMES DAILY
Qty: 60 G | Refills: 2 | Status: CANCELLED | OUTPATIENT
Start: 2020-04-03

## 2020-04-03 NOTE — TELEPHONE ENCOUNTER
I went to send in a prescription for nystatin cream but I see that there was a similar prescription for something called miconazole cream that was prescribed to her on March 19th, by a different provider I do not recognize.  Are they using this, did this not work?

## 2020-04-03 NOTE — TELEPHONE ENCOUNTER
Spoke with pt's daughter, she states pt is c/o itching in her diaper area. She does have redness and irritation in her groin area, no vaginal discharge. They are asking for something for the yeast infection.

## 2020-04-06 ENCOUNTER — TELEPHONE (OUTPATIENT)
Dept: FAMILY MEDICINE | Facility: CLINIC | Age: 85
End: 2020-04-06

## 2020-04-06 RX ORDER — TERCONAZOLE 4 MG/G
1 CREAM VAGINAL NIGHTLY
Qty: 45 G | Refills: 0 | Status: SHIPPED | OUTPATIENT
Start: 2020-04-06 | End: 2020-04-13

## 2020-04-06 RX ORDER — HYDROXYZINE HYDROCHLORIDE 25 MG/1
25 TABLET, FILM COATED ORAL NIGHTLY PRN
Qty: 30 TABLET | Refills: 0 | Status: SHIPPED | OUTPATIENT
Start: 2020-04-06 | End: 2020-05-26

## 2020-04-06 NOTE — TELEPHONE ENCOUNTER
Diflucan not recommended bc of aricept.  Switch to terconazole insert x 7 days (bedtime).   Hydroxyzine at bedtime for both anxiety and itching.

## 2020-04-06 NOTE — TELEPHONE ENCOUNTER
----- Message from Farhan Morrison sent at 4/6/2020  7:57 AM CDT -----  Contact: Loree  Type: Needs Medical Advice    Who Called:  Loree patient's daughter  Symptoms (please be specific):    How long has patient had these symptoms:    Ph[facility] and phone #:  [facility]  Best Call Back Number: [phone]   Additional Information: called to advise that the patient urine is clear from the uti,stated patient is itching down there still expriencing pain,stated patient has yeast infection.patient got one last time she had uti,patient need something to release the pain and itch.requesting a call back

## 2020-04-09 DIAGNOSIS — R41.3 MEMORY LOSS: ICD-10-CM

## 2020-04-09 RX ORDER — OXYBUTYNIN CHLORIDE 5 MG/1
TABLET ORAL
Qty: 90 TABLET | Refills: 1 | Status: SHIPPED | OUTPATIENT
Start: 2020-04-09 | End: 2020-06-26

## 2020-04-09 RX ORDER — DONEPEZIL HYDROCHLORIDE 10 MG/1
TABLET, FILM COATED ORAL
Qty: 90 TABLET | Refills: 1 | Status: ON HOLD | OUTPATIENT
Start: 2020-04-09 | End: 2020-09-16 | Stop reason: HOSPADM

## 2020-04-09 NOTE — TELEPHONE ENCOUNTER
----- Message from Tiburcio Song sent at 4/9/2020  3:07 PM CDT -----  Contact: pt daughter Loree  Type: Needs Medical Advice  Who Called:  Loree  Symptoms (please be specific):  Persistent cough  How long has patient had these symptoms:  On going  Pharmacy name and phone #:    [facility] - [place], [place] - [address] [address]  [address] [facility] [place] [address]  [facility] [address]  Phone: [phone] Fax: [phone]    Best Call Back Number: [phone]  Additional Information: tussin not improving conditions

## 2020-04-12 PROCEDURE — G0179 PR HOME HEALTH MD RECERTIFICATION: ICD-10-PCS | Mod: ,,, | Performed by: FAMILY MEDICINE

## 2020-04-12 PROCEDURE — G0179 MD RECERTIFICATION HHA PT: HCPCS | Mod: ,,, | Performed by: FAMILY MEDICINE

## 2020-04-13 RX ORDER — NEBULIZER AND COMPRESSOR
EACH MISCELLANEOUS
Qty: 1 EACH | Status: ON HOLD | OUTPATIENT
Start: 2020-04-13 | End: 2020-09-16 | Stop reason: HOSPADM

## 2020-04-17 ENCOUNTER — EXTERNAL HOME HEALTH (OUTPATIENT)
Dept: HOME HEALTH SERVICES | Facility: HOSPITAL | Age: 85
End: 2020-04-17
Payer: MEDICARE

## 2020-04-23 ENCOUNTER — CARE AT HOME (OUTPATIENT)
Dept: HOME HEALTH SERVICES | Facility: CLINIC | Age: 85
End: 2020-04-23
Payer: MEDICARE

## 2020-04-23 VITALS
HEART RATE: 78 BPM | RESPIRATION RATE: 16 BRPM | DIASTOLIC BLOOD PRESSURE: 70 MMHG | TEMPERATURE: 98 F | SYSTOLIC BLOOD PRESSURE: 118 MMHG | OXYGEN SATURATION: 95 %

## 2020-04-23 DIAGNOSIS — Z93.1 PEG (PERCUTANEOUS ENDOSCOPIC GASTROSTOMY) STATUS: ICD-10-CM

## 2020-04-23 DIAGNOSIS — N39.0 FREQUENT UTI: ICD-10-CM

## 2020-04-23 DIAGNOSIS — Z97.8 CHRONIC INDWELLING FOLEY CATHETER: ICD-10-CM

## 2020-04-23 DIAGNOSIS — R53.81 DEBILITY: ICD-10-CM

## 2020-04-23 DIAGNOSIS — Z74.01 BEDBOUND: Primary | ICD-10-CM

## 2020-04-23 DIAGNOSIS — F03.90 DEMENTIA WITHOUT BEHAVIORAL DISTURBANCE, UNSPECIFIED DEMENTIA TYPE: ICD-10-CM

## 2020-04-23 DIAGNOSIS — Z74.1 REQUIRES DAILY ASSISTANCE FOR ACTIVITIES OF DAILY LIVING (ADL) AND COMFORT NEEDS: ICD-10-CM

## 2020-04-23 PROCEDURE — 99348 PR HOME VISIT,ESTAB PATIENT,LEVEL II: ICD-10-PCS | Mod: S$GLB,,, | Performed by: NURSE PRACTITIONER

## 2020-04-23 PROCEDURE — 99348 HOME/RES VST EST LOW MDM 30: CPT | Mod: S$GLB,,, | Performed by: NURSE PRACTITIONER

## 2020-04-23 NOTE — PROGRESS NOTES
[facility]  Medical Home Visit    Visit Date: 4/23/2020  Encounter Provider: Autumn Wong NP  PCP:  Frantz Bill MD    Subjective:      Patient ID: Breanne Culp is a 86 y.o. female.    Consult Requested By:  Autumn Wong  Reason for Consult:  Medical Follow Up    Breanne LEÓN is being seen at home due to physical debility that presents a taxing effort to leave the home, to mitigate high risk of hospital readmission and/or due to the limited availability of reliable or safe options for transportation to the point of access to the provider. Prior to treatment on this visit the chart was reviewed and patient consent was obtained verbally from daughter Loree.    Chief Complaint: Follow-up/homebound/bedbound/recent UTI      Breanne Culp is an 86 year old female with PMHX of CVA with residual left sided hemiparesis, bedbound status, s/p Peg tube, Alzheimer's disease, aphasia, dysphagia, protein calorie malnutrition, abdominal aortic atherosclerosis, hyperlipidemia, history of sacral pressure ulcer, COPD, chronic indwelling aponte catheter, frequent UTIs, osteoarthritis, idiopathic peripheral neuropathy and lumbar degenerative disc disease.     Breanne is being seen today because she is bedbound and was recently treated for a UTI (3/31/2020) with Augmentin. Prior to that she had a UTI that treated 3/5/2020. Since recent treatment her daughter and sitter report that her urine appears clearer. Patient does indicate frequently that she has pain by pointing to where her catheter enters her body. The site appears without abnormality. On a previous visit, 3/19/2020,  Loree stated that they have tried leaving the aponte catheter out and changing the patient frequently but her skin broke down on her sacrum quickly. In asking what other options are available, possible placement of a suprapubic catheter could be considered but Loree quickly stated she could not get her mother out to see a Urologist nor could  she get her to a hospital for a procedure.    The patient if found lying in a hospital bed today and a sitter Kelley is present. The patient is alert but speech is unintelligible. Kelley reports that patient was treated for a vaginal yeast infection weeks ago and that seems to be resolved.    No recent fever or shortness of breath reported. Patient uses nebulizer occasionally per Kelley.     In speaking with Loree via telephone Hospice consideration was discussed again. She states her mother has never been on Hospice and indicated that it was not something she wants to pursue at this time. Palliative care/AIM was discussed and she agrees to this if PCP does.     Loree reports that patient's spouse, who was in rehab at a nursing home for the last several weeks,  passed away 2 days ago and they do not want patient to know.         Review of Systems   Constitutional: Negative for diaphoresis.   Eyes: Negative for discharge.   Respiratory: Positive for cough occasionally, non-productive.    Gastrointestinal: Negative for diarrhea and vomiting.   Genitourinary: Negative for vaginal discharge.   Neurological: Positive for facial asymmetry, speech difficulty and weakness.   Psychiatric/Behavioral: Positive for confusion.   ROS limited due to patient dementia and aphasia, info obtained from sitter and daughter     Assessments:  · Environmental: clean, adequate lighting and temperature, no foul odors  · Functional Status: dependent on all ADLs, bedbound  · Safety: no issues identified  · Nutritional: PEG tube feedings/Jevity at 50 cc/hr with 400 cc water boluses  · Home Health/DME/Supplies: [facility], Private sitters 24/7. Hospital bed, tube feeding equipment/pump    Objective:   Physical Exam   Constitutional:   Awake, alert, bedbound, frail, speech unintelligible   HENT:   Head: Normocephalic and atraumatic.   Right Ear: External ear normal.   Left Ear: External ear normal.   Eyes: Pupils are equal, round, and  reactive to light. Conjunctivae and EOM are normal.   Neck: Neck supple.   Cardiovascular: Normal rate, regular rhythm, normal heart sounds and intact distal pulses.   Pulmonary/Chest: Effort normal.   Occasional, strong, non-productive cough, occasional inspiratory wheeze heard   Abdominal: Soft. Bowel sounds are normal.   Genitourinary: No vaginal discharge noted.   Genitourinary Comments: Aponte catheter in place draining yellow urine with small amount of sediment noted in tubing   Musculoskeletal: She exhibits no edema.   Left sided hemiplegia   Neurological: She is alert.   Skin: Skin is warm and dry. Capillary refill takes 2 to 3 seconds.   Sacral pressure ulcer scarring but healed, skin intact   Psychiatric:   Follows no commands        Vitals:    04/23/20 1137   BP: 118/70   Pulse: 78   Resp: 16   Temp: 98.4 °F (36.9 °C)   TempSrc: Temporal   SpO2: 95%   PainSc: 0-No pain     There is no height or weight on file to calculate BMI.    Assessment:     1. Bedbound    2. Dementia without behavioral disturbance, unspecified dementia type    3. Debility    4. Frequent UTI    5. Chronic indwelling Aponte catheter    6. Requires daily assistance for activities of daily living (ADL) and comfort needs    7. PEG (percutaneous endoscopic gastrostomy) status        Plan:     Breanne LEÓN was seen today for follow-up.    Diagnoses and all orders for this visit:    Bedbound  Stable. Skin intact.    Dementia without behavioral disturbance, unspecified dementia type  -     Ambulatory referral/consult to [facility]  Stable.  Debility  Stable. Fall precautions and safety discussed.    Frequent UTI  Discussed meticulous perineal hygiene when patient has a BM.  Discussed chronic indwelling aponte catheter can add to risk of frequent UTI.  S/S UTI discussed and when to report.    Chronic indwelling Aponte catheter  Managed by Home Health.    Requires daily assistance for activities of daily living (ADL)  and comfort needs  Has 24/7 private sitter who appears to take excellent care of patient.    PEG (percutaneous endoscopic gastrostomy) status  Stable.     Time allowed for questions, all questions answered. [facility] contact information left with patient today for any future concerns.    Were controlled substances prescribed?  No    Follow Up Appointments:   No future appointments.    Signature:  Autumn Wong NP     Attestation:   Screening criteria to assess the level of the patient's risk for infection with COVID-19 as recommended by the CDC at the time of the above documented home visit concluded appropriateness to proceed. Universal precautions were maintained at all times, including provider wearing a mask and gloves during entire visit and use of 60% alcohol gel hand  immediately prior to entry and upon departure of patient's home as well as cleaning of equipment used in home visit with antibacterial/germicidal disposable wipes.

## 2020-04-25 NOTE — PATIENT INSTRUCTIONS
Dementia: Coping Tips for Caregivers  When someone you love has dementia, its normal to want to do as much as you can to help. But you cant take good care of someone else if you dont take care of yourself, too. So be sure to take breaks when you need them. Its not selfish. Its essential. Get out to see friends. Eat right. And be sure to visit your own doctor for regular checkups. Most of all, accept that you cant do everything yourself.    Make time for you  Its vital for you to spend time outside your role as caregiver. It may not feel right at first. But even simple things can ease stress and keep you refreshed. Try the following:  · Go to a movie or concert.  · Go to the gym.  · Have a meal with friends.  · Take a walk.  · Read a book or write in a journal.  · Pursue a hobby.  Talk to others  Talking to others is often a big stress reliever. Sometimes you just need a friend or family member to listen. At other times, you may want to talk to a professional you trust. This could be a counselor, , , or therapist. Another good option is joining a local support group for caregivers. Joining a support group can help you feel that you arent alone. Its also reassuring to share thoughts and ideas with others who are going through the same things as you.  Get temporary help  Finding time away isnt always easy. But you do have options for respite care (temporary help). Draw on support from family and friends. And accept help when its offered. People who care about you and your loved one really do want to help. Try these tips:  · Ask a friend to spend the evening with your loved one.  · Hire a home healthcare worker for regular breaks.  · Take your loved one to adult day care.  · Have family or friends help by shopping or bringing over a meal once a week.  · Contact local support agencies or a  for respite care recommendations.  Accept your emotions  The stress of  caregiving can seem overwhelming at times. You may feel frustrated, sad, or resentful. This isnt a sign youre doing something wrong. Its completely normal. So accept these emotions as they come. However, if you find yourself feeling hopeless, tired, sad, or guilty most of the time, talk to your healthcare provider. These feelings may be signs of depression, which can and should be treated.  Know when to make a change  The time may come when you can no longer care for your loved one safely. It may be that he or she requires more supervision. Or, you may find it too hard to cope with the daily stresses of caregiving. No matter the reason, its OK to make a change. It doesnt mean youve failed. Changing the situation may be best for everyone. Youll still be able to spend plenty of quality time with your loved one.  Date Last Reviewed: 7/1/2016 © 2000-2017 [employer]. [address] [place] [address]. All rights reserved. This information is not intended as a substitute for professional medical care. Always follow your healthcare professional's instructions.        Catheter-Associated Urinary Tract Infections     A small balloon keeps the catheter in place inside the bladder.   A catheter-associated urinary tract infection (CAUTI) is an infection of the urinary system. CAUTI is caused by bacteria that enter the urinary tract when a urinary catheter is used. This is a tube thats placed into the bladder to drain urine.  The urinary system  This system includes the kidneys, ureters, bladder, and urethra. The kidneys filter blood and make urine. The ureters carry urine from the kidneys to the bladder. The bladder stores urine. The urethra carries urine from the bladder to the outside of the body.  What is a urinary catheter?  A urinary catheter is a thin, flexible tube. It is placed in the bladder to drain urine. Urine flows through the tube into a collecting bag outside of the body. There are  different types of urinary catheters. The most common type is an indwelling catheter. This is also known as a urethral catheter. This is because its placed into the bladder through the urethra. This catheter is also called a Scherer catheter.  Why is a urinary catheter needed?  A urinary catheter is needed for any of the following:  · You cant get up to use the toilet because your mobility is limited. This may be due to surgery, an injury, or illness.  · You have a blockage in your urinary system.  · Your healthcare provider needs to measure the amount of urine you pass.  · The function of your kidneys and bladder is being tested.  · Youre not able to control your bladder (incontinence).  In most cases, the urinary catheter is temporary. You'll need it only until the problem that requires it is resolved.   How does a CAUTI develop?  Bacteria can enter the urinary tract as the catheter is put into the urethra. Bacteria can also get into the urinary tract while the catheter is in place. The common bacteria that cause a CAUTI are ones that live in the intestine. These bacteria dont normally cause problems in the intestine. But when they get into the urinary tract, a CAUTI can result.  Why is a CAUTI of concern?  Left untreated, a CAUTI can lead to health problems. These problems may include bladder infection, prostate infection, and kidney infection. A CAUTI can prolong your hospital stay. If the infection is not treated in time, serious health complications may occur.  What are the symptoms of a CAUTI?  · A burning feeling, pressure, or pain in your lower abdomen  · Fever or chills  · Urine in the collecting bag is cloudy or bloody (pink or red)  · Burning feeling in the urethra or genital area  · Aching in the back (kidney area)  · Nausea and vomiting  · Person is confused, or is not alert, or has a change in behavior (mainly affects older patients)  · Note that sometimes a person wont have any symptoms but may  still have a CAUTI.  Tell a healthcare provider right away if you or your loved one has any of these symptoms.  How is CAUTI diagnosed?  If you have symptoms of CAUTI your healthcare provider will order tests. These include a urine test, blood tests, and other tests as needed.  How is CAUTI treated?   Treatment may involve any of the following:  · Antibiotics. Your healthcare provider will likely prescribe antibiotics if you have symptoms. Be aware that if you dont have symptoms, you may not be given antibiotics. This is to prevent an increase in bacteria that resist (cant be killed by) certain antibiotics.  · Removing the catheter. The catheter will be removed when your healthcare provider decides its no longer needed. This usually helps stop the infection.  · Changing the catheter. If you still need a catheter, the old one will be removed. A new one will be put in. This may help stop the infection.  How do hospital and long-term facility staff prevent CAUTI?  To keep patients from getting a CAUTI, the staff follow certain procedures:  · Prescribe a catheter only when its needed. It is removed as soon as its no longer needed.  · Use sterile (clean) technique when placing the catheter into the urinary tract. This means before putting the catheter in, the caregiver washes his or her hands with soap and water. He or she then puts on sterile gloves. A sterile catheter kit that has cleansers is used to cleanse the patients genital area.  · Before performing catheter care, caregivers also wash their hands or use an alcohol-based hand cleanser.  · Hang the bag lower than your bladder. This prevents urine from flowing back into your bladder.  · Ensure that the bag is emptied regularly.  What you can do as a patient to prevent CAUTI  You can help prevent yourself from getting a CAUTI by doing the following:  · Every day ask your healthcare provider how long you need to have the catheter. The longer you have a  catheter, the higher your chance of getting a CAUTI.  · If a caregiver doesnt clean his or her hands and put on gloves before touching your catheter, ask them to do so.  · If youve been taught how to care for your catheter, be sure to wash your hands before and after each session.  · Make sure your bag is lower than your bladder. If its not, tell your caregiver.  · Dont disconnect the catheter and drain tube. Doing so allows germs to get into the catheter.  · Cleansing of the genital and perineal areas is very important to help decrease bacteria in areas surrounding the catheter. Ask your doctor what you should use and how often to clean these areas.  If you are discharged with an indwelling catheter  · Before you leave the hospital, make sure you understand the instructions on how to care for your catheter at home.  · Ask your healthcare provider how long you need the catheter. Also ask if you need to make a follow-up appointment to have the catheter removed.  · Always use sterile (clean) technique when caring for your catheter. Wash your hands before and after doing any catheter care.  · Call your healthcare provider right away if you develop symptoms of a CAUTI (see above).   Date Last Reviewed: 1/1/2017  © 2611-4850 [employer]. [address] [place] [address], [place] [address]. All rights reserved. This information is not intended as a substitute for professional medical care. Always follow your healthcare professional's instructions.

## 2020-04-29 ENCOUNTER — TELEPHONE (OUTPATIENT)
Dept: FAMILY MEDICINE | Facility: CLINIC | Age: 85
End: 2020-04-29

## 2020-04-29 NOTE — TELEPHONE ENCOUNTER
----- Message from Jie Bermudez sent at 4/29/2020 11:08 AM CDT -----  Contact: [facility]   Type:  Pharmacy Calling to Clarify an RX    Name of Caller:  Lucy [facility]   Pharmacy Name:    [facility] - [facility]  [address] [address]  [address] [address] [address]  Phone: [phone] Fax: [phone]  Additional Information:  Please advise-thank you

## 2020-04-30 ENCOUNTER — TELEPHONE (OUTPATIENT)
Dept: FAMILY MEDICINE | Facility: CLINIC | Age: 85
End: 2020-04-30

## 2020-04-30 ENCOUNTER — TELEPHONE (OUTPATIENT)
Dept: HOME HEALTH SERVICES | Facility: CLINIC | Age: 85
End: 2020-04-30

## 2020-04-30 NOTE — TELEPHONE ENCOUNTER
----- Message from Jie Bermudez sent at 4/30/2020  2:27 PM CDT -----  Contact: Daughter  Type: Needs Medical Advice  Who Called:  Daughter  Best Call Back Number:   Additional Information: Requesting a call back regarding home health order, states its urgent

## 2020-04-30 NOTE — TELEPHONE ENCOUNTER
Spoke with pt's daughter, she states NP Autumn Wong (care at home) came out to evaluate pt yesterday. Pt has had trouble with bowel movements despite daily miralax. Autumn would like an order for digital exam be sent to [facility] so that pt can be given an enema tomorrow.

## 2020-04-30 NOTE — TELEPHONE ENCOUNTER
In reference to message on 4/30/2020, I did not see patient on 4/29/2020 and did not ask for an order for a digital exam and/or enema. I last saw patient 4/23/2020 in her home and last spoke with patient's daughter Loree on same day. We discussed constipation and Miralax use. They were only giving patient 1/2 capful every other day or so, recommended her give it daily.    Patient's daughter Loree sent me a long text message today asking for an order be sent to Home Health for a digital exam and enema but I was still seeing patients during the time of her message.     I contacted [facility] ([phone]) spoke with  Rubina and she stated that they could take the order from me if it could be faxed. Informed her that the order would be faxed tomorrow 5/1/2020. [facility] fax # [phone].    Order for [facility]:  Ok to digitally disimpact for fecal impaction  Ok to administer enema

## 2020-05-01 ENCOUNTER — TELEPHONE (OUTPATIENT)
Dept: FAMILY MEDICINE | Facility: CLINIC | Age: 85
End: 2020-05-01

## 2020-05-01 ENCOUNTER — LAB VISIT (OUTPATIENT)
Dept: LAB | Facility: HOSPITAL | Age: 85
End: 2020-05-01
Attending: FAMILY MEDICINE
Payer: MEDICARE

## 2020-05-01 DIAGNOSIS — I69.320 APHASIA S/P CVA: ICD-10-CM

## 2020-05-01 DIAGNOSIS — N39.0 UTI (URINARY TRACT INFECTION): ICD-10-CM

## 2020-05-01 DIAGNOSIS — G60.8 HEREDITARY SENSORY NEUROPATHY: Primary | ICD-10-CM

## 2020-05-01 DIAGNOSIS — Z74.01 BED CONFINEMENT STATUS: ICD-10-CM

## 2020-05-01 DIAGNOSIS — Z93.1 GASTROSTOMY STATUS: ICD-10-CM

## 2020-05-01 DIAGNOSIS — E43 ALIMENTARY EDEMA: ICD-10-CM

## 2020-05-01 DIAGNOSIS — F02.80 ALZHEIMER'S DISEASE: ICD-10-CM

## 2020-05-01 DIAGNOSIS — R13.10 PROBLEMS WITH SWALLOWING AND MASTICATION: ICD-10-CM

## 2020-05-01 DIAGNOSIS — M48.061 SPINAL STENOSIS, LUMBAR REGION, WITHOUT NEUROGENIC CLAUDICATION: ICD-10-CM

## 2020-05-01 DIAGNOSIS — Z46.6 FITTING AND ADJUSTMENT OF URINARY DEVICE: ICD-10-CM

## 2020-05-01 DIAGNOSIS — N32.81 DETRUSOR INSTABILITY OF BLADDER: ICD-10-CM

## 2020-05-01 DIAGNOSIS — G30.9 ALZHEIMER'S DISEASE: ICD-10-CM

## 2020-05-01 DIAGNOSIS — M51.36 DEGENERATION OF LUMBAR INTERVERTEBRAL DISC: ICD-10-CM

## 2020-05-01 DIAGNOSIS — I10 ESSENTIAL HYPERTENSION, MALIGNANT: ICD-10-CM

## 2020-05-01 DIAGNOSIS — I25.10 CORONARY ATHEROSCLEROSIS OF NATIVE CORONARY ARTERY: ICD-10-CM

## 2020-05-01 DIAGNOSIS — I69.391 DYSPHAGIA STATUS POST CEREBROVASCULAR ACCIDENT: ICD-10-CM

## 2020-05-01 DIAGNOSIS — M16.11 PRIMARY OSTEOARTHRITIS OF RIGHT HIP: ICD-10-CM

## 2020-05-01 LAB
BACTERIA #/AREA URNS HPF: NEGATIVE /HPF
BILIRUB UR QL STRIP: NEGATIVE
CLARITY UR: ABNORMAL
COLOR UR: YELLOW
GLUCOSE UR QL STRIP: NEGATIVE
HGB UR QL STRIP: ABNORMAL
HYALINE CASTS #/AREA URNS LPF: 50 /LPF
KETONES UR QL STRIP: NEGATIVE
LEUKOCYTE ESTERASE UR QL STRIP: ABNORMAL
MICROSCOPIC COMMENT: ABNORMAL
NITRITE UR QL STRIP: NEGATIVE
PH UR STRIP: 7 [PH] (ref 5–8)
PROT UR QL STRIP: ABNORMAL
RBC #/AREA URNS HPF: 33 /HPF (ref 0–4)
SP GR UR STRIP: 1.01 (ref 1–1.03)
SQUAMOUS #/AREA URNS HPF: 1 /HPF
URN SPEC COLLECT METH UR: ABNORMAL
UROBILINOGEN UR STRIP-ACNC: NEGATIVE EU/DL
WBC #/AREA URNS HPF: 85 /HPF (ref 0–5)

## 2020-05-01 PROCEDURE — 81001 URINALYSIS AUTO W/SCOPE: CPT

## 2020-05-01 PROCEDURE — 87077 CULTURE AEROBIC IDENTIFY: CPT

## 2020-05-01 PROCEDURE — 87186 SC STD MICRODIL/AGAR DIL: CPT

## 2020-05-01 PROCEDURE — 87086 URINE CULTURE/COLONY COUNT: CPT

## 2020-05-01 NOTE — TELEPHONE ENCOUNTER
The urinalysis does suggest infection however the culture is not back yet.  If she is doing reasonably well I would like to wait for the culture before trying to guess which antibiotic would be best.  This will return over the weekend so I need to make certain the family will be able to check the my chart to see if I send in an antibiotic (I will message them through results review).  Also need to make certain that whichever pharmacy they prefer has weekend hours, if not then I need to know alternative pharmacy that has weekend hours , thanks

## 2020-05-01 NOTE — TELEPHONE ENCOUNTER
Reviewed with pt's daughter, she said pt is doing well enough to wait for culture to return. She will monitor her MyChart over the weekend for culture results and I updated her pharmacy that has weekend hours.

## 2020-05-03 ENCOUNTER — TELEPHONE (OUTPATIENT)
Dept: FAMILY MEDICINE | Facility: CLINIC | Age: 85
End: 2020-05-03

## 2020-05-03 LAB — BACTERIA UR CULT: ABNORMAL

## 2020-05-03 NOTE — TELEPHONE ENCOUNTER
Please call her daughter regarding urine culture.  Urine culture is positive.  It is NOT sensitive to any oral antibiotics.    If the patient is having significant symptoms then we need to arrange intravenous, possibly IM although it may be difficult to find the right IM antibiotic.  If she is feeling better we may wish to consider repeating and observing.    If patient has home health we can potentially order IM antibiotic through there.  Although if the patient has been on hospice then I would argue we really should not be doing any more urine cultures but I do not remember whether the family has requested that she sign up for hospice.

## 2020-05-04 NOTE — TELEPHONE ENCOUNTER
Spoke with pt's daughter, confirmed pt received home health care, not hospice. She states pt complained over the weekend of back pain and when she would ask pt where the pain was, she would pat her diaper area. She would like IM abx if possible to avoid ER for IV treatment. She is also asking if Dr. Bill recommends they remove pt's catheter, they have had trouble in the past keeping her dry and wetness causing skin breakdown but they do not want to continue with chronic UTIs. She said the home health nurse recommended prophylactic abx to prevent future infections.   [facility]

## 2020-05-04 NOTE — TELEPHONE ENCOUNTER
Called [facility], spoke with Rubina, She states we may need to use an infusion company, [facility] is who they usually use. [phone]. Call Rubina back once med has been obtained.     Called [facility], spoke with Erica, they just need order faxed to them at  [phone].

## 2020-05-04 NOTE — TELEPHONE ENCOUNTER
Spoke with Dwight(pharmacist), gave verbal for order, they need demographics, insurance, LOV note for pt faxed over. Being addressed in clinic by Piedad Turpin MA

## 2020-05-04 NOTE — TELEPHONE ENCOUNTER
I am not in clinic today.  Can this be called in verbally to the pharmacy suggested?    Ertapenem (Invanz)  1 gram IM, daily for 3 days.  This particular product is available IM. which is why I ordered it but if they still feel an infusion company is necessary then that is fine.

## 2020-05-04 NOTE — TELEPHONE ENCOUNTER
Ok, the medication is Ertapenem (Invanz)  1 gram IM, daily for 3 days.  Need to check with [employer], see which pharmacy the use for this type of situation.  This may be a special order drug and it might take a day or so to come in depending on the pharmacy.    Having a chronic indwelling catheter is a risk for UTIs.  This is known and established.  However chronic urinary incontinence can lead to skin problems and other issues in that area.  The safest way to handle this is frequent bed/diaper changes.  This is a challenge to patients who are kept at home and bedbound

## 2020-05-05 ENCOUNTER — TELEPHONE (OUTPATIENT)
Dept: FAMILY MEDICINE | Facility: CLINIC | Age: 85
End: 2020-05-05

## 2020-05-05 NOTE — TELEPHONE ENCOUNTER
----- Message from Machelle Ruth sent at 5/5/2020  1:27 PM CDT -----  Contact: daughter  Type: Needs Medical Advice  Who Called: Loree  Symptoms (please be specific):    How long has patient had these symptoms:    Pharmacy name and phone #:    Best Call Back Number:[phone](cell phone)  Additional Information: pt daughter needs to speak with someone in provider office about and injection that was ordered. pls call to advise

## 2020-05-05 NOTE — TELEPHONE ENCOUNTER
----- Message from Bernadette Dempsey MA sent at 5/5/2020  4:07 PM CDT -----  Contact: patient daughter Loree   Type:  Patient Returning Call    Who Called:  Patient daughter Loree   Who Left Message for Patient:  Daphnie   Does the patient know what this is regarding?:    Best Call Back Number:  [phone]    Additional Information:

## 2020-05-07 ENCOUNTER — DOCUMENT SCAN (OUTPATIENT)
Dept: HOME HEALTH SERVICES | Facility: HOSPITAL | Age: 85
End: 2020-05-07
Payer: MEDICARE

## 2020-05-17 RX ORDER — METOPROLOL TARTRATE 25 MG/1
TABLET, FILM COATED ORAL
Qty: 60 TABLET | Refills: 3 | Status: ON HOLD | OUTPATIENT
Start: 2020-05-17 | End: 2020-09-16 | Stop reason: HOSPADM

## 2020-05-18 NOTE — TELEPHONE ENCOUNTER
Fly fried  Spoke with pt's daughter, she states pt's vaginal itching has not improved with use of miconazole cream. She is requesting an oral tablet for  relief. Urine is clear, they do believe uti is clearing up.   They are also requesting a refill of lorazepam to help with her anxiety at night. She has not taken this in several months, it was given to her regularly when she was in the nursing home but they did not feel she needed it for awhile was she was home. She is now become very restless and anxious at night and itching herself all over her arms and shoulders.    injury, lower leg

## 2020-05-19 ENCOUNTER — TELEPHONE (OUTPATIENT)
Dept: FAMILY MEDICINE | Facility: CLINIC | Age: 85
End: 2020-05-19

## 2020-05-19 DIAGNOSIS — R13.10 DYSPHAGIA, UNSPECIFIED TYPE: ICD-10-CM

## 2020-05-19 DIAGNOSIS — R10.9 ABDOMINAL PAIN, UNSPECIFIED ABDOMINAL LOCATION: Primary | ICD-10-CM

## 2020-05-19 NOTE — TELEPHONE ENCOUNTER
Spoke with Beatris Tar[facility]. She states pt is c/o abd pain and family is requesting x-ray for eval. No constipation or diarrhea. Cant describe which side the pain is on but nurse believes it is her right side since that is her dominant side. Pt is more awake lately and asking for meals frequently. She is getting 50-55CCs an hour of her current tube feedings. Nurse believes this could be hunger pains she is experiencing since she is more alert now and may need an increase to her feedings or a swallow study to evaluate if she can have food PO

## 2020-05-19 NOTE — TELEPHONE ENCOUNTER
Pain is not severe  As she does not appear to be in any distress, just a persistent complaint from pt. They will have portable xray in home for KUB and HH will send out their speech therapist for swallow study. I did give verbal but she asked for orders to be entered in Epic.

## 2020-05-19 NOTE — TELEPHONE ENCOUNTER
----- Message from Farhan Morrison sent at 5/19/2020 11:46 AM CDT -----  Contact: Emelina  Type: Needs Medical Advice  Who Called:  Emelina with [facility] home health  Symptoms (please be specific):    How long has patient had these symptoms:    Pharmacy name and phone #:    Best Call Back Number:   Additional Information: called regarding a written request from last week and haven't received a response.regarding patient's continued stomach pain

## 2020-05-19 NOTE — TELEPHONE ENCOUNTER
And abdominal x-rays very limited.  It may show or suggest constipation.  It may also show something more severe like it bowel obstruction although she would be having more symptoms for that.  So okay if they would like this but if she is having severe abdominal pain she really should go to the emergency room.  Where they want to do the x-ray, I they coming in to clinic, can this [facility] provide a portable?  If that is the case then given verbal for a KUB    Swallow study requires consultation with speech therapist typically that is done outpatient at the hospital. Will need speech therarpy consult and modified barium swallow.  That would require her to leave the home.  It is possible that speech therapist might be able to try a bedside swallowing test or at least pulmonary evaluation.  If the [facility] has a speech therapist then okay to give a verbal on that

## 2020-05-21 ENCOUNTER — TELEPHONE (OUTPATIENT)
Dept: FAMILY MEDICINE | Facility: CLINIC | Age: 85
End: 2020-05-21

## 2020-05-21 DIAGNOSIS — M51.36 DDD (DEGENERATIVE DISC DISEASE), LUMBAR: Primary | ICD-10-CM

## 2020-05-21 NOTE — TELEPHONE ENCOUNTER
I received a message requesting additional home health orders for physical therapy.  I entered them but since this is external and may need to be faxed.

## 2020-05-26 RX ORDER — HYDROXYZINE HYDROCHLORIDE 25 MG/1
TABLET, FILM COATED ORAL
Qty: 30 TABLET | Refills: 0 | Status: SHIPPED | OUTPATIENT
Start: 2020-05-26 | End: 2020-07-10

## 2020-05-31 RX ORDER — LISINOPRIL 5 MG/1
TABLET ORAL
Qty: 30 TABLET | Refills: 3 | Status: ON HOLD | OUTPATIENT
Start: 2020-05-31 | End: 2020-09-16 | Stop reason: HOSPADM

## 2020-05-31 RX ORDER — GABAPENTIN 100 MG/1
CAPSULE ORAL
Qty: 60 CAPSULE | Refills: 2 | Status: ON HOLD | OUTPATIENT
Start: 2020-05-31 | End: 2020-09-16 | Stop reason: HOSPADM

## 2020-06-02 ENCOUNTER — DOCUMENT SCAN (OUTPATIENT)
Dept: HOME HEALTH SERVICES | Facility: HOSPITAL | Age: 85
End: 2020-06-02
Payer: MEDICARE

## 2020-06-02 ENCOUNTER — LAB VISIT (OUTPATIENT)
Dept: LAB | Facility: HOSPITAL | Age: 85
End: 2020-06-02
Attending: FAMILY MEDICINE
Payer: MEDICARE

## 2020-06-02 DIAGNOSIS — M48.061 SPINAL STENOSIS, LUMBAR REGION, WITHOUT NEUROGENIC CLAUDICATION: ICD-10-CM

## 2020-06-02 DIAGNOSIS — Z74.01 BED CONFINEMENT STATUS: ICD-10-CM

## 2020-06-02 DIAGNOSIS — I69.320 APHASIA S/P CVA: ICD-10-CM

## 2020-06-02 DIAGNOSIS — M51.36 DEGENERATION OF LUMBAR INTERVERTEBRAL DISC: ICD-10-CM

## 2020-06-02 DIAGNOSIS — Z46.6 FITTING AND ADJUSTMENT OF URINARY DEVICE: ICD-10-CM

## 2020-06-02 DIAGNOSIS — J44.89 OBSTRUCTIVE CHRONIC BRONCHITIS WITHOUT EXACERBATION: ICD-10-CM

## 2020-06-02 DIAGNOSIS — I10 ESSENTIAL HYPERTENSION, MALIGNANT: ICD-10-CM

## 2020-06-02 DIAGNOSIS — Z93.1 GASTROSTOMY STATUS: ICD-10-CM

## 2020-06-02 DIAGNOSIS — G30.9 ALZHEIMER'S DISEASE: ICD-10-CM

## 2020-06-02 DIAGNOSIS — I25.10 CORONARY ATHEROSCLEROSIS OF NATIVE CORONARY ARTERY: ICD-10-CM

## 2020-06-02 DIAGNOSIS — N32.81 DETRUSOR INSTABILITY OF BLADDER: ICD-10-CM

## 2020-06-02 DIAGNOSIS — I69.391 DYSPHAGIA STATUS POST CEREBROVASCULAR ACCIDENT: ICD-10-CM

## 2020-06-02 DIAGNOSIS — G60.8 HEREDITARY SENSORY NEUROPATHY: Primary | ICD-10-CM

## 2020-06-02 DIAGNOSIS — F02.80 ALZHEIMER'S DISEASE: ICD-10-CM

## 2020-06-02 DIAGNOSIS — R13.10 PROBLEMS WITH SWALLOWING AND MASTICATION: ICD-10-CM

## 2020-06-02 LAB
BACTERIA #/AREA URNS HPF: NEGATIVE /HPF
BILIRUB UR QL STRIP: NEGATIVE
CLARITY UR: ABNORMAL
COLOR UR: YELLOW
GLUCOSE UR QL STRIP: NEGATIVE
HGB UR QL STRIP: NEGATIVE
HYALINE CASTS #/AREA URNS LPF: 32 /LPF
KETONES UR QL STRIP: NEGATIVE
LEUKOCYTE ESTERASE UR QL STRIP: ABNORMAL
MICROSCOPIC COMMENT: ABNORMAL
NITRITE UR QL STRIP: POSITIVE
PH UR STRIP: 8 [PH] (ref 5–8)
PROT UR QL STRIP: ABNORMAL
RBC #/AREA URNS HPF: 10 /HPF (ref 0–4)
SP GR UR STRIP: 1.02 (ref 1–1.03)
SQUAMOUS #/AREA URNS HPF: 1 /HPF
URN SPEC COLLECT METH UR: ABNORMAL
UROBILINOGEN UR STRIP-ACNC: ABNORMAL EU/DL
WBC #/AREA URNS HPF: >100 /HPF (ref 0–5)

## 2020-06-02 PROCEDURE — 99499 NO LOS: ICD-10-PCS | Mod: ,,, | Performed by: FAMILY MEDICINE

## 2020-06-02 PROCEDURE — 87077 CULTURE AEROBIC IDENTIFY: CPT

## 2020-06-02 PROCEDURE — 87086 URINE CULTURE/COLONY COUNT: CPT | Mod: GA

## 2020-06-02 PROCEDURE — 87186 SC STD MICRODIL/AGAR DIL: CPT

## 2020-06-02 PROCEDURE — 81001 URINALYSIS AUTO W/SCOPE: CPT

## 2020-06-02 PROCEDURE — 99499 UNLISTED E&M SERVICE: CPT | Mod: ,,, | Performed by: FAMILY MEDICINE

## 2020-06-03 ENCOUNTER — TELEPHONE (OUTPATIENT)
Dept: FAMILY MEDICINE | Facility: CLINIC | Age: 85
End: 2020-06-03

## 2020-06-03 DIAGNOSIS — N30.00 ACUTE CYSTITIS WITHOUT HEMATURIA: Primary | ICD-10-CM

## 2020-06-03 NOTE — TELEPHONE ENCOUNTER
Spoke with  nurse.  States urine was ordered with catheter change.   Will decided treatment when c&s results come in.

## 2020-06-03 NOTE — TELEPHONE ENCOUNTER
Reviewing labs for Dr. Bill, urine culture is positive. It appears this was ordered yesterday, but I can not see why. Please contact patient/family to determine why this was ordered? Is she having symptoms? I can see from previous notes that she has a history of recurrent UTIs and has an indwelling catheter.

## 2020-06-04 ENCOUNTER — CARE AT HOME (OUTPATIENT)
Dept: HOME HEALTH SERVICES | Facility: CLINIC | Age: 85
End: 2020-06-04
Payer: MEDICARE

## 2020-06-04 VITALS
HEART RATE: 72 BPM | DIASTOLIC BLOOD PRESSURE: 64 MMHG | TEMPERATURE: 98 F | RESPIRATION RATE: 16 BRPM | OXYGEN SATURATION: 98 % | SYSTOLIC BLOOD PRESSURE: 118 MMHG

## 2020-06-04 DIAGNOSIS — I63.9 ALTERATION IN MOBILITY DUE TO ACUTE CEREBROVASCULAR ACCIDENT (CVA): ICD-10-CM

## 2020-06-04 DIAGNOSIS — R53.81 DEBILITY: ICD-10-CM

## 2020-06-04 DIAGNOSIS — I69.320 HEMIPARESIS, APHASIA, AND DYSPHAGIA AS LATE EFFECT OF CEREBROVASCULAR ACCIDENT (CVA): ICD-10-CM

## 2020-06-04 DIAGNOSIS — I69.359 HEMIPARESIS, APHASIA, AND DYSPHAGIA AS LATE EFFECT OF CEREBROVASCULAR ACCIDENT (CVA): ICD-10-CM

## 2020-06-04 DIAGNOSIS — E44.0 MODERATE PROTEIN-CALORIE MALNUTRITION: ICD-10-CM

## 2020-06-04 DIAGNOSIS — R26.9 ALTERATION IN MOBILITY DUE TO ACUTE CEREBROVASCULAR ACCIDENT (CVA): ICD-10-CM

## 2020-06-04 DIAGNOSIS — K59.00 CONSTIPATION, UNSPECIFIED CONSTIPATION TYPE: Primary | ICD-10-CM

## 2020-06-04 DIAGNOSIS — I69.391 HEMIPARESIS, APHASIA, AND DYSPHAGIA AS LATE EFFECT OF CEREBROVASCULAR ACCIDENT (CVA): ICD-10-CM

## 2020-06-04 DIAGNOSIS — Z97.8 CHRONIC INDWELLING FOLEY CATHETER: ICD-10-CM

## 2020-06-04 DIAGNOSIS — Z74.1 REQUIRES DAILY ASSISTANCE FOR ACTIVITIES OF DAILY LIVING (ADL) AND COMFORT NEEDS: ICD-10-CM

## 2020-06-04 DIAGNOSIS — Z93.1 PEG (PERCUTANEOUS ENDOSCOPIC GASTROSTOMY) STATUS: ICD-10-CM

## 2020-06-04 DIAGNOSIS — I70.0 ABDOMINAL AORTIC ATHEROSCLEROSIS: ICD-10-CM

## 2020-06-04 DIAGNOSIS — F03.90 DEMENTIA WITHOUT BEHAVIORAL DISTURBANCE, UNSPECIFIED DEMENTIA TYPE: ICD-10-CM

## 2020-06-04 DIAGNOSIS — J44.9 CHRONIC OBSTRUCTIVE PULMONARY DISEASE, UNSPECIFIED COPD TYPE: ICD-10-CM

## 2020-06-04 DIAGNOSIS — N39.0 FREQUENT UTI: ICD-10-CM

## 2020-06-04 DIAGNOSIS — Z74.01 BEDBOUND: ICD-10-CM

## 2020-06-04 DIAGNOSIS — I69.954 HEMIPLEGIA OF LEFT NONDOMINANT SIDE AS LATE EFFECT OF CEREBROVASCULAR DISEASE, UNSPECIFIED CEREBROVASCULAR DISEASE TYPE, UNSPECIFIED HEMIPLEGIA TYPE: ICD-10-CM

## 2020-06-04 LAB — BACTERIA UR CULT: ABNORMAL

## 2020-06-04 PROCEDURE — 99349 PR HOME VISIT,ESTAB PATIENT,LEVEL III: ICD-10-PCS | Mod: S$GLB,,, | Performed by: NURSE PRACTITIONER

## 2020-06-04 PROCEDURE — 99349 HOME/RES VST EST MOD MDM 40: CPT | Mod: S$GLB,,, | Performed by: NURSE PRACTITIONER

## 2020-06-04 RX ORDER — CEPHALEXIN 500 MG/1
500 CAPSULE ORAL EVERY 12 HOURS
Qty: 10 CAPSULE | Refills: 0 | Status: SHIPPED | OUTPATIENT
Start: 2020-06-04 | End: 2020-06-09

## 2020-06-04 NOTE — PROGRESS NOTES
[facility]  Medical Home Visit    Visit Date: 6/4/2020  Encounter Provider: Autumn Wong NP  PCP:  Frantz Blil MD    Subjective:      Patient ID: Breanne Culp is a 86 y.o. female.    Consult Requested By:  Autumn Wong  Reason for Consult:  Medical Follow Up/Homebound/Bedbound    Breanne LEÓN is being seen at home due to physical debility that presents a taxing effort to leave the home, to mitigate high risk of hospital readmission and/or due to the limited availability of reliable or safe options for transportation to the point of access to the provider. Prior to treatment on this visit the chart was reviewed and patient consent was obtained verbally from daughter Loree.      Chief Complaint: Follow-up    Breanne Culp is an 86 year old female with PMHX of CVA with residual left sided hemiparesis, bedbound status, s/p Peg tube, Alzheimer's disease, aphasia, dysphagia, protein calorie malnutrition, abdominal aortic atherosclerosis, hyperlipidemia, history of sacral pressure ulcer, COPD, chronic indwelling aponte catheter, frequent UTIs, osteoarthritis, idiopathic peripheral neuropathy and lumbar degenerative disc disease.    Breanne is being seen today because she is bedbound/homebound from dementia and history of CVA with residual hemiplegia/hemiparesis. She is found lying in a hospital bed today and jodi Benson is present. The patient is alert, oriented to self only and speech is mostly unintelligible however she is clearer today more than on previous visits.  Kelley reports that Home Health sent in a urine specimen recently and they are awaiting the results. Breanne has a chronic indwelling aponte catheter and has  frequent UTIs.     Constipation has been a serious issue with Breanne over the last few months. Kelley reports that the patient does not like to be incontinent of stool and therefore holds her bowel movements for days and then can't go. She has needed enemas approximately twice  a month and is taking Miralax daily. Patient receives Jevity tube feeding via Peg but has also recently started eating a few spoonfuls of soft foods as indicated by Speech Therapy (ST).  Breanne had a KUB xray performed in her home ordered by PCP 5/19/2020. Kelley reports xray was done but no results are evident in Epic.     Kelley reports that she notices improvement in Breanne since Physical Therapy is working with her. A durga-chair has been ordered for Breanne so that she can get out of bed more.    VSS. No fever, chest pain, shortness of breath, nausea, vomiting, diarrhea reported. Reports taking all medications as prescribed. No other needs identified at this time. Risks of environmental exposure to coronavirus discussed including: social distancing, hand hygiene, and limiting departures from the home for necessities only.  Reports understanding and willingness to comply.    Patient uses nebulizer occasionally for COPD symptoms per Kelley.     In speaking with Loree via telephone Hospice consideration was discussed again. She states her mother has never been on Hospice and indicated that it was not something she wants to pursue at this time. Palliative care/AIM was discussed and she agrees to this if PCP does. Will reach out to PCP for PC consideration        Review of Systems   Constitutional: Negative for diaphoresis.   Eyes: Negative for discharge.   Respiratory: Negative for cough, shortness of breath.   Gastrointestinal: Negative for diarrhea and vomiting. Positive for consitpation requiring enemas twice a month.  Genitourinary: Negative for vaginal discharge.   Neurological: Positive for facial asymmetry, speech difficulty and weakness that is chronic.   Psychiatric/Behavioral: Positive for confusion.   ROS limited due to patient dementia and aphasia, info obtained from sitter and daughter     Assessments:  · Environmental: clean, adequate lighting and temperature, no foul odors  · Functional  "Status: dependent on all ADLs, bedbound  · Safety: no issues identified  · Nutritional: PEG tube feedings/Jevity at 50 cc/hr with 400 cc water boluses and has recently started eating a few spoonfuls of soft foods as advised by ST.  · Home Health/DME/Supplies: [facility]/ST, Private sitters 24/7. Hospital bed, tube feeding equipment/pump        Objective:   Physical Exam   Constitutional:   Awake, alert, bedbound, frail, speech unintelligible mostly however she is clearer today than on previous visits. She stated "I am good".  HENT:   Head: Normocephalic and atraumatic.   Right Ear: External ear normal.   Left Ear: External ear normal.   Eyes: Pupils are equal, round, and reactive to light. Conjunctivae and EOM are normal.   Neck: Neck supple.   Cardiovascular: Normal rate, regular rhythm, normal heart sounds and intact distal pulses.   Pulmonary/Chest: Effort normal. Lungs CTA but diminished in bases. No cough noted.  Abdominal: Soft. Bowel sounds are normal.   Genitourinary: No vaginal discharge noted.   Genitourinary Comments: Scherer catheter in place draining yellow urine with small amount of sediment noted in tubing   Musculoskeletal: She exhibits no edema.   Left sided hemiplegia   Neurological: She is alert.   Skin: Skin is warm and dry. Capillary refill takes 2 to 3 seconds.   Sacral pressure ulcer scarring but healed, skin intact   Psychiatric:   Follows no commands     Vitals:    06/04/20 1015   BP: 118/64   Pulse: 72   Resp: 16   Temp: 98.1 °F (36.7 °C)   SpO2: 98%   PainSc: 0-No pain     There is no height or weight on file to calculate BMI.    Assessment:     1. Constipation, unspecified constipation type    2. Dementia without behavioral disturbance, unspecified dementia type    3. Bedbound    4. Debility    5. Frequent UTI    6. Chronic indwelling Scherer catheter    7. Requires daily assistance for activities of daily living (ADL) and comfort needs    8. Hemiplegia of left nondominant " "side as late effect of cerebrovascular disease, unspecified cerebrovascular disease type, unspecified hemiplegia type    9. Hemiparesis, aphasia, and dysphagia as late effect of cerebrovascular accident (CVA)    10. Abdominal aortic atherosclerosis    11. Moderate protein-calorie malnutrition        Plan:          Breanne LEÓN was seen today for follow-up.    Diagnoses and all orders for this visit:    Dementia without behavioral disturbance, unspecified dementia type  -     Ambulatory referral/consult to [facility]  Stable. Followed by PCP.   Bedbound  -     Ambulatory referral/consult to [facility]  Stable. Patient currently has Home Health with PT and ST. Skin currently intact.  Debility  -     Ambulatory referral/consult to [facility]  Stable. Patient currently has Home Health with PT and ST.  Frequent UTI  -     Ambulatory referral/consult to [facility]  Discussed meticulous perineal hygiene when patient has a BM.  Discussed chronic indwelling aponte catheter can add to risk of frequent UTI.  S/S UTI discussed and when to report. Patient has pending UA with culture at this time.  Chronic indwelling Aponte catheter  -     Ambulatory referral/consult to [facility]  Stable. Managed by Home Health.  Requires daily assistance for activities of daily living (ADL) and comfort needs  -     Ambulatory referral/consult to [facility]  [facility]. Patient currently has Home Health with PT and ST.  Constipation  Active. Patient is requiring enemas twice a month lately. Sitter reports that patient does not like to be incontinent of bowel so she will "hold it" and becomes severely constipated. Continue Miralax and encourage patient not to hold bowel movement when urge is present.  Hemiplegia of left nondominant side as late effect of " cerebrovascular disease, unspecified cerebrovascular disease type, unspecified hemiplegia type  Stable. Patient currently has Home Health with PT and ST.  Hemiparesis, aphasia, and dysphagia as late effect of cerebrovascular accident (CVA)  Stable. Patient currently has Home Health with PT and ST.  Abdominal aortic atherosclerosis  Stable. Followed by PCP.  Moderate protein-calorie malnutrition  Patient has Peg tube and is receiving Jevity at 50 cc/hr and is starting to eat some soft food as advised by ST.  Last albumin (2/13/2020) was 2.6. Followed by PCP.  PEG (percutaneous endoscopic gastrostomy) status  Stable. Followed by PCP.   Chronic obstructive pulmonary disease, unspecified COPD type  Stable. Followed by PCP.   Alteration in mobility due to acute cerebrovascular accident (CVA)  Active. Followed by PT currently.      - [facility] to schedule home follow-up visit with patient in 4-6 weeks or as needed.  - Continue all medications, treatments and therapies as ordered.   - Follow all instructions, recommendations as discussed.  - Maintain Safety Precautions at all times.  - Attend all medical appointments as scheduled.  - For worsening symptoms: call Primary Care Physician or Nurse Practitioner.  - For emergencies, call 911 or immediately report to the nearest emergency room.  - Limit Risks of environmental exposure to coronavirus/COVID-19 as discussed including: social distancing, hand hygiene, and limiting departures from the home for necessities only.       Limit Risks of environmental exposure to coronavirus/COVID-19 as discussed including: social distancing, hand hygiene, and limiting departures from the home for necessities only.     Time allowed for questions, all questions answered. [facility] contact information left with patient today for any future concerns.      Were controlled substances prescribed?  No    Follow Up Appointments:   Future Appointments   Date Time Provider  Department Center   7/16/2020 10:00 AM Autumn Wong NP [id number]       Signature:  Autumn Wong NP     Attestation:   Screening criteria to assess the level of the patient's risk for infection with COVID-19 as recommended by the CDC at the time of the above documented home visit concluded appropriateness to proceed. Universal precautions were maintained at all times, including provider wearing a mask and gloves during entire visit and use of 60% alcohol gel hand  immediately prior to entry and upon departure of patient's home as well as cleaning of equipment used in home visit with antibacterial/germicidal disposable wipes.

## 2020-06-04 NOTE — TELEPHONE ENCOUNTER
Confirmed pharmacy and allergies.  Please send abx    Subjective


Remarks


Follow up tricuspid valve endocarditis, cervical spine osteomyelitis, and 

hypertension. 


Patient seen and examined.  


Pt noted she felt "ok."


Pt was again appreciative of recent change in diet and "being able to eat real 

food again".


Asked patient if she had any thoughts or concerns about yesterday's meeting 

with this clinician and charge nurse.  Patient said she had none.  She stated 

she did speak with her AA sponsor yesterday.


Discussed with pt her recent echocardiogram results and implications of 

ejection of 35-40% as well as pulmonary hypertension of 40-55 mmHg. she was 

informed these issues will need to be followed up outpatient with PCP and or 

cardiology.


She denied any new acute complaints overnight.


Pt denied, chill, cough, shortness of breath, abdominal/chest pain, diarrhea, 

dysuria.


Per RN (Rufus) no acute issues noted overnight or since start of shift.





Objective


Vitals





Vital Signs








  Date Time  Temp Pulse Resp B/P (MAP) Pulse Ox O2 Delivery O2 Flow Rate FiO2


 


9/23/17 12:00 98.3 77 17 118/55 (76) 99   


 


9/23/17 08:00 98.2 73 17 124/59 (80) 96   


 


9/23/17 05:09 98.3 80 20 96/54 (68) 95   


 


9/23/17 00:54 97.9 93 20 105/57 (73) 98   


 


9/22/17 20:00 97.8 91 18 124/66 (85) 95   


 


9/22/17 19:44  107      


 


9/22/17 17:06  69      


 


9/22/17 16:00 98.5 87 18 123/70 (87) 100   














I/O      


 


 9/22/17 9/22/17 9/22/17 9/23/17 9/23/17 9/23/17





 07:00 15:00 23:00 07:00 15:00 23:00


 


Intake Total  262.5 ml   240 ml 


 


Balance  262.5 ml   240 ml 


 


      


 


Intake Oral     240 ml 


 


IV Total  262.5 ml    


 


# Voids 4   2  


 


# Bowel Movements  1    








Result Diagram:  


9/22/17 0522





Imaging





Last Impressions








Tumor Localization 9/5/17 0000 Signed





Impressions: 





 Service Date/Time:  Tuesday, September 5, 2017 16:43 - CONCLUSION: No abnormal 





 white cell accumulation in the cervical spine.     Kan Alvarado MD 


 


Cervical Spine MRI 9/2/17 0000 Signed





Impressions: 





 Service Date/Time:  Saturday, September 2, 2017 16:40 - CONCLUSION:  

Substantial 





 improvement when compared to 5/10/17 substantial decrease in the amount of 





 enhancement. Ceretec tagged white cell study may be of benefit to exclude 





 residual inflammatory focus.     Bronson Johnson MD  FACR


 


Cervical Spine CT 9/1/17 0000 Signed





Impressions: 





 Service Date/Time:  Saturday, September 2, 2017 11:22 - CONCLUSION: Stable 





 changes when compared to 6/7/17.  Spinal canal still remains adequate. Its 





 difficult to assess the intra-and extradural components.  A SPECT tagged white 





 cell study with 3-D reconstructions could offer more information if continued 





 infection is suspected.     Bronson Johnson MD  FACR


 


Cervical Spine X-Ray 8/31/17 0000 Signed





Impressions: 





 Service Date/Time:  Thursday, August 31, 2017 14:02 - CONCLUSION:  1. Severe 





 kyphosis centered at the C3 level approaching 90 which is mildly increased 

from 





 the prior study. There is increased deformity of the inferior aspect of C2 

with 





 hypertrophic change and or soft tissue calcification. 2. Stable appearing 





 deformity of the C3 vertebral body. 3.  4. Status post remote fusion of the C4-

5 





 and C6-7 levels.   Alexis Cervantes MD 


 


Chest X-Ray 8/30/17 1400 Signed





Impressions: 





 Service Date/Time:  Wednesday, August 30, 2017 14:22 - CONCLUSION:  Prominent 





 cardiac silhouette without suspicious lung lesions.     Bronson Johnson MD  





 FACR








Objective Remarks


GENERAL: Pt encountered laying a bed, resting. NAD. 


SKIN: Warm and dry.


HEAD: Normocephalic.


EYES: No scleral icterus. No injection or drainage. 


NECK: Supple, trachea midline. 


CARDIOVASCULAR: Regular rate and rhythm without murmurs, gallops, or rubs. 


RESPIRATORY: Breath sounds equal bilaterally. No accessory muscle use.


GASTROINTESTINAL: Abdomen soft, non-tender, nondistended. 


MUSCULOSKELETAL: No cyanosis, or edema. Pt wearing cervical collar.


PSYCHIATRIC:    Mood and affect appropriate. Insight and judgment adequate. Pt 

was pleasant and cooperative. Speech was clear and fluent.





Procedures


None


Medications and IVs





Current Medications








 Medications


  (Trade)  Dose


 Ordered  Sig/Lorena


 Route  Start Time


 Stop Time Status Last Admin


 


  (NS Flush)  2 ml  UNSCH  PRN


 IV FLUSH  8/30/17 17:30


    9/11/17 21:31


 


 


  (NS Flush)  2 ml  BID


 IV FLUSH  8/30/17 21:00


    9/23/17 09:00


 


 


  (Tylenol)  650 mg  Q4H  PRN


 PO  8/30/17 17:30


     


 


 


  (Lovenox Inj)  40 mg  Q24H


 SQ  8/30/17 20:00


    9/22/17 20:08


 


 


  (Narcan Inj)  0.4 mg  UNSCH  PRN


 IV  8/30/17 17:30


     


 


 


  (Milk Of


 Magnesia Liq)  30 ml  Q12H  PRN


 PO  8/30/17 17:30


     


 


 


  (Senokot)  17.2 mg  Q12H  PRN


 PO  8/30/17 17:30


     


 


 


  (Dulcolax Supp)  10 mg  DAILY  PRN


 RECTAL  8/30/17 17:30


     


 


 


  (Lactulose Liq)  30 ml  DAILY  PRN


 PO  8/30/17 17:30


     


 


 


 Pharmacy Profile


 Note  0 ml @ 0


 mls/hr  UNSCH


 OTHER  8/30/17 17:45


     


 


 


  (ZyrTEC)  10 mg  DAILY


 PO  8/31/17 09:00


    9/23/17 09:49


 


 


  (Colace)  100 mg  Q12H


 PO  8/30/17 21:00


    9/23/17 09:49


 


 


  (Percocet  5-325


 Mg)  1 tab  Q4H  PRN


 PO  8/30/17 18:15


    9/22/17 05:34


 


 


  (Effexor Xr)  37.5 mg  DAILY


 PO  8/31/17 09:00


    9/23/17 09:49


 


 


  (Percocet


 7.5-325 Mg)  1 tab  Q4H  PRN


 PO  8/31/17 12:00


    9/23/17 09:49


 


 


  (Ativan)  0.5 mg  Q6H  PRN


 PO  9/1/17 14:00


    9/19/17 22:03


 


 


  (Prinivil)  10 mg  DAILY


 PO  9/4/17 13:30


   Future Hold 9/7/17 08:40


 


 


 Vancomycin HCl


 1000 mg/Sodium


 Chloride  250 ml @ 


 250 mls/hr  Q12H


 IV  9/24/17 00:00


     


 


 


 Miscellaneous


 Information  SPECIFIC


 LAB TO BE


 JERMAINE...  ONCE  ONCE


 .XX  9/25/17 11:45


 9/25/17 11:46   


 








Urinary Catheter:  No





A/P


Problem List:  


(1) Amphetamine use disorder, severe, dependence


ICD Code:  F15.20 - Other stimulant dependence, uncomplicated


Status:  Acute


(2) Abscess in epidural space of lumbar spine


ICD Code:  G06.1 - Intraspinal abscess and granuloma


Status:  Acute


(3) Osteomyelitis of cervical spine


ICD Code:  M46.22 - Osteomyelitis of vertebra, cervical region


Status:  Acute


(4) Neck abscess


ICD Code:  L02.11 - Cutaneous abscess of neck


Status:  Acute


(5) Infectious endocarditis


ICD Code:  I33.0 - Acute and subacute infective endocarditis


Status:  Acute


(6) Bacteremia


ICD Code:  R78.81 - Bacteremia


Status:  Acute


(7) Fever


ICD Code:  R50.9 - Fever, unspecified


Status:  Resolved


(8) IV drug abuse


ICD Code:  F19.10 - Other psychoactive substance abuse, uncomplicated


Status:  Chronic


(9) Tachycardia


ICD Code:  R00.0 - Tachycardia, unspecified


Status:  Acute


Assessment and Plan


45-year-old female with a past medical history of IVDA, cervical osteomyelitis, 

endocarditis, lumbar abscesses who presented with agitation and altered mental 

status.





Tachycardia: One episodes noted over past 24 hours during vital check. 

Endocarditis: Pt continues to tolerate IV antibiotics. Pt educated about 

condition. Picc line to be ordered in event current peripheral IV line fails. 








Acute encephalopathy: resolved.





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 


   - TV endocarditis previously for both MRSA and MSSA


   - 2D echo 9/3 EF 35-40%, vegetation on the posterior leaflet of the 

tricuspid valve.  Patient not candidate for MARY JANE 2/2 cervical disease


   - ID following patient.  Spoke to Dr. Wolf about plan for patient. 

Patient will require prolonged IV antibiotics.  So far no nursing facility or 

Home health agency willing to take 


   this patient due to her history.  Case management following and updated on 

being a candidate for PO. Will explore option of transferring patient to PO to 

finish her IV       


   antibiotics, stop date will be October 8,2017 to complete 4 weeks of 

antibiotic treatment. CM to continue to follow.


 


Cervical spine osteomyelitis: Diagnosed May 2017. 


   -Continue cervical collar. 


   - Appreciate neurosurgery recommendations. 


   - Patient is not a good surgical candidate at this time due to ongoing IV 

drug abuse and high infection risk. 


   - MRI cervical spine 9/2 - Substantial improvement when compared to 5/10/17 

substantial decrease in the amount of enhancement.


   - Ceretec tagged white cell study shows no abnormal white cell accumulation 

in the cervical spine.


   


RUE weakness/weak right  strength


   - no complaints of cervical radiculopathy. Pain well controlled. Denies any 

numbness or tingling in upper extremities. 


   - PT/OT eval/tx





Chronic pain: She is an IV drug user. 


   - Continue Percocet 5/325mg q4hrs, Percocet 7.5mg/325mg q4hrs. no plans to 

escalate pain medications.  Patient is aware of this.





Hypertension: 


   - Will controlled at this time. Will continue to monitor BPs. 


   -Continue to Hold lisinopril





BMP and CBC pending this am. Follow. 





DVT prophylaxis: Lovenox.


Full code





Case discussed with Pt, RN, and Dr. Engle


Discharge Planning





Difficult discharge. CM assisting.











Mo Gama Jr. PA Sep 23, 2017 14:48

## 2020-06-04 NOTE — Clinical Note
I can follow her for Palliative Care if you would like for me to. I will go every 4-6 weeks as needed.

## 2020-06-04 NOTE — TELEPHONE ENCOUNTER
Reviewed sensitivities, will send course of keflex for UTI. Please confirm allergies and pharmacy and send back to me. After this, will send message to PCP to address daughter's question of whether the patient should be on long term suppressive antibiotics. I typically recommend seeing Urology if having recurrent UTIs with a catheter to help with this decision.

## 2020-06-07 PROBLEM — J44.9 CHRONIC OBSTRUCTIVE PULMONARY DISEASE: Status: ACTIVE | Noted: 2020-06-07

## 2020-06-07 RX ORDER — POLYETHYLENE GLYCOL 3350 17 G/17G
POWDER, FOR SOLUTION ORAL
Status: ON HOLD | COMMUNITY
End: 2020-09-16 | Stop reason: HOSPADM

## 2020-06-07 NOTE — PATIENT INSTRUCTIONS
Catheter-Associated Urinary Tract Infections     A small balloon keeps the catheter in place inside the bladder.   A catheter-associated urinary tract infection (CAUTI) is an infection of the urinary system. CAUTI is caused by bacteria that enter the urinary tract when a urinary catheter is used. This is a tube thats placed into the bladder to drain urine.  The urinary system  This system includes the kidneys, ureters, bladder, and urethra. The kidneys filter blood and make urine. The ureters carry urine from the kidneys to the bladder. The bladder stores urine. The urethra carries urine from the bladder to the outside of the body.  What is a urinary catheter?  A urinary catheter is a thin, flexible tube. It is placed in the bladder to drain urine. Urine flows through the tube into a collecting bag outside of the body. There are different types of urinary catheters. The most common type is an indwelling catheter. This is also known as a urethral catheter. This is because its placed into the bladder through the urethra. This catheter is also called a Scherer catheter.  Why is a urinary catheter needed?  A urinary catheter is needed for any of the following:  · You cant get up to use the toilet because your mobility is limited. This may be due to surgery, an injury, or illness.  · You have a blockage in your urinary system.  · Your healthcare provider needs to measure the amount of urine you pass.  · The function of your kidneys and bladder is being tested.  · Youre not able to control your bladder (incontinence).  In most cases, the urinary catheter is temporary. You'll need it only until the problem that requires it is resolved.   How does a CAUTI develop?  Bacteria can enter the urinary tract as the catheter is put into the urethra. Bacteria can also get into the urinary tract while the catheter is in place. The common bacteria that cause a CAUTI are ones that live in the intestine. These bacteria dont  normally cause problems in the intestine. But when they get into the urinary tract, a CAUTI can result.  Why is a CAUTI of concern?  Left untreated, a CAUTI can lead to health problems. These problems may include bladder infection, prostate infection, and kidney infection. A CAUTI can prolong your hospital stay. If the infection is not treated in time, serious health complications may occur.  What are the symptoms of a CAUTI?  · A burning feeling, pressure, or pain in your lower abdomen  · Fever or chills  · Urine in the collecting bag is cloudy or bloody (pink or red)  · Burning feeling in the urethra or genital area  · Aching in the back (kidney area)  · Nausea and vomiting  · Person is confused, or is not alert, or has a change in behavior (mainly affects older patients)  · Note that sometimes a person wont have any symptoms but may still have a CAUTI.  Tell a healthcare provider right away if you or your loved one has any of these symptoms.  How is CAUTI diagnosed?  If you have symptoms of CAUTI your healthcare provider will order tests. These include a urine test, blood tests, and other tests as needed.  How is CAUTI treated?   Treatment may involve any of the following:  · Antibiotics. Your healthcare provider will likely prescribe antibiotics if you have symptoms. Be aware that if you dont have symptoms, you may not be given antibiotics. This is to prevent an increase in bacteria that resist (cant be killed by) certain antibiotics.  · Removing the catheter. The catheter will be removed when your healthcare provider decides its no longer needed. This usually helps stop the infection.  · Changing the catheter. If you still need a catheter, the old one will be removed. A new one will be put in. This may help stop the infection.  How do hospital and long-term facility staff prevent CAUTI?  To keep patients from getting a CAUTI, the staff follow certain procedures:  · Prescribe a catheter only when its  needed. It is removed as soon as its no longer needed.  · Use sterile (clean) technique when placing the catheter into the urinary tract. This means before putting the catheter in, the caregiver washes his or her hands with soap and water. He or she then puts on sterile gloves. A sterile catheter kit that has cleansers is used to cleanse the patients genital area.  · Before performing catheter care, caregivers also wash their hands or use an alcohol-based hand cleanser.  · Hang the bag lower than your bladder. This prevents urine from flowing back into your bladder.  · Ensure that the bag is emptied regularly.  What you can do as a patient to prevent CAUTI  You can help prevent yourself from getting a CAUTI by doing the following:  · Every day ask your healthcare provider how long you need to have the catheter. The longer you have a catheter, the higher your chance of getting a CAUTI.  · If a caregiver doesnt clean his or her hands and put on gloves before touching your catheter, ask them to do so.  · If youve been taught how to care for your catheter, be sure to wash your hands before and after each session.  · Make sure your bag is lower than your bladder. If its not, tell your caregiver.  · Dont disconnect the catheter and drain tube. Doing so allows germs to get into the catheter.  · Cleansing of the genital and perineal areas is very important to help decrease bacteria in areas surrounding the catheter. Ask your doctor what you should use and how often to clean these areas.  If you are discharged with an indwelling catheter  · Before you leave the hospital, make sure you understand the instructions on how to care for your catheter at home.  · Ask your healthcare provider how long you need the catheter. Also ask if you need to make a follow-up appointment to have the catheter removed.  · Always use sterile (clean) technique when caring for your catheter. Wash your hands before and after doing any catheter  care.  · Call your healthcare provider right away if you develop symptoms of a CAUTI (see above).   Date Last Reviewed: 1/1/2017 © 2000-2017 [employer]. [address] [place] [address], [place] [address]. All rights reserved. This information is not intended as a substitute for professional medical care. Always follow your healthcare professional's instructions.        Soft Diet  Your healthcare provider has prescribed a soft diet (also called gastrointestinal soft diet). This means eating foods that are soft, low in fiber, and easy to digest. This diet is for people with digestive problems. A soft diet provides foods that are easy to chew and swallow. Foods should be bite-size and very soft or moist. Follow your healthcare providers specific instructions about what foods and drinks you may have. The general guidelines below can help you get started on this diet.       Beverages  OK: Milk, tea, coffee, fruit juices, carbonated beverages, nutrition shakes, and drinks (Note: Thin liquids may be hard to swallow. They may need to be thickened.)  Avoid: None, unless they need to be thickened  Breads and crackers  OK: Refined white, wheat, or seedless rye bread; lena or soda crackers that have been moistened; plain rolls or bagels; very soft tortillas  Avoid: Whole-grain breads, rolls, or bagels with nuts, raisins, or seeds; crackers, croutons, taco shells  Cereals and grains  OK: Cooked cereals, plain dry cereals that have been moistened, plain macaroni, spaghetti, noodles, rice  Avoid: Whole-grain cereals and granola, or cereals containing bran, raisins, seeds or nuts; coconut; brown or wild rice  Desserts and sweets  OK: Moist cake; soft fruit pie with bottom crust only; soft cookies moistened in milk or other liquid; gelatin, custard, pudding, plain ice cream, plain sherbet, sugar, honey, clear jelly  Avoid: Pastries, desserts, and ice cream that have nuts, coconut, seeds, or dried fruit; popcorn; chips of  any kind including potato chips and tortilla chips; jam, marmalade  Eggs and cheese  OK: Poached, soft boiled, or scrambled eggs; cottage cheese, ricotta cheese, cream cheese, cheese sauces, or cheese melted in other dishes  Avoid: Crisp fried eggs, cheese slices and cubes  Fruits  OK: Avocado, banana, baked peeled apple, applesauce, peeled ripe peaches or pears, canned fruit (apricots, cherries, peaches, pears), melons  Avoid: Raw apple, dried fruits, coconut, pineapple, grapes, fruit yvrose, fruit snacks  Meat and fish  OK: All fresh meat, poultry, or fish that is cooked until tender  Avoid: Meat, fish, or poultry that is fried; tough or stringy meat including proctor, sausage, bratwurst, jerky, corned beef  Other protein foods  OK: Tofu, baked beans, smooth peanut butter or other nut or seed butters  Avoid: Deep-fried tofu; crunchy peanut or other nut or seed butters; nuts or seeds that are whole or chopped  Soups  OK: All soups, but they may need to be thickened. Thin liquid may be too hard to swallow.  Avoid: Soups made with stringy meat pieces or chunky vegetables  Vegetables  OK: Peeled and well-cooked potatoes or sweet potatoes; fresh, cooked, canned, or frozen vegetables without seeds, skin, or coarse fiber  Avoid: Raw vegetables, deep-fried vegetables (such as tempura), and corn  Date Last Reviewed: 8/1/2016 © 2000-2017 [employer]. [address] [place] [id number]. All rights reserved. This information is not intended as a substitute for professional medical care. Always follow your healthcare professional's instructions.        Constipation (Adult)  Constipation means that you have bowel movements that are less frequent than usual. Stools often become very hard and difficult to pass.  Constipation is very common. At some point in life it affects almost everyone. Since everyone's bowel habits are different, what is constipation to one person may not be to another. Your healthcare  provider may do tests to diagnose constipation. It depends on what he or she finds when evaluating you.    Symptoms of constipation include:  · Abdominal pain  · Bloating  · Vomiting  · Painful bowel movements  · Itching, swelling, bleeding, or pain around the anus  Causes  Constipation can have many causes. These include:  · Diet low in fiber  · Too much dairy  · Not drinking enough liquids  · Lack of exercise or physical activity. This is especially true for older adults.  · Changes in lifestyle or daily routine, including pregnancy, aging, work, and travel  · Frequent use or misuse of laxatives  · Ignoring the urge to have a bowel movement or delaying it until later  · Medicines, such as certain prescription pain medicines, iron supplements, antacids, certain antidepressants, and calcium supplements  · Diseases like irritable bowel syndrome, bowel obstructions, stroke, diabetes, thyroid disease, Parkinson disease, hemorrhoids, and colon cancer  Complications  Potential complications of constipation can include:  · Hemorrhoids  · Rectal bleeding from hemorrhoids or anal fissures (skin tears)  · Hernias  · Dependency on laxatives  · Chronic constipation  · Fecal impaction  · Bowel obstruction or perforation  Home care  All treatment should be done after talking with your healthcare provider. This is especially true if you have another medical problems, are taking prescription medicines, or are an older adult. Treatment most often involves lifestyle changes. You may also need medicines. Your healthcare provider will tell you which will work best for you. Follow the advice below to help avoid this problem in the future.  Lifestyle changes  These lifestyle changes can help prevent constipation:  · Diet. Eat a high-fiber diet, with fresh fruit and vegetables, and reduce dairy intake, meats, and processed foods  · Fluids. It's important to get enough fluids each day. Drink plenty of water when you eat more fiber. If you  are on diet that limits the amount of fluid you can have, talk about this with your healthcare provider.  · Regular exercise. Check with your healthcare provider first.  Medications  Take any medicines as directed. Some laxatives are safe to use only every now and then. Others can be taken on a regular basis. Talk with your doctor or pharmacist if you have questions.  Prescription pain medicines can cause constipation. If you are taking this kind of medicine, ask your healthcare provider if you should also take a stool softener.  Medicines you may take to treat constipation include:  · Fiber supplements  · Stool softeners  · Laxatives  · Enemas  · Rectal suppositories  Follow-up care  Follow up with your healthcare provider if symptoms don't get better in the next few days. You may need to have more tests or see a specialist.  Call 911  Call 911 if any of these occur:  · Trouble breathing  · Stiff, rigid abdomen that is severely painful to touch  · Confusion  · Fainting or loss of consciousness  · Rapid heart rate  · Chest pain  When to seek medical advice  Call your healthcare provider right away if any of these occur:  · Fever over 100.4°F (38°C)  · Failure to resume normal bowel movements  · Pain in your abdomen or back gets worse  · Nausea or vomiting  · Swelling in your abdomen  · Blood in the stool  · Black, tarry stool  · Involuntary weight loss  · Weakness  Date Last Reviewed: 12/30/2015  © 8860-8832 [employer]. [address] [place] [address], [place] [address]. All rights reserved. This information is not intended as a substitute for professional medical care. Always follow your healthcare professional's instructions.

## 2020-06-10 ENCOUNTER — TELEPHONE (OUTPATIENT)
Dept: FAMILY MEDICINE | Facility: CLINIC | Age: 85
End: 2020-06-10

## 2020-06-10 NOTE — TELEPHONE ENCOUNTER
Spoke with [facility], pt has a standard wheelchair but they are trying to get her more comfortable and get her out for bed more so they are requesting durga-chair  They need written order sent to [facility].

## 2020-06-10 NOTE — TELEPHONE ENCOUNTER
----- Message from Tiburcio Song sent at 6/10/2020  2:32 PM CDT -----  Contact: Jamila with [facility]   Type: Needs Medical Advice  Who Called:  Jamila    Best Call Back Number: [phone]  Additional Information: Jamila is calling to get a Carlos chair for the pt. Pt is having trouble sitting in normal wheel chair and needing one that reclines.Please call to advise

## 2020-06-11 PROCEDURE — G0179 MD RECERTIFICATION HHA PT: HCPCS | Mod: ,,, | Performed by: FAMILY MEDICINE

## 2020-06-11 PROCEDURE — G0179 PR HOME HEALTH MD RECERTIFICATION: ICD-10-PCS | Mod: ,,, | Performed by: FAMILY MEDICINE

## 2020-06-17 ENCOUNTER — TELEPHONE (OUTPATIENT)
Dept: FAMILY MEDICINE | Facility: CLINIC | Age: 85
End: 2020-06-17

## 2020-06-17 NOTE — TELEPHONE ENCOUNTER
----- Message from Maria Esther Dunaway MA sent at 6/17/2020  3:43 PM CDT -----  Regarding: referral  Type: Needs Medical Advice  Who Called:  Jamila Chinchilla PT  Best Call Back Number:   Additional Information: PT calling to check the status on the order for the durga-chair.  Please call to discuss

## 2020-06-18 ENCOUNTER — DOCUMENT SCAN (OUTPATIENT)
Dept: HOME HEALTH SERVICES | Facility: HOSPITAL | Age: 85
End: 2020-06-18
Payer: MEDICARE

## 2020-06-18 ENCOUNTER — TELEPHONE (OUTPATIENT)
Dept: FAMILY MEDICINE | Facility: CLINIC | Age: 85
End: 2020-06-18

## 2020-06-18 NOTE — TELEPHONE ENCOUNTER
----- Message from Mikayla Rogel sent at 6/18/2020  8:24 AM CDT -----  Contact: Amrita Ralph  Type: Needs Medical Advice  Who Called:  Amrita Ralph  Symptoms (please be specific):  Still hungry  Best Call Back Number: [phone] if calling from landline have to dial 1  Additional Information: Patient is still hungry so needs advise

## 2020-06-18 NOTE — TELEPHONE ENCOUNTER
Spoke with pt's daughter. She states pt is not liking pureed food, she is eating soft foods and drinking. She is repeatedly saying she is hungry. She is asking if caloric intake on her Jevity can be increased up from 1.2, if that will help.      She also would like to know if catheter can be changed more frequently than every 4 weeks bc she is developing UTIs in the 4th week.

## 2020-06-23 ENCOUNTER — DOCUMENT SCAN (OUTPATIENT)
Dept: HOME HEALTH SERVICES | Facility: HOSPITAL | Age: 85
End: 2020-06-23
Payer: MEDICARE

## 2020-06-24 ENCOUNTER — EXTERNAL HOME HEALTH (OUTPATIENT)
Dept: HOME HEALTH SERVICES | Facility: HOSPITAL | Age: 85
End: 2020-06-24
Payer: MEDICARE

## 2020-06-26 ENCOUNTER — TELEPHONE (OUTPATIENT)
Dept: FAMILY MEDICINE | Facility: CLINIC | Age: 85
End: 2020-06-26

## 2020-06-26 DIAGNOSIS — R05.9 COUGH: Primary | ICD-10-CM

## 2020-06-26 NOTE — TELEPHONE ENCOUNTER
I entered the order for the chest x-ray.  Regarding the Allegra.  The Allegra 180 mg  is once a day and that is fine.  I do not recommend the Allegra D either the 12 hr or 24 hr for elderly patients.

## 2020-06-26 NOTE — TELEPHONE ENCOUNTER
Spoke with pt's daughter, she states she is having a productive cough, has had for months. She is on Tussin with positive mucus production. She does have allergies, wakes up with nasal congestion, takes allegra with quick relief. She takes allegra q12h but she does notice that congestion returns in afternoon before she is due for 2nd dose, she wants to know if ok to change her to 24hr dose, no contraindications with her other meds?  No c/o sore throat or fever or SOB. She does c/o chest discomfort when she has a hard coughing fit. They would like CXR just to be safe.

## 2020-06-26 NOTE — TELEPHONE ENCOUNTER
----- Message from Aylin Moon sent at 6/26/2020  7:52 AM CDT -----  Type: Needs Medical Advice    Who Called:  Daughter (Loree)  Best Call Back Number: [phone]  Additional Information:  Daughter requesting to speak with nurse concerning patient having allergies and getting medication ordered for sinus issues and possibly ordering chest xray also/please call back to advise.

## 2020-06-28 ENCOUNTER — TELEPHONE (OUTPATIENT)
Dept: HOME HEALTH SERVICES | Facility: CLINIC | Age: 85
End: 2020-06-28

## 2020-06-28 DIAGNOSIS — I69.359 HEMIPARESIS, APHASIA, AND DYSPHAGIA AS LATE EFFECT OF CEREBROVASCULAR ACCIDENT (CVA): Primary | ICD-10-CM

## 2020-06-28 DIAGNOSIS — E46 PROTEIN-CALORIE MALNUTRITION, UNSPECIFIED SEVERITY: ICD-10-CM

## 2020-06-28 DIAGNOSIS — I69.391 HEMIPARESIS, APHASIA, AND DYSPHAGIA AS LATE EFFECT OF CEREBROVASCULAR ACCIDENT (CVA): Primary | ICD-10-CM

## 2020-06-28 DIAGNOSIS — I69.320 HEMIPARESIS, APHASIA, AND DYSPHAGIA AS LATE EFFECT OF CEREBROVASCULAR ACCIDENT (CVA): Primary | ICD-10-CM

## 2020-07-01 PROCEDURE — G0180 MD CERTIFICATION HHA PATIENT: HCPCS | Mod: ,,, | Performed by: FAMILY MEDICINE

## 2020-07-01 PROCEDURE — G0180 PR HOME HEALTH MD CERTIFICATION: ICD-10-PCS | Mod: ,,, | Performed by: FAMILY MEDICINE

## 2020-07-02 ENCOUNTER — LAB VISIT (OUTPATIENT)
Dept: LAB | Facility: HOSPITAL | Age: 85
End: 2020-07-02
Attending: FAMILY MEDICINE
Payer: MEDICARE

## 2020-07-02 DIAGNOSIS — R30.0 DYSURIA: Primary | ICD-10-CM

## 2020-07-02 LAB
BACTERIA #/AREA URNS HPF: NEGATIVE /HPF
BILIRUB UR QL STRIP: NEGATIVE
CLARITY UR: CLEAR
COLOR UR: YELLOW
GLUCOSE UR QL STRIP: NEGATIVE
HGB UR QL STRIP: ABNORMAL
HYALINE CASTS #/AREA URNS LPF: 64 /LPF
KETONES UR QL STRIP: NEGATIVE
LEUKOCYTE ESTERASE UR QL STRIP: ABNORMAL
MICROSCOPIC COMMENT: ABNORMAL
NITRITE UR QL STRIP: NEGATIVE
PH UR STRIP: 8 [PH] (ref 5–8)
PROT UR QL STRIP: ABNORMAL
RBC #/AREA URNS HPF: 2 /HPF (ref 0–4)
SP GR UR STRIP: 1.01 (ref 1–1.03)
SQUAMOUS #/AREA URNS HPF: 4 /HPF
URN SPEC COLLECT METH UR: ABNORMAL
UROBILINOGEN UR STRIP-ACNC: NEGATIVE EU/DL
WBC #/AREA URNS HPF: >100 /HPF (ref 0–5)

## 2020-07-02 PROCEDURE — 87186 SC STD MICRODIL/AGAR DIL: CPT

## 2020-07-02 PROCEDURE — 87086 URINE CULTURE/COLONY COUNT: CPT

## 2020-07-02 PROCEDURE — 87077 CULTURE AEROBIC IDENTIFY: CPT

## 2020-07-02 PROCEDURE — 81001 URINALYSIS AUTO W/SCOPE: CPT

## 2020-07-05 LAB — BACTERIA UR CULT: ABNORMAL

## 2020-07-06 ENCOUNTER — TELEPHONE (OUTPATIENT)
Dept: FAMILY MEDICINE | Facility: CLINIC | Age: 85
End: 2020-07-06

## 2020-07-06 DIAGNOSIS — N39.0 URINARY TRACT INFECTION WITHOUT HEMATURIA, SITE UNSPECIFIED: Primary | ICD-10-CM

## 2020-07-06 RX ORDER — AMOXICILLIN 875 MG/1
875 TABLET, FILM COATED ORAL 2 TIMES DAILY
Qty: 14 TABLET | Refills: 0 | Status: SHIPPED | OUTPATIENT
Start: 2020-07-06 | End: 2020-07-13

## 2020-07-06 NOTE — TELEPHONE ENCOUNTER
Please call her daughter with lab results.  The urine culture is positive.  However the organism is sensitive to most antibiotics so I sent in a rx for amoxil

## 2020-07-08 ENCOUNTER — NURSE TRIAGE (OUTPATIENT)
Dept: ADMINISTRATIVE | Facility: CLINIC | Age: 85
End: 2020-07-08

## 2020-07-08 NOTE — TELEPHONE ENCOUNTER
"Emelina, home health nurse, states "I dont need a triage, I'm just trying to reach Dr. Beyer office & kept getting sent to triage line". Attempted to transfer to MD office, line disconnected. Unable to reach again at number.   "
COUGH

## 2020-07-09 ENCOUNTER — DOCUMENT SCAN (OUTPATIENT)
Dept: HOME HEALTH SERVICES | Facility: HOSPITAL | Age: 85
End: 2020-07-09
Payer: MEDICARE

## 2020-07-14 ENCOUNTER — DOCUMENT SCAN (OUTPATIENT)
Dept: HOME HEALTH SERVICES | Facility: HOSPITAL | Age: 85
End: 2020-07-14
Payer: MEDICARE

## 2020-07-14 ENCOUNTER — TELEPHONE (OUTPATIENT)
Dept: FAMILY MEDICINE | Facility: CLINIC | Age: 85
End: 2020-07-14

## 2020-07-14 NOTE — TELEPHONE ENCOUNTER
----- Message from Salinas Zuñiga sent at 7/14/2020 12:57 PM CDT -----  Contact: pt's daughter Loree Jacobs  Type: Needs Medical Advice    Who Called:  Loree    Chace Call Back Number: [phone]  Additional Information: Needs to discuss getting the pt's chest x-ray scheduled to be done at the pt's house. Please call to advise.

## 2020-07-14 NOTE — TELEPHONE ENCOUNTER
Confirmed with pt's daughter that pt is active with [facility], sent order to them for mobile xray

## 2020-07-16 ENCOUNTER — CARE AT HOME (OUTPATIENT)
Dept: HOME HEALTH SERVICES | Facility: CLINIC | Age: 85
End: 2020-07-16
Payer: MEDICARE

## 2020-07-16 DIAGNOSIS — I70.0 ABDOMINAL AORTIC ATHEROSCLEROSIS: ICD-10-CM

## 2020-07-16 DIAGNOSIS — R53.81 DEBILITY: ICD-10-CM

## 2020-07-16 DIAGNOSIS — I69.954 HEMIPLEGIA OF LEFT NONDOMINANT SIDE AS LATE EFFECT OF CEREBROVASCULAR DISEASE, UNSPECIFIED CEREBROVASCULAR DISEASE TYPE, UNSPECIFIED HEMIPLEGIA TYPE: ICD-10-CM

## 2020-07-16 DIAGNOSIS — E44.0 MODERATE PROTEIN-CALORIE MALNUTRITION: ICD-10-CM

## 2020-07-16 DIAGNOSIS — R05.3 CHRONIC COUGH: ICD-10-CM

## 2020-07-16 DIAGNOSIS — I69.359 HEMIPARESIS, APHASIA, AND DYSPHAGIA AS LATE EFFECT OF CEREBROVASCULAR ACCIDENT (CVA): ICD-10-CM

## 2020-07-16 DIAGNOSIS — Z74.1 REQUIRES DAILY ASSISTANCE FOR ACTIVITIES OF DAILY LIVING (ADL) AND COMFORT NEEDS: ICD-10-CM

## 2020-07-16 DIAGNOSIS — I69.391 HEMIPARESIS, APHASIA, AND DYSPHAGIA AS LATE EFFECT OF CEREBROVASCULAR ACCIDENT (CVA): ICD-10-CM

## 2020-07-16 DIAGNOSIS — Z74.01 BEDBOUND: Primary | ICD-10-CM

## 2020-07-16 DIAGNOSIS — F03.90 DEMENTIA WITHOUT BEHAVIORAL DISTURBANCE, UNSPECIFIED DEMENTIA TYPE: ICD-10-CM

## 2020-07-16 DIAGNOSIS — N39.0 FREQUENT UTI: ICD-10-CM

## 2020-07-16 DIAGNOSIS — Z97.8 CHRONIC INDWELLING FOLEY CATHETER: ICD-10-CM

## 2020-07-16 DIAGNOSIS — I69.320 HEMIPARESIS, APHASIA, AND DYSPHAGIA AS LATE EFFECT OF CEREBROVASCULAR ACCIDENT (CVA): ICD-10-CM

## 2020-07-16 PROCEDURE — 99350 PR HOME VISIT,ESTAB PATIENT,LEVEL IV: ICD-10-PCS | Mod: S$GLB,,, | Performed by: NURSE PRACTITIONER

## 2020-07-16 PROCEDURE — 99350 HOME/RES VST EST HIGH MDM 60: CPT | Mod: S$GLB,,, | Performed by: NURSE PRACTITIONER

## 2020-07-17 ENCOUNTER — EXTERNAL HOME HEALTH (OUTPATIENT)
Dept: HOME HEALTH SERVICES | Facility: HOSPITAL | Age: 85
End: 2020-07-17
Payer: MEDICARE

## 2020-07-19 VITALS
OXYGEN SATURATION: 97 % | SYSTOLIC BLOOD PRESSURE: 118 MMHG | HEART RATE: 73 BPM | DIASTOLIC BLOOD PRESSURE: 68 MMHG | RESPIRATION RATE: 16 BRPM | TEMPERATURE: 98 F

## 2020-07-19 PROBLEM — R05.3 CHRONIC COUGH: Status: ACTIVE | Noted: 2020-07-19

## 2020-07-19 NOTE — PROGRESS NOTES
[facility]  Medical Home Visit    Visit Date: 7/16/2020  Encounter Provider: Autumn Wong NP  PCP:  Frantz Bill MD    Subjective:      Patient ID: Breanne Culp is a 86 y.o. female.    Consult Requested By:  Autumn Wong NP  Reason for Consult:  Medical follow up/home bound, bed bound, dementia, CVA hx with left hemiplegia  Visit time: 11:00 am - 12:00 pm    Breanne is being seen at home due to physical debility that presents a taxing effort to leave the home, to mitigate high risk of hospital readmission and/or due to the limited availability of reliable or safe options for transportation to the point of access to the provider. Prior to treatment on this visit the chart was reviewed and patient consent was obtained.    Chief Complaint: Cough and Follow-up    Breanne Culp is an 86 year old female with PMHX of CVA with residual left sided hemiparesis, bedbound status, s/p Peg tube, Alzheimer's disease, aphasia, dysphagia, protein calorie malnutrition, abdominal aortic atherosclerosis, hyperlipidemia, history of sacral pressure ulcer, COPD, chronic indwelling aponte catheter, frequent UTIs, osteoarthritis, idiopathic peripheral neuropathy and lumbar degenerative disc disease.     Breanne is being seen today because she is bedbound/homebound from dementia and history of CVA with residual hemiplegia/hemiparesis. Patient's care is discussed with her daughter Loree prior to visit due to Loree being at work. Breanne is found lying in a hospital bed today and sitter Kelley is present. The patient is alert, oriented to self only and speech is mostly unintelligible however she is clearer today more than on previous visits.  Kelley reports that Breanne just completed Amoxicillin on 7/2/2020 for another UTI. Breanne has a chronic indwelling aponte catheter and has frequent UTIs. Home health is changing out catheter every 4 weeks.     Breanne has been having an intermittent cough for 4 months now  "per Kelley and Loree. The cough is worse at night, is non-productive and breathing treatments seem to make the cough worse reportedly. Cough is not observed during visit today. Patient is on Allegra daily. Portable CXR was done in the home yesterday and results are pending. No fever or shortness of breath has been reported. Tussin, Delsym and Allegra "are not helping".      Kelley reports that she notices improvement in Breanne since Physical Therapy is working with her. A durga-chair was ordered for Breanne but insurance would not cover it so it was not delivered. Patient stays in bed at all times. Skin is currently intact but she does have a history of sacral decubitus ulcers.      VSS. No fever, chest pain, shortness of breath, nausea, vomiting, diarrhea reported. Reports taking all medications as prescribed. No other needs identified at this time. Risks of environmental exposure to coronavirus discussed including: social distancing, hand hygiene, and limiting departures from the home for necessities only.  Reports understanding and willingness to comply.         Review of Systems   Constitutional: Negative for diaphoresis.   Eyes: Negative for discharge.   Respiratory: Negative for cough, shortness of breath.   Gastrointestinal: Negative for diarrhea and vomiting. Positive for consitpation requiring enemas twice a month.  Genitourinary: Negative for vaginal discharge.   Neurological: Positive for facial asymmetry, speech difficulty and weakness that is chronic.   Psychiatric/Behavioral: Positive for confusion.   ROS limited due to patient dementia and aphasia, info obtained from sitter and daughter     Assessments:  · Environmental: clean, adequate lighting and temperature, no foul odors  · Functional Status: dependent on all ADLs, bedbound  · Safety: no issues identified  · Nutritional: PEG tube feedings/Jevity at 50 cc/hr with 400 cc water boluses and has recently started eating a few spoonfuls of soft foods as " "advised by ST. Patient reportedly eats about 3 spoonfuls of pureed food and several sips of juice daily.  · Home Health/DME/Supplies: [facility]/ST, Private sitters 24/7. Hospital bed, tube feeding equipment/pump      Objective:   Physical Exam   Constitutional:   Awake, alert, bedbound, frail, speech unintelligible mostly however she is clearer today than on previous visits. She stated "I have to go to the bathroom".  HENT:   Head: Normocephalic and atraumatic.   Right Ear: External ear normal.   Left Ear: External ear normal.   Eyes: Pupils are equal, round, and reactive to light. Conjunctivae and EOM are normal.   Neck: Neck supple.   Cardiovascular: Normal rate, regular rhythm, normal heart sounds and intact distal pulses.   Pulmonary/Chest: Effort normal. Lungs CTA but diminished in bases. No cough noted.  Abdominal: Soft. Bowel sounds are normal.   Genitourinary: No vaginal discharge noted.   Genitourinary Comments: Scherer catheter in place draining yellow urine with small amount of sediment noted in tubing   Musculoskeletal: She exhibits no edema.   Left sided hemiplegia   Neurological: She is alert.   Skin: Skin is warm and dry. Capillary refill takes 2 to 3 seconds.   Sacral pressure ulcer scarring but healed, skin intact   Psychiatric:   Follows no commands     Vitals:    07/16/20 1100   BP: 118/68   Pulse: 73   Resp: 16   Temp: 98.1 °F (36.7 °C)   TempSrc: Temporal   SpO2: 97%   PainSc: 0-No pain     There is no height or weight on file to calculate BMI.    Assessment:     1. Bedbound    2. Dementia without behavioral disturbance, unspecified dementia type    3. Debility    4. Frequent UTI    5. Chronic indwelling Scherer catheter    6. Requires daily assistance for activities of daily living (ADL) and comfort needs    7. 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 Hemiparesis, " aphasia, and dysphagia as late effect of cerebrovascular accident (CVA)    9. Abdominal aortic atherosclerosis    10. Moderate protein-calorie malnutrition    11. Chronic cough        Plan:          Breanne LEÓN was seen today for cough and follow-up.    Diagnoses and all orders for this visit:  Dementia without behavioral disturbance, unspecified dementia type  Stable. Followed by PCP.   Bedbound  Stable. Patient currently has Home Health with PT and ST. Skin currently intact.  Debility  Stable. Patient currently has Home Health with PT and ST.  Frequent UTI  Discussed meticulous perineal hygiene when patient has a BM.  Discussed chronic indwelling aponte catheter can add to risk of frequent UTI.  S/S UTI discussed and when to report. Patient just completed Amoxicillin 7/2 for UTI.   Home Health changes catheter q4 weeks.  Discussed Purewick device with deysi Ralph but insurance does not cover it and they are not financially able to consider at this time.  Chronic indwelling Aponte catheter  Home Health changes catheter q4 weeks.  Discussed Purewick device with deysi Ralph but insurance does not cover it and they are not financially able to consider at this time.  Requires daily assistance for activities of daily living (ADL) and comfort needs  Stable. Patient currently has Home Health with PT and ST.  Hemiplegia of left nondominant side as late effect of cerebrovascular disease, unspecified cerebrovascular disease type, unspecified hemiplegia type  Stable. Patient currently has Home Health with PT and ST.  Hemiparesis, aphasia, and dysphagia as late effect of cerebrovascular accident (CVA)  Stable. Patient currently has Home Health with PT and ST.  Abdominal aortic atherosclerosis  Stable. Followed by PCP.  Moderate protein-calorie malnutrition  Patient has Peg tube and is receiving Jevity at 50 cc/hr and is starting to eat some soft food as advised by ST.  Last albumin (2/13/2020) was 2.6. Followed by PCP.  PEG  (percutaneous endoscopic gastrostomy) status  Stable. Followed by PCP.   Chronic obstructive pulmonary disease, unspecified COPD type  Stable. Followed by PCP.   Alteration in mobility due to acute cerebrovascular accident (CVA)  Active. Followed by PT currently.  Chronic cough  Active and worse at night. Patient had CXR done in the home yesterday. Awaiting results.    - [facility] Practition[facility] to schedule home follow-up visit with patient in 4-6 weeks or as needed.  - Continue all medications, treatments and therapies as ordered.   - Follow all instructions, recommendations as discussed.  - Maintain Safety Precautions at all times.  - Attend all medical appointments as scheduled.  - For worsening symptoms: call Primary Care Physician or Nurse Practitioner.  - For emergencies, call 911 or immediately report to the nearest emergency room.  - Limit Risks of environmental exposure to coronavirus/COVID-19 as discussed including: social distancing, hand hygiene, and limiting departures from the home for necessities only.         Limit Risks of environmental exposure to coronavirus/COVID-19 as discussed including: social distancing, hand hygiene, and limiting departures from the home for necessities only.      Time allowed for questions, all questions answered. [facility] contact information left with patient today for any future concerns.    Were controlled substances prescribed?  No    Follow Up Appointments:   No future appointments.    Verbal consent for visit obtained from daughter Loree wilson.    Signature:  Autumn Wong NP     Attestation:   Screening criteria to assess the level of the patient's risk for infection with COVID-19 as recommended by the CDC at the time of the above documented home visit concluded appropriateness to proceed. Universal precautions were maintained at all times, including provider wearing a mask and gloves during entire visit and use of 60% alcohol gel hand   immediately prior to entry and upon departure of patient's home as well as cleaning of equipment used in home visit with antibacterial/germicidal disposable wipes.

## 2020-07-19 NOTE — PATIENT INSTRUCTIONS
Cough, Chronic, Uncertain Cause (Adult)    Everyone has had a cough as part of the common cold, flu, or bronchitis. This kind of cough occurs along with an achy feeling, low-grade fever, nasal and sinus congestion, and a scratchy or sore throat. This usually gets better in 2 to 3 weeks. A cough that lasts longer than 3 weeks may be due to other causes.  If your cough does not improve over the next 2 weeks, further testing may be needed. Follow up with your healthcare provider as advised. Cough suppressants may be recommended. Based on your exam today, the exact cause of your cough is not certain. Below are some common causes for persistent cough.  Smokers cough  Smokers cough doesnt go away. If you continue to smoke, it only gets worse. The cough is from irritation in the air passages. Talk to your healthcare provider about quitting. Medicines or nicotine-replacement products, like gum or the patch, may make quitting easier.  Postnasal drip  A cough that is worse at night may be due to postnasal drip. Excess mucus in the nose drains from the back of your nose to your throat. This triggers the cough reflex. Postnasal drip may be due to a sinus infection or allergy. Common allergens include dust, tobacco smoke (both inhaled and secondhand smoke), environmental pollutants, pollen, mold, pets, cleaning agents, room deodorizers, and chemical fumes. Over-the-counter antihistamines or decongestants may be helpful for allergies. A sinus infection may requires antibiotic treatment. See your healthcare provider if symptoms continue.  Medicines  Certain prescribed medicines can cause a chronic cough in some people:  · ACE inhibitors for high blood pressure. These include benazepril, captopril, enalapril, fosinopril, lisinopril, quinapril, ramipril, and others.  · Beta-blockers for high blood pressure and other conditions. These include propranolol, atenolol, metoprolol, nadolol, and others.  Let your healthcare provider  know if you are taking any of these.  Asthma  Cough may be the only sign of mild asthma. You may have tests to find out if asthma is causing your cough. You may also take asthma medicine on a trial basis.  Acid reflux (heartburn, GERD)  The esophagus is the tube that carries food from the mouth to the stomach. A valve at its lower end prevents stomach acids from flowing upward. If this valve does not work properly, acid from the stomach enters the esophagus. This may cause a burning pain in the upper abdomen or lower chest, belching, or cough. Symptoms are often worse when lying flat. Avoid eating or drinking before bedtime. Try using extra pillows to raise your upper body, or place 4-inch blocks under the head of your bed. You may try an over-the-counter antacid or an acid-blocking medicine such as famotidine, cimetidine, ranitidine, esomeprazole, lansoprazole, or omeprazole. Stronger medicines for this condition can be prescribed by your healthcare provider.  Follow-up care  Follow up with your healthcare provider, or as advised, if your cough does not improve. Further testing may be needed.  Note: If an X-ray was taken, a specialist will review it. You will be notified of any new findings that may affect your care.  When to seek medical advice  Call your healthcare provider right away if any of these occur:  · Mild wheezing or difficulty breathing  · Fever of 100.4ºF (38ºC) or higher, or as directed by your healthcare provider  · Unexpected weight loss  · Coughing up large amounts of colored sputum  · Night sweats (sheets and pajamas get soaking wet)  Call 911, or get immediate medical care  Contact emergency services right away if any of these occur:  · Coughing up blood  · Moderate to severe trouble breathing or wheezing  Date Last Reviewed: 9/13/2015  © 0178-4341 [employer]. [address] [place] [address], [place] [address]. All rights reserved. This information is not intended as a substitute for  professional medical care. Always follow your healthcare professional's instructions.        Dementia and Caregiver Support  Dementia is a chronic condition that affects the brain. It causes a gradual loss of memory and higher intellectual functions. A person with dementia may have trouble recognizing familiar people and places, or knowing what day it is. The persons memory, judgment, and decision-making may also be affected. In severe cases, the person may not respond when someone talks to him or her.  The most common form of dementia is Alzheimers disease. Doctors dont fully understand what causes AD. It has no cure. But medicines can treat some of the symptoms.  Some of the less common causes for dementia are curable. So its important to have a complete medical evaluation to look for conditions that can be treated.  Home care  These tips can help you care for a person with AD at home:  · A responsible person must be with the person who has advanced AD at all times.  He or she should not be left alone or unsupervised.  · In the case of advanced AD, keep all medicines in a secure place. They should be under the caregivers control. A person with advanced AD should not be allowed to take his or her own medicines. This needs to be supervised by the caregiver.  Here are ways to help a person with dementia:  Activities  Keep to a daily routine. Changes in routine can cause stress for someone with dementia. Make a schedule for common daily tasks. These include bathing, dressing, taking medicines, eating meals, going for walks, and going to bed.  Communication  When talking to a person with dementia, talk slowly and clearly. Use a gentle tone of voice. Choose short, simple words and sentences. Ask one question at a time. Dont interrupt, criticize, or argue. Be calm and supportive. Use friendly facial expressions. Use pointing and touching to help communicate. If the person has a loss of long-term memory, dont ask  questions about past events. Instead, talk about what is happening now.  Behavioral tips  Use lists, signs, family photos, clocks, and calendars as memory aids. Label cabinets and drawers. Try to distract, not confront, the person. When he or she becomes frustrated or upset, direct the persons attention to eating or some other interesting activity.  Medical-legal tips  Talk with your doctor or  about getting a power of  for healthcare and for financial decisions. It is best to do this while the person can still sign legal documents and make his or her own legal decisions. Otherwise, you'll need a court order.  Support for the caregiver  As the caregiver, you will need a lot of support for yourself. Caring for a person with dementia is a full-time job. It can drain your emotions and lead to frustration and anger toward the one you love. It is common to have feelings of grief over losing the relationship that you once had. As a caregiver to someone with dementia, you are at higher risk for depression, anxiety and stress.  Here are some tips to help you cope with being a caregiver:  · Learn about dementia and Alzheimers disease so you know what to expect.  · Find out about the resources in your community, including adult day-care programs. Ask your healthcare provider for a referral to a , if needed.  · Take care of yourself with a healthy diet, exercise, and plenty of rest.  · Ask for help. Share some of the caretaking duties with family and friends.  · Make personal time for yourself. This is essential! Consider hiring an in-home sitter or home health aide.  · Seek counseling or join a caregivers support group. Don't isolate yourself or try to cope with this alone. In a support group, you can learn from others in a similar situation.  · Visit the Alzheimers Association website (www.alz.org) for more information.  Follow-up care  Follow up with the persons healthcare provider, or as  advised.  When to seek medical advice  Call your healthcare provider right away if any of these occur:  · Frequent falls  · The person refuses to eat or drink  · Violent behavior or behavior becomes too difficult to manage at home  · Increased drowsiness, or failure to respond normally  · Headache or nausea that gets worse, or repeated vomiting  · Numbness or weakness of the face, an arm, or a leg  · Slurred speech, trouble speaking, walking, or seeing  · Fainting spell, dizziness, or seizure (staring spells, lip-smacking, twitching, sudden changes in mental status)  · Unexplained fever of 100.4º F (38.0º C) or higher  Date Last Reviewed: 8/1/2016 © 2000-2017 [employer]. [address] [place] [address], [place] [address]. All rights reserved. This information is not intended as a substitute for professional medical care. Always follow your healthcare professional's instructions.        Tube Feeding: Skin and Mouth Care    You need to keep the skin around the feeding tube dry and clean. This helps prevent soreness and infection. The mouth also needs to be cleansed, even though food isnt taken through it.   Cleaning the skin  Gently wash the skin around the feeding tube each day. Follow these steps:  1. Wash your hands. Wet a soft cloth or gauze with warm, soapy water.  2. Gently wipe the skin around the feeding tube. Also wipe the bolster and the base of the feeding tube.  3. Rinse well with clear, warm water.  4. Pat dry with a soft cloth.  Checking under the bolster    When you wash the skin, clean and check under the bolster. Follow these steps:  1. Gently lift the bolster just enough to get a cotton swab under it. Be careful not to pull on the feeding tube.  2. Check for redness, swelling, bleeding, or leakage around the opening.  3. Dip a cotton swab in warm water and gently clean under the bolster. Pat the skin dry.  4. Apply a protective skin barrier or antibacterial ointment if your healthcare  provider tells you to.  5. Gently push the bolster back against the skin.  6. Give the feeding tube a gentle 1/4 turn. This helps keep the bumper from sticking to the inside of the stomach.  7. Wash your hands.  Caring for the mouth  To keep the mouth clean, follow these steps:  1. Brush the teeth or dentures at least once daily with a soft toothbrush.  2. Wipe the inside of the mouth with a damp washcloth.  3. Apply a lip balm to keep the lips moist.  Date Last Reviewed: 6/1/2016  © 0401-8953 [employer]. [address] [place] [address], [place] [address]. All rights reserved. This information is not intended as a substitute for professional medical care. Always follow your healthcare professional's instructions.

## 2020-07-23 ENCOUNTER — TELEPHONE (OUTPATIENT)
Dept: FAMILY MEDICINE | Facility: CLINIC | Age: 85
End: 2020-07-23

## 2020-07-23 RX ORDER — BENZONATATE 100 MG/1
100 CAPSULE ORAL 3 TIMES DAILY PRN
Qty: 30 CAPSULE | Refills: 1 | Status: ON HOLD | OUTPATIENT
Start: 2020-07-23 | End: 2020-09-16 | Stop reason: HOSPADM

## 2020-07-23 NOTE — TELEPHONE ENCOUNTER
----- Message from Farhan Morrison sent at 7/23/2020 11:37 AM CDT -----  Contact: Emelina  Type: Needs Medical Advice  Who Called: Emelina with [facility] health  Symptoms (please be specific):    How long has patient had these symptoms:   Pharmacy name and phone #:    Best Call Back Number:  Additional Information: called to advise that patient has a persistent cough,family is giving her Delsom cough medication need to know what else to do for patient's cough

## 2020-07-23 NOTE — TELEPHONE ENCOUNTER
We can try Tessalon Perles. please also advise on reflux precautions as sometimes that can cause a chronic cough.  So make certain they keep the head of the bed raised, especially after eating and try not to lie down at all right after eating  Small meals, etc.

## 2020-07-23 NOTE — TELEPHONE ENCOUNTER
Pt was evaluated last week by SAADIA Wong ([facility])persistent cough noted, pt has been using delsym with little to no relief and family is asking if there is something else she can take.

## 2020-08-04 ENCOUNTER — LAB VISIT (OUTPATIENT)
Dept: LAB | Facility: HOSPITAL | Age: 85
End: 2020-08-04
Attending: FAMILY MEDICINE
Payer: MEDICARE

## 2020-08-04 DIAGNOSIS — I69.351 HEMIPARESIS AFFECTING RIGHT SIDE AS LATE EFFECT OF CEREBROVASCULAR ACCIDENT: Primary | ICD-10-CM

## 2020-08-04 LAB
BACTERIA #/AREA URNS HPF: ABNORMAL /HPF
BILIRUB UR QL STRIP: ABNORMAL
CLARITY UR: ABNORMAL
COLOR UR: YELLOW
GLUCOSE UR QL STRIP: ABNORMAL
HGB UR QL STRIP: ABNORMAL
HYALINE CASTS #/AREA URNS LPF: 78 /LPF
KETONES UR QL STRIP: ABNORMAL
LEUKOCYTE ESTERASE UR QL STRIP: ABNORMAL
MICROSCOPIC COMMENT: ABNORMAL
NITRITE UR QL STRIP: ABNORMAL
PH UR STRIP: 8 [PH] (ref 5–8)
PROT UR QL STRIP: ABNORMAL
RBC #/AREA URNS HPF: 4 /HPF (ref 0–4)
SP GR UR STRIP: 1.01 (ref 1–1.03)
SQUAMOUS #/AREA URNS HPF: ABNORMAL /HPF
URN SPEC COLLECT METH UR: ABNORMAL
UROBILINOGEN UR STRIP-ACNC: ABNORMAL EU/DL
WBC #/AREA URNS HPF: >100 /HPF (ref 0–5)
YEAST URNS QL MICRO: ABNORMAL

## 2020-08-04 PROCEDURE — 87186 SC STD MICRODIL/AGAR DIL: CPT | Mod: 59

## 2020-08-04 PROCEDURE — 87077 CULTURE AEROBIC IDENTIFY: CPT

## 2020-08-04 PROCEDURE — 81001 URINALYSIS AUTO W/SCOPE: CPT

## 2020-08-04 PROCEDURE — 87086 URINE CULTURE/COLONY COUNT: CPT

## 2020-08-05 ENCOUNTER — TELEPHONE (OUTPATIENT)
Dept: FAMILY MEDICINE | Facility: CLINIC | Age: 85
End: 2020-08-05

## 2020-08-05 NOTE — TELEPHONE ENCOUNTER
Spoke with pt's daughter, she states tessalon is not helping pt's cough. Her night sitter told her that last night she was having bad coughing fits, choking on mucus production. Taking tessalon TID. No fever. Asking what else she can do for her cough.

## 2020-08-05 NOTE — TELEPHONE ENCOUNTER
----- Message from Samantha Cerda sent at 8/5/2020  8:00 AM CDT -----  Contact: Loree daughter  Type: Needs Medical Advice  Who Called:  Loree daughter/not on pt contact  Symptoms (please be specific):   benzonatate (TESSALON) 100 MG capsule /not working/ mother is still coughing  How long has patient had these symptoms:  ongoing  Pharmacy name and phone #:    [facility] - [place], [place] - [address] [facility] [address]  [address] [address] [address]  Phone: [phone] Fax: [phone]      Best Call Back Number: [phone] she says you have to press the 1  Additional Information: pls call Loree to adv

## 2020-08-05 NOTE — TELEPHONE ENCOUNTER
I have not seen her in so long there is just no way for me to really try to diagnose what is causing the cough.  She could have an infection.  This could be chronic aspiration.  I believe she is already consulting with our care at home program.  If there has been a change in her condition please contact them to see if they can come out and see her.  I do not know how often they can come out etc..    Also she appears to have a no other UTI.  I do not have the final culture back.  I prefer to wait until the culture comes back unless she is having true symptoms of a UTI but if this was just ordered at family's request without actual symptoms I would rather wait for the culture.  But this is going to be a continuing problem because she is bed-bound and they are using a Scherer catheter.

## 2020-08-06 ENCOUNTER — TELEPHONE (OUTPATIENT)
Dept: FAMILY MEDICINE | Facility: CLINIC | Age: 85
End: 2020-08-06

## 2020-08-06 DIAGNOSIS — N39.0 URINARY TRACT INFECTION WITHOUT HEMATURIA, SITE UNSPECIFIED: Primary | ICD-10-CM

## 2020-08-06 RX ORDER — CEPHALEXIN 500 MG/1
500 CAPSULE ORAL 3 TIMES DAILY
Qty: 30 CAPSULE | Refills: 0 | Status: ON HOLD | OUTPATIENT
Start: 2020-08-06 | End: 2020-08-13 | Stop reason: HOSPADM

## 2020-08-06 NOTE — TELEPHONE ENCOUNTER
Reviewed with pt's daughter. She expressed understanding    She is asking if they should still be using benzonatate and nebulizer together. She feels like one if trying to stop the cough while the other is encouraging the mucus to come up so theyaare working against each other. they skipped the tessalon last night and feels like her cough was better with just the breathing treatment.

## 2020-08-06 NOTE — TELEPHONE ENCOUNTER
The benzonatate is purely symptomatic.  It is not going to suppress the cough enough to prevent mucus from clearing.  However if it is not helping or if not needed then certainly discontinue that for now and can always resume or use as needed.  I think the nebulizers probably due more to help keep the passageways open and may even help to clear some mucus over time so that should be continued.

## 2020-08-07 ENCOUNTER — CARE AT HOME (OUTPATIENT)
Dept: HOME HEALTH SERVICES | Facility: CLINIC | Age: 85
End: 2020-08-07
Payer: MEDICARE

## 2020-08-07 ENCOUNTER — HOSPITAL ENCOUNTER (INPATIENT)
Facility: HOSPITAL | Age: 85
LOS: 6 days | Discharge: HOSPICE | DRG: 177 | End: 2020-08-13
Attending: EMERGENCY MEDICINE | Admitting: INTERNAL MEDICINE
Payer: MEDICARE

## 2020-08-07 VITALS
TEMPERATURE: 100 F | SYSTOLIC BLOOD PRESSURE: 142 MMHG | OXYGEN SATURATION: 86 % | DIASTOLIC BLOOD PRESSURE: 70 MMHG | HEART RATE: 120 BPM | RESPIRATION RATE: 24 BRPM

## 2020-08-07 DIAGNOSIS — R00.0 TACHYCARDIA: ICD-10-CM

## 2020-08-07 DIAGNOSIS — R50.9 FEVER, UNSPECIFIED FEVER CAUSE: ICD-10-CM

## 2020-08-07 DIAGNOSIS — U07.1 PNEUMONIA DUE TO COVID-19 VIRUS: ICD-10-CM

## 2020-08-07 DIAGNOSIS — R09.02 HYPOXIA: ICD-10-CM

## 2020-08-07 DIAGNOSIS — R06.82 TACHYPNEA: ICD-10-CM

## 2020-08-07 DIAGNOSIS — R05.3 CHRONIC COUGH: Primary | ICD-10-CM

## 2020-08-07 DIAGNOSIS — R09.02 HYPOXIA: Primary | ICD-10-CM

## 2020-08-07 DIAGNOSIS — J12.82 PNEUMONIA DUE TO COVID-19 VIRUS: ICD-10-CM

## 2020-08-07 DIAGNOSIS — F03.90 DEMENTIA WITHOUT BEHAVIORAL DISTURBANCE, UNSPECIFIED DEMENTIA TYPE: ICD-10-CM

## 2020-08-07 DIAGNOSIS — N39.0 URINARY TRACT INFECTION WITHOUT HEMATURIA, SITE UNSPECIFIED: ICD-10-CM

## 2020-08-07 PROBLEM — Z66 DNR (DO NOT RESUSCITATE): Status: ACTIVE | Noted: 2020-08-07

## 2020-08-07 LAB
ALBUMIN SERPL BCP-MCNC: 3.3 G/DL (ref 3.5–5.2)
ALP SERPL-CCNC: 90 U/L (ref 55–135)
ALT SERPL W/O P-5'-P-CCNC: 27 U/L (ref 10–44)
AMORPH CRY URNS QL MICRO: ABNORMAL
ANION GAP SERPL CALC-SCNC: 11 MMOL/L (ref 8–16)
AST SERPL-CCNC: 35 U/L (ref 10–40)
BACTERIA #/AREA URNS HPF: NEGATIVE /HPF
BACTERIA UR CULT: ABNORMAL
BACTERIA UR CULT: ABNORMAL
BASOPHILS NFR BLD: 1 % (ref 0–1.9)
BILIRUB SERPL-MCNC: 0.7 MG/DL (ref 0.1–1)
BILIRUB UR QL STRIP: NEGATIVE
BNP SERPL-MCNC: 28 PG/ML (ref 0–99)
BUN SERPL-MCNC: 40 MG/DL (ref 8–23)
CALCIUM SERPL-MCNC: 9 MG/DL (ref 8.7–10.5)
CHLORIDE SERPL-SCNC: 103 MMOL/L (ref 95–110)
CK MB SERPL-MCNC: 1.1 NG/ML (ref 0.1–6.5)
CK SERPL-CCNC: 113 U/L (ref 20–180)
CK SERPL-CCNC: 113 U/L (ref 20–180)
CLARITY UR: ABNORMAL
CO2 SERPL-SCNC: 22 MMOL/L (ref 23–29)
COLOR UR: ABNORMAL
CREAT SERPL-MCNC: 0.9 MG/DL (ref 0.5–1.4)
CRP SERPL-MCNC: 9.02 MG/DL
DIFFERENTIAL METHOD: ABNORMAL
EOSINOPHIL NFR BLD: 4 % (ref 0–8)
ERYTHROCYTE [DISTWIDTH] IN BLOOD BY AUTOMATED COUNT: 15.8 % (ref 11.5–14.5)
EST. GFR  (AFRICAN AMERICAN): >60 ML/MIN/1.73 M^2
EST. GFR  (NON AFRICAN AMERICAN): 58.1 ML/MIN/1.73 M^2
FERRITIN SERPL-MCNC: 230 NG/ML (ref 20–300)
GLUCOSE SERPL-MCNC: 133 MG/DL (ref 70–110)
GLUCOSE UR QL STRIP: NEGATIVE
HCT VFR BLD AUTO: 31.9 % (ref 37–48.5)
HGB BLD-MCNC: 10.3 G/DL (ref 12–16)
HGB UR QL STRIP: ABNORMAL
HYALINE CASTS #/AREA URNS LPF: 75 /LPF
IMM GRANULOCYTES # BLD AUTO: ABNORMAL K/UL (ref 0–0.04)
IMM GRANULOCYTES NFR BLD AUTO: ABNORMAL % (ref 0–0.5)
KETONES UR QL STRIP: NEGATIVE
LACTATE SERPL-SCNC: 1.5 MMOL/L (ref 0.5–1.9)
LDH SERPL L TO P-CCNC: 325 U/L (ref 110–260)
LEUKOCYTE ESTERASE UR QL STRIP: ABNORMAL
LYMPHOCYTES NFR BLD: 36 % (ref 18–48)
MCH RBC QN AUTO: 30.3 PG (ref 27–31)
MCHC RBC AUTO-ENTMCNC: 32.3 G/DL (ref 32–36)
MCV RBC AUTO: 94 FL (ref 82–98)
MICROSCOPIC COMMENT: ABNORMAL
MONOCYTES NFR BLD: 1 % (ref 4–15)
MYELOCYTES NFR BLD MANUAL: 2 %
NEUTROPHILS NFR BLD: 46 % (ref 38–73)
NEUTS BAND NFR BLD MANUAL: 10 %
NITRITE UR QL STRIP: POSITIVE
NRBC BLD-RTO: 0 /100 WBC
PH UR STRIP: 6 [PH] (ref 5–8)
PLATELET # BLD AUTO: 285 K/UL (ref 150–350)
PLATELET BLD QL SMEAR: ABNORMAL
PMV BLD AUTO: 12.3 FL (ref 9.2–12.9)
POTASSIUM SERPL-SCNC: 4.9 MMOL/L (ref 3.5–5.1)
PROCALCITONIN SERPL IA-MCNC: 0.06 NG/ML (ref 0–0.5)
PROT SERPL-MCNC: 7.2 G/DL (ref 6–8.4)
PROT UR QL STRIP: ABNORMAL
RBC # BLD AUTO: 3.4 M/UL (ref 4–5.4)
RBC #/AREA URNS HPF: 83 /HPF (ref 0–4)
ROULEAUX BLD QL SMEAR: PRESENT
SARS-COV-2 RDRP RESP QL NAA+PROBE: POSITIVE
SODIUM SERPL-SCNC: 136 MMOL/L (ref 136–145)
SP GR UR STRIP: 1.02 (ref 1–1.03)
SQUAMOUS #/AREA URNS HPF: 2 /HPF
TROPONIN I SERPL DL<=0.01 NG/ML-MCNC: <0.03 NG/ML
URN SPEC COLLECT METH UR: ABNORMAL
UROBILINOGEN UR STRIP-ACNC: 1 EU/DL
WBC # BLD AUTO: 7.37 K/UL (ref 3.9–12.7)
WBC #/AREA URNS HPF: >100 /HPF (ref 0–5)
WBC CLUMPS URNS QL MICRO: ABNORMAL

## 2020-08-07 PROCEDURE — 99350 HOME/RES VST EST HIGH MDM 60: CPT | Mod: S$GLB,,, | Performed by: NURSE PRACTITIONER

## 2020-08-07 PROCEDURE — 80053 COMPREHEN METABOLIC PANEL: CPT

## 2020-08-07 PROCEDURE — 87040 BLOOD CULTURE FOR BACTERIA: CPT

## 2020-08-07 PROCEDURE — 25500020 PHARM REV CODE 255: Performed by: EMERGENCY MEDICINE

## 2020-08-07 PROCEDURE — 99350 PR HOME VISIT,ESTAB PATIENT,LEVEL IV: ICD-10-PCS | Mod: S$GLB,,, | Performed by: NURSE PRACTITIONER

## 2020-08-07 PROCEDURE — 84145 PROCALCITONIN (PCT): CPT

## 2020-08-07 PROCEDURE — 87086 URINE CULTURE/COLONY COUNT: CPT

## 2020-08-07 PROCEDURE — 25000003 PHARM REV CODE 250: Performed by: EMERGENCY MEDICINE

## 2020-08-07 PROCEDURE — 82550 ASSAY OF CK (CPK): CPT

## 2020-08-07 PROCEDURE — 93005 ELECTROCARDIOGRAM TRACING: CPT | Performed by: INTERNAL MEDICINE

## 2020-08-07 PROCEDURE — 99285 EMERGENCY DEPT VISIT HI MDM: CPT | Mod: 25

## 2020-08-07 PROCEDURE — U0002 COVID-19 LAB TEST NON-CDC: HCPCS

## 2020-08-07 PROCEDURE — 86140 C-REACTIVE PROTEIN: CPT

## 2020-08-07 PROCEDURE — 25000242 PHARM REV CODE 250 ALT 637 W/ HCPCS: Performed by: EMERGENCY MEDICINE

## 2020-08-07 PROCEDURE — 87186 SC STD MICRODIL/AGAR DIL: CPT

## 2020-08-07 PROCEDURE — 63600175 PHARM REV CODE 636 W HCPCS: Performed by: INTERNAL MEDICINE

## 2020-08-07 PROCEDURE — 83615 LACTATE (LD) (LDH) ENZYME: CPT

## 2020-08-07 PROCEDURE — 81001 URINALYSIS AUTO W/SCOPE: CPT

## 2020-08-07 PROCEDURE — 85007 BL SMEAR W/DIFF WBC COUNT: CPT

## 2020-08-07 PROCEDURE — 63600175 PHARM REV CODE 636 W HCPCS: Performed by: EMERGENCY MEDICINE

## 2020-08-07 PROCEDURE — 96375 TX/PRO/DX INJ NEW DRUG ADDON: CPT

## 2020-08-07 PROCEDURE — 94640 AIRWAY INHALATION TREATMENT: CPT

## 2020-08-07 PROCEDURE — 87077 CULTURE AEROBIC IDENTIFY: CPT

## 2020-08-07 PROCEDURE — 83880 ASSAY OF NATRIURETIC PEPTIDE: CPT

## 2020-08-07 PROCEDURE — 82728 ASSAY OF FERRITIN: CPT

## 2020-08-07 PROCEDURE — 84484 ASSAY OF TROPONIN QUANT: CPT

## 2020-08-07 PROCEDURE — 85027 COMPLETE CBC AUTOMATED: CPT

## 2020-08-07 PROCEDURE — 96365 THER/PROPH/DIAG IV INF INIT: CPT

## 2020-08-07 PROCEDURE — 83605 ASSAY OF LACTIC ACID: CPT

## 2020-08-07 PROCEDURE — 82553 CREATINE MB FRACTION: CPT

## 2020-08-07 PROCEDURE — 36415 COLL VENOUS BLD VENIPUNCTURE: CPT

## 2020-08-07 PROCEDURE — 12000002 HC ACUTE/MED SURGE SEMI-PRIVATE ROOM

## 2020-08-07 RX ORDER — FAMOTIDINE 20 MG/1
20 TABLET, FILM COATED ORAL 2 TIMES DAILY
Qty: 60 TABLET | Refills: 11 | Status: ON HOLD | OUTPATIENT
Start: 2020-08-07 | End: 2020-09-16 | Stop reason: HOSPADM

## 2020-08-07 RX ORDER — SODIUM CHLORIDE, SODIUM LACTATE, POTASSIUM CHLORIDE, CALCIUM CHLORIDE 600; 310; 30; 20 MG/100ML; MG/100ML; MG/100ML; MG/100ML
1000 INJECTION, SOLUTION INTRAVENOUS
Status: COMPLETED | OUTPATIENT
Start: 2020-08-07 | End: 2020-08-07

## 2020-08-07 RX ORDER — ENOXAPARIN SODIUM 100 MG/ML
40 INJECTION SUBCUTANEOUS EVERY 24 HOURS
Status: DISCONTINUED | OUTPATIENT
Start: 2020-08-07 | End: 2020-08-13 | Stop reason: HOSPADM

## 2020-08-07 RX ORDER — BENZONATATE 100 MG/1
100 CAPSULE ORAL 3 TIMES DAILY PRN
Status: DISCONTINUED | OUTPATIENT
Start: 2020-08-07 | End: 2020-08-13 | Stop reason: HOSPADM

## 2020-08-07 RX ORDER — POLYETHYLENE GLYCOL 3350 17 G/17G
17 POWDER, FOR SOLUTION ORAL DAILY
Status: DISCONTINUED | OUTPATIENT
Start: 2020-08-08 | End: 2020-08-08

## 2020-08-07 RX ORDER — LEVALBUTEROL 1.25 MG/.5ML
1.25 SOLUTION, CONCENTRATE RESPIRATORY (INHALATION)
Status: COMPLETED | OUTPATIENT
Start: 2020-08-07 | End: 2020-08-07

## 2020-08-07 RX ORDER — FLUTICASONE PROPIONATE 50 MCG
1 SPRAY, SUSPENSION (ML) NASAL DAILY
Qty: 16 G | Refills: 11 | Status: ON HOLD | OUTPATIENT
Start: 2020-08-07 | End: 2020-09-16 | Stop reason: HOSPADM

## 2020-08-07 RX ORDER — DEXAMETHASONE SODIUM PHOSPHATE 4 MG/ML
6 INJECTION, SOLUTION INTRA-ARTICULAR; INTRALESIONAL; INTRAMUSCULAR; INTRAVENOUS; SOFT TISSUE
Status: COMPLETED | OUTPATIENT
Start: 2020-08-07 | End: 2020-08-07

## 2020-08-07 RX ORDER — BISACODYL 10 MG
10 SUPPOSITORY, RECTAL RECTAL DAILY PRN
Status: DISCONTINUED | OUTPATIENT
Start: 2020-08-07 | End: 2020-08-13 | Stop reason: HOSPADM

## 2020-08-07 RX ORDER — ACETAMINOPHEN 325 MG/1
650 TABLET ORAL EVERY 4 HOURS PRN
Status: DISCONTINUED | OUTPATIENT
Start: 2020-08-07 | End: 2020-08-13 | Stop reason: HOSPADM

## 2020-08-07 RX ORDER — SODIUM CHLORIDE 0.9 % (FLUSH) 0.9 %
10 SYRINGE (ML) INJECTION
Status: DISCONTINUED | OUTPATIENT
Start: 2020-08-07 | End: 2020-08-13 | Stop reason: HOSPADM

## 2020-08-07 RX ORDER — ACETAMINOPHEN 325 MG/1
650 TABLET ORAL EVERY 6 HOURS PRN
Status: DISCONTINUED | OUTPATIENT
Start: 2020-08-07 | End: 2020-08-09

## 2020-08-07 RX ORDER — TALC
6 POWDER (GRAM) TOPICAL NIGHTLY PRN
Status: DISCONTINUED | OUTPATIENT
Start: 2020-08-07 | End: 2020-08-13 | Stop reason: HOSPADM

## 2020-08-07 RX ORDER — ACETAMINOPHEN 325 MG/1
650 TABLET ORAL EVERY 8 HOURS PRN
Status: DISCONTINUED | OUTPATIENT
Start: 2020-08-07 | End: 2020-08-13 | Stop reason: HOSPADM

## 2020-08-07 RX ORDER — ACETAMINOPHEN 650 MG/1
650 SUPPOSITORY RECTAL
Status: COMPLETED | OUTPATIENT
Start: 2020-08-07 | End: 2020-08-07

## 2020-08-07 RX ADMIN — SODIUM CHLORIDE, SODIUM LACTATE, POTASSIUM CHLORIDE, AND CALCIUM CHLORIDE 1000 ML: .6; .31; .03; .02 INJECTION, SOLUTION INTRAVENOUS at 12:08

## 2020-08-07 RX ADMIN — PIPERACILLIN AND TAZOBACTAM 3.38 G: 3; .375 INJECTION, POWDER, FOR SOLUTION INTRAVENOUS at 08:08

## 2020-08-07 RX ADMIN — LEVALBUTEROL HYDROCHLORIDE 1.25 MG: 1.25 SOLUTION, CONCENTRATE RESPIRATORY (INHALATION) at 12:08

## 2020-08-07 RX ADMIN — ACETAMINOPHEN 650 MG: 650 SUPPOSITORY RECTAL at 11:08

## 2020-08-07 RX ADMIN — IOHEXOL 100 ML: 350 INJECTION, SOLUTION INTRAVENOUS at 02:08

## 2020-08-07 RX ADMIN — DEXAMETHASONE SODIUM PHOSPHATE 6 MG: 4 INJECTION, SOLUTION INTRA-ARTICULAR; INTRALESIONAL; INTRAMUSCULAR; INTRAVENOUS; SOFT TISSUE at 04:08

## 2020-08-07 RX ADMIN — ENOXAPARIN SODIUM 40 MG: 100 INJECTION SUBCUTANEOUS at 09:08

## 2020-08-07 RX ADMIN — PIPERACILLIN AND TAZOBACTAM 3.38 G: 3; .375 INJECTION, POWDER, FOR SOLUTION INTRAVENOUS at 12:08

## 2020-08-07 NOTE — ED NOTES
Second blood culture drawn from left forearm, pressure and bandage applied     Consuelo Clay RN  08/07/20 4401

## 2020-08-07 NOTE — ED NOTES
Bed: 02A Trauma  Expected date:   Expected time:   Means of arrival:   Comments:  ems     Dl Howard RN  08/07/20 1126

## 2020-08-07 NOTE — ED TRIAGE NOTES
Per EMS pt has complaints of fever and altered mental status. Pt was recently diagnosed with a UTI and placed on antibiotics.

## 2020-08-07 NOTE — H&P
[facility] Medicine History & Physical Examination   Patient Name: Breanne Culp  MRN: [id number]  Patient Class: IP- Inpatient   Admission Date: 8/7/2020 11:22 AM  Length of Stay: 0  Attending Physician: Mauricio Mobley MD  Primary Care Provider: Frantz Bill MD  Face-to-Face encounter date: 08/07/2020  Code Status: DNR/DNI  POA: Daughter: Loree Jacobs- [phone]  Chief Complaint: Fever        Patient information was obtained from patient, past medical records and ER records.   Case discussed with Dr Pratt, Dr Thomas Phipps    HISTORY OF PRESENT ILLNESS:   Breanne Culp is a 86 y.o. AA female who  has a past medical history of Anticoagulant long-term use, Aphasia S/P CVA, Arthritis, Bedbound, Cataract, COPD (chronic obstructive pulmonary disease), Coronary artery disease, DDD (degenerative disc disease), lumbar, Glaucoma, Hyperlipidemia, Low back pain, Onychomycosis due to dermatophyte (11/26/2014), Osteoporosis, Positive PPD (1970), Seasonal allergies, and Stroke.. The patient presented to [facility] on 8/7/2020 with a primary complaint of Fever    History was obtained from ER physician on sign out.  Dr. Pratt spoke with patient's daughter via phone to get the history.  Daughter reported patient has been having fever for the last few days off and on.  Patient is living at home with her daughter.  Patient is bedbound, nonverbal since the stroke, history of recurrent/frequent UTI, sacral decubitus, history of dementia/Alzheimer's with behavioral disturbance.  Daughter reports they have home health service.  Yesterday she was diagnosed with UTI and was started on Keflex.  Today she noted that patient was more confused and somnolent so she called the EMS.  When EMS arrived, patient had an O2 sat of 88% on room air.  They applied the supplemental oxygen via nasal cannula and brought her to the ER  In the ER patient had a temp of 100.6°, respiration was 25 per minute, O2  sat was 88% which improved to 94% with 2 L supplemental oxygen.  On exam patient did grimace on abdominal examination, specially in the left lower quadrant and suprapubic area.  CT of the abdomen was ordered.  Prominent stool ball seen in the rectal vault, probably fecal impaction.  Marked sigmoid prominent diverticulosis without acute diverticulitis dose.  Hepatic steatosis.  X-ray chest shows worsening of bilateral pulmonary interstitial and ground-glass alveolar opacities S seen in viral pneumonia, atypical infection or pulmonary edema    Lab shows WBC of 7.37, H&H 10.3/31.9, platelet 285  Ferritin 230  CMP shows bicarb of 22, BUN creatinine 40/0.9, albumin 3.3  CRP 9.02(H)  BNP 28,  -high  COVID rapid positive  Lactate vein 1.5  Urinalysis shows appearance hazy, protein trace, 3+ occult blood, nitrite positive, 3+ leuko, RBC D3, bacteria negative, WBC greater than 100  Noted urine culture from 08/04/2020 was positive for E coli and Proteus.  Patient has a history of Proteus UTI multiple time in the past    Dr. Darby and Dr. Phipps were consulted through the ER..  Dr. Phipps discussed the case with patient's daughter miss wilcox who confirmed that patient has a DNR/DNI status.  Treatment options were discussed and decided to continue treatment with dexamethasone daily.  Patient will not receive convalescent plasma and Remdesivir per daughter/Dr. winchester conversation    Admit to med tele with cardiac monitoring    PAST MEDICAL HISTORY:     Past Medical History:   Diagnosis Date    Anticoagulant long-term use     ASA 81mg, Fish Oil    Aphasia S/P CVA     Arthritis     Bedbound     Cataract     OU done//    COPD (chronic obstructive pulmonary disease)     Coronary artery disease     DDD (degenerative disc disease), lumbar     Glaucoma     suspect    Hyperlipidemia     Low back pain     Onychomycosis due to dermatophyte 11/26/2014    Osteoporosis     Positive PPD 1970    Seasonal allergies     Stroke         PAST SURGICAL HISTORY:     Past Surgical History:   Procedure Laterality Date    BREAST BIOPSY Right      neg    CATARACT EXTRACTION W/  INTRAOCULAR LENS IMPLANT Bilateral 10/15/14     Dr Wright    COLONOSCOPY      Epidural Steroid injection      Pain Management    GASTROSTOMY TUBE PLACEMENT      INJECTION OF JOINT Right 4/3/2019    Procedure: Injection, Joint, Hip;  Surgeon: Tashi Morrison MD;  Location: [facility] OR;  Service: Pain Management;  Laterality: Right;    TONSILLECTOMY         ALLERGIES:   Brimonidine    FAMILY HISTORY:     Family History   Problem Relation Age of Onset    Cancer Father         Rectal    Glaucoma Mother     Cancer Mother         lung, throat    Cancer Brother         throat    Hypertension Brother     Stroke Brother     Diabetes Sister     Stroke Sister     Amblyopia Neg Hx     Blindness Neg Hx     Cataracts Neg Hx     Macular degeneration Neg Hx     Retinal detachment Neg Hx     Strabismus Neg Hx     Thyroid disease Neg Hx     Nephrolithiasis Neg Hx        SOCIAL HISTORY:     Social History     Tobacco Use    Smoking status: Former Smoker     Packs/day: 0.25     Start date: 1961     Quit date: 1992     Years since quittin.1    Smokeless tobacco: Never Used   Substance Use Topics    Alcohol use: No        Social History     Substance and Sexual Activity   Sexual Activity Not Currently        HOME MEDICATIONS:     Prior to Admission medications    Medication Sig Start Date End Date Taking? Authorizing Provider   acetaminophen (TYLENOL) 325 MG tablet Take 650 mg by mouth every 6 (six) hours as needed for Pain. Per peg   Yes Historical Provider, MD   aspirin 81 mg Tab 1 tablet by PEG Tube route once daily. Every day per peg 11  Yes Historical Provider, MD   benzonatate (TESSALON) 100 MG capsule Take 1 capsule (100 mg total) by mouth 3 (three) times daily as needed for Cough. 20  Yes Frantz Bill MD   cephALEXin  (KEFLEX) 500 MG capsule Take 1 capsule (500 mg total) by mouth 3 (three) times daily. for 10 days  Patient taking differently: Take 500 mg by mouth 3 (three) times daily. For 10 days 8/6/20 8/16/20 Yes Frantz Bill MD   donepeziL (ARICEPT) 10 MG tablet TAKE ONE TABLET BY MOUTH EVERY EVENING  Patient taking differently: 10 mg by Per G Tube route every evening.  4/9/20  Yes Frantz Bill MD   fexofenadine 30 mg TbDL 1 tablet by PEG Tube route once daily.   Yes Historical Provider, MD   gabapentin (NEURONTIN) 100 MG capsule TAKE 2 CAPSULES BY MOUTH EVERY EVENING  Patient taking differently: 200 mg by Per G Tube route every evening.  5/31/20  Yes Frantz Bill MD   hydrOXYzine HCL (ATARAX) 25 MG tablet TAKE ONE TABLET BY MOUTH NIGHTLY AS NEEDED FOR ITCHING OR ANXIETY  Patient taking differently: 25 mg by Per G Tube route every evening.  7/21/20  Yes Walter Feng MD   lisinopriL (PRINIVIL,ZESTRIL) 5 MG tablet TAKE ONE TABLET PER G TUBE ONCE DAILY  Patient taking differently: 5 mg by Per G Tube route once daily.  5/31/20  Yes Frantz Bill MD   LORazepam (ATIVAN) 1 MG tablet 1/2 to 1 po QHS prn  Patient taking differently: 1 mg by Per G Tube route every evening. 1/2 to 1 po QHS prn 5/23/19  Yes Frantz Bill MD   metoprolol tartrate (LOPRESSOR) 25 MG tablet TAKE ONE TABLET PER G TUBE TWO TIMES DAILY  Patient taking differently: Take 25 mg by mouth 2 (two) times daily.  5/17/20  Yes Frantz Bill MD   multivitamin (THERAGRAN) per tablet Take 1 tablet by mouth once daily.   Yes Historical Provider, MD   oxybutynin (DITROPAN) 5 MG Tab TAKE ONE TABLET BY MOUTH THREE TIMES DAILY  Patient taking differently: 5 mg by Per G Tube route 3 (three) times daily.  6/26/20  Yes Frantz Bill MD   traMADoL (ULTRAM) 50 mg tablet Take 1 tablet (50 mg total) by mouth nightly as needed for Pain.  Patient taking differently: Take 25 mg by mouth every 8 (eight) hours as needed for  Pain. And nightly 3/12/20  Yes Frantz Bill MD   albuterol-ipratropium (DUO-NEB) 2.5 mg-0.5 mg/3 mL nebulizer solution Take 3 mLs by nebulization every 6 (six) hours as needed for Wheezing. Rescue  Patient not taking: Reported on 8/7/2020 3/19/20 3/19/21  Autumn Wong NP   ascorbic acid, vitamin C, (VITAMIN C) 500 MG tablet Take 500 mg by mouth 2 (two) times daily.    Historical Provider, MD   diclofenac sodium (VOLTAREN) 1 % Gel Apply 2 g topically 3 (three) times daily.  Patient not taking: Reported on 12/26/2019 3/6/17   Doroteo Lynch Jr., MD   famotidine (PEPCID) 20 MG tablet Take 1 tablet (20 mg total) by mouth 2 (two) times daily. 8/7/20 8/7/21  Autumn Wong NP   fluticasone propionate (FLONASE) 50 mcg/actuation nasal spray 1 spray (50 mcg total) by Each Nostril route once daily. 8/7/20   Autumn Wong NP   ipratropium (ATROVENT) 0.02 % nebulizer solution Take 500 mcg by nebulization 4 (four) times daily. Rescue    Historical Provider, MD   lidocaine-prilocaine (EMLA) cream As directed 6/20/18   Historical Provider, MD   miconazole (MICOTIN) 2 % cream Apply topically 2 (two) times daily. for 7 days 3/19/20 3/26/20  Autumn Wong NP   nebulizer and compressor (VIOS AEROSOL DELIVERY SYSTEM) Angela USE AS DIRECTED 4/13/20   Frantz Bill MD   polyethylene glycol (GLYCOLAX) 17 gram PwPk Take by mouth.    Historical Provider, MD   potassium chloride 10% (KAYCIEL) 20 mEq/15 mL oral solution Take 20 mEq by mouth once daily. For 3 days 8/23/19   Historical Provider, MD   acetaminophen (TYLENOL) 500 MG tablet Take 500 mg by mouth every 6 (six) hours as needed for Pain.  8/7/20  Historical Provider, MD   albuterol-ipratropium (DUO-NEB) 2.5 mg-0.5 mg/3 mL nebulizer solution Take 3 mLs by nebulization every 6 (six) hours as needed for Wheezing. Rescue 2/18/20 8/7/20  Frantz Bill MD   fexofenadine (ALLEGRA) 30 MG tablet Take 30 mg by mouth 2 (two) times daily. As needed   "8/7/20  Historical Provider, MD   fluticasone (FLONASE) 50 mcg/actuation nasal spray 2 sprays by Each Nare route once daily.  Patient not taking: Reported on 12/26/2019 4/5/17 8/7/20  Autumn Mccormick NP   Zinc Mth/Copper/Saw Palm/Gnsg (PROSTATE HEALTH FORMULA ORAL) Take 30 mLs by mouth 2 (two) times daily.  8/7/20  Historical Provider, MD       REVIEW OF SYSTEMS:   10 Point Review of System was performed and was found to be negative except for that mentioned already in the HPI above.     PHYSICAL EXAM:   BP (!) 111/55   Pulse 96   Temp 98.4 °F (36.9 °C) (Rectal)   Resp 20   Ht 5' 6" (1.676 m)   Wt 68 kg (150 lb)   LMP  (LMP Unknown)   SpO2 (!) 92%   BMI 24.21 kg/m²     Vitals Reviewed  General appearance:  Elderly lady, malnourished, in no apparent distress.  HENT: Atraumatic head. Moist mucous membranes of oral cavity.  Eyes:  Sleeping  Neck: Supple.   Lungs:  Coarse breath sounds  Heart:  Near tachycardia  Abdomen: Soft slightly tender in the left lower quadrant Bowel sounds are normal.   :  Scherer present  Extremities:  Left hemiparesis  Skin:  Sacral decubitus  Neuro:  Left hemiparesis, nonverbal  Psych/mental status:  Unable to assess  EMERGENCY DEPARTMENT LABS AND IMAGING:     Labs Reviewed   CBC W/ AUTO DIFFERENTIAL - Abnormal; Notable for the following components:       Result Value    RBC 3.40 (*)     Hemoglobin 10.3 (*)     Hematocrit 31.9 (*)     RDW 15.8 (*)     Mono% 1.0 (*)     Rouleaux Present (*)     All other components within normal limits   COMPREHENSIVE METABOLIC PANEL - Abnormal; Notable for the following components:    CO2 22 (*)     Glucose 133 (*)     BUN, Bld 40 (*)     Albumin 3.3 (*)     eGFR if non  58.1 (*)     All other components within normal limits   C-REACTIVE PROTEIN - Abnormal; Notable for the following components:    CRP 9.02 (*)     All other components within normal limits   LACTATE DEHYDROGENASE - Abnormal; Notable for the following components:    LD " 325 (*)     All other components within normal limits   SARS-COV-2 RNA AMPLIFICATION, QUAL - Abnormal; Notable for the following components:    SARS-CoV-2 RNA, Amplification, Qual Positive (*)     All other components within normal limits   URINALYSIS - Abnormal; Notable for the following components:    Appearance, UA Hazy (*)     Protein, UA Trace (*)     Occult Blood UA 3+ (*)     Nitrite, UA Positive (*)     Leukocytes, UA 3+ (*)     All other components within normal limits   URINALYSIS MICROSCOPIC - Abnormal; Notable for the following components:    RBC, UA 83 (*)     WBC, UA >100 (*)     WBC Clumps, UA Moderate (*)     Hyaline Casts, UA 75 (*)     All other components within normal limits   CULTURE, URINE   CULTURE, BLOOD   CULTURE, BLOOD   CULTURE, URINE   FERRITIN   CK   LACTIC ACID, PLASMA   TROPONIN I   B-TYPE NATRIURETIC PEPTIDE   CK   CK-MB   PROCALCITONIN       CT Abdomen Pelvis With Contrast   Final Result      X-Ray Chest AP Portable   Final Result      Worsening of bilateral pulmonary interstitial and ground-glass alveolar opacities can be seen with viral pneumonia, other atypical infection, or pulmonary edema.         Electronically signed by: Dereck Trejo MD   Date:    08/07/2020   Time:    13:28          ASSESSMENT & PLAN:   Breanne Culp is a 86 y.o. female admitted for    Pneumonia due to COVID-19  Admit to med tele with remote monitoring  Dr. Phipps had a long conversation with patient's daughter Mr. Juarez.  Family does not want aggressive treatment with convalescent plasma or Remddesivir  No indication for pulmonology or ID consult  Will continue with Decadron 6 mg per PEG daily  Supplemental oxygen to keep O2 sat greater than 92%  Keep patient comfortable  Daily inflammatory markers  Possibly discharge in 2-3 days once stable  Note patient has a DNR/DNI status    UTI  Chronic Scherer  Recent urine culture from 08/04/2020 shows UTI with E coli and Proteus, both sensitive to ceftriaxone and  Zosyn  Patient received Zosyn in the ER, will continue    Constipation  Will cont miralax  Not sure if patient will be able to hold the enema, if ordered  Will do dulcolax suppository    Chronic bed-bound status  Nonverbal due to previous CVA  PEG status   Sacral decubitus ulcer on admission  Turned patient Q 2 hr  NPO by mouth  Consult dietitian for nutritional eval per PEG  Elevate head of the bed to avoid aspiration  Consult wound care for sacral decubitus on admission    DNR/DNI status      DVT Prophylaxis: will be placed on Heparin/Lovenox for DVT prophylaxis and will be advised to be as mobile as possible and sit in a chair as tolerated.   ________________________________________________________________________________    Discharge Planning and Disposition:  BED-BOUND, NONVERBAL. Good social support system. Patient will be discharged in 1-3 DAYS to home with home health    Face-to-Face encounter date: 08/07/2020  Encounter included review of the medical records, interviewing and examining the patient face-to-face, discussion with family and other health care providers including emergency medicine physician, admission orders, interpreting lab/test results and formulating a plan of care.     Medical Decision Making during this encounter was  [_] Low Complexity  [_] Moderate Complexity  [X] High Complexity  _________________________________________________________________________________    INPATIENT LIST OF MEDICATIONS     Current Facility-Administered Medications:     acetaminophen tablet 650 mg, 650 mg, Per G Tube, Q6H PRN, Mauricio Mobley MD    acetaminophen tablet 650 mg, 650 mg, Oral, Q4H PRN, Mauricio Mobley MD    acetaminophen tablet 650 mg, 650 mg, Oral, Q8H PRN, Mauricio Mobley MD    benzonatate capsule 100 mg, 100 mg, Oral, TID PRN, Mauricio Mobley MD    enoxaparin injection 40 mg, 40 mg, Subcutaneous, Q24H, Mauricio Mobley MD    melatonin tablet 6 mg, 6 mg, Oral, Nightly PRN, Mauricio Mobley MD    [START ON 8/8/2020]  polyethylene glycol packet 17 g, 17 g, Oral, Daily, Mauricio Mobley MD    sodium chloride 0.9% flush 10 mL, 10 mL, Intravenous, PRN, Mauricio Mobley MD    Current Outpatient Medications:     acetaminophen (TYLENOL) 325 MG tablet, Take 650 mg by mouth every 6 (six) hours as needed for Pain. Per peg, Disp: , Rfl:     aspirin 81 mg Tab, 1 tablet by PEG Tube route once daily. Every day per peg, Disp: , Rfl:     benzonatate (TESSALON) 100 MG capsule, Take 1 capsule (100 mg total) by mouth 3 (three) times daily as needed for Cough., Disp: 30 capsule, Rfl: 1    cephALEXin (KEFLEX) 500 MG capsule, Take 1 capsule (500 mg total) by mouth 3 (three) times daily. for 10 days (Patient taking differently: Take 500 mg by mouth 3 (three) times daily. For 10 days), Disp: 30 capsule, Rfl: 0    donepeziL (ARICEPT) 10 MG tablet, TAKE ONE TABLET BY MOUTH EVERY EVENING (Patient taking differently: 10 mg by Per G Tube route every evening. ), Disp: 90 tablet, Rfl: 1    fexofenadine 30 mg TbDL, 1 tablet by PEG Tube route once daily., Disp: , Rfl:     gabapentin (NEURONTIN) 100 MG capsule, TAKE 2 CAPSULES BY MOUTH EVERY EVENING (Patient taking differently: 200 mg by Per G Tube route every evening. ), Disp: 60 capsule, Rfl: 2    hydrOXYzine HCL (ATARAX) 25 MG tablet, TAKE ONE TABLET BY MOUTH NIGHTLY AS NEEDED FOR ITCHING OR ANXIETY (Patient taking differently: 25 mg by Per G Tube route every evening. ), Disp: 30 tablet, Rfl: 5    lisinopriL (PRINIVIL,ZESTRIL) 5 MG tablet, TAKE ONE TABLET PER G TUBE ONCE DAILY (Patient taking differently: 5 mg by Per G Tube route once daily. ), Disp: 30 tablet, Rfl: 3    LORazepam (ATIVAN) 1 MG tablet, 1/2 to 1 po QHS prn (Patient taking differently: 1 mg by Per G Tube route every evening. 1/2 to 1 po QHS prn), Disp: 30 tablet, Rfl: 1    metoprolol tartrate (LOPRESSOR) 25 MG tablet, TAKE ONE TABLET PER G TUBE TWO TIMES DAILY (Patient taking differently: Take 25 mg by mouth 2 (two) times daily. ), Disp: 60  tablet, Rfl: 3    multivitamin (THERAGRAN) per tablet, Take 1 tablet by mouth once daily., Disp: , Rfl:     oxybutynin (DITROPAN) 5 MG Tab, TAKE ONE TABLET BY MOUTH THREE TIMES DAILY (Patient taking differently: 5 mg by Per G Tube route 3 (three) times daily. ), Disp: 90 tablet, Rfl: 1    traMADoL (ULTRAM) 50 mg tablet, Take 1 tablet (50 mg total) by mouth nightly as needed for Pain. (Patient taking differently: Take 25 mg by mouth every 8 (eight) hours as needed for Pain. And nightly), Disp: 30 tablet, Rfl: 2    albuterol-ipratropium (DUO-NEB) 2.5 mg-0.5 mg/3 mL nebulizer solution, Take 3 mLs by nebulization every 6 (six) hours as needed for Wheezing. Rescue (Patient not taking: Reported on 8/7/2020), Disp: 1 Box, Rfl: 1    ascorbic acid, vitamin C, (VITAMIN C) 500 MG tablet, Take 500 mg by mouth 2 (two) times daily., Disp: , Rfl:     diclofenac sodium (VOLTAREN) 1 % Gel, Apply 2 g topically 3 (three) times daily. (Patient not taking: Reported on 12/26/2019), Disp: 1 Tube, Rfl: 3    famotidine (PEPCID) 20 MG tablet, Take 1 tablet (20 mg total) by mouth 2 (two) times daily., Disp: 60 tablet, Rfl: 11    fluticasone propionate (FLONASE) 50 mcg/actuation nasal spray, 1 spray (50 mcg total) by Each Nostril route once daily., Disp: 16 g, Rfl: 11    ipratropium (ATROVENT) 0.02 % nebulizer solution, Take 500 mcg by nebulization 4 (four) times daily. Rescue, Disp: , Rfl:     lidocaine-prilocaine (EMLA) cream, As directed, Disp: , Rfl:     miconazole (MICOTIN) 2 % cream, Apply topically 2 (two) times daily. for 7 days, Disp: 42.5 g, Rfl: 1    nebulizer and compressor (VIOS AEROSOL DELIVERY SYSTEM) Angela, USE AS DIRECTED, Disp: 1 each, Rfl: prn    polyethylene glycol (GLYCOLAX) 17 gram PwPk, Take by mouth., Disp: , Rfl:     potassium chloride 10% (KAYCIEL) 20 mEq/15 mL oral solution, Take 20 mEq by mouth once daily. For 3 days, Disp: , Rfl:       Scheduled Meds:   enoxaparin  40 mg Subcutaneous Q24H    [START  ON 8/8/2020] polyethylene glycol  17 g Oral Daily     Continuous Infusions:  PRN Meds:.acetaminophen, acetaminophen, acetaminophen, benzonatate, melatonin, sodium chloride 0.9%      Mauricio Itz  [facility] Hospitalist  08/07/2020

## 2020-08-07 NOTE — ED PROVIDER NOTES
Encounter Date: 8/7/2020       History     Chief Complaint   Patient presents with    Fever     86-year-old female with multiple medical problems including COPD on occasional supplemental oxygen, arthritis, degenerative disc disease, CVA with left hemiparesis, bed-bound, patient lives at home with home health visits.  Recently diagnosed yesterday with urinary tract infection placed on Keflex.  Patient presents to the emergency department today with complaint decreased mental status with fever 101 at home with complaints of abdominal pain and generalized weakness.  Patient also found with nonproductive cough.  Patient satting 86% on room air at home.  Per EMS.  Upon arrival to the emergency department patient with room air sats 88%.        Review of patient's allergies indicates:   Allergen Reactions    Brimonidine Other (See Comments)     Redness and irritation of eyes     Past Medical History:   Diagnosis Date    Anticoagulant long-term use     ASA 81mg, Fish Oil    Aphasia S/P CVA     Arthritis     Bedbound     Cataract     OU done//    COPD (chronic obstructive pulmonary disease)     Coronary artery disease     DDD (degenerative disc disease), lumbar     Glaucoma     suspect    Hyperlipidemia     Low back pain     Onychomycosis due to dermatophyte 11/26/2014    Osteoporosis     Positive PPD 1970    Seasonal allergies     Stroke      Past Surgical History:   Procedure Laterality Date    BREAST BIOPSY Right     2012 neg    CATARACT EXTRACTION W/  INTRAOCULAR LENS IMPLANT Bilateral 10/15/14     Dr Wright    COLONOSCOPY      Epidural Steroid injection      Pain Management    GASTROSTOMY TUBE PLACEMENT      INJECTION OF JOINT Right 4/3/2019    Procedure: Injection, Joint, Hip;  Surgeon: Tashi Morrison MD;  Location: [facility];  Service: Pain Management;  Laterality: Right;    TONSILLECTOMY  1941     Family History   Problem Relation Age of Onset    Cancer Father         Rectal     Glaucoma Mother     Cancer Mother         lung, throat    Cancer Brother         throat    Hypertension Brother     Stroke Brother     Diabetes Sister     Stroke Sister     Amblyopia Neg Hx     Blindness Neg Hx     Cataracts Neg Hx     Macular degeneration Neg Hx     Retinal detachment Neg Hx     Strabismus Neg Hx     Thyroid disease Neg Hx     Nephrolithiasis Neg Hx      Social History     Tobacco Use    Smoking status: Former Smoker     Packs/day: 0.25     Start date: 1961     Quit date: 1992     Years since quittin.1    Smokeless tobacco: Never Used   Substance Use Topics    Alcohol use: No    Drug use: No     Review of Systems   Constitutional: Positive for fatigue and fever.   HENT: Negative for sore throat.    Respiratory: Positive for cough and shortness of breath.    Cardiovascular: Negative for chest pain.   Gastrointestinal: Positive for abdominal pain. Negative for nausea.   Genitourinary: Negative for dysuria.   Musculoskeletal: Negative for back pain.   Skin: Negative for rash.   Neurological: Negative for weakness.   Hematological: Does not bruise/bleed easily.   Psychiatric/Behavioral: Positive for confusion.       Physical Exam     Initial Vitals [20 1133]   BP Pulse Resp Temp SpO2   (!) 128/95 94 (!) 25 (!) 100.6 °F (38.1 °C) (!) 88 %      MAP       --         Physical Exam    Nursing note and vitals reviewed.  Constitutional:   Elderly appearing female, no acute distress   HENT:   Head: Normocephalic and atraumatic.   Nose: Nose normal.   Mouth/Throat: Oropharynx is clear and moist.   Eyes: Conjunctivae and EOM are normal. Pupils are equal, round, and reactive to light.   Neck: Normal range of motion. Neck supple. No thyromegaly present. No tracheal deviation present.   Cardiovascular: Normal rate, regular rhythm, normal heart sounds and intact distal pulses. Exam reveals no gallop and no friction rub.    No murmur heard.  Pulmonary/Chest: No stridor. No  respiratory distress.   Course bilateral breath sounds no adventitious sounds   Abdominal: Bowel sounds are normal. She exhibits no mass. There is abdominal tenderness (Diffusely tender throughout). There is no rebound and no guarding.   Genitourinary:    Genitourinary Comments: Scherer catheter noted with clear yellow urine in bag     Musculoskeletal: No edema.      Comments: Left hemiparesis noted   Lymphadenopathy:     She has no cervical adenopathy.   Neurological: She is alert and oriented to person, place, and time. GCS score is 15. GCS eye subscore is 4. GCS verbal subscore is 5. GCS motor subscore is 6.   Left hemiparesis left-sided facial droop (consistent with previous stroke)   Skin: Skin is warm and dry. Capillary refill takes less than 2 seconds.   Psychiatric: She has a normal mood and affect.         ED Course   Procedures  Labs Reviewed   CBC W/ AUTO DIFFERENTIAL - Abnormal; Notable for the following components:       Result Value    RBC 3.40 (*)     Hemoglobin 10.3 (*)     Hematocrit 31.9 (*)     RDW 15.8 (*)     Mono% 1.0 (*)     Rouleaux Present (*)     All other components within normal limits   COMPREHENSIVE METABOLIC PANEL - Abnormal; Notable for the following components:    CO2 22 (*)     Glucose 133 (*)     BUN, Bld 40 (*)     Albumin 3.3 (*)     eGFR if non  58.1 (*)     All other components within normal limits   C-REACTIVE PROTEIN - Abnormal; Notable for the following components:    CRP 9.02 (*)     All other components within normal limits   LACTATE DEHYDROGENASE - Abnormal; Notable for the following components:     (*)     All other components within normal limits   SARS-COV-2 RNA AMPLIFICATION, QUAL - Abnormal; Notable for the following components:    SARS-CoV-2 RNA, Amplification, Qual Positive (*)     All other components within normal limits   URINALYSIS - Abnormal; Notable for the following components:    Appearance, UA Hazy (*)     Protein, UA Trace (*)      Occult Blood UA 3+ (*)     Nitrite, UA Positive (*)     Leukocytes, UA 3+ (*)     All other components within normal limits   URINALYSIS MICROSCOPIC - Abnormal; Notable for the following components:    RBC, UA 83 (*)     WBC, UA >100 (*)     WBC Clumps, UA Moderate (*)     Hyaline Casts, UA 75 (*)     All other components within normal limits   CULTURE, URINE   CULTURE, BLOOD   CULTURE, BLOOD   CULTURE, URINE   FERRITIN   CK   LACTIC ACID, PLASMA   TROPONIN I   B-TYPE NATRIURETIC PEPTIDE   CK   CK-MB   PROCALCITONIN     EKG Readings: (Independently Interpreted)   Initial Reading: No STEMI. Rhythm: Normal Sinus Rhythm. Ectopy: No Ectopy. Axis: Left Axis Deviation.   Ninety-six, normal sinus rhythm, left axis, nonspecific T-wave changes.     ECG Results          EKG 12-lead (In process)  Result time 08/07/20 11:45:36    In process by Interface, Lab In [facility] (08/07/20 11:45:36)                 Narrative:    Test Reason : R68.89,    Vent. Rate : 096 BPM     Atrial Rate : 096 BPM     P-R Int : 134 ms          QRS Dur : 088 ms      QT Int : 384 ms       P-R-T Axes : 058 -02 051 degrees     QTc Int : 485 ms    Normal sinus rhythm  Nonspecific T wave abnormality  Abnormal ECG  When compared with ECG of 13-FEB-2020 13:00,  No significant change was found    Referred By: [email or web address]   SELF           Confirmed By:                             Imaging Results          CT Abdomen Pelvis With Contrast (Final result)  Result time 08/07/20 15:08:49    Final result by Calixto Bartholomew MD (08/07/20 15:08:49)                 Narrative:    CMS MANDATED QUALITY DATA - CT RADIATION  436    All CT scans at this facility utilize dose modulation, iterative  reconstruction, and/or weight based dosing when appropriate to reduce  radiation dose to as low as reasonably achievable.    CLINICAL HISTORY:  86 years (2/28/1934) Female Abdominal pain, acute, nonlocalized    TECHNIQUE:  CT ABDOMEN PELVIS WITH CONTRAST. 227 images obtained. Axial CT  images  of the abdomen and pelvis were obtained from the dome of the diaphragm  to the proximal thigh.    CONTRAST:  IV contrast was administered.    COMPARISON:  CT most recently from February 13, 2020    FINDINGS:.  Lower Thorax:  The heart is normal in size with scattered coronary arterial  calcifications. There are scattered interstitial opacities in both  lung bases.    CT Abdomen:  Liver: Relative diffuse low-attenuation liver consistent with hepatic  steatosis.  Gallbladder: The gallbladder is within normal limits.  Biliary Tree: No intra or extrahepatic ductal dilation.  Spleen: Within normal limits.  Pancreas: The pancreas is normal.  Adrenal Glands: The adrenal glands appear within normal limits.  Kidneys: The kidneys are normal in imaging appearance without  hydronephrosis or hydroureter.  Vasculature: Atheromatous calcifications in the abdominal aorta and  iliacs with no aneurysm.  Lymph nodes: No abdominal lymphadenopathy is seen.  Intraperitoneal structures: There is no ascites.  Bowel: Marked sigmoid predominant diverticulosis without findings of  focal diverticulitis. There is a prominent stool ball seen in the  rectal vault (consider correlation for constipation/fecal impaction)  Abdominal wall: The abdominal wall and musculature are normal.  Musculoskeletal: There is degenerative disc disease and facet  arthropathy in the lumbar spine with no aggressive appearing lytic or  blastic lesions .    CT Pelvis:  Bladder: There is a Scherer catheter with balloon inflated in a  partially decompressed bladder, note the catheter is somewhat  redundant and correlation for desired positioning would be warranted.  Reproductive Organs: The uterus and ovaries are unremarkable.  Pelvic Lymph nodes: No pelvic lymphadenopathy or pelvic mass is  identified.    IMPRESSION:  1. Scherer catheter with balloon inflated in a decompressed bladder, the  catheter is redundant and correlation for desired positioning would  be  warranted.  2. Prominent stool ball seen in the rectal vault (consider correlation  for constipation/fecal impaction.  3. Marked sigmoid predominant diverticulosis without findings of acute  diverticulitis.  4. Relative diffuse low-attenuation liver in keeping with steatosis.  5. Tiny interstitial opacities in both dependent lower lobes  suggesting atelectasis, scarring or possibly very mild  aspiration/atypical infection or pneumonia (this would be better  followed with chest radiography).                    .    Electronically Signed by LIA Landry on 8/7/2020 3:17 PM                             X-Ray Chest AP Portable (Final result)  Result time 08/07/20 13:28:33    Final result by Dereck Trejo MD (08/07/20 13:28:33)                 Impression:      Worsening of bilateral pulmonary interstitial and ground-glass alveolar opacities can be seen with viral pneumonia, other atypical infection, or pulmonary edema.      Electronically signed by: Dereck Trejo MD  Date:    08/07/2020  Time:    13:28             Narrative:    EXAMINATION:  XR CHEST AP PORTABLE    CLINICAL HISTORY:  Suspected Covid-19 Virus Infection;    FINDINGS:  Portable chest at 1153 compared with 01/19/2020 shows normal cardiomediastinal silhouette.    Bilateral interstitial and ground-glass pulmonary opacities have progressed.  Mild elevation or eventration of right diaphragm unchanged.  Lung volumes remain low.  Central pulmonary vasculature is within normal limits.  No acute osseous abnormality.                                 Medical Decision Making:   Initial Assessment:   86-year-old female with history of COPD, previous CVA with left hemiparesis, aphasia, recent diagnosis of urinary tract infection presents with fever, weakness, diffuse abdominal pain since this morning.  Differential Diagnosis:   Pyelonephritis, bacteremia, septicemia, urinary tract infection, pneumonia, aspiration pneumonia,  Clinical Tests:   Lab Tests:  Ordered and Reviewed  Radiological Study: Ordered and Reviewed  Medical Tests: Ordered and Reviewed  Sepsis Perfusion Assessment: I attest, a sepsis perfusion exam was performed within 6 hours of Septic Shock presentation, following fluid resuscitation.  ED Management:  Patient found emergency department have multilobar pneumonia on chest x-ray and was found to be COVID-19 positive.  Patient had markedly infected urine as well.  At this time patient will be admitted to hospitalist service.  Patient will be considered for convalescent plasma therapy, IV steroids and remdesivere.  Currently at this time Pulmonary Medicine evaluated patient in emergency department Dr. Phipps at bedside.                                 Clinical Impression:       ICD-10-CM ICD-9-CM   1. Hypoxia  R09.02 799.02   2. Pneumonia due to COVID-19 virus  U07.1     J12.89    3. Urinary tract infection without hematuria, site unspecified  N39.0 599.0             ED Disposition Condition    Admit                           Jairo Pratt MD  08/07/20 [id number]

## 2020-08-08 PROBLEM — R09.02 HYPOXIA: Status: ACTIVE | Noted: 2020-08-08

## 2020-08-08 PROBLEM — L89.159 SACRAL DECUBITUS ULCER: Chronic | Status: ACTIVE | Noted: 2020-08-08

## 2020-08-08 PROBLEM — R00.0 TACHYCARDIA: Status: ACTIVE | Noted: 2020-08-08

## 2020-08-08 PROBLEM — E87.5 HYPERKALEMIA: Status: ACTIVE | Noted: 2020-08-08

## 2020-08-08 PROBLEM — E83.39 HYPERPHOSPHATEMIA: Status: ACTIVE | Noted: 2020-08-08

## 2020-08-08 PROBLEM — J96.01 ACUTE HYPOXEMIC RESPIRATORY FAILURE: Status: ACTIVE | Noted: 2020-08-08

## 2020-08-08 PROBLEM — R06.82 TACHYPNEA: Status: ACTIVE | Noted: 2020-08-08

## 2020-08-08 PROBLEM — R50.9 FEVER: Status: ACTIVE | Noted: 2020-08-08

## 2020-08-08 LAB
ABO + RH BLD: NORMAL
ALBUMIN SERPL BCP-MCNC: 3.1 G/DL (ref 3.5–5.2)
ALP SERPL-CCNC: 71 U/L (ref 55–135)
ALT SERPL W/O P-5'-P-CCNC: 26 U/L (ref 10–44)
ANION GAP SERPL CALC-SCNC: 9 MMOL/L (ref 8–16)
AST SERPL-CCNC: 32 U/L (ref 10–40)
BASOPHILS # BLD AUTO: 0.02 K/UL (ref 0–0.2)
BASOPHILS NFR BLD: 0.4 % (ref 0–1.9)
BILIRUB SERPL-MCNC: 1 MG/DL (ref 0.1–1)
BLD GP AB SCN CELLS X3 SERPL QL: NORMAL
BLD PROD TYP BPU: NORMAL
BLOOD UNIT EXPIRATION DATE: NORMAL
BLOOD UNIT TYPE CODE: 5100
BLOOD UNIT TYPE: NORMAL
BUN SERPL-MCNC: 42 MG/DL (ref 8–23)
CALCIUM SERPL-MCNC: 8.6 MG/DL (ref 8.7–10.5)
CHLORIDE SERPL-SCNC: 106 MMOL/L (ref 95–110)
CO2 SERPL-SCNC: 22 MMOL/L (ref 23–29)
CODING SYSTEM: NORMAL
CREAT SERPL-MCNC: 1 MG/DL (ref 0.5–1.4)
CRP SERPL-MCNC: 8.39 MG/DL
D DIMER PPP IA.FEU-MCNC: 0.99 UG/ML FEU
DIFFERENTIAL METHOD: ABNORMAL
DISPENSE STATUS: NORMAL
EOSINOPHIL # BLD AUTO: 0 K/UL (ref 0–0.5)
EOSINOPHIL NFR BLD: 0 % (ref 0–8)
ERYTHROCYTE [DISTWIDTH] IN BLOOD BY AUTOMATED COUNT: 15.4 % (ref 11.5–14.5)
EST. GFR  (AFRICAN AMERICAN): 58.9 ML/MIN/1.73 M^2
EST. GFR  (NON AFRICAN AMERICAN): 51.1 ML/MIN/1.73 M^2
FERRITIN SERPL-MCNC: 247 NG/ML (ref 20–300)
GLUCOSE SERPL-MCNC: 162 MG/DL (ref 70–110)
HCT VFR BLD AUTO: 28.9 % (ref 37–48.5)
HGB BLD-MCNC: 10.1 G/DL (ref 12–16)
IMM GRANULOCYTES # BLD AUTO: 0.03 K/UL (ref 0–0.04)
IMM GRANULOCYTES NFR BLD AUTO: 0.6 % (ref 0–0.5)
LYMPHOCYTES # BLD AUTO: 1 K/UL (ref 1–4.8)
LYMPHOCYTES NFR BLD: 19.9 % (ref 18–48)
MAGNESIUM SERPL-MCNC: 2.5 MG/DL (ref 1.6–2.6)
MCH RBC QN AUTO: 34.9 PG (ref 27–31)
MCHC RBC AUTO-ENTMCNC: 34.9 G/DL (ref 32–36)
MCV RBC AUTO: 100 FL (ref 82–98)
MONOCYTES # BLD AUTO: 0.2 K/UL (ref 0.3–1)
MONOCYTES NFR BLD: 4.3 % (ref 4–15)
NEUTROPHILS # BLD AUTO: 3.6 K/UL (ref 1.8–7.7)
NEUTROPHILS NFR BLD: 74.8 % (ref 38–73)
NRBC BLD-RTO: 0 /100 WBC
PHOSPHATE SERPL-MCNC: 4.6 MG/DL (ref 2.7–4.5)
PLATELET # BLD AUTO: 331 K/UL (ref 150–350)
PMV BLD AUTO: 11.6 FL (ref 9.2–12.9)
POTASSIUM SERPL-SCNC: 5.3 MMOL/L (ref 3.5–5.1)
PROT SERPL-MCNC: 6.7 G/DL (ref 6–8.4)
RBC # BLD AUTO: 2.89 M/UL (ref 4–5.4)
SODIUM SERPL-SCNC: 137 MMOL/L (ref 136–145)
UNIT NUMBER: NORMAL
WBC # BLD AUTO: 4.87 K/UL (ref 3.9–12.7)

## 2020-08-08 PROCEDURE — 63600175 PHARM REV CODE 636 W HCPCS: Performed by: INTERNAL MEDICINE

## 2020-08-08 PROCEDURE — 25000242 PHARM REV CODE 250 ALT 637 W/ HCPCS: Performed by: INTERNAL MEDICINE

## 2020-08-08 PROCEDURE — 86140 C-REACTIVE PROTEIN: CPT

## 2020-08-08 PROCEDURE — 85025 COMPLETE CBC W/AUTO DIFF WBC: CPT

## 2020-08-08 PROCEDURE — 83735 ASSAY OF MAGNESIUM: CPT

## 2020-08-08 PROCEDURE — 25000003 PHARM REV CODE 250: Performed by: INTERNAL MEDICINE

## 2020-08-08 PROCEDURE — 36415 COLL VENOUS BLD VENIPUNCTURE: CPT

## 2020-08-08 PROCEDURE — 80053 COMPREHEN METABOLIC PANEL: CPT

## 2020-08-08 PROCEDURE — 85379 FIBRIN DEGRADATION QUANT: CPT

## 2020-08-08 PROCEDURE — 99900035 HC TECH TIME PER 15 MIN (STAT)

## 2020-08-08 PROCEDURE — 86901 BLOOD TYPING SEROLOGIC RH(D): CPT

## 2020-08-08 PROCEDURE — 27000221 HC OXYGEN, UP TO 24 HOURS

## 2020-08-08 PROCEDURE — 82728 ASSAY OF FERRITIN: CPT

## 2020-08-08 PROCEDURE — 94761 N-INVAS EAR/PLS OXIMETRY MLT: CPT

## 2020-08-08 PROCEDURE — 84100 ASSAY OF PHOSPHORUS: CPT

## 2020-08-08 PROCEDURE — 12000002 HC ACUTE/MED SURGE SEMI-PRIVATE ROOM

## 2020-08-08 RX ORDER — TRAMADOL HYDROCHLORIDE 50 MG/1
50 TABLET ORAL NIGHTLY PRN
Status: DISCONTINUED | OUTPATIENT
Start: 2020-08-08 | End: 2020-08-13 | Stop reason: HOSPADM

## 2020-08-08 RX ORDER — POTASSIUM CHLORIDE 20 MEQ/1
40 TABLET, EXTENDED RELEASE ORAL
Status: DISCONTINUED | OUTPATIENT
Start: 2020-08-08 | End: 2020-08-13 | Stop reason: HOSPADM

## 2020-08-08 RX ORDER — MAGNESIUM SULFATE HEPTAHYDRATE 40 MG/ML
2 INJECTION, SOLUTION INTRAVENOUS
Status: DISCONTINUED | OUTPATIENT
Start: 2020-08-08 | End: 2020-08-13 | Stop reason: HOSPADM

## 2020-08-08 RX ORDER — DOXYLAMINE SUCCINATE 25 MG
TABLET ORAL 2 TIMES DAILY
Status: DISCONTINUED | OUTPATIENT
Start: 2020-08-08 | End: 2020-08-13 | Stop reason: HOSPADM

## 2020-08-08 RX ORDER — LANOLIN ALCOHOL/MO/W.PET/CERES
800 CREAM (GRAM) TOPICAL
Status: DISCONTINUED | OUTPATIENT
Start: 2020-08-08 | End: 2020-08-13 | Stop reason: HOSPADM

## 2020-08-08 RX ORDER — HYDROCODONE BITARTRATE AND ACETAMINOPHEN 500; 5 MG/1; MG/1
TABLET ORAL
Status: DISCONTINUED | OUTPATIENT
Start: 2020-08-08 | End: 2020-08-09

## 2020-08-08 RX ORDER — LORAZEPAM 1 MG/1
1 TABLET ORAL NIGHTLY
Status: DISCONTINUED | OUTPATIENT
Start: 2020-08-08 | End: 2020-08-13 | Stop reason: HOSPADM

## 2020-08-08 RX ORDER — POLYETHYLENE GLYCOL 3350 17 G/17G
17 POWDER, FOR SOLUTION ORAL DAILY
Status: DISCONTINUED | OUTPATIENT
Start: 2020-08-08 | End: 2020-08-09

## 2020-08-08 RX ORDER — CETIRIZINE HYDROCHLORIDE 10 MG/1
10 TABLET ORAL DAILY
Status: DISCONTINUED | OUTPATIENT
Start: 2020-08-08 | End: 2020-08-13 | Stop reason: HOSPADM

## 2020-08-08 RX ORDER — POTASSIUM CHLORIDE 7.45 MG/ML
20 INJECTION INTRAVENOUS
Status: DISCONTINUED | OUTPATIENT
Start: 2020-08-08 | End: 2020-08-13 | Stop reason: HOSPADM

## 2020-08-08 RX ORDER — IBUPROFEN 200 MG
24 TABLET ORAL
Status: DISCONTINUED | OUTPATIENT
Start: 2020-08-08 | End: 2020-08-13 | Stop reason: HOSPADM

## 2020-08-08 RX ORDER — ASPIRIN 81 MG/1
81 TABLET ORAL DAILY
Status: DISCONTINUED | OUTPATIENT
Start: 2020-08-08 | End: 2020-08-13 | Stop reason: HOSPADM

## 2020-08-08 RX ORDER — POTASSIUM CHLORIDE 20 MEQ/1
20 TABLET, EXTENDED RELEASE ORAL
Status: DISCONTINUED | OUTPATIENT
Start: 2020-08-08 | End: 2020-08-13 | Stop reason: HOSPADM

## 2020-08-08 RX ORDER — DIPHENHYDRAMINE HYDROCHLORIDE 50 MG/ML
50 INJECTION INTRAMUSCULAR; INTRAVENOUS
Status: DISCONTINUED | OUTPATIENT
Start: 2020-08-08 | End: 2020-08-13 | Stop reason: HOSPADM

## 2020-08-08 RX ORDER — POTASSIUM CHLORIDE 7.45 MG/ML
40 INJECTION INTRAVENOUS
Status: DISCONTINUED | OUTPATIENT
Start: 2020-08-08 | End: 2020-08-13 | Stop reason: HOSPADM

## 2020-08-08 RX ORDER — DOCUSATE SODIUM 50 MG/5ML
100 LIQUID ORAL DAILY
Status: DISCONTINUED | OUTPATIENT
Start: 2020-08-08 | End: 2020-08-12

## 2020-08-08 RX ORDER — MAGNESIUM SULFATE 1 G/100ML
1 INJECTION INTRAVENOUS
Status: DISCONTINUED | OUTPATIENT
Start: 2020-08-08 | End: 2020-08-13 | Stop reason: HOSPADM

## 2020-08-08 RX ORDER — GLUCAGON 1 MG
1 KIT INJECTION
Status: DISCONTINUED | OUTPATIENT
Start: 2020-08-08 | End: 2020-08-13 | Stop reason: HOSPADM

## 2020-08-08 RX ORDER — OXYBUTYNIN CHLORIDE 5 MG/1
5 TABLET ORAL 3 TIMES DAILY
Status: DISCONTINUED | OUTPATIENT
Start: 2020-08-08 | End: 2020-08-13 | Stop reason: HOSPADM

## 2020-08-08 RX ORDER — FLUTICASONE PROPIONATE 50 MCG
1 SPRAY, SUSPENSION (ML) NASAL DAILY
Status: DISCONTINUED | OUTPATIENT
Start: 2020-08-08 | End: 2020-08-13 | Stop reason: HOSPADM

## 2020-08-08 RX ORDER — MAGNESIUM SULFATE HEPTAHYDRATE 40 MG/ML
4 INJECTION, SOLUTION INTRAVENOUS
Status: DISCONTINUED | OUTPATIENT
Start: 2020-08-08 | End: 2020-08-13 | Stop reason: HOSPADM

## 2020-08-08 RX ORDER — ACETAMINOPHEN 650 MG/20.3ML
650 LIQUID ORAL
Status: DISCONTINUED | OUTPATIENT
Start: 2020-08-08 | End: 2020-08-13 | Stop reason: HOSPADM

## 2020-08-08 RX ORDER — ONDANSETRON 2 MG/ML
4 INJECTION INTRAMUSCULAR; INTRAVENOUS EVERY 8 HOURS PRN
Status: DISCONTINUED | OUTPATIENT
Start: 2020-08-08 | End: 2020-08-13 | Stop reason: HOSPADM

## 2020-08-08 RX ORDER — FAMOTIDINE 20 MG/1
20 TABLET, FILM COATED ORAL 2 TIMES DAILY
Status: DISCONTINUED | OUTPATIENT
Start: 2020-08-08 | End: 2020-08-13 | Stop reason: HOSPADM

## 2020-08-08 RX ORDER — METOPROLOL TARTRATE 25 MG/1
25 TABLET, FILM COATED ORAL 2 TIMES DAILY
Status: DISCONTINUED | OUTPATIENT
Start: 2020-08-08 | End: 2020-08-13 | Stop reason: HOSPADM

## 2020-08-08 RX ORDER — HYDROXYZINE HYDROCHLORIDE 25 MG/1
25 TABLET, FILM COATED ORAL NIGHTLY
Status: DISCONTINUED | OUTPATIENT
Start: 2020-08-08 | End: 2020-08-13 | Stop reason: HOSPADM

## 2020-08-08 RX ORDER — IBUPROFEN 200 MG
16 TABLET ORAL
Status: DISCONTINUED | OUTPATIENT
Start: 2020-08-08 | End: 2020-08-13 | Stop reason: HOSPADM

## 2020-08-08 RX ORDER — DONEPEZIL HYDROCHLORIDE 5 MG/1
10 TABLET, FILM COATED ORAL NIGHTLY
Status: DISCONTINUED | OUTPATIENT
Start: 2020-08-08 | End: 2020-08-13 | Stop reason: HOSPADM

## 2020-08-08 RX ORDER — GABAPENTIN 100 MG/1
200 CAPSULE ORAL NIGHTLY
Status: DISCONTINUED | OUTPATIENT
Start: 2020-08-08 | End: 2020-08-13 | Stop reason: HOSPADM

## 2020-08-08 RX ORDER — LISINOPRIL 5 MG/1
5 TABLET ORAL DAILY
Status: DISCONTINUED | OUTPATIENT
Start: 2020-08-08 | End: 2020-08-08

## 2020-08-08 RX ADMIN — FLUTICASONE PROPIONATE 50 MCG: 50 SPRAY, METERED NASAL at 11:08

## 2020-08-08 RX ADMIN — METOPROLOL TARTRATE 25 MG: 25 TABLET, FILM COATED ORAL at 09:08

## 2020-08-08 RX ADMIN — FAMOTIDINE 20 MG: 20 TABLET ORAL at 09:08

## 2020-08-08 RX ADMIN — DONEPEZIL HYDROCHLORIDE 10 MG: 5 TABLET, FILM COATED ORAL at 02:08

## 2020-08-08 RX ADMIN — MICONAZOLE NITRATE: 20 CREAM TOPICAL at 09:08

## 2020-08-08 RX ADMIN — FAMOTIDINE 20 MG: 20 TABLET ORAL at 11:08

## 2020-08-08 RX ADMIN — DONEPEZIL HYDROCHLORIDE 10 MG: 5 TABLET, FILM COATED ORAL at 09:08

## 2020-08-08 RX ADMIN — METOPROLOL TARTRATE 25 MG: 25 TABLET, FILM COATED ORAL at 11:08

## 2020-08-08 RX ADMIN — HYDROXYZINE HYDROCHLORIDE 25 MG: 25 TABLET, FILM COATED ORAL at 02:08

## 2020-08-08 RX ADMIN — OXYBUTYNIN CHLORIDE 5 MG: 5 TABLET ORAL at 03:08

## 2020-08-08 RX ADMIN — HYDROXYZINE HYDROCHLORIDE 25 MG: 25 TABLET, FILM COATED ORAL at 09:08

## 2020-08-08 RX ADMIN — POLYETHYLENE GLYCOL 3350 17 G: 17 POWDER, FOR SOLUTION ORAL at 11:08

## 2020-08-08 RX ADMIN — GABAPENTIN 200 MG: 100 CAPSULE ORAL at 02:08

## 2020-08-08 RX ADMIN — MICONAZOLE NITRATE: 20 CREAM TOPICAL at 11:08

## 2020-08-08 RX ADMIN — ENOXAPARIN SODIUM 40 MG: 100 INJECTION SUBCUTANEOUS at 06:08

## 2020-08-08 RX ADMIN — DEXAMETHASONE 6 MG: 2 TABLET ORAL at 11:08

## 2020-08-08 RX ADMIN — OXYBUTYNIN CHLORIDE 5 MG: 5 TABLET ORAL at 09:08

## 2020-08-08 RX ADMIN — PIPERACILLIN AND TAZOBACTAM 3.38 G: 3; .375 INJECTION, POWDER, FOR SOLUTION INTRAVENOUS at 02:08

## 2020-08-08 RX ADMIN — DIPHENHYDRAMINE HYDROCHLORIDE 50 MG: 50 INJECTION INTRAMUSCULAR; INTRAVENOUS at 03:08

## 2020-08-08 RX ADMIN — LORAZEPAM 1 MG: 1 TABLET ORAL at 09:08

## 2020-08-08 RX ADMIN — OXYBUTYNIN CHLORIDE 5 MG: 5 TABLET ORAL at 11:08

## 2020-08-08 RX ADMIN — CETIRIZINE HYDROCHLORIDE 10 MG: 10 TABLET, FILM COATED ORAL at 11:08

## 2020-08-08 RX ADMIN — LORAZEPAM 1 MG: 1 TABLET ORAL at 02:08

## 2020-08-08 RX ADMIN — BISACODYL 10 MG: 10 SUPPOSITORY RECTAL at 02:08

## 2020-08-08 RX ADMIN — DOCUSATE SODIUM 100 MG: 50 LIQUID ORAL at 11:08

## 2020-08-08 RX ADMIN — PIPERACILLIN AND TAZOBACTAM 3.38 G: 3; .375 INJECTION, POWDER, FOR SOLUTION INTRAVENOUS at 04:08

## 2020-08-08 RX ADMIN — ACETAMINOPHEN 650 MG: 325 TABLET ORAL at 03:08

## 2020-08-08 RX ADMIN — ASPIRIN 81 MG: 81 TABLET, COATED ORAL at 11:08

## 2020-08-08 RX ADMIN — PIPERACILLIN AND TAZOBACTAM 3.38 G: 3; .375 INJECTION, POWDER, FOR SOLUTION INTRAVENOUS at 09:08

## 2020-08-08 RX ADMIN — GABAPENTIN 200 MG: 100 CAPSULE ORAL at 09:08

## 2020-08-08 NOTE — PROGRESS NOTES
[facility] Medicine  Progress Note    Patient Name: Breanne Culp  MRN: [id number]  Patient Class: IP- Inpatient   Admission Date: 8/7/2020  Length of Stay: 1 days  Attending Physician: Elisha Vallejo MD  Primary Care Provider: Frantz Bill MD        Subjective:     Principal Problem:Pneumonia due to COVID-19 virus        HPI:  HPI as per MD Mobley  Breanne Culp is a 86 y.o. AA female who  has a past medical history of Anticoagulant long-term use, Aphasia S/P CVA, Arthritis, Bedbound, Cataract, COPD (chronic obstructive pulmonary disease), Coronary artery disease, DDD (degenerative disc disease), lumbar, Glaucoma, Hyperlipidemia, Low back pain, Onychomycosis due to dermatophyte (11/26/2014), Osteoporosis, Positive PPD (1970), Seasonal allergies, and Stroke.. The patient presented to [facility] on 8/7/2020 with a primary complaint of Fever    History was obtained from ER physician on sign out.  Dr. Pratt spoke with patient's daughter via phone to get the history.  Daughter reported patient has been having fever for the last few days off and on.  Patient is living at home with her daughter.  Patient is bedbound, nonverbal since the stroke, history of recurrent/frequent UTI, sacral decubitus, history of dementia/Alzheimer's with behavioral disturbance.  Daughter reports they have home health service.  Yesterday she was diagnosed with UTI and was started on Keflex.  Today she noted that patient was more confused and somnolent so she called the EMS.  When EMS arrived, patient had an O2 sat of 88% on room air.  They applied the supplemental oxygen via nasal cannula and brought her to the ER  In the ER patient had a temp of 100.6°, respiration was 25 per minute, O2 sat was 88% which improved to 94% with 2 L supplemental oxygen.  On exam patient did grimace on abdominal examination, specially in the left lower quadrant and suprapubic area.  CT of the abdomen was  ordered.  Prominent stool ball seen in the rectal vault, probably fecal impaction.  Marked sigmoid prominent diverticulosis without acute diverticulitis dose.  Hepatic steatosis.  X-ray chest shows worsening of bilateral pulmonary interstitial and ground-glass alveolar opacities S seen in viral pneumonia, atypical infection or pulmonary edema     Lab shows WBC of 7.37, H&H 10.3/31.9, platelet 285  Ferritin 230  CMP shows bicarb of 22, BUN creatinine 40/0.9, albumin 3.3  CRP 9.02(H)  BNP 28,  -high  COVID rapid positive  Lactate vein 1.5  Urinalysis shows appearance hazy, protein trace, 3+ occult blood, nitrite positive, 3+ leuko, RBC D3, bacteria negative, WBC greater than 100  Noted urine culture from 08/04/2020 was positive for E coli and Proteus.  Patient has a history of Proteus UTI multiple time in the past     Dr. Darby and Dr. Phipps were consulted through the ER..  Dr. Phipps discussed the case with patient's daughter miss wilcox who confirmed that patient has a DNR/DNI status.  Treatment options were discussed and decided to continue treatment with dexamethasone daily.  Patient will not receive convalescent plasma and Remdesivir per daughter/Dr. winchester conversation     Admit to med tele with cardiac monitoring    Overview/Hospital Course:  Patient with a history of CVA, nonverbal, bedbound, PEG feeding dependent, sacral decubitus ulcer, POA, community dwelling with home health, brought into the ED due to persistent fever and shortness of breath as per collateral.  COVID-19 was positive with bilateral infiltrates on chest x-ray.  Did have fever with positive UA, was recently started on Keflex as outpatient for UTI.  Chronic Scherer catheter in place, was changed in the ED.  Pulmonary was consulted in the ED, following discussions decision to admit, steroid, holding of plasma/RDV.  On 08/08, assumed care, patient lying in bed, does not open eyes during examination, remains nonverbal, as per nursing she did  have bowel movement overnight, hard, remains on supplemental oxygen, saturating well, collateral obtained from daughter, not on home oxygen, daughter was tested yesterday at urgent care and now was also COVID-19 positive, she states the patient did have caregivers/sitters as well in the home.  Daughter now consenting to convalescent plasma, communicated with pulmonologist, will order.  Discussed patient is high risk for decline.  Guarded prognosis.  Confirmed DNI.    Interval History:  Patient is nonverbal, bedbound at baseline, had history of CVA, peg tube dependent, chronic Scherer, sacral decubitus ulcer.  Discussed with nursing, patient did have hard bowel movement yesterday, tube feeds have not been started, Scherer was changed in the ED, sacral decubitus wound was reviewed and discussed.  Discussed with daughter over the phone, states patient did have home health with sitters, daughter tested positive for COVID-19 yesterday, patient is not on home oxygen, states she acquired the sacral decubitus in December, after a hospital stay, daughter wants to ensure patient is turned q.2 hours and gets air mattress, this was discussed with nursing.  Daughter also now wishes patient to get convalescent plasma, communicated with pulmonologist, type and screen ordered and will proceed.  Did discuss with daughter given comorbidities and now COVID-19 with bilateral infiltrates, high risk and will keep updated.  T-max 100.6°.  Labs with ferritin 02/02/2030, D-dimer 0.99, potassium 5.3, phosphorus 4.6, CRP 8.39.  UA positive, E coli and Proteus.  CT abdomen and pelvis with concern for stool within the rectal vault.  Chest x-ray with bilateral pulmonary infiltrates/alveolar ground-glass changes.     Review of Systems   Unable to perform ROS: Patient nonverbal     Objective:     Vital Signs (Most Recent):  Temp: 96 °F (35.6 °C) (08/08/20 0225)  Pulse: 89 (08/08/20 0739)  Resp: 18 (08/08/20 0225)  BP: 137/72 (08/08/20 0739)  SpO2: (!)  93 % (08/08/20 0739) Vital Signs (24h Range):  Temp:  [96 °F (35.6 °C)-100.6 °F (38.1 °C)] 96 °F (35.6 °C)  Pulse:  [] 89  Resp:  [18-25] 18  SpO2:  [88 %-99 %] 93 %  BP: (106-148)/(55-96) 137/72     Weight: 67.5 kg (148 lb 13 oz)  Body mass index is 24.02 kg/m².    Intake/Output Summary (Last 24 hours) at 8/8/2020 1010  Last data filed at 8/8/2020 0554  Gross per 24 hour   Intake 150 ml   Output 650 ml   Net -500 ml      Physical Exam  Vitals signs and nursing note reviewed.   Constitutional:       Comments: Frail, chronically ill-appearing, elderly female patient, lying in bed, does not open eyes or participate in exam   HENT:      Head: Normocephalic and atraumatic.   Eyes:      Comments: Keeps eyes closed   Cardiovascular:      Rate and Rhythm: Normal rate and regular rhythm.   Pulmonary:      Comments: On supplemental oxygen via nasal cannula, bilateral inspiratory crackles with diminished air entry on lung examination, no wheeze  Abdominal:      Comments: Distended, mild tenderness to deep palpation, hypoactive bowel sounds, peg in place   Genitourinary:     Comments: Scherer in place with yellow urine  Skin:     Comments: Sacral decubitus ulcer, POA.  Trophic nail changes.   Neurological:      Comments: Nonverbal, not following any commands, keeps eyes closed   Psychiatric:      Comments: Unable to evaluate         Significant Labs:   Blood Culture:   Recent Labs   Lab 08/07/20  1150 08/07/20  1202   LABBLOO No Growth to date No Growth to date     BMP:   Recent Labs   Lab 08/08/20  0505   *      K 5.3*      CO2 22*   BUN 42*   CREATININE 1.0   CALCIUM 8.6*   MG 2.5     CBC:   Recent Labs   Lab 08/07/20  1140 08/08/20  0505   WBC 7.37 4.87   HGB 10.3* 10.1*   HCT 31.9* 28.9*    331     CMP:   Recent Labs   Lab 08/07/20  1140 08/08/20  0505    137   K 4.9 5.3*    106   CO2 22* 22*   * 162*   BUN 40* 42*   CREATININE 0.9 1.0   CALCIUM 9.0 8.6*   PROT 7.2 6.7    ALBUMIN 3.3* 3.1*   BILITOT 0.7 1.0   ALKPHOS 90 71   AST 35 32   ALT 27 26   ANIONGAP 11 9   EGFRNONAA 58.1* 51.1*     Cardiac Markers:   Recent Labs   Lab 08/07/20  1140   BNP 28     Lactic Acid:   Recent Labs   Lab 08/07/20  1202   LACTATE 1.5     Magnesium:   Recent Labs   Lab 08/08/20  0505   MG 2.5     POCT Glucose: No results for input(s): POCTGLUCOSE in the last 48 hours.  Prealbumin: No results for input(s): PREALBUMIN in the last 48 hours.  Respiratory Culture: No results for input(s): GSRESP, RESPIRATORYC in the last 48 hours.  Troponin:   Recent Labs   Lab 08/07/20  1140   TROPONINI <0.030     TSH: No results for input(s): TSH in the last 4320 hours.  Urine Culture:   Recent Labs   Lab 08/07/20  1150   LABURIN No growth to date     Urine Studies:   Recent Labs   Lab 08/07/20  1150   COLORU Genevieve   APPEARANCEUA Hazy*   PHUR 6.0   SPECGRAV 1.020   PROTEINUA Trace*   GLUCUA Negative   KETONESU Negative   BILIRUBINUA Negative   OCCULTUA 3+*   NITRITE Positive*   UROBILINOGEN 1.0   LEUKOCYTESUR 3+*   RBCUA 83*   WBCUA >100*   BACTERIA Negative   SQUAMEPITHEL 2   HYALINECASTS 75*     All pertinent labs within the past 24 hours have been reviewed.    Significant Imaging: I have reviewed all pertinent imaging results/findings within the past 24 hours.     Ct Abdomen Pelvis With Contrast    Result Date: 8/7/2020  CMS MANDATED QUALITY DATA - CT RADIATION  436 All CT scans at this facility utilize dose modulation, iterative reconstruction, and/or weight based dosing when appropriate to reduce radiation dose to as low as reasonably achievable. CLINICAL HISTORY: 86 years (2/28/1934) Female Abdominal pain, acute, nonlocalized TECHNIQUE: CT ABDOMEN PELVIS WITH CONTRAST. 227 images obtained. Axial CT images of the abdomen and pelvis were obtained from the dome of the diaphragm to the proximal thigh. CONTRAST: IV contrast was administered. COMPARISON: CT most recently from February 13, 2020 FINDINGS:. Lower Thorax: The  heart is normal in size with scattered coronary arterial calcifications. There are scattered interstitial opacities in both lung bases. CT Abdomen: Liver: Relative diffuse low-attenuation liver consistent with hepatic steatosis. Gallbladder: The gallbladder is within normal limits. Biliary Tree: No intra or extrahepatic ductal dilation. Spleen: Within normal limits. Pancreas: The pancreas is normal. Adrenal Glands: The adrenal glands appear within normal limits. Kidneys: The kidneys are normal in imaging appearance without hydronephrosis or hydroureter. Vasculature: Atheromatous calcifications in the abdominal aorta and iliacs with no aneurysm. Lymph nodes: No abdominal lymphadenopathy is seen. Intraperitoneal structures: There is no ascites. Bowel: Marked sigmoid predominant diverticulosis without findings of focal diverticulitis. There is a prominent stool ball seen in the rectal vault (consider correlation for constipation/fecal impaction) Abdominal wall: The abdominal wall and musculature are normal. Musculoskeletal: There is degenerative disc disease and facet arthropathy in the lumbar spine with no aggressive appearing lytic or blastic lesions . CT Pelvis: Bladder: There is a Scherer catheter with balloon inflated in a partially decompressed bladder, note the catheter is somewhat redundant and correlation for desired positioning would be warranted. Reproductive Organs: The uterus and ovaries are unremarkable. Pelvic Lymph nodes: No pelvic lymphadenopathy or pelvic mass is identified. IMPRESSION: 1. Scherer catheter with balloon inflated in a decompressed bladder, the catheter is redundant and correlation for desired positioning would be warranted. 2. Prominent stool ball seen in the rectal vault (consider correlation for constipation/fecal impaction. 3. Marked sigmoid predominant diverticulosis without findings of acute diverticulitis. 4. Relative diffuse low-attenuation liver in keeping with steatosis. 5. Tiny  interstitial opacities in both dependent lower lobes suggesting atelectasis, scarring or possibly very mild aspiration/atypical infection or pneumonia (this would be better followed with chest radiography). . Electronically Signed by LIA Landry on 8/7/2020 3:17 PM    X-ray Chest Ap Portable    Result Date: 8/7/2020  EXAMINATION: XR CHEST AP PORTABLE CLINICAL HISTORY: Suspected Covid-19 Virus Infection; FINDINGS: Portable chest at 1153 compared with 01/19/2020 shows normal cardiomediastinal silhouette. Bilateral interstitial and ground-glass pulmonary opacities have progressed.  Mild elevation or eventration of right diaphragm unchanged.  Lung volumes remain low.  Central pulmonary vasculature is within normal limits.  No acute osseous abnormality.     Worsening of bilateral pulmonary interstitial and ground-glass alveolar opacities can be seen with viral pneumonia, other atypical infection, or pulmonary edema. Electronically signed by: Dereck Trejo MD Date:    08/07/2020 Time:    13:28  ECG Results          EKG 12-lead (Final result)  Result time 08/07/20 17:32:51    Final result by Interface, Lab In [facility] (08/07/20 17:32:51)                 Narrative:    Test Reason : R68.89,    Vent. Rate : 096 BPM     Atrial Rate : 096 BPM     P-R Int : 134 ms          QRS Dur : 088 ms      QT Int : 384 ms       P-R-T Axes : 058 -02 051 degrees     QTc Int : 485 ms    Normal sinus rhythm  Nonspecific T wave abnormality  Early transition, increased R/S ratio in V1, consider posterior infarct  Abnormal ECG  When compared with ECG of 13-FEB-2020 13:00,  No significant change was found  Confirmed by Kumar Griggs MD ([id number]) on 8/7/2020 5:32:40 PM    Referred By: [id number]   SELF           Confirmed By:Kumar Griggs MD                                  Assessment/Plan:      * Bilateral pneumonia secondary to COVID-19 infection  Fever at home, desaturation with increased work of breathing, COVID-19 positive with  now bilateral infiltrates on x-ray.  Requiring supplemental oxygen.  Daughter is also positive for COVID-19.  High risk given comorbidities, advanced age, poor functional reserves.  Ferritin 247--230  D-dimer 0.99  CRP 9.02--8.39  Continue dexamethasone 6 mg daily for 10 days  Receiving Zosyn due to concurrent UTI  Prophylactic dose Lovenox  Today daughter would like to proceed with convalescent plasma, type and screen, communicated with pulmonologist  Continue isolation precautions  Trending inflammatory markers  As needed breathing treatments with supplemental oxygen  Due to comorbidities unable to lie prone or use incentive spirometer    Acute hypoxemic respiratory failure  Due to COVID-19 infection.  High risk to progressed to ARDS.  DNR/DNI.      UTI (urinary tract infection)  History of chronic Scherer.  Urine culture few days ago with E coli and Proteus both susceptible to Rocephin and Zosyn.  Repeat culture was sent in the ED and Scherer catheter was changed  Continue empiric Zosyn and follow-up cultures      Hyperphosphatemia  Phosphorus 4.6, monitoring      Hyperkalemia  Potassium 5.3.  Holding lisinopril today.  Trending levels      Sacral decubitus ulcer, POA  Known sacral decubitus ulcer, POA, daughter states she acquired during prior hospitalization in December.  Air mattress ordered.  Q 2 turning.  Wound care consulted.      DNR (do not resuscitate)  Confirmed on admission      Moderate protein-calorie malnutrition  PEG feeding dependent.  Sacral decubitus ulcer.  Bed-bound.      Constipation  Concern for constipation with stool  in rectum.  Had a hard stool yesterday.  Continue aggressive bowel regimen.  Resuming PEG feeding.  Monitoring      Alzheimer's dementia  On donepezil.      COPD (chronic obstructive pulmonary disease)  No evidence of acute exacerbation      Bedbound  Nonverbal and bed-bound at baseline      PEG (percutaneous endoscopic gastrostomy) status  PEG feed dependent.  Nutrition  consulted to restart feeding today        VTE Risk Mitigation (From admission, onward)         Ordered     IP VTE HIGH RISK PATIENT  Once      08/08/20 0142     enoxaparin injection 40 mg  Every 24 hours      08/07/20 1707     Place sequential compression device  Until discontinued      08/07/20 1707                      Elisha Vallejo MD  Department of Hospital Medicine   [facility]

## 2020-08-08 NOTE — SUBJECTIVE & OBJECTIVE
Interval History:  Patient is nonverbal, bedbound at baseline, had history of CVA, peg tube dependent, chronic Scherer, sacral decubitus ulcer.  Discussed with nursing, patient did have hard bowel movement yesterday, tube feeds have not been started, Scherer was changed in the ED, sacral decubitus wound was reviewed and discussed.  Discussed with daughter over the phone, states patient did have home health with sitters, daughter tested positive for COVID-19 yesterday, patient is not on home oxygen, states she acquired the sacral decubitus in December, after a hospital stay, daughter wants to ensure patient is turned q.2 hours and gets air mattress, this was discussed with nursing.  Daughter also now wishes patient to get convalescent plasma, communicated with pulmonologist, type and screen ordered and will proceed.  Did discuss with daughter given comorbidities and now COVID-19 with bilateral infiltrates, high risk and will keep updated.  T-max 100.6°.  Labs with ferritin 02/02/2030, D-dimer 0.99, potassium 5.3, phosphorus 4.6, CRP 8.39.  UA positive, E coli and Proteus.  CT abdomen and pelvis with concern for stool within the rectal vault.  Chest x-ray with bilateral pulmonary infiltrates/alveolar ground-glass changes.     Review of Systems   Unable to perform ROS: Patient nonverbal     Objective:     Vital Signs (Most Recent):  Temp: 96 °F (35.6 °C) (08/08/20 0225)  Pulse: 89 (08/08/20 0739)  Resp: 18 (08/08/20 0225)  BP: 137/72 (08/08/20 0739)  SpO2: (!) 93 % (08/08/20 0739) Vital Signs (24h Range):  Temp:  [96 °F (35.6 °C)-100.6 °F (38.1 °C)] 96 °F (35.6 °C)  Pulse:  [] 89  Resp:  [18-25] 18  SpO2:  [88 %-99 %] 93 %  BP: (106-148)/(55-96) 137/72     Weight: 67.5 kg (148 lb 13 oz)  Body mass index is 24.02 kg/m².    Intake/Output Summary (Last 24 hours) at 8/8/2020 1010  Last data filed at 8/8/2020 0554  Gross per 24 hour   Intake 150 ml   Output 650 ml   Net -500 ml      Physical Exam  Vitals signs and nursing  note reviewed.   Constitutional:       Comments: Frail, chronically ill-appearing, elderly female patient, lying in bed, does not open eyes or participate in exam   HENT:      Head: Normocephalic and atraumatic.   Eyes:      Comments: Keeps eyes closed   Cardiovascular:      Rate and Rhythm: Normal rate and regular rhythm.   Pulmonary:      Comments: On supplemental oxygen via nasal cannula, bilateral inspiratory crackles with diminished air entry on lung examination, no wheeze  Abdominal:      Comments: Distended, mild tenderness to deep palpation, hypoactive bowel sounds, peg in place   Genitourinary:     Comments: Scherer in place with yellow urine  Skin:     Comments: Sacral decubitus ulcer, POA.  Trophic nail changes.   Neurological:      Comments: Nonverbal, not following any commands, keeps eyes closed   Psychiatric:      Comments: Unable to evaluate         Significant Labs:   Blood Culture:   Recent Labs   Lab 08/07/20  1150 08/07/20  1202   LABBLOO No Growth to date No Growth to date     BMP:   Recent Labs   Lab 08/08/20  0505   *      K 5.3*      CO2 22*   BUN 42*   CREATININE 1.0   CALCIUM 8.6*   MG 2.5     CBC:   Recent Labs   Lab 08/07/20  1140 08/08/20  0505   WBC 7.37 4.87   HGB 10.3* 10.1*   HCT 31.9* 28.9*    331     CMP:   Recent Labs   Lab 08/07/20  1140 08/08/20  0505    137   K 4.9 5.3*    106   CO2 22* 22*   * 162*   BUN 40* 42*   CREATININE 0.9 1.0   CALCIUM 9.0 8.6*   PROT 7.2 6.7   ALBUMIN 3.3* 3.1*   BILITOT 0.7 1.0   ALKPHOS 90 71   AST 35 32   ALT 27 26   ANIONGAP 11 9   EGFRNONAA 58.1* 51.1*     Cardiac Markers:   Recent Labs   Lab 08/07/20  1140   BNP 28     Lactic Acid:   Recent Labs   Lab 08/07/20  1202   LACTATE 1.5     Magnesium:   Recent Labs   Lab 08/08/20  0505   MG 2.5     POCT Glucose: No results for input(s): POCTGLUCOSE in the last 48 hours.  Prealbumin: No results for input(s): PREALBUMIN in the last 48 hours.  Respiratory Culture:  No results for input(s): GSRESP, RESPIRATORYC in the last 48 hours.  Troponin:   Recent Labs   Lab 08/07/20  1140   TROPONINI <0.030     TSH: No results for input(s): TSH in the last 4320 hours.  Urine Culture:   Recent Labs   Lab 08/07/20  1150   LABURIN No growth to date     Urine Studies:   Recent Labs   Lab 08/07/20  1150   COLORU Genevieve   APPEARANCEUA Hazy*   PHUR 6.0   SPECGRAV 1.020   PROTEINUA Trace*   GLUCUA Negative   KETONESU Negative   BILIRUBINUA Negative   OCCULTUA 3+*   NITRITE Positive*   UROBILINOGEN 1.0   LEUKOCYTESUR 3+*   RBCUA 83*   WBCUA >100*   BACTERIA Negative   SQUAMEPITHEL 2   HYALINECASTS 75*     All pertinent labs within the past 24 hours have been reviewed.    Significant Imaging: I have reviewed all pertinent imaging results/findings within the past 24 hours.     Ct Abdomen Pelvis With Contrast    Result Date: 8/7/2020  CMS MANDATED QUALITY DATA - CT RADIATION  436 All CT scans at this facility utilize dose modulation, iterative reconstruction, and/or weight based dosing when appropriate to reduce radiation dose to as low as reasonably achievable. CLINICAL HISTORY: 86 years (2/28/1934) Female Abdominal pain, acute, nonlocalized TECHNIQUE: CT ABDOMEN PELVIS WITH CONTRAST. 227 images obtained. Axial CT images of the abdomen and pelvis were obtained from the dome of the diaphragm to the proximal thigh. CONTRAST: IV contrast was administered. COMPARISON: CT most recently from February 13, 2020 FINDINGS:. Lower Thorax: The heart is normal in size with scattered coronary arterial calcifications. There are scattered interstitial opacities in both lung bases. CT Abdomen: Liver: Relative diffuse low-attenuation liver consistent with hepatic steatosis. Gallbladder: The gallbladder is within normal limits. Biliary Tree: No intra or extrahepatic ductal dilation. Spleen: Within normal limits. Pancreas: The pancreas is normal. Adrenal Glands: The adrenal glands appear within normal limits. Kidneys:  The kidneys are normal in imaging appearance without hydronephrosis or hydroureter. Vasculature: Atheromatous calcifications in the abdominal aorta and iliacs with no aneurysm. Lymph nodes: No abdominal lymphadenopathy is seen. Intraperitoneal structures: There is no ascites. Bowel: Marked sigmoid predominant diverticulosis without findings of focal diverticulitis. There is a prominent stool ball seen in the rectal vault (consider correlation for constipation/fecal impaction) Abdominal wall: The abdominal wall and musculature are normal. Musculoskeletal: There is degenerative disc disease and facet arthropathy in the lumbar spine with no aggressive appearing lytic or blastic lesions . CT Pelvis: Bladder: There is a Scherer catheter with balloon inflated in a partially decompressed bladder, note the catheter is somewhat redundant and correlation for desired positioning would be warranted. Reproductive Organs: The uterus and ovaries are unremarkable. Pelvic Lymph nodes: No pelvic lymphadenopathy or pelvic mass is identified. IMPRESSION: 1. Scherer catheter with balloon inflated in a decompressed bladder, the catheter is redundant and correlation for desired positioning would be warranted. 2. Prominent stool ball seen in the rectal vault (consider correlation for constipation/fecal impaction. 3. Marked sigmoid predominant diverticulosis without findings of acute diverticulitis. 4. Relative diffuse low-attenuation liver in keeping with steatosis. 5. Tiny interstitial opacities in both dependent lower lobes suggesting atelectasis, scarring or possibly very mild aspiration/atypical infection or pneumonia (this would be better followed with chest radiography). . Electronically Signed by LIA Landry on 8/7/2020 3:17 PM    X-ray Chest Ap Portable    Result Date: 8/7/2020  EXAMINATION: XR CHEST AP PORTABLE CLINICAL HISTORY: Suspected Covid-19 Virus Infection; FINDINGS: Portable chest at 1153 compared with 01/19/2020  shows normal cardiomediastinal silhouette. Bilateral interstitial and ground-glass pulmonary opacities have progressed.  Mild elevation or eventration of right diaphragm unchanged.  Lung volumes remain low.  Central pulmonary vasculature is within normal limits.  No acute osseous abnormality.     Worsening of bilateral pulmonary interstitial and ground-glass alveolar opacities can be seen with viral pneumonia, other atypical infection, or pulmonary edema. Electronically signed by: Dereck Trejo MD Date:    08/07/2020 Time:    13:28  ECG Results          EKG 12-lead (Final result)  Result time 08/07/20 17:32:51    Final result by Interface, Lab In [facility] (08/07/20 17:32:51)                 Narrative:    Test Reason : R68.89,    Vent. Rate : 096 BPM     Atrial Rate : 096 BPM     P-R Int : 134 ms          QRS Dur : 088 ms      QT Int : 384 ms       P-R-T Axes : 058 -02 051 degrees     QTc Int : 485 ms    Normal sinus rhythm  Nonspecific T wave abnormality  Early transition, increased R/S ratio in V1, consider posterior infarct  Abnormal ECG  When compared with ECG of 13-FEB-2020 13:00,  No significant change was found  Confirmed by Kumar Griggs MD ([id number]) on 8/7/2020 5:32:40 PM    Referred By: [id number]   SELF           Confirmed By:Kumar Griggs MD

## 2020-08-08 NOTE — HOSPITAL COURSE
"Patient with a history of CVA, bedbound, PEG feeding dependent, sacral decubitus ulcer, POA, community dwelling with home health, brought into the ED due to persistent fever and shortness of breath as per collateral.  COVID-19 was positive with bilateral infiltrates on chest x-ray.  Did have fever with positive UA, was recently started on Keflex as outpatient for UTI.  Chronic Scherer catheter in place, was changed in the ED.  Pulmonary was consulted in the ED, following discussions decision to admit, steroid, holding of plasma/RDV.  On 08/08, assumed care, patient lying in bed, does not open eyes during examination, remains nonverbal, as per nursing she did have bowel movement overnight, hard, remains on supplemental oxygen, saturating well, collateral obtained from daughter, not on home oxygen, daughter was tested yesterday at urgent care and now was also COVID-19 positive, she states the patient did have caregivers/sitters as well in the home.  Daughter now consenting to convalescent plasma, communicated with pulmonologist, will order.  Discussed patient is high risk for decline. Confirmed DNI.  On 08/09, O2 requirements increased to 4 L, on face mask however patient has been frequently removing, episodes of hypothermia down to 95.2, tolerated CCP well, tolerating tube feeds, she is more awake, repeating words, updated daughter over the phone.  On 8/10 assumed care, pt lying comfortably on bed, constantly mumbles" god is good for you hand good for me, not following command, oxygen requirement gradually improved, difficult to keep her nasal cannula, currently on the day of discharge saturating 91-95% on room air.  Completed her IV antibiotic for 1 week for Pseudomonas UTI.  Goal of care discussed with daughter over the phone, agreed for home hospice due to her chronic comorbidities advanced Alzheimer's dementia with bedbound status on chronic Scherer.  Advised to continue isolation precautions for 4 more days for " COVID.    Physical Exam ON THE DAY OF DISCHARGE  Vitals signs and nursing note reviewed.   Constitutional:       Comments: Frail, chronically ill-appearing, elderly female patient, lying in bed, more awake today, repeating few words   HENT:      Head: Normocephalic and atraumatic.   Cardiovascular:      Rate and Rhythm: Normal rate and regular rhythm.   Pulmonary:      Comments: On supplemental oxygen via NC, she is frequently removing, poor breathing effort no wheeze  Abdominal:      Comments: Protuberant, mild tenderness to deep palpation, bowel sounds present, peg in place--tube feeds going   Genitourinary:     Comments: Scherer in place with yellow urine  Skin:     Comments: Sacral decubitus ulcer, POA.  Trophic nail changes.   Neurological:      Comments: More awake, speaking few words however repeating this providers not answering questions, not following commands   Psychiatric:      Comments: Unable to evaluate

## 2020-08-08 NOTE — PROGRESS NOTES
"[facility]  Medical Home Visit    Visit Date: 8/8/2020  Encounter Provider: Autumn Wong NP  PCP:  Frantz Bill MD    Subjective:      Patient ID: Breanne Culp is a 86 y.o. female.    Consult Requested By:  Autumn Wong  Reason for Consult:  Cough  Visit time: 0845 - 0945    Breanne LEÓN is being seen at home due to physical debility that presents a taxing effort to leave the home, to mitigate high risk of hospital readmission and/or due to the limited availability of reliable or safe options for transportation to the point of access to the provider. Prior to treatment on this visit the chart was reviewed and patient consent was obtained.      Chief Complaint: Leeann Culp is an 86 year old female with PMHX of CVA with residual left sided hemiparesis, bedbound status, s/p Peg tube, Alzheimer's disease, aphasia, dysphagia, protein calorie malnutrition, abdominal aortic atherosclerosis, hyperlipidemia, history of sacral pressure ulcer, COPD, chronic indwelling aponte catheter, frequent UTIs, osteoarthritis, idiopathic peripheral neuropathy and lumbar degenerative disc disease.     Breanne is being seen today because she is bedbound/homebound from dementia and history of CVA with residual hemiplegia/hemiparesis.     20 minutes prior to physical visit with patient, phone call with her daughter is done:  Patient's care is discussed with her daughter Loree prior to visit due to Loree being at work. The main reason for this visit to address a chronic cough that Breanne has had for months. The cough is reported to be both "wet and dry" at different times of the day. The cough at night is mostly dry and the cough during the day sounds like she is "strangling" per daughter and sitter. She is never able to expectorate anything. The patient has had an xray recently done in the home, results are not in Epic, but Loree states she was told it was "clear". Patient has been tried on Allegra, Tessalon " Pearls, Delsym, Tussin, Duonebs and all to no avail. Due to patient's bedbound status, tube feeding via Peg status and history of COPD but on no oxygen, many factors could be contributing to her cough. Discussed adding Pepcid and Flonase to regimen.     Upon arrival to visit with patient after speaking with daughter on phone for 20 minutes (see above documentation) patient is found lying in her hospital bed, breathing rapidly and shallowly. Luis Eduardo Benson stated that she didn't feel patient was in any distress. Breanne is awake but does not follow any commands and moans. On my past 2 visits with her she attempted to speak and interact. She feels warm as pulse ox is applied. Temp is 99.9, heart rate 120, oxygen saturation level 86%. Lungs sounds are CTA but diminished in bases. Loree was called during visit and encouraged to have her mother sent to the ER for further evaluation. She stated she was on her way and would be home in 30-45 mintues. Luis Eduardo Benson is present and giving patient tylenol via Peg tube.     Breanne also has frequent UTIs and has just begun Keflex again for UTI.       Review of Systems   Constitutional: Negative for diaphoresis.   Eyes: Negative for discharge.   Respiratory: Positive for cough and shortness of breath.   Gastrointestinal: Negative for diarrhea and vomiting. Positive for consitpation requiring enemas twice a month.  Genitourinary: Negative for vaginal discharge.   Neurological: Positive for facial asymmetry, speech difficulty and weakness that is chronic.   Psychiatric/Behavioral: Positive for confusion.   ROS limited due to patient dementia and aphasia, info obtained from sitter and daughter     Assessments:  · Environmental: clean, adequate lighting and temperature, no foul odors  · Functional Status: dependent on all ADLs, bedbound  · Safety: no issues identified  · Nutritional: PEG tube feedings/Jevity at 50 cc/hr with 400 cc water boluses and has recently started eating a few  spoonfuls of soft foods as advised by ST. Patient reportedly eats about 3 spoonfuls of pureed food and several sips of juice daily.  · Home Health/DME/Supplies: [facility]ST, Private sitters 24/7. Hospital bed, tube feeding equipment/pump      Objective:   Physical Exam   Constitutional:   Awake, alert, bedbound, frail, speech unintelligible. Febrille.   HENT:   Head: Normocephalic and atraumatic.   Right Ear: External ear normal.   Left Ear: External ear normal.   Eyes: Pupils are equal, round, and reactive to light. Conjunctivae and EOM are normal.   Neck: Neck supple.   Cardiovascular: Elevated rate at 120, regular rhythm, normal heart sounds and intact distal pulses.   Pulmonary/Chest: Effort rapid and shallow. Lungs CTA but diminished in bases. No cough noted.  Abdominal: Soft. Bowel sounds are normal. Peg tube in place.  Genitourinary: No vaginal discharge noted.   Genitourinary Comments: Scherer catheter in place draining yellow urine with small amount of sediment noted in tubing   Musculoskeletal: She exhibits no edema.   Left sided hemiplegia   Neurological: She is alert.   Skin: Skin is warm and dry. Capillary refill takes 2 to 3 seconds.   Sacral pressure ulcer scarring but healed, skin intact   Psychiatric:   Follows no commands     Vitals:    08/07/20 0909   BP: (!) 142/70   Pulse: (!) 120   Resp: (!) 24   Temp: 99.9 °F (37.7 °C)   TempSrc: Temporal   SpO2: (!) 86%   PainSc: 0-No pain     There is no height or weight on file to calculate BMI.    Assessment:     1. Chronic cough    2. Dementia without behavioral disturbance, unspecified dementia type    3. Fever, unspecified fever cause    4. Tachypnea    5. Tachycardia    6. Hypoxia        Plan:     Ethical / Legal: Advance Care Planning   · Surrogate decision maker:  Name Loree Culp Relationship: daughter  · Code Status:  DNR/DNI  · [place]  · Other advance directive:  none, Capacity to make medical decisions:  impaired, Conflict  none       Breanne LEÓN was seen today for cough.    Diagnoses and all orders for this visit:    Chronic cough    Dementia without behavioral disturbance, unspecified dementia type  -     Ambulatory referral/consult to [facility]    Fever, unspecified fever cause    Tachypnea    Tachycardia    Hypoxia    Other orders  -     famotidine (PEPCID) 20 MG tablet; Take 1 tablet (20 mg total) by mouth 2 (two) times daily.  -     fluticasone propionate (FLONASE) 50 mcg/actuation nasal spray; 1 spray (50 mcg total) by Each Nostril route once daily.    Patient's status was reported to her daughter Loree via telephone during visit and encouraged to have patient transported to ER. Loree did not want EMS called until she got there from work in about 30 - 45 minutes.     Were controlled substances prescribed?  No    Follow Up Appointments:   No future appointments.    > 50%  mins of 60 min visit spent in chart review,  symptom assessment, evaluation and treatment, and coordination of care and emotional support. Topics discussed today: cough, gerd, respiratory distress, fever, hypoxia, tachycardia, tachypnea, medication review.    Verbal consent for visit obtained from patient's daughter Loree today.    Signature:  Autumn Wong NP     Attestation:   Screening criteria to assess the level of the patient's risk for infection with COVID-19 as recommended by the CDC at the time of the above documented home visit concluded appropriateness to proceed. Universal precautions were maintained at all times, including provider wearing a mask and gloves during entire visit and use of 60% alcohol gel hand  immediately prior to entry and upon departure of patient's home as well as cleaning of equipment used in home visit with antibacterial/germicidal disposable wipes.

## 2020-08-08 NOTE — CONSULTS
"[facility]  Adult Nutrition   Consult Note (Nutrition Support Management)    SUMMARY     Recommendations/Interventions:  1. When ready to start PEG feedings, Start Nepro @ 10 mL/hr and increase by 10mL Q4hrs as tolerating to goal of 35 mL/hr with 30 mL FWF/s Q4hrs.   · TF + FWF's provides: 1512 kcals, 68 g protein, 791 FW/day.  2. Hyperkalemia, Hyperphosphatemia: continue to monitor. If this improves, will change TF to patients home regimen.  3. RD to monitor TF tolerances, labs, plan of care, and make recommendations accordingly.  Goals: 1. Patient to meet needs via EN.  Nutrition Goal Status: new    Dietitian Rounds Brief:  · Seen 2' consult RE PEG feeding, bed bound. Admitted 2' COVID. Patients normal TF regimen is Jevity 1.2 @ 50mL/hr. Started Nepro for now 2' hyperkalemia and hyperphosphatemia (Lowest K+ and phos formula). Will reassess as needed according to labs.  Reason for Assessment  Reason For Assessment: consult  Diagnosis: (COVID-19)  Relevant Medical History: COPD, CAD, DDD, HLD, Stroke, PEG, bed bound  Interdisciplinary Rounds: did not attend    Nutrition Risk Screen  Nutrition Risk Screen: tube feeding or parenteral nutrition    Nutrition/Diet History  Food Allergies: NKFA  Factors Affecting Nutritional Intake: NPO    Anthropometrics  Temp: 96 °F (35.6 °C)  Height Method: Estimated  Height: 5' 6" (167.6 cm)  Height (inches): 66 in  Weight Method: Bed Scale  Weight: 67.5 kg (148 lb 13 oz)  Weight (lb): 148.81 lb  Ideal Body Weight (IBW), Female: 130 lb  BMI (Calculated): 24  BMI Grade: 18.5-24.9 - normal     Weight History:  Wt Readings from Last 10 Encounters:   08/07/20 67.5 kg (148 lb 13 oz)   03/19/20 56.7 kg (125 lb)   02/13/20 56.7 kg (125 lb)   08/29/19 55.5 kg (122 lb 5.7 oz)   08/21/19 53.5 kg (117 lb 15.1 oz)   11/23/18 57.3 kg (126 lb 5.2 oz)   10/23/18 54.1 kg (119 lb 4.3 oz)   09/10/18 54.1 kg (119 lb 2.5 oz)   08/07/18 54.4 kg (120 lb)   08/03/18 54.8 kg (120 lb 13 oz) "     Lab/Procedures/Meds: Pertinent Labs Reviewed  Clinical Chemistry:  Recent Labs   Lab 08/08/20  0505      K 5.3*      CO2 22*   *   BUN 42*   CREATININE 1.0   CALCIUM 8.6*   PROT 6.7   ALBUMIN 3.1*   BILITOT 1.0   ALKPHOS 71   AST 32   ALT 26   ANIONGAP 9   ESTGFRAFRICA 58.9*   EGFRNONAA 51.1*   MG 2.5   PHOS 4.6*     CBC:   Recent Labs   Lab 08/08/20  0505   WBC 4.87   RBC 2.89*   HGB 10.1*   HCT 28.9*      *   MCH 34.9*   MCHC 34.9   Cardiac Profile:  Recent Labs   Lab 08/07/20  1140   BNP 28     113   CPKMB 1.1   TROPONINI <0.030     Inflammatory Labs:  Recent Labs   Lab 08/07/20  1140 08/08/20  0505   CRP 9.02* 8.39*     Medications: Pertinent Medications reviewed  Scheduled Meds:   aspirin  81 mg Oral Daily    cetirizine  10 mg Per G Tube Daily    dexAMETHasone  6 mg Per G Tube Daily    docusate  100 mg Per G Tube Daily    donepeziL  10 mg Per G Tube QHS    enoxaparin  40 mg Subcutaneous Q24H    famotidine  20 mg Per G Tube BID    fluticasone propionate  1 spray Each Nostril Daily    gabapentin  200 mg Per G Tube QHS    hydrOXYzine HCL  25 mg Per G Tube QHS    LORazepam  1 mg Per G Tube QHS    metoprolol tartrate  25 mg Per G Tube BID    miconazole   Topical (Top) BID    oxybutynin  5 mg Per G Tube TID    piperacillin-tazobactam (ZOSYN) IVPB  3.375 g Intravenous Q8H    polyethylene glycol  17 g Per G Tube Daily     Continuous Infusions:  PRN Meds:.acetaminophen, acetaminophen, acetaminophen, benzonatate, bisacodyL, calcium chloride IVPB, calcium chloride IVPB, calcium chloride IVPB, dextrose 50%, dextrose 50%, glucagon (human recombinant), glucose, glucose, magnesium oxide, magnesium sulfate IVPB, magnesium sulfate IVPB, magnesium sulfate IVPB, magnesium sulfate IVPB, melatonin, ondansetron, potassium chloride in water, potassium chloride in water, potassium chloride in water, potassium chloride in water, potassium chloride, potassium chloride,  potassium chloride, potassium chloride, promethazine (PHENERGAN) IVPB, sodium chloride 0.9%, sodium phosphate IVPB, sodium phosphate IVPB, sodium phosphate IVPB, sodium phosphate IVPB, sodium phosphate IVPB, traMADoL    Antibiotics (From admission, onward)    Start     Stop Route Frequency Ordered    08/07/20 2000  piperacillin-tazobactam 3.375 g in dextrose 5 % 50 mL IVPB (ready to mix system)      -- IV Every 8 hours (non-standard times) 08/07/20 1730        Estimated/Assessed Needs    Weight Used For Calorie Calculations: 67.5 kg (148 lb 13 oz)  Energy Calorie Requirements (kcal): 7446-4133 kcals/day (20-25 kcals/kg)  Energy Need Method: Kcal/kg  Protein Requirements: 67-88 g/day (1.0-1.3 g/kg)  Weight Used For Protein Calculations: 67.5 kg (148 lb 13 oz)     Estimated Fluid Requirement Method: RDA Method    Nutrition Prescription Ordered    Current Diet Order: NPO    Evaluation of Received Nutrient/Fluid Intake    Energy Calories Required: not meeting needs  Protein Required: not meeting needs  Fluid Required: meeting needs  Tolerance: (NPO)  % Intake of Estimated Energy Needs: 0%  % Meal Intake: NPO    Intake/Output Summary (Last 24 hours) at 8/8/2020 0925  Last data filed at 8/8/2020 0554  Gross per 24 hour   Intake 150 ml   Output 650 ml   Net -500 ml      Nutrition Risk    Level of Risk/Frequency of Follow-up: high   Monitor and Evaluation    Food and Nutrient Intake: energy intake, enteral nutrition intake  Food and Nutrient Adminstration: enteral and parenteral nutrition administration  Physical Activity and Function: nutrition-related ADLs and IADLs, factors affecting access to physical activity  Anthropometric Measurements: weight change, body mass index, weight  Biochemical Data, Medical Tests and Procedures: electrolyte and renal panel, lipid profile, gastrointestinal profile, glucose/endocrine profile, inflammatory profile  Nutrition-Focused Physical Findings: overall appearance     Nutrition  Follow-Up    RD Follow-up?: Yes  Reina Laughlin RD 08/08/2020 9:32 AM

## 2020-08-08 NOTE — ASSESSMENT & PLAN NOTE
History of chronic Scherer.  Urine culture few days ago with E coli and Proteus both susceptible to Rocephin and Zosyn.  Repeat culture was sent in the ED and Scherer catheter was changed  Continue empiric Zosyn and follow-up cultures

## 2020-08-08 NOTE — PROGRESS NOTES
RESEARCH ENCOUNTER FOR CONVALESCENT PLASMA COVID-19: Expanded Access to Convalescent Plasma for the Treatment of Patients with COVID-19    PI: Thomas Phipps MD  Protocol No.: [id number]    Consent:    LAR Telephone Consent:  The Patient's Legally Authorized Representative (LAR) granting permission, Loree Jacobs, was contacted regarding Breanne Culp's participation in Expanded Access to Convalescent Plasma for the Treatment of Patients with COVID-19. The informed consent was read and discussed by the consenter. The following was discussed: the purpose of the study, the potential benefits/risks associated with the study and possible alternatives, and all procedures, testing, and follow-ups associated with the study. The patient/LAR was informed he/she does not have to participate in this study. If they do not participate, their care at [facility] will not be affected. The person granting permission was provided adequate time to ask questions regarding the scope and purpose of the study. Permission was obtained by telephone. The above statements were read by the person obtaining permission to the person granting permission and witnessed by Elisha Vallejo MD, who also confirmed the patient/LAR's consent to the study. The witness information was documented on the verbal consent form as well. This Verbal Informed Consent process was conducted prior to initiation of any study procedures.    Inclusion/Exclusion:  I confirm Breanne Culp is a 86 y.o. female with a confirmed diagnosis of Covid-19 and meets I/E as documented below.    Inclusion Criteria:  1. At least 18 years of age: yes  2. Laboratory confirmed diagnosis of infection with SARS-CoV-2: yes  3. Admitted to an acute care facility for the treatment of COVID-19 complications: yes  4. Severe or life-threatening COVID-19 (qualifying criteria indicated below), or judged by the treating provider to be at high risk of progression to severe or life-threatening disease:    a. Severe COVID-19 is defined by one or more of the following:  i. Dyspnea: no  ii. Respiratory frequency ? 30/min: no  iii. Blood oxygen saturation ? 93%: yes  iv. Partial pressure of arterial oxygen to fraction of inspired oxygen ration <300: no  v. Lung infiltrates >50% within 24 to 48 hours: yes  OR  b. Life-threatening COVID-19 is defined as one or more of the following:  i. Respiratory failure: yes  ii. Septic shock: no  iii. Multiple organ dysfunction or failure: no  OR  c. Judged by treating provider to be at high risk of progression to severe or life-threatening disease: yes  5. Informed consent provided by the patient or healthcare proxy: yes    Plan:  -     [facility] Patient Registration Number: [id number]  - COVID-19 Convalescent Plasma Research Study Panel Ordered Including: yes  o ABO/Rh typing  o Prepare FFP 1 Unit  o Transfuse FFP 1 Unit  o 0.9% NaCl infusion (for blood administration)  - Pre-medications Ordered/Administered:  o PO Acetaminophen: yes  o IV Diphenhydramine: yes

## 2020-08-08 NOTE — PLAN OF CARE
Problem: Feeding Intolerance (Enteral Nutrition)  Goal: Feeding Tolerance  Outcome: Ongoing, Progressing  Intervention: Prevent and Manage Feeding Intolerance  Flowsheets (Taken 8/8/2020 5115)  Nutrition Support Management: tube feeding initiated   When ready to start PEG feedings, Start Nepro @ 10 mL/hr and increase by 10mL Q4hrs as tolerating to goal of 35 mL/hr with 30 mL FWF/s Q4hrs.   TF + FWF's provides: 1512 kcals, 68 g protein, 791 FW/day.  Hyperkalemia, Hyperphosphatemia: continue to monitor. If this improves, will change TF to patients home regimen.  RD to monitor TF tolerances, labs, plan of care, and make recommendations accordingly.

## 2020-08-08 NOTE — NURSING
Plan of care reviewed with the pt. Cardiac, vital signs, and lab monitoring. IV antibiotics. Bed alarm on and safety precautions maintained. NPO until further notice. Breathing treatments and adjust oxygen as needed. Strict I&O. Daily weights. No c/o pain.

## 2020-08-08 NOTE — HPI
HPI as per MD Itz Raymundo ROMELIA Culp is a 86 y.o. AA female who  has a past medical history of Anticoagulant long-term use, Aphasia S/P CVA, Arthritis, Bedbound, Cataract, COPD (chronic obstructive pulmonary disease), Coronary artery disease, DDD (degenerative disc disease), lumbar, Glaucoma, Hyperlipidemia, Low back pain, Onychomycosis due to dermatophyte (11/26/2014), Osteoporosis, Positive PPD (1970), Seasonal allergies, and Stroke.. The patient presented to [facility] on 8/7/2020 with a primary complaint of Fever    History was obtained from ER physician on sign out.  Dr. Pratt spoke with patient's daughter via phone to get the history.  Daughter reported patient has been having fever for the last few days off and on.  Patient is living at home with her daughter.  Patient is bedbound, nonverbal since the stroke, history of recurrent/frequent UTI, sacral decubitus, history of dementia/Alzheimer's with behavioral disturbance.  Daughter reports they have home health service.  Yesterday she was diagnosed with UTI and was started on Keflex.  Today she noted that patient was more confused and somnolent so she called the EMS.  When EMS arrived, patient had an O2 sat of 88% on room air.  They applied the supplemental oxygen via nasal cannula and brought her to the ER  In the ER patient had a temp of 100.6°, respiration was 25 per minute, O2 sat was 88% which improved to 94% with 2 L supplemental oxygen.  On exam patient did grimace on abdominal examination, specially in the left lower quadrant and suprapubic area.  CT of the abdomen was ordered.  Prominent stool ball seen in the rectal vault, probably fecal impaction.  Marked sigmoid prominent diverticulosis without acute diverticulitis dose.  Hepatic steatosis.  X-ray chest shows worsening of bilateral pulmonary interstitial and ground-glass alveolar opacities S seen in viral pneumonia, atypical infection or pulmonary edema     Lab shows WBC of 7.37,  H&H 10.3/31.9, platelet 285  Ferritin 230  CMP shows bicarb of 22, BUN creatinine 40/0.9, albumin 3.3  CRP 9.02(H)  BNP 28,  -high  COVID rapid positive  Lactate vein 1.5  Urinalysis shows appearance hazy, protein trace, 3+ occult blood, nitrite positive, 3+ leuko, RBC D3, bacteria negative, WBC greater than 100  Noted urine culture from 08/04/2020 was positive for E coli and Proteus.  Patient has a history of Proteus UTI multiple time in the past     Dr. Darby and Dr. Phipps were consulted through the ER..  Dr. Phipps discussed the case with patient's daughter miss wilcox who confirmed that patient has a DNR/DNI status.  Treatment options were discussed and decided to continue treatment with dexamethasone daily.  Patient will not receive convalescent plasma and Remdesivir per daughter/Dr. winchester conversation     Admit to med tele with cardiac monitoring

## 2020-08-08 NOTE — ASSESSMENT & PLAN NOTE
Known sacral decubitus ulcer, POA, daughter states she acquired during prior hospitalization in December.  Air mattress ordered.  Q 2 turning.  Wound care consulted.

## 2020-08-08 NOTE — ASSESSMENT & PLAN NOTE
Concern for constipation with stool  in rectum.  Had a hard stool yesterday.  Continue aggressive bowel regimen.  Resuming PEG feeding.  Monitoring

## 2020-08-08 NOTE — ASSESSMENT & PLAN NOTE
Fever at home, desaturation with increased work of breathing, COVID-19 positive with now bilateral infiltrates on x-ray.  Requiring supplemental oxygen.  Daughter is also positive for COVID-19.  High risk given comorbidities, advanced age, poor functional reserves.  Ferritin 247--230  D-dimer 0.99  CRP 9.02--8.39  Continue dexamethasone 6 mg daily for 10 days  Receiving Zosyn due to concurrent UTI  Prophylactic dose Lovenox  Today daughter would like to proceed with convalescent plasma, type and screen, communicated with pulmonologist  Continue isolation precautions  Trending inflammatory markers  As needed breathing treatments with supplemental oxygen  Due to comorbidities unable to lie prone or use incentive spirometer

## 2020-08-09 PROBLEM — E87.5 HYPERKALEMIA: Status: RESOLVED | Noted: 2020-08-08 | Resolved: 2020-08-09

## 2020-08-09 PROBLEM — E83.39 HYPERPHOSPHATEMIA: Status: RESOLVED | Noted: 2020-08-08 | Resolved: 2020-08-09

## 2020-08-09 PROBLEM — K59.00 CONSTIPATION: Status: RESOLVED | Noted: 2020-06-04 | Resolved: 2020-08-09

## 2020-08-09 LAB
ALBUMIN SERPL BCP-MCNC: 3 G/DL (ref 3.5–5.2)
ALP SERPL-CCNC: 59 U/L (ref 55–135)
ALT SERPL W/O P-5'-P-CCNC: 24 U/L (ref 10–44)
ANION GAP SERPL CALC-SCNC: 12 MMOL/L (ref 8–16)
AST SERPL-CCNC: 29 U/L (ref 10–40)
BASOPHILS # BLD AUTO: 0.01 K/UL (ref 0–0.2)
BASOPHILS NFR BLD: 0.1 % (ref 0–1.9)
BILIRUB SERPL-MCNC: 1 MG/DL (ref 0.1–1)
BUN SERPL-MCNC: 51 MG/DL (ref 8–23)
CALCIUM SERPL-MCNC: 8.4 MG/DL (ref 8.7–10.5)
CHLORIDE SERPL-SCNC: 105 MMOL/L (ref 95–110)
CO2 SERPL-SCNC: 21 MMOL/L (ref 23–29)
CREAT SERPL-MCNC: 1.1 MG/DL (ref 0.5–1.4)
CRP SERPL-MCNC: 3.88 MG/DL
D DIMER PPP IA.FEU-MCNC: 0.41 UG/ML FEU
DIFFERENTIAL METHOD: ABNORMAL
EOSINOPHIL # BLD AUTO: 0 K/UL (ref 0–0.5)
EOSINOPHIL NFR BLD: 0 % (ref 0–8)
ERYTHROCYTE [DISTWIDTH] IN BLOOD BY AUTOMATED COUNT: 15.1 % (ref 11.5–14.5)
EST. GFR  (AFRICAN AMERICAN): 52.5 ML/MIN/1.73 M^2
EST. GFR  (NON AFRICAN AMERICAN): 45.6 ML/MIN/1.73 M^2
FERRITIN SERPL-MCNC: 252 NG/ML (ref 20–300)
GLUCOSE SERPL-MCNC: 145 MG/DL (ref 70–110)
HCT VFR BLD AUTO: 24.8 % (ref 37–48.5)
HGB BLD-MCNC: 9.2 G/DL (ref 12–16)
IMM GRANULOCYTES # BLD AUTO: 0.08 K/UL (ref 0–0.04)
IMM GRANULOCYTES NFR BLD AUTO: 1 % (ref 0–0.5)
LYMPHOCYTES # BLD AUTO: 0.9 K/UL (ref 1–4.8)
LYMPHOCYTES NFR BLD: 10.9 % (ref 18–48)
MAGNESIUM SERPL-MCNC: 2.5 MG/DL (ref 1.6–2.6)
MCH RBC QN AUTO: 35.7 PG (ref 27–31)
MCHC RBC AUTO-ENTMCNC: 37.1 G/DL (ref 32–36)
MCV RBC AUTO: 96 FL (ref 82–98)
MONOCYTES # BLD AUTO: 0.6 K/UL (ref 0.3–1)
MONOCYTES NFR BLD: 7.8 % (ref 4–15)
NEUTROPHILS # BLD AUTO: 6.6 K/UL (ref 1.8–7.7)
NEUTROPHILS NFR BLD: 80.2 % (ref 38–73)
NRBC BLD-RTO: 0 /100 WBC
PHOSPHATE SERPL-MCNC: 4.2 MG/DL (ref 2.7–4.5)
PLATELET # BLD AUTO: 338 K/UL (ref 150–350)
PMV BLD AUTO: 11.7 FL (ref 9.2–12.9)
POTASSIUM SERPL-SCNC: 4.5 MMOL/L (ref 3.5–5.1)
PROT SERPL-MCNC: 6.1 G/DL (ref 6–8.4)
RBC # BLD AUTO: 2.58 M/UL (ref 4–5.4)
SODIUM SERPL-SCNC: 138 MMOL/L (ref 136–145)
WBC # BLD AUTO: 8.23 K/UL (ref 3.9–12.7)

## 2020-08-09 PROCEDURE — 27000221 HC OXYGEN, UP TO 24 HOURS

## 2020-08-09 PROCEDURE — 25000242 PHARM REV CODE 250 ALT 637 W/ HCPCS: Performed by: INTERNAL MEDICINE

## 2020-08-09 PROCEDURE — 25000003 PHARM REV CODE 250: Performed by: INTERNAL MEDICINE

## 2020-08-09 PROCEDURE — 36415 COLL VENOUS BLD VENIPUNCTURE: CPT

## 2020-08-09 PROCEDURE — 63600175 PHARM REV CODE 636 W HCPCS: Performed by: INTERNAL MEDICINE

## 2020-08-09 PROCEDURE — 82728 ASSAY OF FERRITIN: CPT

## 2020-08-09 PROCEDURE — 84100 ASSAY OF PHOSPHORUS: CPT

## 2020-08-09 PROCEDURE — 12000002 HC ACUTE/MED SURGE SEMI-PRIVATE ROOM

## 2020-08-09 PROCEDURE — 99900035 HC TECH TIME PER 15 MIN (STAT)

## 2020-08-09 PROCEDURE — 80053 COMPREHEN METABOLIC PANEL: CPT

## 2020-08-09 PROCEDURE — 85025 COMPLETE CBC W/AUTO DIFF WBC: CPT

## 2020-08-09 PROCEDURE — 94761 N-INVAS EAR/PLS OXIMETRY MLT: CPT

## 2020-08-09 PROCEDURE — 83735 ASSAY OF MAGNESIUM: CPT

## 2020-08-09 PROCEDURE — 86140 C-REACTIVE PROTEIN: CPT

## 2020-08-09 PROCEDURE — 85379 FIBRIN DEGRADATION QUANT: CPT

## 2020-08-09 RX ADMIN — DEXAMETHASONE 6 MG: 2 TABLET ORAL at 09:08

## 2020-08-09 RX ADMIN — MICONAZOLE NITRATE: 20 CREAM TOPICAL at 08:08

## 2020-08-09 RX ADMIN — DOCUSATE SODIUM 100 MG: 50 LIQUID ORAL at 09:08

## 2020-08-09 RX ADMIN — OXYBUTYNIN CHLORIDE 5 MG: 5 TABLET ORAL at 09:08

## 2020-08-09 RX ADMIN — MICONAZOLE NITRATE: 20 CREAM TOPICAL at 09:08

## 2020-08-09 RX ADMIN — POLYETHYLENE GLYCOL 3350 17 G: 17 POWDER, FOR SOLUTION ORAL at 09:08

## 2020-08-09 RX ADMIN — PIPERACILLIN AND TAZOBACTAM 3.38 G: 3; .375 INJECTION, POWDER, FOR SOLUTION INTRAVENOUS at 08:08

## 2020-08-09 RX ADMIN — FAMOTIDINE 20 MG: 20 TABLET ORAL at 09:08

## 2020-08-09 RX ADMIN — PIPERACILLIN AND TAZOBACTAM 3.38 G: 3; .375 INJECTION, POWDER, FOR SOLUTION INTRAVENOUS at 03:08

## 2020-08-09 RX ADMIN — ENOXAPARIN SODIUM 40 MG: 100 INJECTION SUBCUTANEOUS at 04:08

## 2020-08-09 RX ADMIN — CETIRIZINE HYDROCHLORIDE 10 MG: 10 TABLET, FILM COATED ORAL at 09:08

## 2020-08-09 RX ADMIN — PIPERACILLIN AND TAZOBACTAM 3.38 G: 3; .375 INJECTION, POWDER, FOR SOLUTION INTRAVENOUS at 12:08

## 2020-08-09 RX ADMIN — ASPIRIN 81 MG: 81 TABLET, COATED ORAL at 09:08

## 2020-08-09 RX ADMIN — OXYBUTYNIN CHLORIDE 5 MG: 5 TABLET ORAL at 02:08

## 2020-08-09 NOTE — ASSESSMENT & PLAN NOTE
History of chronic Scherer.  Urine culture few days ago with E coli and Proteus both susceptible to Rocephin and Zosyn.  Repeat cultures sent in the ED, Scherer was changed, cultures growing Pseudomonas  Continue empiric Zosyn and follow-up cultures

## 2020-08-09 NOTE — PROGRESS NOTES
[facility] Medicine  Progress Note    Patient Name: Breanne Culp  MRN: [id number]  Patient Class: IP- Inpatient   Admission Date: 8/7/2020  Length of Stay: 2 days  Attending Physician: Elisha Vallejo MD  Primary Care Provider: Frantz Bill MD        Subjective:     Principal Problem:Pneumonia due to COVID-19 virus        HPI:  HPI as per MD Mobley  Breanne Culp is a 86 y.o. AA female who  has a past medical history of Anticoagulant long-term use, Aphasia S/P CVA, Arthritis, Bedbound, Cataract, COPD (chronic obstructive pulmonary disease), Coronary artery disease, DDD (degenerative disc disease), lumbar, Glaucoma, Hyperlipidemia, Low back pain, Onychomycosis due to dermatophyte (11/26/2014), Osteoporosis, Positive PPD (1970), Seasonal allergies, and Stroke.. The patient presented to [facility] on 8/7/2020 with a primary complaint of Fever    History was obtained from ER physician on sign out.  Dr. Pratt spoke with patient's daughter via phone to get the history.  Daughter reported patient has been having fever for the last few days off and on.  Patient is living at home with her daughter.  Patient is bedbound, nonverbal since the stroke, history of recurrent/frequent UTI, sacral decubitus, history of dementia/Alzheimer's with behavioral disturbance.  Daughter reports they have home health service.  Yesterday she was diagnosed with UTI and was started on Keflex.  Today she noted that patient was more confused and somnolent so she called the EMS.  When EMS arrived, patient had an O2 sat of 88% on room air.  They applied the supplemental oxygen via nasal cannula and brought her to the ER  In the ER patient had a temp of 100.6°, respiration was 25 per minute, O2 sat was 88% which improved to 94% with 2 L supplemental oxygen.  On exam patient did grimace on abdominal examination, specially in the left lower quadrant and suprapubic area.  CT of the abdomen was  ordered.  Prominent stool ball seen in the rectal vault, probably fecal impaction.  Marked sigmoid prominent diverticulosis without acute diverticulitis dose.  Hepatic steatosis.  X-ray chest shows worsening of bilateral pulmonary interstitial and ground-glass alveolar opacities S seen in viral pneumonia, atypical infection or pulmonary edema     Lab shows WBC of 7.37, H&H 10.3/31.9, platelet 285  Ferritin 230  CMP shows bicarb of 22, BUN creatinine 40/0.9, albumin 3.3  CRP 9.02(H)  BNP 28,  -high  COVID rapid positive  Lactate vein 1.5  Urinalysis shows appearance hazy, protein trace, 3+ occult blood, nitrite positive, 3+ leuko, RBC D3, bacteria negative, WBC greater than 100  Noted urine culture from 08/04/2020 was positive for E coli and Proteus.  Patient has a history of Proteus UTI multiple time in the past     Dr. aDrby and Dr. Phipps were consulted through the ER..  Dr. Phipps discussed the case with patient's daughter miss wilcox who confirmed that patient has a DNR/DNI status.  Treatment options were discussed and decided to continue treatment with dexamethasone daily.  Patient will not receive convalescent plasma and Remdesivir per daughter/Dr. winchester conversation     Admit to med tele with cardiac monitoring    Overview/Hospital Course:  Patient with a history of CVA, bedbound, PEG feeding dependent, sacral decubitus ulcer, POA, community dwelling with home health, brought into the ED due to persistent fever and shortness of breath as per collateral.  COVID-19 was positive with bilateral infiltrates on chest x-ray.  Did have fever with positive UA, was recently started on Keflex as outpatient for UTI.  Chronic Scherer catheter in place, was changed in the ED.  Pulmonary was consulted in the ED, following discussions decision to admit, steroid, holding of plasma/RDV.  On 08/08, assumed care, patient lying in bed, does not open eyes during examination, remains nonverbal, as per nursing she did have bowel  movement overnight, hard, remains on supplemental oxygen, saturating well, collateral obtained from daughter, not on home oxygen, daughter was tested yesterday at urgent care and now was also COVID-19 positive, she states the patient did have caregivers/sitters as well in the home.  Daughter now consenting to convalescent plasma, communicated with pulmonologist, will order.  Discussed patient is high risk for decline. Confirmed DNI.  On 08/09, O2 requirements increased to 4 L, on face mask however patient has been frequently removing, episodes of hypothermia down to 95.2, tolerated CCP well, tolerating tube feeds, she is more awake, repeating words, updated daughter over the phone.    Interval History:  Patient is more awake today, she repeats few words this provider states during the encounter however does not answer questions or follow commands.  She is currently on the face mask however on entering the room was down to her chest area, O2 sats were in the high 80s, on replacing supplemental oxygen did improved to the low 92, current settings of 4 L. Discussed with nursing.  Tube feeds in progress currently at 30 cc and tolerating well.  Tolerated CCP infusion yesterday well, did have episode of hypothermia down to 95.2.  Labs with hemoglobin 9.2, ferritin 252, D-dimer 0.41, CRP 3.88.  Urine culture on preliminary growing Pseudomonas.  Called and updated patient's daughter over the phone, all questions answered.    Review of Systems   Unable to perform ROS: Dementia     Objective:     Vital Signs (Most Recent):  Temp: 96.5 °F (35.8 °C) (08/09/20 0719)  Pulse: 92 (08/09/20 0943)  Resp: 19 (08/09/20 0719)  BP: (!) 97/55 (08/09/20 0943)  SpO2: 96 % (08/09/20 0854) Vital Signs (24h Range):  Temp:  [95.2 °F (35.1 °C)-97.9 °F (36.6 °C)] 96.5 °F (35.8 °C)  Pulse:  [86-95] 92  Resp:  [18-22] 19  SpO2:  [93 %-96 %] 96 %  BP: ()/(50-79) 97/55     Weight: 67.5 kg (148 lb 13 oz)  Body mass index is 24.02  kg/m².    Intake/Output Summary (Last 24 hours) at 8/9/2020 1325  Last data filed at 8/9/2020 0544  Gross per 24 hour   Intake 490 ml   Output 800 ml   Net -310 ml      Physical Exam  Vitals signs and nursing note reviewed.   Constitutional:       Comments: Frail, chronically ill-appearing, elderly female patient, lying in bed, more awake today, repeating few words   HENT:      Head: Normocephalic and atraumatic.   Cardiovascular:      Rate and Rhythm: Normal rate and regular rhythm.   Pulmonary:      Comments: On supplemental oxygen via face mask, she is frequently removing, bilateral inspiratory crackles with diminished air entry on lung examination, no wheeze  Abdominal:      Comments: Protuberant, mild tenderness to deep palpation, bowel sounds present, peg in place--tube feeds going   Genitourinary:     Comments: Scherer in place with yellow urine  Skin:     Comments: Sacral decubitus ulcer, POA.  Trophic nail changes.   Neurological:      Comments: More awake, speaking few words however repeating this providers not answering questions, not following commands   Psychiatric:      Comments: Unable to evaluate         Significant Labs:   BMP:   Recent Labs   Lab 08/09/20  0430   *      K 4.5      CO2 21*   BUN 51*   CREATININE 1.1   CALCIUM 8.4*   MG 2.5     CBC:   Recent Labs   Lab 08/08/20  0505 08/09/20  0430   WBC 4.87 8.23   HGB 10.1* 9.2*   HCT 28.9* 24.8*    338     CMP:   Recent Labs   Lab 08/08/20  0505 08/09/20  0430    138   K 5.3* 4.5    105   CO2 22* 21*   * 145*   BUN 42* 51*   CREATININE 1.0 1.1   CALCIUM 8.6* 8.4*   PROT 6.7 6.1   ALBUMIN 3.1* 3.0*   BILITOT 1.0 1.0   ALKPHOS 71 59   AST 32 29   ALT 26 24   ANIONGAP 9 12   EGFRNONAA 51.1* 45.6*     Cardiac Markers: No results for input(s): CKMB, MYOGLOBIN, BNP, TROPISTAT in the last 48 hours.  Lactic Acid: No results for input(s): LACTATE in the last 48 hours.  Magnesium:   Recent Labs   Lab 08/08/20  0505  08/09/20  0430   MG 2.5 2.5     POCT Glucose: No results for input(s): POCTGLUCOSE in the last 48 hours.  Troponin: No results for input(s): TROPONINI in the last 48 hours.  TSH: No results for input(s): TSH in the last 4320 hours.  Urine Culture: No results for input(s): LABURIN in the last 48 hours.  Urine Studies: No results for input(s): COLORU, APPEARANCEUA, PHUR, SPECGRAV, PROTEINUA, GLUCUA, KETONESU, BILIRUBINUA, OCCULTUA, NITRITE, UROBILINOGEN, LEUKOCYTESUR, RBCUA, WBCUA, BACTERIA, SQUAMEPITHEL, HYALINECASTS in the last 48 hours.    Invalid input(s): WRIGHTSUR  All pertinent labs within the past 24 hours have been reviewed.    Significant Imaging: I have reviewed all pertinent imaging results/findings within the past 24 hours.      Assessment/Plan:      * Bilateral pneumonia secondary to COVID-19 infection  Fever at home, desaturation with increased work of breathing, COVID-19 positive with now bilateral infiltrates on x-ray.  Requiring supplemental oxygen.  Daughter is also positive for COVID-19.  High risk given comorbidities, advanced age, poor functional reserves.  Ferritin 247--230--252  D-dimer 0.99--0.41  CRP 9.02--8.39--3.88  Continue dexamethasone 6 mg daily for 10 days  Receiving Zosyn due to concurrent UTI  Prophylactic dose Lovenox  Status post convalescent plasma 8/08  Continue isolation precautions  Trending inflammatory markers  As needed breathing treatments with supplemental oxygen  Due to comorbidities unable to lie prone or use incentive spirometer    Acute hypoxemic respiratory failure  Due to COVID-19 infection.  High risk to progressed to ARDS.  DNR/DNI.  Patient needs frequent redirection in keeping oxygen on      UTI (urinary tract infection)  History of chronic Scherer.  Urine culture few days ago with E coli and Proteus both susceptible to Rocephin and Zosyn.  Repeat cultures sent in the ED, Scherer was changed, cultures growing Pseudomonas  Continue empiric Zosyn and follow-up  cultures      Sacral decubitus ulcer, POA  Known sacral decubitus ulcer, POA, daughter states she acquired during prior hospitalization in December.  Air mattress ordered.  Q 2 turning.  Wound care consulted.      DNR (do not resuscitate)  Confirmed on admission      Moderate protein-calorie malnutrition  PEG feeding dependent.  Sacral decubitus ulcer.  Bed-bound.      Alzheimer's dementia  On donepezil.      COPD (chronic obstructive pulmonary disease)  No evidence of acute exacerbation      Bedbound  Bed-bound at baseline      PEG (percutaneous endoscopic gastrostomy) status  PEG feed dependent.  Tube feeds at 30 cc currently in progress.    CVA (cerebral vascular accident)  Chronic history of same, now bed-bound        VTE Risk Mitigation (From admission, onward)         Ordered     IP VTE HIGH RISK PATIENT  Once      08/08/20 0142     enoxaparin injection 40 mg  Every 24 hours      08/07/20 1707     Place sequential compression device  Until discontinued      08/07/20 1707                      Elisha Vallejo MD  Department of Hospital Medicine   [facility]

## 2020-08-09 NOTE — ASSESSMENT & PLAN NOTE
Due to COVID-19 infection.  High risk to progressed to ARDS.  DNR/DNI.  Patient needs frequent redirection in keeping oxygen on

## 2020-08-09 NOTE — ASSESSMENT & PLAN NOTE
Fever at home, desaturation with increased work of breathing, COVID-19 positive with now bilateral infiltrates on x-ray.  Requiring supplemental oxygen.  Daughter is also positive for COVID-19.  High risk given comorbidities, advanced age, poor functional reserves.  Ferritin 247--230--252  D-dimer 0.99--0.41  CRP 9.02--8.39--3.88  Continue dexamethasone 6 mg daily for 10 days  Receiving Zosyn due to concurrent UTI  Prophylactic dose Lovenox  Status post convalescent plasma 8/08  Continue isolation precautions  Trending inflammatory markers  As needed breathing treatments with supplemental oxygen  Due to comorbidities unable to lie prone or use incentive spirometer

## 2020-08-09 NOTE — SUBJECTIVE & OBJECTIVE
Interval History:  Patient is more awake today, she repeats few words this provider states during the encounter however does not answer questions or follow commands.  She is currently on the face mask however on entering the room was down to her chest area, O2 sats were in the high 80s, on replacing supplemental oxygen did improved to the low 92, current settings of 4 L. Discussed with nursing.  Tube feeds in progress currently at 30 cc and tolerating well.  Tolerated CCP infusion yesterday well, did have episode of hypothermia down to 95.2.  Labs with hemoglobin 9.2, ferritin 252, D-dimer 0.41, CRP 3.88.  Urine culture on preliminary growing Pseudomonas.  Called and updated patient's daughter over the phone, all questions answered.    Review of Systems   Unable to perform ROS: Dementia     Objective:     Vital Signs (Most Recent):  Temp: 96.5 °F (35.8 °C) (08/09/20 0719)  Pulse: 92 (08/09/20 0943)  Resp: 19 (08/09/20 0719)  BP: (!) 97/55 (08/09/20 0943)  SpO2: 96 % (08/09/20 0854) Vital Signs (24h Range):  Temp:  [95.2 °F (35.1 °C)-97.9 °F (36.6 °C)] 96.5 °F (35.8 °C)  Pulse:  [86-95] 92  Resp:  [18-22] 19  SpO2:  [93 %-96 %] 96 %  BP: ()/(50-79) 97/55     Weight: 67.5 kg (148 lb 13 oz)  Body mass index is 24.02 kg/m².    Intake/Output Summary (Last 24 hours) at 8/9/2020 1325  Last data filed at 8/9/2020 0544  Gross per 24 hour   Intake 490 ml   Output 800 ml   Net -310 ml      Physical Exam  Vitals signs and nursing note reviewed.   Constitutional:       Comments: Frail, chronically ill-appearing, elderly female patient, lying in bed, more awake today, repeating few words   HENT:      Head: Normocephalic and atraumatic.   Cardiovascular:      Rate and Rhythm: Normal rate and regular rhythm.   Pulmonary:      Comments: On supplemental oxygen via face mask, she is frequently removing, bilateral inspiratory crackles with diminished air entry on lung examination, no wheeze  Abdominal:      Comments: Protuberant,  mild tenderness to deep palpation, bowel sounds present, peg in place--tube feeds going   Genitourinary:     Comments: Scherer in place with yellow urine  Skin:     Comments: Sacral decubitus ulcer, POA.  Trophic nail changes.   Neurological:      Comments: More awake, speaking few words however repeating this providers not answering questions, not following commands   Psychiatric:      Comments: Unable to evaluate         Significant Labs:   BMP:   Recent Labs   Lab 08/09/20  0430   *      K 4.5      CO2 21*   BUN 51*   CREATININE 1.1   CALCIUM 8.4*   MG 2.5     CBC:   Recent Labs   Lab 08/08/20  0505 08/09/20  0430   WBC 4.87 8.23   HGB 10.1* 9.2*   HCT 28.9* 24.8*    338     CMP:   Recent Labs   Lab 08/08/20  0505 08/09/20  0430    138   K 5.3* 4.5    105   CO2 22* 21*   * 145*   BUN 42* 51*   CREATININE 1.0 1.1   CALCIUM 8.6* 8.4*   PROT 6.7 6.1   ALBUMIN 3.1* 3.0*   BILITOT 1.0 1.0   ALKPHOS 71 59   AST 32 29   ALT 26 24   ANIONGAP 9 12   EGFRNONAA 51.1* 45.6*     Cardiac Markers: No results for input(s): CKMB, MYOGLOBIN, BNP, TROPISTAT in the last 48 hours.  Lactic Acid: No results for input(s): LACTATE in the last 48 hours.  Magnesium:   Recent Labs   Lab 08/08/20  0505 08/09/20  0430   MG 2.5 2.5     POCT Glucose: No results for input(s): POCTGLUCOSE in the last 48 hours.  Troponin: No results for input(s): TROPONINI in the last 48 hours.  TSH: No results for input(s): TSH in the last 4320 hours.  Urine Culture: No results for input(s): LABURIN in the last 48 hours.  Urine Studies: No results for input(s): COLORU, APPEARANCEUA, PHUR, SPECGRAV, PROTEINUA, GLUCUA, KETONESU, BILIRUBINUA, OCCULTUA, NITRITE, UROBILINOGEN, LEUKOCYTESUR, RBCUA, WBCUA, BACTERIA, SQUAMEPITHEL, HYALINECASTS in the last 48 hours.    Invalid input(s): FLOYD  All pertinent labs within the past 24 hours have been reviewed.    Significant Imaging: I have reviewed all pertinent imaging  results/findings within the past 24 hours.

## 2020-08-10 LAB
ALBUMIN SERPL BCP-MCNC: 3 G/DL (ref 3.5–5.2)
ALP SERPL-CCNC: 53 U/L (ref 55–135)
ALT SERPL W/O P-5'-P-CCNC: 23 U/L (ref 10–44)
ANION GAP SERPL CALC-SCNC: 9 MMOL/L (ref 8–16)
AST SERPL-CCNC: 25 U/L (ref 10–40)
BACTERIA UR CULT: ABNORMAL
BASOPHILS # BLD AUTO: 0.01 K/UL (ref 0–0.2)
BASOPHILS NFR BLD: 0.2 % (ref 0–1.9)
BILIRUB SERPL-MCNC: 1.1 MG/DL (ref 0.1–1)
BUN SERPL-MCNC: 48 MG/DL (ref 8–23)
CALCIUM SERPL-MCNC: 8.7 MG/DL (ref 8.7–10.5)
CHLORIDE SERPL-SCNC: 109 MMOL/L (ref 95–110)
CO2 SERPL-SCNC: 24 MMOL/L (ref 23–29)
CREAT SERPL-MCNC: 1 MG/DL (ref 0.5–1.4)
CRP SERPL-MCNC: 1.74 MG/DL
D DIMER PPP IA.FEU-MCNC: 0.4 UG/ML FEU
DIFFERENTIAL METHOD: ABNORMAL
EOSINOPHIL # BLD AUTO: 0 K/UL (ref 0–0.5)
EOSINOPHIL NFR BLD: 0 % (ref 0–8)
ERYTHROCYTE [DISTWIDTH] IN BLOOD BY AUTOMATED COUNT: 15.2 % (ref 11.5–14.5)
EST. GFR  (AFRICAN AMERICAN): 58.9 ML/MIN/1.73 M^2
EST. GFR  (NON AFRICAN AMERICAN): 51.1 ML/MIN/1.73 M^2
FERRITIN SERPL-MCNC: 263 NG/ML (ref 20–300)
GLUCOSE SERPL-MCNC: 103 MG/DL (ref 70–110)
HCT VFR BLD AUTO: 26.4 % (ref 37–48.5)
HGB BLD-MCNC: 9.4 G/DL (ref 12–16)
IMM GRANULOCYTES # BLD AUTO: 0.07 K/UL (ref 0–0.04)
IMM GRANULOCYTES NFR BLD AUTO: 1.1 % (ref 0–0.5)
LYMPHOCYTES # BLD AUTO: 1.2 K/UL (ref 1–4.8)
LYMPHOCYTES NFR BLD: 18.5 % (ref 18–48)
MAGNESIUM SERPL-MCNC: 2.6 MG/DL (ref 1.6–2.6)
MCH RBC QN AUTO: 34.7 PG (ref 27–31)
MCHC RBC AUTO-ENTMCNC: 35.6 G/DL (ref 32–36)
MCV RBC AUTO: 97 FL (ref 82–98)
MONOCYTES # BLD AUTO: 0.6 K/UL (ref 0.3–1)
MONOCYTES NFR BLD: 9.6 % (ref 4–15)
NEUTROPHILS # BLD AUTO: 4.4 K/UL (ref 1.8–7.7)
NEUTROPHILS NFR BLD: 70.6 % (ref 38–73)
NRBC BLD-RTO: 0 /100 WBC
PHOSPHATE SERPL-MCNC: 2.8 MG/DL (ref 2.7–4.5)
PLATELET # BLD AUTO: 354 K/UL (ref 150–350)
PMV BLD AUTO: 11.6 FL (ref 9.2–12.9)
POTASSIUM SERPL-SCNC: 4.1 MMOL/L (ref 3.5–5.1)
PROT SERPL-MCNC: 6.3 G/DL (ref 6–8.4)
RBC # BLD AUTO: 2.71 M/UL (ref 4–5.4)
SODIUM SERPL-SCNC: 142 MMOL/L (ref 136–145)
WBC # BLD AUTO: 6.28 K/UL (ref 3.9–12.7)

## 2020-08-10 PROCEDURE — 84100 ASSAY OF PHOSPHORUS: CPT

## 2020-08-10 PROCEDURE — 80053 COMPREHEN METABOLIC PANEL: CPT

## 2020-08-10 PROCEDURE — 12000002 HC ACUTE/MED SURGE SEMI-PRIVATE ROOM

## 2020-08-10 PROCEDURE — 94761 N-INVAS EAR/PLS OXIMETRY MLT: CPT

## 2020-08-10 PROCEDURE — 85379 FIBRIN DEGRADATION QUANT: CPT

## 2020-08-10 PROCEDURE — 83735 ASSAY OF MAGNESIUM: CPT

## 2020-08-10 PROCEDURE — 86140 C-REACTIVE PROTEIN: CPT

## 2020-08-10 PROCEDURE — 63600175 PHARM REV CODE 636 W HCPCS: Performed by: INTERNAL MEDICINE

## 2020-08-10 PROCEDURE — 85025 COMPLETE CBC W/AUTO DIFF WBC: CPT

## 2020-08-10 PROCEDURE — 25000003 PHARM REV CODE 250: Performed by: INTERNAL MEDICINE

## 2020-08-10 PROCEDURE — 36415 COLL VENOUS BLD VENIPUNCTURE: CPT

## 2020-08-10 PROCEDURE — 99900035 HC TECH TIME PER 15 MIN (STAT)

## 2020-08-10 PROCEDURE — 25000242 PHARM REV CODE 250 ALT 637 W/ HCPCS: Performed by: INTERNAL MEDICINE

## 2020-08-10 PROCEDURE — 82728 ASSAY OF FERRITIN: CPT

## 2020-08-10 PROCEDURE — 27000221 HC OXYGEN, UP TO 24 HOURS

## 2020-08-10 RX ADMIN — DEXAMETHASONE 6 MG: 2 TABLET ORAL at 08:08

## 2020-08-10 RX ADMIN — ENOXAPARIN SODIUM 40 MG: 100 INJECTION SUBCUTANEOUS at 04:08

## 2020-08-10 RX ADMIN — GABAPENTIN 200 MG: 100 CAPSULE ORAL at 08:08

## 2020-08-10 RX ADMIN — OXYBUTYNIN CHLORIDE 5 MG: 5 TABLET ORAL at 03:08

## 2020-08-10 RX ADMIN — DONEPEZIL HYDROCHLORIDE 10 MG: 5 TABLET, FILM COATED ORAL at 08:08

## 2020-08-10 RX ADMIN — FAMOTIDINE 20 MG: 20 TABLET ORAL at 08:08

## 2020-08-10 RX ADMIN — MELATONIN 6 MG: at 09:08

## 2020-08-10 RX ADMIN — METOPROLOL TARTRATE 25 MG: 25 TABLET, FILM COATED ORAL at 08:08

## 2020-08-10 RX ADMIN — ACETAMINOPHEN 650 MG: 325 TABLET ORAL at 06:08

## 2020-08-10 RX ADMIN — MICONAZOLE NITRATE: 20 CREAM TOPICAL at 08:08

## 2020-08-10 RX ADMIN — OXYBUTYNIN CHLORIDE 5 MG: 5 TABLET ORAL at 08:08

## 2020-08-10 RX ADMIN — CETIRIZINE HYDROCHLORIDE 10 MG: 10 TABLET, FILM COATED ORAL at 08:08

## 2020-08-10 RX ADMIN — PIPERACILLIN AND TAZOBACTAM 3.38 G: 3; .375 INJECTION, POWDER, FOR SOLUTION INTRAVENOUS at 04:08

## 2020-08-10 RX ADMIN — FLUTICASONE PROPIONATE 50 MCG: 50 SPRAY, METERED NASAL at 08:08

## 2020-08-10 RX ADMIN — LORAZEPAM 1 MG: 1 TABLET ORAL at 08:08

## 2020-08-10 RX ADMIN — HYDROXYZINE HYDROCHLORIDE 25 MG: 25 TABLET, FILM COATED ORAL at 08:08

## 2020-08-10 RX ADMIN — DOCUSATE SODIUM 100 MG: 50 LIQUID ORAL at 08:08

## 2020-08-10 RX ADMIN — PIPERACILLIN AND TAZOBACTAM 3.38 G: 3; .375 INJECTION, POWDER, FOR SOLUTION INTRAVENOUS at 12:08

## 2020-08-10 RX ADMIN — TRAMADOL HYDROCHLORIDE 50 MG: 50 TABLET, FILM COATED ORAL at 09:08

## 2020-08-10 RX ADMIN — ASPIRIN 81 MG: 81 TABLET, COATED ORAL at 08:08

## 2020-08-10 RX ADMIN — PIPERACILLIN AND TAZOBACTAM 3.38 G: 3; .375 INJECTION, POWDER, FOR SOLUTION INTRAVENOUS at 08:08

## 2020-08-10 NOTE — PROGRESS NOTES
"[facility]  Adult Nutrition   Progress Note (Nutrition Support Management)  Tube Feeding Management   SUMMARY     Recommendations  Recommendation/Intervention: 1. RD modified TF order. Jevity 1.5 with goal rate of 50 ml/hr with 100 ml water flush every 6 hours (to provide 1800 kcal/day, 77 g/day protein, and 1312 ml/day free water). 2. RD to monitor NPO status, TF rate and tolerance, weight, labs, fluid status and manage TF accordingly. 3. Recommend daily weight monitoring and I/Os. Current weight 67.5 kg indicates a 10.8 kg weight gain from 56.7 kg (3/19/2020).  Goals: 1. Patient to meet estimated needs via enteral nutrition provision. 2. Weight and fluid status to be monitored and patient to maintain adequate fluid status.  Nutrition Goal Status: new      Dietitian Rounds Brief  RD modified TF formula to better meet needs. Specialty formula no longer indicated due to K+ and Phos are WNL.     Weight History:  Wt Readings from Last 5 Encounters:   08/10/20 67.5 kg (148 lb 13 oz)   03/19/20 56.7 kg (125 lb)   02/13/20 56.7 kg (125 lb)   08/29/19 55.5 kg (122 lb 5.7 oz)   08/21/19 53.5 kg (117 lb 15.1 oz)     Reason for Assessment  Reason For Assessment: RD follow-up, new tube feeding(adjusted TF)  Diagnosis: (COVID-19)  Relevant Medical History: COPD, CAD, DDD, HLD, Stroke, PEG, bed bound  Interdisciplinary Rounds: did not attend    Nutrition Risk Screen  Nutrition Risk Screen: tube feeding or parenteral nutrition, dysphagia or difficulty swallowing             Nutrition/Diet History  Food Allergies: NKFA  Factors Affecting Nutritional Intake: NPO    Anthropometrics  Temp: 96 °F (35.6 °C)  Height Method: Estimated  Height: 5' 6" (167.6 cm)  Height (inches): 66 in  Weight Method: Bed Scale  Weight: 67.5 kg (148 lb 13 oz)  Weight (lb): 148.81 lb  Ideal Body Weight (IBW), Female: 130 lb  BMI (Calculated): 24  BMI Grade: 18.5-24.9 - normal  Weight Loss: other (see comments)(weight gain)  Usual Body Weight " (UBW), k.7 kg  Weight Change Amount: 23 lb 13 oz  % Usual Body Weight: 119.3  % Weight Change From Usual Weight: 19.05 %         Lab/Procedures/Meds: Pertinent Labs Reviewed    Clinical Chemistry:  Recent Labs   Lab 20  0505 20  0430 08/10/20  0405    138 142   K 5.3* 4.5 4.1    105 109   CO2 22* 21* 24   * 145* 103   BUN 42* 51* 48*   CREATININE 1.0 1.1 1.0   CALCIUM 8.6* 8.4* 8.7   PROT 6.7 6.1 6.3   ALBUMIN 3.1* 3.0* 3.0*   BILITOT 1.0 1.0 1.1*   ALKPHOS 71 59 53*   AST 32 29 25   ALT 26 24 23   ANIONGAP 9 12 9   ESTGFRAFRICA 58.9* 52.5* 58.9*   EGFRNONAA 51.1* 45.6* 51.1*   MG 2.5 2.5 2.6   PHOS 4.6* 4.2 2.8       CBC:   Recent Labs   Lab 08/10/20  0405   WBC 6.28   RBC 2.71*   HGB 9.4*   HCT 26.4*   *   MCV 97   MCH 34.7*   MCHC 35.6       Cardiac Profile:  Recent Labs   Lab 20  1140   BNP 28     113   CPKMB 1.1   TROPONINI <0.030       Inflammatory Labs:  Recent Labs   Lab 20  0505 20  0430 08/10/20  0405   CRP 8.39* 3.88* 1.74*         Medications: Pertinent Medications reviewed    Scheduled Meds:   aspirin  81 mg Oral Daily    cetirizine  10 mg Per G Tube Daily    dexAMETHasone  6 mg Per G Tube Daily    docusate  100 mg Per G Tube Daily    donepeziL  10 mg Per G Tube QHS    enoxaparin  40 mg Subcutaneous Q24H    famotidine  20 mg Per G Tube BID    fluticasone propionate  1 spray Each Nostril Daily    gabapentin  200 mg Per G Tube QHS    hydrOXYzine HCL  25 mg Per G Tube QHS    LORazepam  1 mg Per G Tube QHS    metoprolol tartrate  25 mg Per G Tube BID    miconazole   Topical (Top) BID    oxybutynin  5 mg Per G Tube TID    piperacillin-tazobactam (ZOSYN) IVPB  3.375 g Intravenous Q8H       Continuous Infusions:    PRN Meds:.acetaminophen, acetaminophen, acetaminophen, benzonatate, bisacodyL, calcium chloride IVPB, calcium chloride IVPB, calcium chloride IVPB, dextrose 50%, dextrose 50%, diphenhydrAMINE, glucagon (human  recombinant), glucose, glucose, magnesium oxide, magnesium sulfate IVPB, magnesium sulfate IVPB, magnesium sulfate IVPB, magnesium sulfate IVPB, melatonin, ondansetron, potassium chloride in water, potassium chloride in water, potassium chloride in water, potassium chloride in water, potassium chloride, potassium chloride, potassium chloride, potassium chloride, promethazine (PHENERGAN) IVPB, sodium chloride 0.9%, sodium phosphate IVPB, sodium phosphate IVPB, sodium phosphate IVPB, sodium phosphate IVPB, sodium phosphate IVPB, traMADoL    Estimated/Assessed Needs  Weight Used For Calorie Calculations: 67.5 kg (148 lb 13 oz)  Energy Calorie Requirements (kcal): 9673-5610 kcals/day (20-25 kcals/kg)  Energy Need Method: Kcal/kg  Protein Requirements: 67-88 g/day (1.0-1.3 g/kg)  Weight Used For Protein Calculations: 67.5 kg (148 lb 13 oz)     Estimated Fluid Requirement Method: RDA Method  RDA Method (mL): 1350       Nutrition Prescription Ordered  Current Diet Order: NPO  Current Nutrition Support Formula Ordered: Nepro  Current Nutrition Support Frequency Ordered: Goal Rate = 35 ml/hr; RD unable to obtain current tube feeding rate; 24 Hr intake documented as 480 ml (indicates rate of 20 ml/hr).    Evaluation of Received Nutrient/Fluid Intake  Energy Calories Required: not meeting needs  Protein Required: not meeting needs  Fluid Required: not meeting needs  Tolerance: other (see comments)(unable to assess)     Intake/Output Summary (Last 24 hours) at 8/10/2020 1853  Last data filed at 8/10/2020 1700  Gross per 24 hour   Intake 780 ml   Output 1050 ml   Net -270 ml      Nutrition Risk  Level of Risk/Frequency of Follow-up: high     Monitor and Evaluation  Food and Nutrient Intake: energy intake, enteral nutrition intake  Food and Nutrient Adminstration: diet order, enteral and parenteral nutrition administration  Physical Activity and Function: nutrition-related ADLs and IADLs, factors affecting access to physical  activity  Anthropometric Measurements: weight, weight change, body mass index  Biochemical Data, Medical Tests and Procedures: electrolyte and renal panel, gastrointestinal profile, glucose/endocrine profile, lipid profile, inflammatory profile  Nutrition-Focused Physical Findings: overall appearance     Nutrition Follow-Up  RD Follow-up?: Yes     Jessie Mccarthy RD 08/10/2020 6:54 PM

## 2020-08-10 NOTE — PROGRESS NOTES
[facility]  Progress Note    Patient Name: Breanne Culp  MRN: [id number]  Patient Class: IP- Inpatient   Admission Date: 8/7/2020  Length of Stay: 3 days  Attending Physician: Luisa Michele MD  Primary Care Provider: Frantz Bill MD        Subjective:     Principal Problem:Pneumonia due to COVID-19 virus        HPI:  HPI as per MD Mobley  Breanne Culp is a 86 y.o. AA female who  has a past medical history of Anticoagulant long-term use, Aphasia S/P CVA, Arthritis, Bedbound, Cataract, COPD (chronic obstructive pulmonary disease), Coronary artery disease, DDD (degenerative disc disease), lumbar, Glaucoma, Hyperlipidemia, Low back pain, Onychomycosis due to dermatophyte (11/26/2014), Osteoporosis, Positive PPD (1970), Seasonal allergies, and Stroke.. The patient presented to [facility] on 8/7/2020 with a primary complaint of Fever    History was obtained from ER physician on sign out.  Dr. Pratt spoke with patient's daughter via phone to get the history.  Daughter reported patient has been having fever for the last few days off and on.  Patient is living at home with her daughter.  Patient is bedbound, nonverbal since the stroke, history of recurrent/frequent UTI, sacral decubitus, history of dementia/Alzheimer's with behavioral disturbance.  Daughter reports they have home health service.  Yesterday she was diagnosed with UTI and was started on Keflex.  Today she noted that patient was more confused and somnolent so she called the EMS.  When EMS arrived, patient had an O2 sat of 88% on room air.  They applied the supplemental oxygen via nasal cannula and brought her to the ER  In the ER patient had a temp of 100.6°, respiration was 25 per minute, O2 sat was 88% which improved to 94% with 2 L supplemental oxygen.  On exam patient did grimace on abdominal examination, specially in the left lower quadrant and suprapubic area.  CT of the abdomen was  ordered.  Prominent stool ball seen in the rectal vault, probably fecal impaction.  Marked sigmoid prominent diverticulosis without acute diverticulitis dose.  Hepatic steatosis.  X-ray chest shows worsening of bilateral pulmonary interstitial and ground-glass alveolar opacities S seen in viral pneumonia, atypical infection or pulmonary edema     Lab shows WBC of 7.37, H&H 10.3/31.9, platelet 285  Ferritin 230  CMP shows bicarb of 22, BUN creatinine 40/0.9, albumin 3.3  CRP 9.02(H)  BNP 28,  -high  COVID rapid positive  Lactate vein 1.5  Urinalysis shows appearance hazy, protein trace, 3+ occult blood, nitrite positive, 3+ leuko, RBC D3, bacteria negative, WBC greater than 100  Noted urine culture from 08/04/2020 was positive for E coli and Proteus.  Patient has a history of Proteus UTI multiple time in the past     Dr. Darby and Dr. Phipps were consulted through the ER..  Dr. Phipps discussed the case with patient's daughter miss wilcox who confirmed that patient has a DNR/DNI status.  Treatment options were discussed and decided to continue treatment with dexamethasone daily.  Patient will not receive convalescent plasma and Remdesivir per daughter/Dr. winchester conversation     Admit to med tele with cardiac monitoring    Overview/Hospital Course:  Patient with a history of CVA, bedbound, PEG feeding dependent, sacral decubitus ulcer, POA, community dwelling with home health, brought into the ED due to persistent fever and shortness of breath as per collateral.  COVID-19 was positive with bilateral infiltrates on chest x-ray.  Did have fever with positive UA, was recently started on Keflex as outpatient for UTI.  Chronic Scherer catheter in place, was changed in the ED.  Pulmonary was consulted in the ED, following discussions decision to admit, steroid, holding of plasma/RDV.  On 08/08, assumed care, patient lying in bed, does not open eyes during examination, remains nonverbal, as per nursing she did have bowel  movement overnight, hard, remains on supplemental oxygen, saturating well, collateral obtained from daughter, not on home oxygen, daughter was tested yesterday at urgent care and now was also COVID-19 positive, she states the patient did have caregivers/sitters as well in the home.  Daughter now consenting to convalescent plasma, communicated with pulmonologist, will order.  Discussed patient is high risk for decline. Confirmed DNI.  On 08/09, O2 requirements increased to 4 L, on face mask however patient has been frequently removing, episodes of hypothermia down to 95.2, tolerated CCP well, tolerating tube feeds, she is more awake, repeating words, updated daughter over the phone.    Interval history:  Patient remains hypothermic, saturating 98% on 4 L nc  Constantly c/o general body pain and feeling cold  had one episode of diarrhea  CRP improving 3.8>1.7  Will trend inflammatory markers    Physical exam  Vitals signs and nursing note reviewed.   Constitutional:       Comments: Frail, chronically ill-appearing, elderly female patient, lying in bed, more awake than the day of admission, repeating few words   HENT:      Head: Normocephalic and atraumatic.   Cardiovascular:      Rate and Rhythm: Normal rate and regular rhythm.   Pulmonary:      Comments: On supplemental oxygen via nc, bilateral inspiratory crackles with diminished air entry on lung examination, no wheeze  Abdominal:      Comments: Protuberant, mild tenderness to deep palpation, bowel sounds present, peg in place--tube feeds going   Genitourinary:     Comments: Scherer in place with clear urine  Skin:     Comments: Sacral decubitus ulcer, POA.  Trophic nail changes.   Neurological:      Comments: More awake, speaking few words however repeating this providers not answering questions, not following commands   Psychiatric:      Comments: Unable to evaluate  Assessment/Plan:      * Bilateral pneumonia secondary to COVID-19 infection  Fever at home,  desaturation with increased work of breathing, COVID-19 positive with now bilateral infiltrates on x-ray.  Requiring supplemental oxygen.  Daughter is also positive for COVID-19.  High risk given comorbidities, advanced age, poor functional reserves.  Ferritin 247--230--252   D-dimer 0.99--0.41  CRP 9.02--8.39--3.88  Continue dexamethasone 6 mg daily for 4/10 days  Receiving Zosyn due to concurrent UTI  Prophylactic dose Lovenox  Status post convalescent plasma 8/08  Continue isolation precautions  Trending inflammatory markers  As needed breathing treatments with supplemental oxygen  Due to comorbidities unable to lie prone or use incentive spirometer    Sacral decubitus ulcer, POA  Known sacral decubitus ulcer, POA, daughter states she acquired during prior hospitalization in December.  Air mattress ordered.  Q 2 turning.  Wound care consulted.      Acute hypoxemic respiratory failure  Due to COVID-19 infection.  High risk to progressed to ARDS.  DNR/DNI.  Patient needs frequent redirection in keeping oxygen on      DNR (do not resuscitate)  Confirmed on admission      Moderate protein-calorie malnutrition  PEG feeding dependent.  Sacral decubitus ulcer.  Bed-bound.      Alzheimer's dementia  On donepezil.      COPD (chronic obstructive pulmonary disease)  No evidence of acute exacerbation      Bedbound  Bed-bound at baseline      PEG (percutaneous endoscopic gastrostomy) status  PEG feed dependent.  Tube feeds at 30 cc currently in progress.    UTI (urinary tract infection)  History of chronic Scherer.  Urine culture few days ago with E coli and Proteus both susceptible to Rocephin and Zosyn.  Repeat cultures sent in the ED, Scherer was changed, cultures growing Pseudomonas  Continue  Zosyn       CVA (cerebral vascular accident)  Chronic history of same, now bed-bound      VTE Risk Mitigation (From admission, onward)         Ordered     IP VTE HIGH RISK PATIENT  Once      08/08/20 0142     enoxaparin injection 40 mg   Every 24 hours      08/07/20 1707     Place sequential compression device  Until discontinued      08/07/20 1707                      Luisa Michele MD  Department of Hospital Medicine   [facility]

## 2020-08-10 NOTE — NURSING
Unable to flush peg tube, unable to adm meds, notified Dr Lomeli who states he will let dayshift decide what to do.

## 2020-08-10 NOTE — PLAN OF CARE
08/10/20 1320   Patient Assessment/Suction   Level of Consciousness (AVPU) responds to voice   All Lung Fields Breath Sounds diminished   PRE-TX-O2   O2 Device (Oxygen Therapy) nasal cannula   $ Is the patient on Low Flow Oxygen? Yes   Flow (L/min) 4   SpO2 98 %   Pulse Oximetry Type Continuous   $ Pulse Oximetry - Multiple Charge Pulse Oximetry - Multiple   Pulse 63   Resp 15   Aerosol Therapy   $ Aerosol Therapy Charges PRN treatment not required   Respiratory Evaluation   $ Care Plan Tech Time 15 min

## 2020-08-10 NOTE — PLAN OF CARE
Problem: Wound  Goal: Optimal Wound Healing  8/10/2020 0515 by Rosa Sarmiento RN  Outcome: Ongoing, Progressing  8/10/2020 0421 by Rosa Sarmiento RN  Outcome: Ongoing, Progressing

## 2020-08-10 NOTE — PLAN OF CARE
Problem: Feeding Intolerance (Enteral Nutrition)  Goal: Feeding Tolerance  Outcome: Ongoing, Progressing  Intervention: Prevent and Manage Feeding Intolerance  Flowsheets (Taken 8/10/2020 3119)  Nutrition Support Management: tube feeding formula adjusted

## 2020-08-11 LAB
ALBUMIN SERPL BCP-MCNC: 2.9 G/DL (ref 3.5–5.2)
ALP SERPL-CCNC: 53 U/L (ref 55–135)
ALT SERPL W/O P-5'-P-CCNC: 28 U/L (ref 10–44)
ANION GAP SERPL CALC-SCNC: 6 MMOL/L (ref 8–16)
AST SERPL-CCNC: 28 U/L (ref 10–40)
BASOPHILS # BLD AUTO: 0.01 K/UL (ref 0–0.2)
BASOPHILS NFR BLD: 0.2 % (ref 0–1.9)
BILIRUB SERPL-MCNC: 0.9 MG/DL (ref 0.1–1)
BUN SERPL-MCNC: 44 MG/DL (ref 8–23)
CALCIUM SERPL-MCNC: 8.5 MG/DL (ref 8.7–10.5)
CHLORIDE SERPL-SCNC: 110 MMOL/L (ref 95–110)
CO2 SERPL-SCNC: 24 MMOL/L (ref 23–29)
CREAT SERPL-MCNC: 0.9 MG/DL (ref 0.5–1.4)
CRP SERPL-MCNC: 0.82 MG/DL
D DIMER PPP IA.FEU-MCNC: 0.4 UG/ML FEU
DIFFERENTIAL METHOD: ABNORMAL
EOSINOPHIL # BLD AUTO: 0 K/UL (ref 0–0.5)
EOSINOPHIL NFR BLD: 0 % (ref 0–8)
ERYTHROCYTE [DISTWIDTH] IN BLOOD BY AUTOMATED COUNT: 15.9 % (ref 11.5–14.5)
EST. GFR  (AFRICAN AMERICAN): >60 ML/MIN/1.73 M^2
EST. GFR  (NON AFRICAN AMERICAN): 58.1 ML/MIN/1.73 M^2
FERRITIN SERPL-MCNC: 231 NG/ML (ref 20–300)
GLUCOSE SERPL-MCNC: 116 MG/DL (ref 70–110)
HCT VFR BLD AUTO: 23.5 % (ref 37–48.5)
HGB BLD-MCNC: 8.6 G/DL (ref 12–16)
IMM GRANULOCYTES # BLD AUTO: 0.11 K/UL (ref 0–0.04)
IMM GRANULOCYTES NFR BLD AUTO: 2.2 % (ref 0–0.5)
LYMPHOCYTES # BLD AUTO: 1.3 K/UL (ref 1–4.8)
LYMPHOCYTES NFR BLD: 25.3 % (ref 18–48)
MAGNESIUM SERPL-MCNC: 2.5 MG/DL (ref 1.6–2.6)
MCH RBC QN AUTO: 36.6 PG (ref 27–31)
MCHC RBC AUTO-ENTMCNC: 36.6 G/DL (ref 32–36)
MCV RBC AUTO: 100 FL (ref 82–98)
MONOCYTES # BLD AUTO: 0.6 K/UL (ref 0.3–1)
MONOCYTES NFR BLD: 12.4 % (ref 4–15)
NEUTROPHILS # BLD AUTO: 3 K/UL (ref 1.8–7.7)
NEUTROPHILS NFR BLD: 59.9 % (ref 38–73)
NRBC BLD-RTO: 0 /100 WBC
PHOSPHATE SERPL-MCNC: 2.9 MG/DL (ref 2.7–4.5)
PLATELET # BLD AUTO: 370 K/UL (ref 150–350)
PMV BLD AUTO: 11 FL (ref 9.2–12.9)
POTASSIUM SERPL-SCNC: 4 MMOL/L (ref 3.5–5.1)
PROT SERPL-MCNC: 5.9 G/DL (ref 6–8.4)
RBC # BLD AUTO: 2.35 M/UL (ref 4–5.4)
SODIUM SERPL-SCNC: 140 MMOL/L (ref 136–145)
WBC # BLD AUTO: 4.98 K/UL (ref 3.9–12.7)

## 2020-08-11 PROCEDURE — 36415 COLL VENOUS BLD VENIPUNCTURE: CPT

## 2020-08-11 PROCEDURE — 82728 ASSAY OF FERRITIN: CPT

## 2020-08-11 PROCEDURE — 85379 FIBRIN DEGRADATION QUANT: CPT

## 2020-08-11 PROCEDURE — 80053 COMPREHEN METABOLIC PANEL: CPT

## 2020-08-11 PROCEDURE — 12000002 HC ACUTE/MED SURGE SEMI-PRIVATE ROOM

## 2020-08-11 PROCEDURE — 83735 ASSAY OF MAGNESIUM: CPT

## 2020-08-11 PROCEDURE — 99900031 HC PATIENT EDUCATION (STAT)

## 2020-08-11 PROCEDURE — 63600175 PHARM REV CODE 636 W HCPCS: Performed by: INTERNAL MEDICINE

## 2020-08-11 PROCEDURE — 85025 COMPLETE CBC W/AUTO DIFF WBC: CPT

## 2020-08-11 PROCEDURE — 94761 N-INVAS EAR/PLS OXIMETRY MLT: CPT

## 2020-08-11 PROCEDURE — 99900035 HC TECH TIME PER 15 MIN (STAT)

## 2020-08-11 PROCEDURE — 25000003 PHARM REV CODE 250: Performed by: INTERNAL MEDICINE

## 2020-08-11 PROCEDURE — 86140 C-REACTIVE PROTEIN: CPT

## 2020-08-11 PROCEDURE — 25000242 PHARM REV CODE 250 ALT 637 W/ HCPCS: Performed by: INTERNAL MEDICINE

## 2020-08-11 PROCEDURE — 84100 ASSAY OF PHOSPHORUS: CPT

## 2020-08-11 RX ORDER — LOPERAMIDE HYDROCHLORIDE 2 MG/1
2 CAPSULE ORAL ONCE AS NEEDED
Status: DISCONTINUED | OUTPATIENT
Start: 2020-08-11 | End: 2020-08-13 | Stop reason: HOSPADM

## 2020-08-11 RX ORDER — LOPERAMIDE HCL 1MG/7.5ML
2 LIQUID (ML) ORAL DAILY
Status: DISCONTINUED | OUTPATIENT
Start: 2020-08-12 | End: 2020-08-11

## 2020-08-11 RX ADMIN — PIPERACILLIN AND TAZOBACTAM 3.38 G: 3; .375 INJECTION, POWDER, FOR SOLUTION INTRAVENOUS at 11:08

## 2020-08-11 RX ADMIN — MICONAZOLE NITRATE: 20 CREAM TOPICAL at 09:08

## 2020-08-11 RX ADMIN — OXYBUTYNIN CHLORIDE 5 MG: 5 TABLET ORAL at 09:08

## 2020-08-11 RX ADMIN — TRAMADOL HYDROCHLORIDE 50 MG: 50 TABLET, FILM COATED ORAL at 08:08

## 2020-08-11 RX ADMIN — FAMOTIDINE 20 MG: 20 TABLET ORAL at 08:08

## 2020-08-11 RX ADMIN — METOPROLOL TARTRATE 25 MG: 25 TABLET, FILM COATED ORAL at 08:08

## 2020-08-11 RX ADMIN — PIPERACILLIN AND TAZOBACTAM 3.38 G: 3; .375 INJECTION, POWDER, FOR SOLUTION INTRAVENOUS at 04:08

## 2020-08-11 RX ADMIN — OXYBUTYNIN CHLORIDE 5 MG: 5 TABLET ORAL at 08:08

## 2020-08-11 RX ADMIN — FAMOTIDINE 20 MG: 20 TABLET ORAL at 09:08

## 2020-08-11 RX ADMIN — GABAPENTIN 200 MG: 100 CAPSULE ORAL at 08:08

## 2020-08-11 RX ADMIN — LORAZEPAM 1 MG: 1 TABLET ORAL at 08:08

## 2020-08-11 RX ADMIN — HYDROXYZINE HYDROCHLORIDE 25 MG: 25 TABLET, FILM COATED ORAL at 08:08

## 2020-08-11 RX ADMIN — MELATONIN 6 MG: at 08:08

## 2020-08-11 RX ADMIN — ENOXAPARIN SODIUM 40 MG: 100 INJECTION SUBCUTANEOUS at 04:08

## 2020-08-11 RX ADMIN — MICONAZOLE NITRATE: 20 CREAM TOPICAL at 08:08

## 2020-08-11 RX ADMIN — DEXAMETHASONE 6 MG: 2 TABLET ORAL at 09:08

## 2020-08-11 RX ADMIN — PIPERACILLIN AND TAZOBACTAM 3.38 G: 3; .375 INJECTION, POWDER, FOR SOLUTION INTRAVENOUS at 08:08

## 2020-08-11 RX ADMIN — FLUTICASONE PROPIONATE 50 MCG: 50 SPRAY, METERED NASAL at 09:08

## 2020-08-11 RX ADMIN — METOPROLOL TARTRATE 25 MG: 25 TABLET, FILM COATED ORAL at 09:08

## 2020-08-11 RX ADMIN — ASPIRIN 81 MG: 81 TABLET, COATED ORAL at 09:08

## 2020-08-11 RX ADMIN — DONEPEZIL HYDROCHLORIDE 10 MG: 5 TABLET, FILM COATED ORAL at 08:08

## 2020-08-11 RX ADMIN — CETIRIZINE HYDROCHLORIDE 10 MG: 10 TABLET, FILM COATED ORAL at 09:08

## 2020-08-11 RX ADMIN — OXYBUTYNIN CHLORIDE 5 MG: 5 TABLET ORAL at 02:08

## 2020-08-11 RX ADMIN — ACETAMINOPHEN 650 MG: 325 TABLET ORAL at 04:08

## 2020-08-11 NOTE — SUBJECTIVE & OBJECTIVE
Interval History:   Pt is awake, but consistently reporting generalized body pain  Received Tylenol  Was on room air couple of hours today, saturation dropped again on 2 L now  CRP trending down  Had another episode of diarrhea  Will check stool wbc and start Imodium prn  Discharge plan if her oxygen saturation stable  Day 5 of UTI and dexamethasone    Review of Systems   10 point review of systems were found to be negative expect for that mentioned already in interval history.   Objective:     Vital Signs (Most Recent):  Temp: 97.3 °F (36.3 °C) (08/11/20 1500)  Pulse: 92 (08/11/20 1500)  Resp: 20 (08/11/20 1500)  BP: 120/64 (08/11/20 1500)  SpO2: (!) 93 % (08/11/20 1500) Vital Signs (24h Range):  Temp:  [96.1 °F (35.6 °C)-97.3 °F (36.3 °C)] 97.3 °F (36.3 °C)  Pulse:  [55-92] 92  Resp:  [15-20] 20  SpO2:  [93 %-99 %] 93 %  BP: (116-130)/(53-72) 120/64     Weight: 67.5 kg (148 lb 13 oz)  Body mass index is 24.02 kg/m².    Intake/Output Summary (Last 24 hours) at 8/11/2020 1817  Last data filed at 8/11/2020 1800  Gross per 24 hour   Intake 1150 ml   Output 800 ml   Net 350 ml      Physical Exam  Vitals signs and nursing note reviewed.   Constitutional:       Comments: Frail, chronically ill-appearing, elderly female patient, lying in bed, more awake today, repeating few words   HENT:      Head: Normocephalic and atraumatic.   Cardiovascular:      Rate and Rhythm: Normal rate and regular rhythm.   Pulmonary:      Comments: On supplemental oxygen via face mask, she is frequently removing, bilateral inspiratory crackles with diminished air entry on lung examination, no wheeze  Abdominal:      Comments: Protuberant, mild tenderness to deep palpation, bowel sounds present, peg in place--tube feeds going   Genitourinary:     Comments: Scherer in place with yellow urine  Skin:     Comments: Sacral decubitus ulcer, POA.  Trophic nail changes.   Neurological:      Comments: More awake, speaking few words however repeating this  providers not answering questions, not following commands   Psychiatric:      Comments: Unable to evaluate         Significant Labs:   BMP:   Recent Labs   Lab 08/11/20  0342   *      K 4.0      CO2 24   BUN 44*   CREATININE 0.9   CALCIUM 8.5*   MG 2.5     CBC:   Recent Labs   Lab 08/10/20  0405 08/11/20  0341   WBC 6.28 4.98   HGB 9.4* 8.6*   HCT 26.4* 23.5*   * 370*

## 2020-08-11 NOTE — PHYSICIAN QUERY
PT Name: Breanne Culp  MR #: [id number]     Documentation Clarification      CDS/: Lisa Bianchi RN, CCDS               Contact information:  [phone]    This form is a permanent document in the medical record.     Query Date: August 11, 2020    By submitting this query, we are merely seeking further clarification of documentation. Please utilize your independent clinical judgment when addressing the question(s) below.    The Medical Record reflects the following:    Supporting Clinical Findings Location in Medical Record   UTI  Chronic Scherer  Recent urine culture from 08/04/2020 shows UTI with E coli and Proteus, both sensitive to ceftriaxone and Zosyn  Patient received Zosyn in the ER, will continue    Chronic Scherer catheter in place, was changed in the ED.   H & P            08/08/2020 Hospital Medicine                                                                                      Provider, please provide diagnosis or diagnoses associated with above clinical findings.  Please clarify the relationship, if any, between UTI and Scherer catheter.    [   ] UTI is due to or associated with Scherer catheter.   [   ] UTI is unrelated to Scherer catheter.   [  x ] Unknown or clinically unable to determine if UTI is related to Scherer catheter.   [   ] Other (please specify): ____________   [  ] Clinically undetermined

## 2020-08-11 NOTE — PLAN OF CARE
08/11/20 1339   PRE-TX-O2   O2 Device (Oxygen Therapy) room air   SpO2 (!) 93 %   Pulse Oximetry Type Continuous   $ Pulse Oximetry - Multiple Charge Pulse Oximetry - Multiple   Pulse 68   Resp 15   Respiratory Evaluation   $ Care Plan Tech Time 15 min

## 2020-08-11 NOTE — PROGRESS NOTES
[facility] Medicine  Progress Note    Patient Name: Breanne Culp  MRN: [id number]  Patient Class: IP- Inpatient   Admission Date: 8/7/2020  Length of Stay: 4 days  Attending Physician: Luisa Michele MD  Primary Care Provider: Frantz Bill MD        Subjective:     Principal Problem:Pneumonia due to COVID-19 virus        HPI:  HPI as per MD Mobley  Breanne Culp is a 86 y.o. AA female who  has a past medical history of Anticoagulant long-term use, Aphasia S/P CVA, Arthritis, Bedbound, Cataract, COPD (chronic obstructive pulmonary disease), Coronary artery disease, DDD (degenerative disc disease), lumbar, Glaucoma, Hyperlipidemia, Low back pain, Onychomycosis due to dermatophyte (11/26/2014), Osteoporosis, Positive PPD (1970), Seasonal allergies, and Stroke.. The patient presented to [facility] on 8/7/2020 with a primary complaint of Fever    History was obtained from ER physician on sign out.  Dr. Pratt spoke with patient's daughter via phone to get the history.  Daughter reported patient has been having fever for the last few days off and on.  Patient is living at home with her daughter.  Patient is bedbound, nonverbal since the stroke, history of recurrent/frequent UTI, sacral decubitus, history of dementia/Alzheimer's with behavioral disturbance.  Daughter reports they have home health service.  Yesterday she was diagnosed with UTI and was started on Keflex.  Today she noted that patient was more confused and somnolent so she called the EMS.  When EMS arrived, patient had an O2 sat of 88% on room air.  They applied the supplemental oxygen via nasal cannula and brought her to the ER  In the ER patient had a temp of 100.6°, respiration was 25 per minute, O2 sat was 88% which improved to 94% with 2 L supplemental oxygen.  On exam patient did grimace on abdominal examination, specially in the left lower quadrant and suprapubic area.  CT of the abdomen was  ordered.  Prominent stool ball seen in the rectal vault, probably fecal impaction.  Marked sigmoid prominent diverticulosis without acute diverticulitis dose.  Hepatic steatosis.  X-ray chest shows worsening of bilateral pulmonary interstitial and ground-glass alveolar opacities S seen in viral pneumonia, atypical infection or pulmonary edema     Lab shows WBC of 7.37, H&H 10.3/31.9, platelet 285  Ferritin 230  CMP shows bicarb of 22, BUN creatinine 40/0.9, albumin 3.3  CRP 9.02(H)  BNP 28,  -high  COVID rapid positive  Lactate vein 1.5  Urinalysis shows appearance hazy, protein trace, 3+ occult blood, nitrite positive, 3+ leuko, RBC D3, bacteria negative, WBC greater than 100  Noted urine culture from 08/04/2020 was positive for E coli and Proteus.  Patient has a history of Proteus UTI multiple time in the past     Dr. Darby and Dr. Phipps were consulted through the ER..  Dr. Phipps discussed the case with patient's daughter miss wilcox who confirmed that patient has a DNR/DNI status.  Treatment options were discussed and decided to continue treatment with dexamethasone daily.  Patient will not receive convalescent plasma and Remdesivir per daughter/Dr. winchester conversation     Admit to med tele with cardiac monitoring    Overview/Hospital Course:  Patient with a history of CVA, bedbound, PEG feeding dependent, sacral decubitus ulcer, POA, community dwelling with home health, brought into the ED due to persistent fever and shortness of breath as per collateral.  COVID-19 was positive with bilateral infiltrates on chest x-ray.  Did have fever with positive UA, was recently started on Keflex as outpatient for UTI.  Chronic Scherer catheter in place, was changed in the ED.  Pulmonary was consulted in the ED, following discussions decision to admit, steroid, holding of plasma/RDV.  On 08/08, assumed care, patient lying in bed, does not open eyes during examination, remains nonverbal, as per nursing she did have bowel  movement overnight, hard, remains on supplemental oxygen, saturating well, collateral obtained from daughter, not on home oxygen, daughter was tested yesterday at urgent care and now was also COVID-19 positive, she states the patient did have caregivers/sitters as well in the home.  Daughter now consenting to convalescent plasma, communicated with pulmonologist, will order.  Discussed patient is high risk for decline. Confirmed DNI.  On 08/09, O2 requirements increased to 4 L, on face mask however patient has been frequently removing, episodes of hypothermia down to 95.2, tolerated CCP well, tolerating tube feeds, she is more awake, repeating words, updated daughter over the phone.    Interval History:   Pt is awake, but consistently reporting generalized body pain  Received Tylenol  Was on room air couple of hours today, saturation dropped again on 2 L now  CRP trending down  Had another episode of diarrhea  Will check stool wbc and start Imodium prn  Discharge plan if her oxygen saturation stable  Day 5 of UTI and dexamethasone    Review of Systems   10 point review of systems were found to be negative expect for that mentioned already in interval history.   Objective:     Vital Signs (Most Recent):  Temp: 97.3 °F (36.3 °C) (08/11/20 1500)  Pulse: 92 (08/11/20 1500)  Resp: 20 (08/11/20 1500)  BP: 120/64 (08/11/20 1500)  SpO2: (!) 93 % (08/11/20 1500) Vital Signs (24h Range):  Temp:  [96.1 °F (35.6 °C)-97.3 °F (36.3 °C)] 97.3 °F (36.3 °C)  Pulse:  [55-92] 92  Resp:  [15-20] 20  SpO2:  [93 %-99 %] 93 %  BP: (116-130)/(53-72) 120/64     Weight: 67.5 kg (148 lb 13 oz)  Body mass index is 24.02 kg/m².    Intake/Output Summary (Last 24 hours) at 8/11/2020 1817  Last data filed at 8/11/2020 1800  Gross per 24 hour   Intake 1150 ml   Output 800 ml   Net 350 ml      Physical Exam  Vitals signs and nursing note reviewed.   Constitutional:       Comments: Frail, chronically ill-appearing, elderly female patient, lying in  bed, more awake today, repeating few words   HENT:      Head: Normocephalic and atraumatic.   Cardiovascular:      Rate and Rhythm: Normal rate and regular rhythm.   Pulmonary:      Comments: On supplemental oxygen via nc , she is frequently removing,  diminished air entry on lung examination, no wheeze  Abdominal:      Comments: Protuberant, mild tenderness to deep palpation, bowel sounds present, peg in place--tube feeds going   Genitourinary:     Comments: Scherer in place with yellow urine  Skin:     Comments: Sacral decubitus ulcer, POA.  Trophic nail changes.   Neurological:      Comments: More awake, speaking few words however repeating this providers not answering questions, not following commands   Psychiatric:      Comments: Unable to evaluate         Significant Labs:   BMP:   Recent Labs   Lab 08/11/20  0342   *      K 4.0      CO2 24   BUN 44*   CREATININE 0.9   CALCIUM 8.5*   MG 2.5     CBC:   Recent Labs   Lab 08/10/20  0405 08/11/20  0341   WBC 6.28 4.98   HGB 9.4* 8.6*   HCT 26.4* 23.5*   * 370*           Assessment/Plan:      * Bilateral pneumonia secondary to COVID-19 infection  Fever at home, desaturation with increased work of breathing, COVID-19 positive with now bilateral infiltrates on x-ray.  Requiring supplemental oxygen.  Daughter is also positive for COVID-19.  High risk given comorbidities, advanced age, poor functional reserves.  Ferritin 247--230--252  D-dimer 0.99--0.41  CRP 9.02--8.39--3.88  Continue dexamethasone 6 mg daily for 10 days  Receiving Zosyn due to concurrent UTI  Prophylactic dose Lovenox  Status post convalescent plasma 8/08  Continue isolation precautions  Trending inflammatory markers  As needed breathing treatments with supplemental oxygen  Due to comorbidities unable to lie prone or use incentive spirometer    Sacral decubitus ulcer, POA  Known sacral decubitus ulcer, POA, daughter states she acquired during prior hospitalization in  December.  Air mattress ordered.  Q 2 turning.  Wound care consulted.      Acute hypoxemic respiratory failure  Due to COVID-19 infection.  High risk to progressed to ARDS.  DNR/DNI.  Patient needs frequent redirection in keeping oxygen on      DNR (do not resuscitate)  Confirmed on admission      Moderate protein-calorie malnutrition  PEG feeding dependent.  Sacral decubitus ulcer.  Bed-bound.      Alzheimer's dementia  On donepezil.      COPD (chronic obstructive pulmonary disease)  No evidence of acute exacerbation      Bedbound  Bed-bound at baseline      PEG (percutaneous endoscopic gastrostomy) status  PEG feed dependent.  Tube feeds at 30 cc currently in progress.    UTI (urinary tract infection)  History of chronic Scherer.  Urine culture few days ago with E coli and Proteus both susceptible to Rocephin and Zosyn.  Repeat cultures sent in the ED, Scherer was changed, cultures growing Pseudomonas  Continue empiric Zosyn and follow-up cultures      CVA (cerebral vascular accident)  Chronic history of same, now bed-bound        VTE Risk Mitigation (From admission, onward)         Ordered     IP VTE HIGH RISK PATIENT  Once      08/08/20 0142     enoxaparin injection 40 mg  Every 24 hours      08/07/20 1707     Place sequential compression device  Until discontinued      08/07/20 1707                      Luisa Michele MD  Department of Hospital Medicine   [facility]

## 2020-08-11 NOTE — PLAN OF CARE
Problem: Wound  Goal: Optimal Wound Healing  Outcome: Ongoing, Progressing     Problem: Fall Injury Risk  Goal: Absence of Fall and Fall-Related Injury  Outcome: Ongoing, Progressing     Problem: Adult Inpatient Plan of Care  Goal: Plan of Care Review  Outcome: Ongoing, Progressing  Goal: Patient-Specific Goal (Individualization)  Outcome: Ongoing, Progressing  Goal: Absence of Hospital-Acquired Illness or Injury  Outcome: Ongoing, Progressing  Goal: Optimal Comfort and Wellbeing  Outcome: Ongoing, Progressing  Goal: Readiness for Transition of Care  Outcome: Ongoing, Progressing  Goal: Rounds/Family Conference  Outcome: Ongoing, Progressing     Problem: Skin Injury Risk Increased  Goal: Skin Health and Integrity  Outcome: Ongoing, Progressing     Problem: Feeding Intolerance (Enteral Nutrition)  Goal: Feeding Tolerance  Outcome: Ongoing, Progressing     Problem: Infection  Goal: Infection Symptom Resolution  Outcome: Ongoing, Progressing

## 2020-08-12 ENCOUNTER — DOCUMENT SCAN (OUTPATIENT)
Dept: HOME HEALTH SERVICES | Facility: HOSPITAL | Age: 85
End: 2020-08-12
Payer: MEDICARE

## 2020-08-12 LAB
ALBUMIN SERPL BCP-MCNC: 2.9 G/DL (ref 3.5–5.2)
ALP SERPL-CCNC: 52 U/L (ref 55–135)
ALT SERPL W/O P-5'-P-CCNC: 38 U/L (ref 10–44)
ANION GAP SERPL CALC-SCNC: 7 MMOL/L (ref 8–16)
AST SERPL-CCNC: 31 U/L (ref 10–40)
BACTERIA BLD CULT: NORMAL
BACTERIA BLD CULT: NORMAL
BASOPHILS # BLD AUTO: 0.02 K/UL (ref 0–0.2)
BASOPHILS NFR BLD: 0.3 % (ref 0–1.9)
BILIRUB SERPL-MCNC: 0.7 MG/DL (ref 0.1–1)
BUN SERPL-MCNC: 38 MG/DL (ref 8–23)
CALCIUM SERPL-MCNC: 8.5 MG/DL (ref 8.7–10.5)
CHLORIDE SERPL-SCNC: 110 MMOL/L (ref 95–110)
CO2 SERPL-SCNC: 25 MMOL/L (ref 23–29)
CREAT SERPL-MCNC: 0.9 MG/DL (ref 0.5–1.4)
CRP SERPL-MCNC: 0.41 MG/DL
D DIMER PPP IA.FEU-MCNC: 0.45 UG/ML FEU
DIFFERENTIAL METHOD: ABNORMAL
EOSINOPHIL # BLD AUTO: 0 K/UL (ref 0–0.5)
EOSINOPHIL NFR BLD: 0.3 % (ref 0–8)
ERYTHROCYTE [DISTWIDTH] IN BLOOD BY AUTOMATED COUNT: 14.6 % (ref 11.5–14.5)
EST. GFR  (AFRICAN AMERICAN): >60 ML/MIN/1.73 M^2
EST. GFR  (NON AFRICAN AMERICAN): 58.1 ML/MIN/1.73 M^2
FERRITIN SERPL-MCNC: 247 NG/ML (ref 20–300)
FERRITIN SERPL-MCNC: 247 NG/ML (ref 20–300)
GLUCOSE SERPL-MCNC: 117 MG/DL (ref 70–110)
HCT VFR BLD AUTO: 28.2 % (ref 37–48.5)
HGB BLD-MCNC: 9.4 G/DL (ref 12–16)
IMM GRANULOCYTES # BLD AUTO: 0.22 K/UL (ref 0–0.04)
IMM GRANULOCYTES NFR BLD AUTO: 2.8 % (ref 0–0.5)
LYMPHOCYTES # BLD AUTO: 1.8 K/UL (ref 1–4.8)
LYMPHOCYTES NFR BLD: 23.4 % (ref 18–48)
MAGNESIUM SERPL-MCNC: 2.5 MG/DL (ref 1.6–2.6)
MCH RBC QN AUTO: 32.3 PG (ref 27–31)
MCHC RBC AUTO-ENTMCNC: 33.3 G/DL (ref 32–36)
MCV RBC AUTO: 97 FL (ref 82–98)
MONOCYTES # BLD AUTO: 0.9 K/UL (ref 0.3–1)
MONOCYTES NFR BLD: 10.9 % (ref 4–15)
NEUTROPHILS # BLD AUTO: 4.9 K/UL (ref 1.8–7.7)
NEUTROPHILS NFR BLD: 62.3 % (ref 38–73)
NRBC BLD-RTO: 0 /100 WBC
PHOSPHATE SERPL-MCNC: 2.8 MG/DL (ref 2.7–4.5)
PLATELET # BLD AUTO: 393 K/UL (ref 150–350)
PMV BLD AUTO: 10.6 FL (ref 9.2–12.9)
POTASSIUM SERPL-SCNC: 4.1 MMOL/L (ref 3.5–5.1)
PROT SERPL-MCNC: 6 G/DL (ref 6–8.4)
RBC # BLD AUTO: 2.91 M/UL (ref 4–5.4)
SODIUM SERPL-SCNC: 142 MMOL/L (ref 136–145)
WBC # BLD AUTO: 7.78 K/UL (ref 3.9–12.7)

## 2020-08-12 PROCEDURE — 83735 ASSAY OF MAGNESIUM: CPT

## 2020-08-12 PROCEDURE — 36415 COLL VENOUS BLD VENIPUNCTURE: CPT

## 2020-08-12 PROCEDURE — 25000003 PHARM REV CODE 250: Performed by: INTERNAL MEDICINE

## 2020-08-12 PROCEDURE — 85025 COMPLETE CBC W/AUTO DIFF WBC: CPT

## 2020-08-12 PROCEDURE — 84100 ASSAY OF PHOSPHORUS: CPT

## 2020-08-12 PROCEDURE — 25000242 PHARM REV CODE 250 ALT 637 W/ HCPCS: Performed by: INTERNAL MEDICINE

## 2020-08-12 PROCEDURE — 82728 ASSAY OF FERRITIN: CPT

## 2020-08-12 PROCEDURE — 85379 FIBRIN DEGRADATION QUANT: CPT

## 2020-08-12 PROCEDURE — 80053 COMPREHEN METABOLIC PANEL: CPT

## 2020-08-12 PROCEDURE — 86140 C-REACTIVE PROTEIN: CPT

## 2020-08-12 PROCEDURE — 63600175 PHARM REV CODE 636 W HCPCS: Performed by: INTERNAL MEDICINE

## 2020-08-12 PROCEDURE — 12000002 HC ACUTE/MED SURGE SEMI-PRIVATE ROOM

## 2020-08-12 RX ADMIN — PIPERACILLIN AND TAZOBACTAM 3.38 G: 3; .375 INJECTION, POWDER, FOR SOLUTION INTRAVENOUS at 08:08

## 2020-08-12 RX ADMIN — HYDROXYZINE HYDROCHLORIDE 25 MG: 25 TABLET, FILM COATED ORAL at 08:08

## 2020-08-12 RX ADMIN — LORAZEPAM 1 MG: 1 TABLET ORAL at 08:08

## 2020-08-12 RX ADMIN — FAMOTIDINE 20 MG: 20 TABLET ORAL at 08:08

## 2020-08-12 RX ADMIN — ACETAMINOPHEN 650 MG: 325 TABLET, FILM COATED ORAL at 05:08

## 2020-08-12 RX ADMIN — FLUTICASONE PROPIONATE 50 MCG: 50 SPRAY, METERED NASAL at 08:08

## 2020-08-12 RX ADMIN — MICONAZOLE NITRATE: 20 CREAM TOPICAL at 08:08

## 2020-08-12 RX ADMIN — ACETAMINOPHEN 650 MG: 325 TABLET, FILM COATED ORAL at 08:08

## 2020-08-12 RX ADMIN — DEXAMETHASONE 6 MG: 2 TABLET ORAL at 08:08

## 2020-08-12 RX ADMIN — GABAPENTIN 200 MG: 100 CAPSULE ORAL at 08:08

## 2020-08-12 RX ADMIN — ENOXAPARIN SODIUM 40 MG: 100 INJECTION SUBCUTANEOUS at 05:08

## 2020-08-12 RX ADMIN — DONEPEZIL HYDROCHLORIDE 10 MG: 5 TABLET, FILM COATED ORAL at 08:08

## 2020-08-12 RX ADMIN — CETIRIZINE HYDROCHLORIDE 10 MG: 10 TABLET, FILM COATED ORAL at 09:08

## 2020-08-12 RX ADMIN — METOPROLOL TARTRATE 25 MG: 25 TABLET, FILM COATED ORAL at 08:08

## 2020-08-12 RX ADMIN — DOCUSATE SODIUM 100 MG: 50 LIQUID ORAL at 08:08

## 2020-08-12 RX ADMIN — OXYBUTYNIN CHLORIDE 5 MG: 5 TABLET ORAL at 03:08

## 2020-08-12 RX ADMIN — OXYBUTYNIN CHLORIDE 5 MG: 5 TABLET ORAL at 08:08

## 2020-08-12 RX ADMIN — PIPERACILLIN AND TAZOBACTAM 3.38 G: 3; .375 INJECTION, POWDER, FOR SOLUTION INTRAVENOUS at 04:08

## 2020-08-12 RX ADMIN — ASPIRIN 81 MG: 81 TABLET, COATED ORAL at 08:08

## 2020-08-12 RX ADMIN — PIPERACILLIN AND TAZOBACTAM 3.38 G: 3; .375 INJECTION, POWDER, FOR SOLUTION INTRAVENOUS at 12:08

## 2020-08-12 NOTE — PROGRESS NOTES
[facility]  Adult Nutrition   Progress Note (Nutrition Support Management)  Enteral Nutrition Monitoring and Management  SUMMARY     Recommendations  Recommendation/Intervention:   1. Recommend continued advancement of TF to goal rate as tolerated. Jevitty 1.5 with goal rate of 50 ml/hr with 100 ml water flush Q6H  (to provide 1800 kcal/day, 77 g/day protein, and 1312 ml/day free water).   2. RD to monitor TF rate and tolerance, weight, labs, fluid status and manage TF accordingly.   3. Recommend discontinuing ducosate and starting probiotic.   4. Plans to go home with home hospice. Continue care aligned with patient/family wishes.  Goals:   1. Patient to meet estimated needs via enteral nutrition provision.   2. Weight and fluid status to be monitored and patient to maintain adequate fluid status.   3. Patient/family wishes to be honored regarding continuation of artificial hydration and nutrition via enteral nutrition.  Nutrition Goal Status: new  Communication of RD Recs: reviewed with physician     Dietitian Rounds Brief  · Patient in isolation and RD unable to locate RN at time of rounds.   · Per MD progress notes yesterday and today TF running at 30 ml/hr.    · Yesterday Immodium (loperamide) started for diarrhea. Pt recieving docusate 50 mg/5 mL liquid 100 mg daily. Recommended considering discontinuing ducosate and also starting probiotic as patient is recieivng antibiotics as well to help improve/resolve episodes of diarrhea. MD agreed.   · Per progress notes today plans for patient to go home with daughter with home hospice tomorrow.     Reason for Assessment  Reason For Assessment: RD follow-up  Diagnosis: (COVID-19)  Relevant Medical History: COPD, CAD, DDD, HLD, Stroke, PEG, bed bound  Interdisciplinary Rounds: did not attend    Nutrition Risk Screen  Nutrition Risk Screen: tube feeding or parenteral nutrition    Nutrition/Diet History  Food Allergies: NKFA  Factors Affecting Nutritional  "Intake: NPO    Anthropometrics  Temp: 96.1 °F (35.6 °C)  Height Method: Estimated  Height: 5' 6" (167.6 cm)  Height (inches): 66 in  Weight Method: Bed Scale  Weight: 67.5 kg (148 lb 13 oz)  Weight (lb): 148.81 lb  Ideal Body Weight (IBW), Female: 130 lb  BMI (Calculated): 24  BMI Grade: 18.5-24.9 - normal  Weight Loss: other (see comments)(weight gain)  Usual Body Weight (UBW), k.7 kg  Weight Change Amount: 23 lb 13 oz  % Usual Body Weight: 119.3  % Weight Change From Usual Weight: 19.05 %       Weight History:  Wt Readings from Last 10 Encounters:   08/10/20 67.5 kg (148 lb 13 oz)   20 56.7 kg (125 lb)   20 56.7 kg (125 lb)   19 55.5 kg (122 lb 5.7 oz)   19 53.5 kg (117 lb 15.1 oz)   18 57.3 kg (126 lb 5.2 oz)   10/23/18 54.1 kg (119 lb 4.3 oz)   09/10/18 54.1 kg (119 lb 2.5 oz)   18 54.4 kg (120 lb)   18 54.8 kg (120 lb 13 oz)       Lab/Procedures/Meds: Pertinent Labs Reviewed    Clinical Chemistry:  Recent Labs   Lab 08/10/20  0405 20  0342 20  0357    140 142   K 4.1 4.0 4.1    110 110   CO2 24 24 25    116* 117*   BUN 48* 44* 38*   CREATININE 1.0 0.9 0.9   CALCIUM 8.7 8.5* 8.5*   PROT 6.3 5.9* 6.0   ALBUMIN 3.0* 2.9* 2.9*   BILITOT 1.1* 0.9 0.7   ALKPHOS 53* 53* 52*   AST 25 28 31   ALT 23 28 38   ANIONGAP 9 6* 7*   ESTGFRAFRICA 58.9* >60.0 >60.0   EGFRNONAA 51.1* 58.1* 58.1*   MG 2.6 2.5 2.5   PHOS 2.8 2.9 2.8       CBC:   Recent Labs   Lab 20  0357   WBC 7.78   RBC 2.91*   HGB 9.4*   HCT 28.2*   *   MCV 97   MCH 32.3*   MCHC 33.3       Cardiac Profile:  Recent Labs   Lab 20  1140   BNP 28     113   CPKMB 1.1   TROPONINI <0.030       Inflammatory Labs:  Recent Labs   Lab 08/10/20  0405 20  0342 20  0357   CRP 1.74* 0.82* 0.41         Medications: Pertinent Medications reviewed    Scheduled Meds:   aspirin  81 mg Oral Daily    cetirizine  10 mg Per G Tube Daily    dexAMETHasone  6 mg Per G " Tube Daily    donepeziL  10 mg Per G Tube QHS    enoxaparin  40 mg Subcutaneous Q24H    famotidine  20 mg Per G Tube BID    fluticasone propionate  1 spray Each Nostril Daily    gabapentin  200 mg Per G Tube QHS    hydrOXYzine HCL  25 mg Per G Tube QHS    LORazepam  1 mg Per G Tube QHS    metoprolol tartrate  25 mg Per G Tube BID    miconazole   Topical (Top) BID    oxybutynin  5 mg Per G Tube TID    piperacillin-tazobactam (ZOSYN) IVPB  3.375 g Intravenous Q8H       PRN Meds:.acetaminophen, acetaminophen, acetaminophen, benzonatate, bisacodyL, calcium chloride IVPB, calcium chloride IVPB, calcium chloride IVPB, dextrose 50%, dextrose 50%, diphenhydrAMINE, glucagon (human recombinant), glucose, glucose, loperamide, magnesium oxide, magnesium sulfate IVPB, magnesium sulfate IVPB, magnesium sulfate IVPB, magnesium sulfate IVPB, melatonin, ondansetron, potassium chloride in water, potassium chloride in water, potassium chloride in water, potassium chloride in water, potassium chloride, potassium chloride, potassium chloride, potassium chloride, promethazine (PHENERGAN) IVPB, sodium chloride 0.9%, sodium phosphate IVPB, sodium phosphate IVPB, sodium phosphate IVPB, sodium phosphate IVPB, sodium phosphate IVPB, traMADoL    Estimated/Assessed Needs  Weight Used For Calorie Calculations: 67.5 kg (148 lb 13 oz)  Energy Calorie Requirements (kcal): 0520-1824 kcals/day (20-25 kcals/kg)  Energy Need Method: Kcal/kg  Protein Requirements: 67-88 g/day (1.0-1.3 g/kg)  Weight Used For Protein Calculations: 67.5 kg (148 lb 13 oz)     Estimated Fluid Requirement Method: RDA Method  RDA Method (mL): 1350       Nutrition Prescription Ordered  Current Diet Order: NPO  Current Nutrition Support Formula Ordered: Jevity 1.5  Current Nutrition Support Rate Ordered: 30 (ml)  Current Nutrition Support Frequency Ordered: Running at 30 ml/hr. Goal Rate = 50 ml/hr with 100 ml water flush Q6H.    Evaluation of Received Nutrient/Fluid  Intake  Energy Calories Required: not meeting needs  Protein Required: not meeting needs  Fluid Required: meeting needs  Comments: -  Tolerance: tolerating     Intake/Output Summary (Last 24 hours) at 8/12/2020 1839  Last data filed at 8/12/2020 1800  Gross per 24 hour   Intake 100 ml   Output 800 ml   Net -700 ml        Nutrition Risk  Level of Risk/Frequency of Follow-up: comfort care(plans to discharge tomorrow with home hospice)     Monitor and Evaluation  Food and Nutrient Intake: energy intake, enteral nutrition intake  Food and Nutrient Adminstration: enteral and parenteral nutrition administration  Knowledge/Beliefs/Attitudes: beliefs and attitudes, food and nutrition knowledge/skill  Physical Activity and Function: nutrition-related ADLs and IADLs, factors affecting access to physical activity  Anthropometric Measurements: weight change, body mass index, weight  Biochemical Data, Medical Tests and Procedures: electrolyte and renal panel, inflammatory profile, gastrointestinal profile, lipid profile, glucose/endocrine profile  Nutrition-Focused Physical Findings: overall appearance     Nutrition Follow-Up  RD Follow-up?: No(Plans to discharge with home hospice. Will follow PRN)     Jessie Mccarthy RD 08/12/2020 6:41 PM

## 2020-08-12 NOTE — PROGRESS NOTES
[facility]  Progress Note    Patient Name: Breanne Culp  MRN: [id number]  Patient Class: IP- Inpatient   Admission Date: 8/7/2020  Length of Stay: 5 days  Attending Physician: Luisa Michele MD  Primary Care Provider: Frantz Bill MD        Subjective:     Principal Problem:Pneumonia due to COVID-19 virus        HPI:  HPI as per MD Mobley  Breanne Culp is a 86 y.o. AA female who  has a past medical history of Anticoagulant long-term use, Aphasia S/P CVA, Arthritis, Bedbound, Cataract, COPD (chronic obstructive pulmonary disease), Coronary artery disease, DDD (degenerative disc disease), lumbar, Glaucoma, Hyperlipidemia, Low back pain, Onychomycosis due to dermatophyte (11/26/2014), Osteoporosis, Positive PPD (1970), Seasonal allergies, and Stroke.. The patient presented to [facility] on 8/7/2020 with a primary complaint of Fever    History was obtained from ER physician on sign out.  Dr. Pratt spoke with patient's daughter via phone to get the history.  Daughter reported patient has been having fever for the last few days off and on.  Patient is living at home with her daughter.  Patient is bedbound, nonverbal since the stroke, history of recurrent/frequent UTI, sacral decubitus, history of dementia/Alzheimer's with behavioral disturbance.  Daughter reports they have home health service.  Yesterday she was diagnosed with UTI and was started on Keflex.  Today she noted that patient was more confused and somnolent so she called the EMS.  When EMS arrived, patient had an O2 sat of 88% on room air.  They applied the supplemental oxygen via nasal cannula and brought her to the ER  In the ER patient had a temp of 100.6°, respiration was 25 per minute, O2 sat was 88% which improved to 94% with 2 L supplemental oxygen.  On exam patient did grimace on abdominal examination, specially in the left lower quadrant and suprapubic area.  CT of the abdomen was  ordered.  Prominent stool ball seen in the rectal vault, probably fecal impaction.  Marked sigmoid prominent diverticulosis without acute diverticulitis dose.  Hepatic steatosis.  X-ray chest shows worsening of bilateral pulmonary interstitial and ground-glass alveolar opacities S seen in viral pneumonia, atypical infection or pulmonary edema     Lab shows WBC of 7.37, H&H 10.3/31.9, platelet 285  Ferritin 230  CMP shows bicarb of 22, BUN creatinine 40/0.9, albumin 3.3  CRP 9.02(H)  BNP 28,  -high  COVID rapid positive  Lactate vein 1.5  Urinalysis shows appearance hazy, protein trace, 3+ occult blood, nitrite positive, 3+ leuko, RBC D3, bacteria negative, WBC greater than 100  Noted urine culture from 08/04/2020 was positive for E coli and Proteus.  Patient has a history of Proteus UTI multiple time in the past     Dr. Darby and Dr. Phipps were consulted through the ER..  Dr. Phipps discussed the case with patient's daughter miss wilcox who confirmed that patient has a DNR/DNI status.  Treatment options were discussed and decided to continue treatment with dexamethasone daily.  Patient will not receive convalescent plasma and Remdesivir per daughter/Dr. winchester conversation     Admit to med tele with cardiac monitoring    Overview/Hospital Course:  Patient with a history of CVA, bedbound, PEG feeding dependent, sacral decubitus ulcer, POA, community dwelling with home health, brought into the ED due to persistent fever and shortness of breath as per collateral.  COVID-19 was positive with bilateral infiltrates on chest x-ray.  Did have fever with positive UA, was recently started on Keflex as outpatient for UTI.  Chronic Scherer catheter in place, was changed in the ED.  Pulmonary was consulted in the ED, following discussions decision to admit, steroid, holding of plasma/RDV.  On 08/08, assumed care, patient lying in bed, does not open eyes during examination, remains nonverbal, as per nursing she did have bowel  movement overnight, hard, remains on supplemental oxygen, saturating well, collateral obtained from daughter, not on home oxygen, daughter was tested yesterday at urgent care and now was also COVID-19 positive, she states the patient did have caregivers/sitters as well in the home.  Daughter now consenting to convalescent plasma, communicated with pulmonologist, will order.  Discussed patient is high risk for decline. Confirmed DNI.  On 08/09, O2 requirements increased to 4 L, on face mask however patient has been frequently removing, episodes of hypothermia down to 95.2, tolerated CCP well, tolerating tube feeds, she is more awake, repeating words, updated daughter over the phone.    Interval History:   Patient remained confused with baseline dementia  Unable to keep oxygen nasal canula  Saturating 92% on room air  Discharge plan discussed with daughter and   Daughter agreed to take her tomorrow with home hospice    Review of Systems   Unable to obtain  Objective:     Vital Signs (Most Recent):  Temp: 96.1 °F (35.6 °C) (08/12/20 1531)  Pulse: 76 (08/12/20 1531)  Resp: 18 (08/12/20 1531)  BP: 136/66 (08/12/20 1531)  SpO2: (!) 92 % (08/12/20 1531) Vital Signs (24h Range):  Temp:  [96.1 °F (35.6 °C)-97.4 °F (36.3 °C)] 96.1 °F (35.6 °C)  Pulse:  [58-79] 76  Resp:  [16-22] 18  SpO2:  [91 %-93 %] 92 %  BP: (125-157)/(61-74) 136/66     Weight: 67.5 kg (148 lb 13 oz)  Body mass index is 24.02 kg/m².    Intake/Output Summary (Last 24 hours) at 8/12/2020 1735  Last data filed at 8/11/2020 1921  Gross per 24 hour   Intake 1050 ml   Output 500 ml   Net 550 ml      Physical Exam  Vitals signs and nursing note reviewed.   Constitutional:       Comments: Frail, chronically ill-appearing, elderly female patient, lying in bed, more awake today, repeating few words   HENT:      Head: Normocephalic and atraumatic.   Cardiovascular:      Rate and Rhythm: Normal rate and regular rhythm.   Pulmonary:      Comments: On  supplemental oxygen via face mask, she is frequently removing, bilateral inspiratory crackles with diminished air entry on lung examination, no wheeze  Abdominal:      Comments: Protuberant, mild tenderness to deep palpation, bowel sounds present, peg in place--tube feeds going   Genitourinary:     Comments: Scherer in place with yellow urine  Skin:     Comments: Sacral decubitus ulcer, POA.  Trophic nail changes.   Neurological:      Comments: More awake, speaking few words however repeating this providers not answering questions, not following commands   Psychiatric:      Comments: Unable to evaluate         Significant Labs:   BMP:   Recent Labs   Lab 08/12/20  0357   *      K 4.1      CO2 25   BUN 38*   CREATININE 0.9   CALCIUM 8.5*   MG 2.5     CBC:   Recent Labs   Lab 08/11/20  0341 08/12/20  0357   WBC 4.98 7.78   HGB 8.6* 9.4*   HCT 23.5* 28.2*   * 393*             Assessment/Plan:      * Bilateral pneumonia secondary to COVID-19 infection  Fever at home, desaturation with increased work of breathing, COVID-19 positive with now bilateral infiltrates on x-ray.  Requiring supplemental oxygen.  Daughter is also positive for COVID-19.  High risk given comorbidities, advanced age, poor functional reserves.  Ferritin 247--230--252  D-dimer 0.99--0.41  CRP 9.02--8.39--3.88  Continue dexamethasone 6 mg daily for 10 days  Receiving Zosyn due to concurrent UTI  Prophylactic dose Lovenox  Status post convalescent plasma 8/08  Continue isolation precautions  Trending inflammatory markers  As needed breathing treatments with supplemental oxygen  Due to comorbidities unable to lie prone or use incentive spirometer    Sacral decubitus ulcer, POA  Known sacral decubitus ulcer, POA, daughter states she acquired during prior hospitalization in December.  Air mattress ordered.  Q 2 turning.  Wound care consulted.      Acute hypoxemic respiratory failure  Due to COVID-19 infection.  High risk to  progressed to ARDS.  DNR/DNI.  Patient needs frequent redirection in keeping oxygen on      DNR (do not resuscitate)  Confirmed on admission      Moderate protein-calorie malnutrition  PEG feeding dependent.  Sacral decubitus ulcer.  Bed-bound.      Alzheimer's dementia  On donepezil.      COPD (chronic obstructive pulmonary disease)  No evidence of acute exacerbation      Bedbound  Bed-bound at baseline      PEG (percutaneous endoscopic gastrostomy) status  PEG feed dependent.  Tube feeds at 30 cc currently in progress.    UTI (urinary tract infection)  History of chronic Scherer.  Urine culture few days ago with E coli and Proteus both susceptible to Rocephin and Zosyn.  Repeat cultures sent in the ED, Scherer was changed, cultures growing Pseudomonas  Continue empiric Zosyn and follow-up cultures      CVA (cerebral vascular accident)  Chronic history of same, now bed-bound        VTE Risk Mitigation (From admission, onward)         Ordered     IP VTE HIGH RISK PATIENT  Once      08/08/20 0142     enoxaparin injection 40 mg  Every 24 hours      08/07/20 1707     Place sequential compression device  Until discontinued      08/07/20 1707                      Luisa Michele MD  Department of Hospital Medicine   Formerly [facility]

## 2020-08-12 NOTE — RESPIRATORY THERAPY
08/11/20 2030   Patient Assessment/Suction   Level of Consciousness (AVPU) responds to voice   Respiratory Effort Unlabored;Shallow   Expansion/Accessory Muscles/Retractions expansion symmetric;no retractions;no use of accessory muscles   All Lung Fields Breath Sounds diminished   Rhythm/Pattern, Respiratory shallow   PRE-TX-O2   O2 Device (Oxygen Therapy) nasal cannula   Flow (L/min) 0   Pulse Oximetry Type Continuous   $ Pulse Oximetry - Multiple Charge Pulse Oximetry - Multiple   Pulse 66   Resp (!) 22   Respiratory Interventions   Cough And Deep Breathing unable to perform   Education   $ Education Other (see comment);15 min  (pt unable to understand education)   Respiratory Evaluation   $ Care Plan Tech Time 15 min   Evaluation For   (care plan)

## 2020-08-12 NOTE — SUBJECTIVE & OBJECTIVE
Interval History:   Patient remained confused with baseline dementia  Unable to keep oxygen nasal canula  Saturating 92% on room air  Discharge plan discussed with daughter and   Daughter agreed to take her tomorrow with home hospice    Review of Systems   Unable to obtain  Objective:     Vital Signs (Most Recent):  Temp: 96.1 °F (35.6 °C) (08/12/20 1531)  Pulse: 76 (08/12/20 1531)  Resp: 18 (08/12/20 1531)  BP: 136/66 (08/12/20 1531)  SpO2: (!) 92 % (08/12/20 1531) Vital Signs (24h Range):  Temp:  [96.1 °F (35.6 °C)-97.4 °F (36.3 °C)] 96.1 °F (35.6 °C)  Pulse:  [58-79] 76  Resp:  [16-22] 18  SpO2:  [91 %-93 %] 92 %  BP: (125-157)/(61-74) 136/66     Weight: 67.5 kg (148 lb 13 oz)  Body mass index is 24.02 kg/m².    Intake/Output Summary (Last 24 hours) at 8/12/2020 1735  Last data filed at 8/11/2020 1921  Gross per 24 hour   Intake 1050 ml   Output 500 ml   Net 550 ml      Physical Exam  Vitals signs and nursing note reviewed.   Constitutional:       Comments: Frail, chronically ill-appearing, elderly female patient, lying in bed, more awake today, repeating few words   HENT:      Head: Normocephalic and atraumatic.   Cardiovascular:      Rate and Rhythm: Normal rate and regular rhythm.   Pulmonary:      Comments: On supplemental oxygen via face mask, she is frequently removing, bilateral inspiratory crackles with diminished air entry on lung examination, no wheeze  Abdominal:      Comments: Protuberant, mild tenderness to deep palpation, bowel sounds present, peg in place--tube feeds going   Genitourinary:     Comments: Scherer in place with yellow urine  Skin:     Comments: Sacral decubitus ulcer, POA.  Trophic nail changes.   Neurological:      Comments: More awake, speaking few words however repeating this providers not answering questions, not following commands   Psychiatric:      Comments: Unable to evaluate         Significant Labs:   BMP:   Recent Labs   Lab 08/12/20  0357   *       K 4.1      CO2 25   BUN 38*   CREATININE 0.9   CALCIUM 8.5*   MG 2.5     CBC:   Recent Labs   Lab 08/11/20  0341 08/12/20  0357   WBC 4.98 7.78   HGB 8.6* 9.4*   HCT 23.5* 28.2*   * 393*

## 2020-08-13 VITALS
BODY MASS INDEX: 22.18 KG/M2 | RESPIRATION RATE: 20 BRPM | HEIGHT: 66 IN | DIASTOLIC BLOOD PRESSURE: 72 MMHG | TEMPERATURE: 98 F | WEIGHT: 138 LBS | HEART RATE: 73 BPM | OXYGEN SATURATION: 97 % | SYSTOLIC BLOOD PRESSURE: 146 MMHG

## 2020-08-13 LAB
ALBUMIN SERPL BCP-MCNC: 2.8 G/DL (ref 3.5–5.2)
ALP SERPL-CCNC: 76 U/L (ref 55–135)
ALT SERPL W/O P-5'-P-CCNC: 38 U/L (ref 10–44)
ANION GAP SERPL CALC-SCNC: 5 MMOL/L (ref 8–16)
AST SERPL-CCNC: 26 U/L (ref 10–40)
BASOPHILS # BLD AUTO: 0.03 K/UL (ref 0–0.2)
BASOPHILS NFR BLD: 0.3 % (ref 0–1.9)
BILIRUB SERPL-MCNC: 0.5 MG/DL (ref 0.1–1)
BUN SERPL-MCNC: 32 MG/DL (ref 8–23)
CALCIUM SERPL-MCNC: 8.5 MG/DL (ref 8.7–10.5)
CHLORIDE SERPL-SCNC: 110 MMOL/L (ref 95–110)
CO2 SERPL-SCNC: 24 MMOL/L (ref 23–29)
CREAT SERPL-MCNC: 0.7 MG/DL (ref 0.5–1.4)
CRP SERPL-MCNC: 0.27 MG/DL
D DIMER PPP IA.FEU-MCNC: 0.55 UG/ML FEU
DIFFERENTIAL METHOD: ABNORMAL
EOSINOPHIL # BLD AUTO: 0 K/UL (ref 0–0.5)
EOSINOPHIL NFR BLD: 0.4 % (ref 0–8)
ERYTHROCYTE [DISTWIDTH] IN BLOOD BY AUTOMATED COUNT: 14.7 % (ref 11.5–14.5)
EST. GFR  (AFRICAN AMERICAN): >60 ML/MIN/1.73 M^2
EST. GFR  (NON AFRICAN AMERICAN): >60 ML/MIN/1.73 M^2
FERRITIN SERPL-MCNC: 227 NG/ML (ref 20–300)
GLUCOSE SERPL-MCNC: 175 MG/DL (ref 70–110)
HCT VFR BLD AUTO: 31.1 % (ref 37–48.5)
HGB BLD-MCNC: 9.7 G/DL (ref 12–16)
IMM GRANULOCYTES # BLD AUTO: 0.4 K/UL (ref 0–0.04)
IMM GRANULOCYTES NFR BLD AUTO: 4.2 % (ref 0–0.5)
LYMPHOCYTES # BLD AUTO: 2 K/UL (ref 1–4.8)
LYMPHOCYTES NFR BLD: 20.4 % (ref 18–48)
MAGNESIUM SERPL-MCNC: 2.2 MG/DL (ref 1.6–2.6)
MCH RBC QN AUTO: 30.4 PG (ref 27–31)
MCHC RBC AUTO-ENTMCNC: 31.2 G/DL (ref 32–36)
MCV RBC AUTO: 98 FL (ref 82–98)
MONOCYTES # BLD AUTO: 0.8 K/UL (ref 0.3–1)
MONOCYTES NFR BLD: 8.5 % (ref 4–15)
NEUTROPHILS # BLD AUTO: 6.3 K/UL (ref 1.8–7.7)
NEUTROPHILS NFR BLD: 66.2 % (ref 38–73)
NRBC BLD-RTO: 0 /100 WBC
PHOSPHATE SERPL-MCNC: 2.5 MG/DL (ref 2.7–4.5)
PLATELET # BLD AUTO: 408 K/UL (ref 150–350)
PMV BLD AUTO: 11.3 FL (ref 9.2–12.9)
POTASSIUM SERPL-SCNC: 3.9 MMOL/L (ref 3.5–5.1)
PROT SERPL-MCNC: 6 G/DL (ref 6–8.4)
RBC # BLD AUTO: 3.19 M/UL (ref 4–5.4)
SODIUM SERPL-SCNC: 139 MMOL/L (ref 136–145)
WBC # BLD AUTO: 9.55 K/UL (ref 3.9–12.7)

## 2020-08-13 PROCEDURE — 83735 ASSAY OF MAGNESIUM: CPT

## 2020-08-13 PROCEDURE — 82728 ASSAY OF FERRITIN: CPT

## 2020-08-13 PROCEDURE — 63600175 PHARM REV CODE 636 W HCPCS: Performed by: INTERNAL MEDICINE

## 2020-08-13 PROCEDURE — 86140 C-REACTIVE PROTEIN: CPT

## 2020-08-13 PROCEDURE — 99900035 HC TECH TIME PER 15 MIN (STAT)

## 2020-08-13 PROCEDURE — 85025 COMPLETE CBC W/AUTO DIFF WBC: CPT

## 2020-08-13 PROCEDURE — 80053 COMPREHEN METABOLIC PANEL: CPT

## 2020-08-13 PROCEDURE — 85379 FIBRIN DEGRADATION QUANT: CPT

## 2020-08-13 PROCEDURE — 36415 COLL VENOUS BLD VENIPUNCTURE: CPT

## 2020-08-13 PROCEDURE — 25000003 PHARM REV CODE 250: Performed by: INTERNAL MEDICINE

## 2020-08-13 PROCEDURE — 25000242 PHARM REV CODE 250 ALT 637 W/ HCPCS: Performed by: INTERNAL MEDICINE

## 2020-08-13 PROCEDURE — 84100 ASSAY OF PHOSPHORUS: CPT

## 2020-08-13 PROCEDURE — 94761 N-INVAS EAR/PLS OXIMETRY MLT: CPT

## 2020-08-13 RX ADMIN — ASPIRIN 81 MG: 81 TABLET, COATED ORAL at 08:08

## 2020-08-13 RX ADMIN — METOPROLOL TARTRATE 25 MG: 25 TABLET, FILM COATED ORAL at 08:08

## 2020-08-13 RX ADMIN — CETIRIZINE HYDROCHLORIDE 10 MG: 10 TABLET, FILM COATED ORAL at 08:08

## 2020-08-13 RX ADMIN — DEXAMETHASONE 6 MG: 2 TABLET ORAL at 08:08

## 2020-08-13 RX ADMIN — ACETAMINOPHEN 650 MG: 325 TABLET, FILM COATED ORAL at 08:08

## 2020-08-13 RX ADMIN — FAMOTIDINE 20 MG: 20 TABLET ORAL at 08:08

## 2020-08-13 RX ADMIN — PIPERACILLIN AND TAZOBACTAM 3.38 G: 3; .375 INJECTION, POWDER, FOR SOLUTION INTRAVENOUS at 01:08

## 2020-08-13 RX ADMIN — MICONAZOLE NITRATE: 20 CREAM TOPICAL at 08:08

## 2020-08-13 RX ADMIN — FLUTICASONE PROPIONATE 50 MCG: 50 SPRAY, METERED NASAL at 08:08

## 2020-08-13 RX ADMIN — OXYBUTYNIN CHLORIDE 5 MG: 5 TABLET ORAL at 08:08

## 2020-08-13 RX ADMIN — OXYBUTYNIN CHLORIDE 5 MG: 5 TABLET ORAL at 02:08

## 2020-08-13 RX ADMIN — PIPERACILLIN AND TAZOBACTAM 3.38 G: 3; .375 INJECTION, POWDER, FOR SOLUTION INTRAVENOUS at 04:08

## 2020-08-13 NOTE — DISCHARGE SUMMARY
Critical access hospital Medicine  Discharge Summary      Patient Name: Breanne Culp  MRN: [id number]  Admission Date: 8/7/2020  Hospital Length of Stay: 6 days  Discharge Date and Time: No discharge date for patient encounter.  Attending Physician: Luisa Michele MD   Discharging Provider: Luisa Michele MD  Primary Care Provider: Frantz Bill MD      HPI:   HPI as per MD Mobley  Breanne Culp is a 86 y.o. AA female who  has a past medical history of Anticoagulant long-term use, Aphasia S/P CVA, Arthritis, Bedbound, Cataract, COPD (chronic obstructive pulmonary disease), Coronary artery disease, DDD (degenerative disc disease), lumbar, Glaucoma, Hyperlipidemia, Low back pain, Onychomycosis due to dermatophyte (11/26/2014), Osteoporosis, Positive PPD (1970), Seasonal allergies, and Stroke.. The patient presented to [facility] on 8/7/2020 with a primary complaint of Fever    History was obtained from ER physician on sign out.  Dr. Pratt spoke with patient's daughter via phone to get the history.  Daughter reported patient has been having fever for the last few days off and on.  Patient is living at home with her daughter.  Patient is bedbound, nonverbal since the stroke, history of recurrent/frequent UTI, sacral decubitus, history of dementia/Alzheimer's with behavioral disturbance.  Daughter reports they have home health service.  Yesterday she was diagnosed with UTI and was started on Keflex.  Today she noted that patient was more confused and somnolent so she called the EMS.  When EMS arrived, patient had an O2 sat of 88% on room air.  They applied the supplemental oxygen via nasal cannula and brought her to the ER  In the ER patient had a temp of 100.6°, respiration was 25 per minute, O2 sat was 88% which improved to 94% with 2 L supplemental oxygen.  On exam patient did grimace on abdominal examination, specially in the left lower quadrant and suprapubic area.  CT  of the abdomen was ordered.  Prominent stool ball seen in the rectal vault, probably fecal impaction.  Marked sigmoid prominent diverticulosis without acute diverticulitis dose.  Hepatic steatosis.  X-ray chest shows worsening of bilateral pulmonary interstitial and ground-glass alveolar opacities S seen in viral pneumonia, atypical infection or pulmonary edema     Lab shows WBC of 7.37, H&H 10.3/31.9, platelet 285  Ferritin 230  CMP shows bicarb of 22, BUN creatinine 40/0.9, albumin 3.3  CRP 9.02(H)  BNP 28,  -high  COVID rapid positive  Lactate vein 1.5  Urinalysis shows appearance hazy, protein trace, 3+ occult blood, nitrite positive, 3+ leuko, RBC D3, bacteria negative, WBC greater than 100  Noted urine culture from 08/04/2020 was positive for E coli and Proteus.  Patient has a history of Proteus UTI multiple time in the past     Dr. Darby and Dr. Phipps were consulted through the ER..  Dr. Phipps discussed the case with patient's daughter miss wilcox who confirmed that patient has a DNR/DNI status.  Treatment options were discussed and decided to continue treatment with dexamethasone daily.  Patient will not receive convalescent plasma and Remdesivir per daughter/Dr. winchester conversation     Admit to med tele with cardiac monitoring    * No surgery found *      Hospital Course:   Patient with a history of CVA, bedbound, PEG feeding dependent, sacral decubitus ulcer, POA, community dwelling with home health, brought into the ED due to persistent fever and shortness of breath as per collateral.  COVID-19 was positive with bilateral infiltrates on chest x-ray.  Did have fever with positive UA, was recently started on Keflex as outpatient for UTI.  Chronic Scherer catheter in place, was changed in the ED.  Pulmonary was consulted in the ED, following discussions decision to admit, steroid, holding of plasma/RDV.  On 08/08, assumed care, patient lying in bed, does not open eyes during examination, remains nonverbal,  "as per nursing she did have bowel movement overnight, hard, remains on supplemental oxygen, saturating well, collateral obtained from daughter, not on home oxygen, daughter was tested yesterday at urgent care and now was also COVID-19 positive, she states the patient did have caregivers/sitters as well in the home.  Daughter now consenting to convalescent plasma, communicated with pulmonologist, will order.  Discussed patient is high risk for decline. Confirmed DNI.  On 08/09, O2 requirements increased to 4 L, on face mask however patient has been frequently removing, episodes of hypothermia down to 95.2, tolerated CCP well, tolerating tube feeds, she is more awake, repeating words, updated daughter over the phone.  On 8/10 assumed care, pt lying comfortably on bed, constantly mumbles" god is good for you hand good for me, not following command, oxygen requirement gradually improved, difficult to keep her nasal cannula, currently on the day of discharge saturating 91-95% on room air.  Completed her IV antibiotic for 1 week for Pseudomonas UTI.  Goal of care discussed with daughter over the phone, agreed for home hospice due to her chronic comorbidities advanced Alzheimer's dementia with bedbound status on chronic Scherer.  Advised to continue isolation precautions for 4 more days for COVID.    Physical Exam ON THE DAY OF DISCHARGE  Vitals signs and nursing note reviewed.   Constitutional:       Comments: Frail, chronically ill-appearing, elderly female patient, lying in bed, more awake today, repeating few words   HENT:      Head: Normocephalic and atraumatic.   Cardiovascular:      Rate and Rhythm: Normal rate and regular rhythm.   Pulmonary:      Comments: On supplemental oxygen via NC, she is frequently removing, poor breathing effort no wheeze  Abdominal:      Comments: Protuberant, mild tenderness to deep palpation, bowel sounds present, peg in place--tube feeds going   Genitourinary:     Comments: Scherer in " place with yellow urine  Skin:     Comments: Sacral decubitus ulcer, POA.  Trophic nail changes.   Neurological:      Comments: More awake, speaking few words however repeating this providers not answering questions, not following commands   Psychiatric:      Comments: Unable to evaluate      Consults:   Consults (From admission, onward)        Status Ordering Provider     Inpatient consult to Hospitalist  Once     Provider:  Shubham Parker MD    Acknowledged SHUBHAM PARKER     Inpatient consult to Registered Dietitian/Nutritionist  Once     Provider:  (Not yet assigned)    Completed SHUBHAM PARKER     Inpatient consult to   Once     Provider:  (Not yet assigned)    Completed JAMES MORA          No new Assessment & Plan notes have been filed under this hospital service since the last note was generated.  Service: Hospital Medicine    Final Active Diagnoses:    Diagnosis Date Noted POA    PRINCIPAL PROBLEM:  Bilateral pneumonia secondary to COVID-19 infection [U07.1, J12.89] 08/07/2020 Yes    Acute hypoxemic respiratory failure [J96.01] 08/08/2020 Yes    Sacral decubitus ulcer, POA [L89.159] 08/08/2020 Yes     Chronic    DNR (do not resuscitate) [Z66] 08/07/2020 Yes    Moderate protein-calorie malnutrition [E44.0] 06/04/2020 Yes    Alzheimer's dementia [G30.9, F02.80] 12/26/2019 Yes    COPD (chronic obstructive pulmonary disease) [J44.9] 12/26/2019 Yes    PEG (percutaneous endoscopic gastrostomy) status [Z93.1] 12/26/2019 Not Applicable    Bedbound [Z74.01] 12/26/2019 Not Applicable    UTI (urinary tract infection) [N39.0] 08/22/2019 Yes    CVA (cerebral vascular accident) [I63.9] 08/20/2019 Yes     Chronic      Problems Resolved During this Admission:    Diagnosis Date Noted Date Resolved POA    Hyperkalemia [E87.5] 08/08/2020 08/09/2020 No    Hyperphosphatemia [E83.39] 08/08/2020 08/09/2020 No    Constipation [K59.00] 06/04/2020 08/09/2020 Yes       Discharged Condition:  stable    Disposition: Hospice/Home    Follow Up:    Patient Instructions:      Notify your health care provider if you experience any of the following:  temperature >100.4     Notify your health care provider if you experience any of the following:  persistent nausea and vomiting or diarrhea     Notify your health care provider if you experience any of the following:  persistent dizziness, light-headedness, or visual disturbances     Tube Feedings/Formulas     Order Specific Question Answer Comments   Select Adult Formula: Other [email or web address]   Route: Gastrostomy    Formula Rate (mL/hr): 50      Activity as tolerated       Significant Diagnostic Studies: Labs:   BMP:   Recent Labs   Lab 08/12/20 0357 08/13/20  0346   * 175*    139   K 4.1 3.9    110   CO2 25 24   BUN 38* 32*   CREATININE 0.9 0.7   CALCIUM 8.5* 8.5*   MG 2.5 2.2    and CMP   Recent Labs   Lab 08/12/20 0357 08/13/20 0346    139   K 4.1 3.9    110   CO2 25 24   * 175*   BUN 38* 32*   CREATININE 0.9 0.7   CALCIUM 8.5* 8.5*   PROT 6.0 6.0   ALBUMIN 2.9* 2.8*   BILITOT 0.7 0.5   ALKPHOS 52* 76   AST 31 26   ALT 38 38   ANIONGAP 7* 5*   ESTGFRAFRICA >60.0 >60.0   EGFRNONAA 58.1* >60.0       Pending Diagnostic Studies:     None         Medications:  Reconciled Home Medications:      Medication List      CHANGE how you take these medications    donepeziL 10 MG tablet  Commonly known as: ARICEPT  TAKE ONE TABLET BY MOUTH EVERY EVENING  What changed: how to take this     fluticasone propionate 50 mcg/actuation nasal spray  Commonly known as: FLONASE  1 spray (50 mcg total) by Each Nostril route once daily.  What changed: Another medication with the same name was removed. Continue taking this medication, and follow the directions you see here.     gabapentin 100 MG capsule  Commonly known as: NEURONTIN  TAKE 2 CAPSULES BY MOUTH EVERY EVENING  What changed: how to take this     hydrOXYzine HCL 25 MG tablet  Commonly known  as: ATARAX  TAKE ONE TABLET BY MOUTH NIGHTLY AS NEEDED FOR ITCHING OR ANXIETY  What changed: See the new instructions.     lisinopriL 5 MG tablet  Commonly known as: PRINIVIL,ZESTRIL  TAKE ONE TABLET PER G TUBE ONCE DAILY  What changed: See the new instructions.     LORazepam 1 MG tablet  Commonly known as: ATIVAN  1/2 to 1 po QHS prn  What changed:   · how much to take  · how to take this  · when to take this     metoprolol tartrate 25 MG tablet  Commonly known as: LOPRESSOR  TAKE ONE TABLET PER G TUBE TWO TIMES DAILY  What changed: See the new instructions.     oxybutynin 5 MG Tab  Commonly known as: DITROPAN  TAKE ONE TABLET BY MOUTH THREE TIMES DAILY  What changed: how to take this     traMADoL 50 mg tablet  Commonly known as: ULTRAM  Take 1 tablet (50 mg total) by mouth nightly as needed for Pain.  What changed:   · how much to take  · when to take this  · additional instructions        CONTINUE taking these medications    acetaminophen 325 MG tablet  Commonly known as: TYLENOL  Take 650 mg by mouth every 6 (six) hours as needed for Pain. Per peg     albuterol-ipratropium 2.5 mg-0.5 mg/3 mL nebulizer solution  Commonly known as: DUO-NEB  Take 3 mLs by nebulization every 6 (six) hours as needed for Wheezing. Rescue     aspirin 81 mg Tab  1 tablet by PEG Tube route once daily. Every day per peg     benzonatate 100 MG capsule  Commonly known as: TESSALON  Take 1 capsule (100 mg total) by mouth 3 (three) times daily as needed for Cough.     diclofenac sodium 1 % Gel  Commonly known as: VOLTAREN  Apply 2 g topically 3 (three) times daily.     famotidine 20 MG tablet  Commonly known as: PEPCID  Take 1 tablet (20 mg total) by mouth 2 (two) times daily.     fexofenadine 30 mg Tbdl  1 tablet by PEG Tube route once daily.     ipratropium 0.02 % nebulizer solution  Commonly known as: ATROVENT  Take 500 mcg by nebulization 4 (four) times daily. Rescue     lidocaine-prilocaine cream  Commonly known as: EMLA  As directed      miconazole 2 % cream  Commonly known as: MICOTIN  Apply topically 2 (two) times daily. for 7 days     multivitamin per tablet  Commonly known as: THERAGRAN  Take 1 tablet by mouth once daily.     nebulizer and compressor Angela  Commonly known as: VIOS AEROSOL DELIVERY SYSTEM  USE AS DIRECTED     polyethylene glycol 17 gram Pwpk  Commonly known as: GLYCOLAX  Take by mouth.     potassium chloride 10% 20 mEq/15 mL oral solution  Commonly known as: KAYCIEL  Take 20 mEq by mouth once daily. For 3 days        STOP taking these medications    cephALEXin 500 MG capsule  Commonly known as: KEFLEX     VITAMIN C 500 MG tablet  Generic drug: ascorbic acid (vitamin C)            Indwelling Lines/Drains at time of discharge:   Lines/Drains/Airways     Drain                 Gastrostomy/Enterostomy   Percutaneous endoscopic gastrostomy (PEG) midline feeding -- days         Urethral Catheter 08/28/19 1231 Straight-tip 16 Fr. 351 days         Urethral Catheter 01/19/20 1400 Coude 16 Fr. 207 days         Urethral Catheter 08/07/20 1150 Double-lumen 16 Fr. 6 days                Time spent on the discharge of patient: 33  minutes  Patient was seen and examined on the date of discharge and determined to be suitable for discharge.         Luisa Michele MD  Department of Hospital Medicine  [facility]

## 2020-08-13 NOTE — CARE UPDATE
Patient has been accepted with [facility].    IMM verbally discussed with the patients daughter Loree Jacobs.

## 2020-08-13 NOTE — PLAN OF CARE
08/13/20 1555   Medicare Message   Important Message from Medicare regarding Discharge Appeal Rights Explained to patient/caregiver;Signed/date by patient/caregiver;Other (comments)  (called patients jocelyne Jacobs [phone] and discussed form with her)   Date IMM was signed 08/13/20   Time IMM was signed [id number]

## 2020-08-13 NOTE — PLAN OF CARE
Problem: Wound  Goal: Optimal Wound Healing  Outcome: Ongoing, Not Progressing     Problem: Fall Injury Risk  Goal: Absence of Fall and Fall-Related Injury  Outcome: Ongoing, Not Progressing     Problem: Adult Inpatient Plan of Care  Goal: Plan of Care Review  Outcome: Ongoing, Not Progressing  Goal: Patient-Specific Goal (Individualization)  Outcome: Ongoing, Not Progressing  Goal: Absence of Hospital-Acquired Illness or Injury  Outcome: Ongoing, Not Progressing  Goal: Optimal Comfort and Wellbeing  Outcome: Ongoing, Not Progressing  Goal: Readiness for Transition of Care  Outcome: Ongoing, Not Progressing  Goal: Rounds/Family Conference  Outcome: Ongoing, Not Progressing     Problem: Feeding Intolerance (Enteral Nutrition)  Goal: Feeding Tolerance  Outcome: Ongoing, Not Progressing     Problem: Skin Injury Risk Increased  Goal: Skin Health and Integrity  Outcome: Ongoing, Not Progressing

## 2020-08-13 NOTE — CARE UPDATE
Called patients daughter Loree Jacobs [phone] and she stated that she wanted to call [facility] and will call me once she talked to them with a decision.  She stated also that the patient would need an ambulance ride home since she can't sit up.     Called [facility] [phone] and spoke with [employer] and she stated that no authorization is needed for the ambulance ride it is covered at 100%.  Reference #[id number].

## 2020-08-13 NOTE — CARE UPDATE
Called [facility] service for transport of patient to home. Transport set up and will be here within the hour.

## 2020-08-13 NOTE — PLAN OF CARE
Problem: Wound  Goal: Optimal Wound Healing  8/13/2020 0131 by Ofelia Woods, LPN  Outcome: Ongoing, Not Progressing  8/13/2020 0128 by Ofelia Woods, LPN  Outcome: Ongoing, Not Progressing     Problem: Fall Injury Risk  Goal: Absence of Fall and Fall-Related Injury  8/13/2020 0131 by Ofelia Woods, LPN  Outcome: Ongoing, Not Progressing  8/13/2020 0128 by Ofelia Woods, LPN  Outcome: Ongoing, Not Progressing     Problem: Adult Inpatient Plan of Care  Goal: Plan of Care Review  8/13/2020 0131 by Ofelia Woods, LPN  Outcome: Ongoing, Not Progressing  8/13/2020 0128 by Ofelia Woods, LPN  Outcome: Ongoing, Not Progressing  Goal: Patient-Specific Goal (Individualization)  8/13/2020 0131 by Ofelia Woods, LPN  Outcome: Ongoing, Not Progressing  8/13/2020 0128 by Ofelia Woods, LPN  Outcome: Ongoing, Not Progressing  Goal: Absence of Hospital-Acquired Illness or Injury  8/13/2020 0131 by Ofelia Woods, LPN  Outcome: Ongoing, Not Progressing  8/13/2020 0128 by Ofelia Woods, LPN  Outcome: Ongoing, Not Progressing  Goal: Optimal Comfort and Wellbeing  8/13/2020 0131 by Ofelia Woods, LPN  Outcome: Ongoing, Not Progressing  8/13/2020 0128 by Ofelia Woods, LPN  Outcome: Ongoing, Not Progressing  Goal: Readiness for Transition of Care  8/13/2020 0131 by Ofelia Woods, LPN  Outcome: Ongoing, Not Progressing  8/13/2020 0128 by Ofelia Woods, LPN  Outcome: Ongoing, Not Progressing  Goal: Rounds/Family Conference  8/13/2020 0131 by Ofelia Woods, LPN  Outcome: Ongoing, Not Progressing  8/13/2020 0128 by Ofelia Woods, LPN  Outcome: Ongoing, Not Progressing     Problem: Skin Injury Risk Increased  Goal: Skin Health and Integrity  8/13/2020 0131 by Ofelia Woods, LPN  Outcome: Ongoing, Not Progressing  8/13/2020 0128 by Ofelia Woods, LPN  Outcome: Ongoing, Not Progressing     Problem: Feeding Intolerance (Enteral Nutrition)  Goal: Feeding Tolerance  8/13/2020 0131 by Ofelia Woods, LPN  Outcome: Ongoing, Not  Progressing  8/13/2020 0128 by Ofelia Woods LPN  Outcome: Ongoing, Not Progressing

## 2020-08-13 NOTE — CARE UPDATE
Spoke with daughter Loree Jacobs and she stated that she would like to use [facility] . Referral faxed to Akanksha Carey - [phone]  Phone number [phone].

## 2020-08-13 NOTE — PLAN OF CARE
08/13/20 1456   Patient Assessment/Suction   Level of Consciousness (AVPU) responds to pain   Respiratory Effort Normal   Expansion/Accessory Muscles/Retractions no use of accessory muscles;no retractions   All Lung Fields Breath Sounds diminished   PRE-TX-O2   O2 Device (Oxygen Therapy) room air   SpO2 95 %   Pulse Oximetry Type Continuous   $ Pulse Oximetry - Multiple Charge Pulse Oximetry - Multiple   Respiratory Evaluation   $ Care Plan Tech Time 15 min

## 2020-08-13 NOTE — NURSING
Patient was prepared for discharge. IV was removed; IV catheter intact. Telemetry monitor discontinued. Awaiting on the ambulance to arrive.

## 2020-08-14 DIAGNOSIS — U07.1 COVID-19 VIRUS DETECTED: ICD-10-CM

## 2020-08-14 NOTE — NURSING
Patient picked up by [facility] for transport home, iv previously removed, patient belongings and discharge papers sent with patient

## 2020-08-14 NOTE — PLAN OF CARE
08/14/20 0835   Final Note   Assessment Type Final Discharge Note   Anticipated Discharge Disposition HospiceHome   Post-Acute Status   Discharge Delays None known at this time

## 2020-08-26 ENCOUNTER — TELEPHONE (OUTPATIENT)
Dept: FAMILY MEDICINE | Facility: CLINIC | Age: 85
End: 2020-08-26

## 2020-08-26 NOTE — TELEPHONE ENCOUNTER
----- Message from Aylin Moon sent at 8/25/2020  4:08 PM CDT -----  Type: Needs Medical Advice    Who Called:  Daughter (Loree)  Best Call Back Number: [phone]  Additional Information: Daughter needs to speak with nurse (Bridgette) for name of company delivering medical equipment/stated patient is on hospice and they are still delivering items/please call back to advise.      ANAMIKA

## 2020-08-26 NOTE — TELEPHONE ENCOUNTER
----- Message from Abril Carmona sent at 8/26/2020 11:26 AM CDT -----  Type:  Patient Returning Call    Who Called:  Loree Jacobs (Daughter)  Who Left Message for Patient:  Bridgette  Does the patient know what this is regarding?:  yes  Best Call Back Number:    Additional Information:  No response on messenger.

## 2020-08-29 ENCOUNTER — HOSPITAL ENCOUNTER (EMERGENCY)
Facility: HOSPITAL | Age: 85
Discharge: HOME | End: 2020-08-29
Attending: EMERGENCY MEDICINE
Payer: MEDICARE

## 2020-08-29 VITALS
OXYGEN SATURATION: 95 % | RESPIRATION RATE: 15 BRPM | SYSTOLIC BLOOD PRESSURE: 121 MMHG | DIASTOLIC BLOOD PRESSURE: 59 MMHG | TEMPERATURE: 98 F | HEART RATE: 82 BPM

## 2020-08-29 DIAGNOSIS — K94.23 PEG TUBE MALFUNCTION: Primary | ICD-10-CM

## 2020-08-29 PROCEDURE — 43762 RPLC GTUBE NO REVJ TRC: CPT

## 2020-08-29 PROCEDURE — 99283 EMERGENCY DEPT VISIT LOW MDM: CPT | Mod: 25

## 2020-08-29 NOTE — ED NOTES
Leak noted in peg. Pt sent to er by hospice rn for peg tube replacement. abd snt. + bowel sounds x  4 quads. No redness, swelling, foul drainage noted at site.

## 2020-08-29 NOTE — ED PROVIDER NOTES
Encounter Date: 8/29/2020       History     Chief Complaint   Patient presents with    [facility]     pt sent from home/hospice for peg tube replacement. peg has hole in it.     86-year-old female with history of chronic bed-bound patient on hospice therapy, COPD, hypertension, hyperlipidemia, CVA with aphasia here with complaint needing PEG to replaced.  Patient PEG tube had a break in it which he was leaking at home.  Patient is here just for PEG tube replacement.  Denies any other constitutional symptoms or new problems.        Review of patient's allergies indicates:   Allergen Reactions    Brimonidine Other (See Comments)     Redness and irritation of eyes     Past Medical History:   Diagnosis Date    Anticoagulant long-term use     ASA 81mg, Fish Oil    Aphasia S/P CVA     Arthritis     Bedbound     Cataract     OU done//    COPD (chronic obstructive pulmonary disease)     Coronary artery disease     DDD (degenerative disc disease), lumbar     Glaucoma     suspect    Hyperlipidemia     Low back pain     Onychomycosis due to dermatophyte 11/26/2014    Osteoporosis     Positive PPD 1970    Seasonal allergies     Stroke      Past Surgical History:   Procedure Laterality Date    BREAST BIOPSY Right     2012 neg    CATARACT EXTRACTION W/  INTRAOCULAR LENS IMPLANT Bilateral 10/15/14     Dr Wright    COLONOSCOPY      Epidural Steroid injection      Pain Management    GASTROSTOMY TUBE PLACEMENT      INJECTION OF JOINT Right 4/3/2019    Procedure: Injection, Joint, Hip;  Surgeon: Tashi Morrison MD;  Location: [facility] OR;  Service: Pain Management;  Laterality: Right;    TONSILLECTOMY  1941     Family History   Problem Relation Age of Onset    Cancer Father         Rectal    Glaucoma Mother     Cancer Mother         lung, throat    Cancer Brother         throat    Hypertension Brother     Stroke Brother     Diabetes Sister     Stroke Sister     Amblyopia Neg Hx     Blindness Neg Hx      Cataracts Neg Hx     Macular degeneration Neg Hx     Retinal detachment Neg Hx     Strabismus Neg Hx     Thyroid disease Neg Hx     Nephrolithiasis Neg Hx      Social History     Tobacco Use    Smoking status: Former Smoker     Packs/day: 0.25     Start date: 1961     Quit date: 1992     Years since quittin.2    Smokeless tobacco: Never Used   Substance Use Topics    Alcohol use: No    Drug use: No     Review of Systems   Constitutional: Negative for fever.   HENT: Negative for sore throat.    Respiratory: Negative for shortness of breath.    Cardiovascular: Negative for chest pain.   Gastrointestinal: Negative for nausea and vomiting.        PEG to malfunction   Genitourinary: Negative for dysuria.   Musculoskeletal: Negative for back pain.   Skin: Negative for rash.   Neurological: Negative for weakness.   Hematological: Does not bruise/bleed easily.   Psychiatric/Behavioral: Negative for self-injury.       Physical Exam     Initial Vitals [20 1119]   BP Pulse Resp Temp SpO2   (!) 125/51 89 14 97.8 °F (36.6 °C) 95 %      MAP       --         Physical Exam    Nursing note and vitals reviewed.  Constitutional: She appears well-developed and well-nourished.   Thin cachectic female no acute distress   HENT:   Head: Normocephalic and atraumatic.   Nose: Nose normal.   Mouth/Throat: Oropharynx is clear and moist.   Eyes: Conjunctivae and EOM are normal. Pupils are equal, round, and reactive to light.   Neck: Normal range of motion. Neck supple. No thyromegaly present. No tracheal deviation present.   Cardiovascular: Normal rate, regular rhythm, normal heart sounds and intact distal pulses. Exam reveals no gallop and no friction rub.    No murmur heard.  Pulmonary/Chest: No stridor. No respiratory distress.   Course bilateral breath sounds no adventitious sounds   Abdominal: Soft. Bowel sounds are normal. She exhibits no mass. There is no rebound and no guarding.   PEG to noted with  fracture at the 22 cm ai   Musculoskeletal: Normal range of motion. No edema.   Lymphadenopathy:     She has no cervical adenopathy.   Neurological: She is alert and oriented to person, place, and time. She has normal strength and normal reflexes. GCS score is 15. GCS eye subscore is 4. GCS verbal subscore is 5. GCS motor subscore is 6.   Skin: Skin is warm and dry. Capillary refill takes less than 2 seconds.   Psychiatric: She has a normal mood and affect.         ED Course   Procedures  Labs Reviewed - No data to display       Imaging Results    None          Medical Decision Making:   Initial Assessment:   86-year-old female with history of aphasia secondary to CVA here with PEG to malfunction on hospice  Differential Diagnosis:   Mechanical PEG tube malfunction  ED Management:  Patient seen evaluated emergency department.  Consult Dr. harvey Gastroenterology.  GI bedside and PEG tube replaced.  Without incident.  Patient tolerated procedure well.  Gastric contents expressed after placement of tube.  Patient is cleared to be discharged per Gastroenterology.                                 Clinical Impression:       ICD-10-CM ICD-9-CM   1. PEG tube malfunction  K94.23 536.42             ED Disposition Condition    Discharge Stable        ED Prescriptions     None        Follow-up Information    None                                    Jairo Pratt MD  08/29/20 4037

## 2020-09-10 ENCOUNTER — HOSPITAL ENCOUNTER (EMERGENCY)
Facility: HOSPITAL | Age: 85
Discharge: HOME | End: 2020-09-10
Attending: EMERGENCY MEDICINE
Payer: MEDICARE

## 2020-09-10 VITALS
RESPIRATION RATE: 18 BRPM | WEIGHT: 150 LBS | TEMPERATURE: 99 F | BODY MASS INDEX: 24.11 KG/M2 | OXYGEN SATURATION: 97 % | DIASTOLIC BLOOD PRESSURE: 80 MMHG | SYSTOLIC BLOOD PRESSURE: 173 MMHG | HEART RATE: 105 BPM | HEIGHT: 66 IN

## 2020-09-10 DIAGNOSIS — I63.9 STROKE: Primary | ICD-10-CM

## 2020-09-10 LAB
ALBUMIN SERPL BCP-MCNC: 3.6 G/DL (ref 3.5–5.2)
ALP SERPL-CCNC: 78 U/L (ref 55–135)
ALT SERPL W/O P-5'-P-CCNC: 24 U/L (ref 10–44)
ANION GAP SERPL CALC-SCNC: 13 MMOL/L (ref 8–16)
AST SERPL-CCNC: 25 U/L (ref 10–40)
BASOPHILS # BLD AUTO: 0.04 K/UL (ref 0–0.2)
BASOPHILS NFR BLD: 0.6 % (ref 0–1.9)
BILIRUB SERPL-MCNC: 1 MG/DL (ref 0.1–1)
BNP SERPL-MCNC: 26 PG/ML (ref 0–99)
BUN SERPL-MCNC: 29 MG/DL (ref 8–23)
CALCIUM SERPL-MCNC: 9.5 MG/DL (ref 8.7–10.5)
CHLORIDE SERPL-SCNC: 96 MMOL/L (ref 95–110)
CHOLEST SERPL-MCNC: 160 MG/DL (ref 120–199)
CHOLEST/HDLC SERPL: 3.9 {RATIO} (ref 2–5)
CO2 SERPL-SCNC: 27 MMOL/L (ref 23–29)
CREAT SERPL-MCNC: 0.9 MG/DL (ref 0.5–1.4)
DIFFERENTIAL METHOD: ABNORMAL
EOSINOPHIL # BLD AUTO: 0.4 K/UL (ref 0–0.5)
EOSINOPHIL NFR BLD: 5.9 % (ref 0–8)
ERYTHROCYTE [DISTWIDTH] IN BLOOD BY AUTOMATED COUNT: 16.6 % (ref 11.5–14.5)
EST. GFR  (AFRICAN AMERICAN): >60 ML/MIN/1.73 M^2
EST. GFR  (NON AFRICAN AMERICAN): 58.1 ML/MIN/1.73 M^2
GLUCOSE SERPL-MCNC: 109 MG/DL (ref 70–110)
GLUCOSE SERPL-MCNC: 120 MG/DL (ref 70–110)
HCT VFR BLD AUTO: 27.9 % (ref 37–48.5)
HDLC SERPL-MCNC: 41 MG/DL (ref 40–75)
HDLC SERPL: 25.6 % (ref 20–50)
HGB BLD-MCNC: 9.1 G/DL (ref 12–16)
IMM GRANULOCYTES # BLD AUTO: 0.06 K/UL (ref 0–0.04)
IMM GRANULOCYTES NFR BLD AUTO: 0.8 % (ref 0–0.5)
INR PPP: 1.1
LDLC SERPL CALC-MCNC: 98.8 MG/DL (ref 63–159)
LYMPHOCYTES # BLD AUTO: 2.5 K/UL (ref 1–4.8)
LYMPHOCYTES NFR BLD: 34.3 % (ref 18–48)
MCH RBC QN AUTO: 32.3 PG (ref 27–31)
MCHC RBC AUTO-ENTMCNC: 32.6 G/DL (ref 32–36)
MCV RBC AUTO: 99 FL (ref 82–98)
MONOCYTES # BLD AUTO: 0.7 K/UL (ref 0.3–1)
MONOCYTES NFR BLD: 9.8 % (ref 4–15)
NEUTROPHILS # BLD AUTO: 3.5 K/UL (ref 1.8–7.7)
NEUTROPHILS NFR BLD: 48.6 % (ref 38–73)
NONHDLC SERPL-MCNC: 119 MG/DL
NRBC BLD-RTO: 0 /100 WBC
PLATELET # BLD AUTO: 187 K/UL (ref 150–350)
PLATELET BLD QL SMEAR: ABNORMAL
PMV BLD AUTO: 11.4 FL (ref 9.2–12.9)
POTASSIUM SERPL-SCNC: 4.9 MMOL/L (ref 3.5–5.1)
PROT SERPL-MCNC: 6.5 G/DL (ref 6–8.4)
PROTHROMBIN TIME: 13.9 SEC (ref 10.6–14.8)
RBC # BLD AUTO: 2.82 M/UL (ref 4–5.4)
SODIUM SERPL-SCNC: 136 MMOL/L (ref 136–145)
TRIGL SERPL-MCNC: 101 MG/DL (ref 30–150)
TROPONIN I SERPL DL<=0.01 NG/ML-MCNC: 0.03 NG/ML
TSH SERPL DL<=0.005 MIU/L-ACNC: 1.39 UIU/ML (ref 0.34–5.6)
WBC # BLD AUTO: 7.23 K/UL (ref 3.9–12.7)

## 2020-09-10 PROCEDURE — 82962 GLUCOSE BLOOD TEST: CPT

## 2020-09-10 PROCEDURE — 87040 BLOOD CULTURE FOR BACTERIA: CPT

## 2020-09-10 PROCEDURE — 80061 LIPID PANEL: CPT

## 2020-09-10 PROCEDURE — 85025 COMPLETE CBC W/AUTO DIFF WBC: CPT

## 2020-09-10 PROCEDURE — 80053 COMPREHEN METABOLIC PANEL: CPT

## 2020-09-10 PROCEDURE — 93005 ELECTROCARDIOGRAM TRACING: CPT | Performed by: INTERNAL MEDICINE

## 2020-09-10 PROCEDURE — 84484 ASSAY OF TROPONIN QUANT: CPT

## 2020-09-10 PROCEDURE — 83880 ASSAY OF NATRIURETIC PEPTIDE: CPT

## 2020-09-10 PROCEDURE — 85610 PROTHROMBIN TIME: CPT

## 2020-09-10 PROCEDURE — 93010 ELECTROCARDIOGRAM REPORT: CPT | Mod: GW,,, | Performed by: INTERNAL MEDICINE

## 2020-09-10 PROCEDURE — 99285 EMERGENCY DEPT VISIT HI MDM: CPT | Mod: 25

## 2020-09-10 PROCEDURE — 84443 ASSAY THYROID STIM HORMONE: CPT

## 2020-09-10 PROCEDURE — 93010 EKG 12-LEAD: ICD-10-PCS | Mod: GW,,, | Performed by: INTERNAL MEDICINE

## 2020-09-10 RX ORDER — ATORVASTATIN CALCIUM 10 MG/1
10 TABLET, FILM COATED ORAL DAILY
Qty: 30 TABLET | Refills: 0 | Status: ON HOLD | OUTPATIENT
Start: 2020-09-10 | End: 2020-09-16 | Stop reason: HOSPADM

## 2020-09-10 RX ORDER — NAPROXEN SODIUM 220 MG/1
81 TABLET, FILM COATED ORAL DAILY
Qty: 30 TABLET | Refills: 0 | Status: ON HOLD | OUTPATIENT
Start: 2020-09-10 | End: 2020-09-16 | Stop reason: HOSPADM

## 2020-09-10 NOTE — ED PROVIDER NOTES
Encounter Date: 9/10/2020       History     Chief Complaint   Patient presents with    Altered Mental Status     possible Hospice pt     86-year-old female was noticed by home health aide to have altered mental status starting at approximately noon today, patient has a history of dementia, hypertension hyperlipidemia COPD patient is currently on hospice.  Patient was sent to the emergency department without family's awareness, when family was contacted they report that they would like her checked out in the emergency department but they do not want her admitted.  Patient is currently resting comfortably although easily arousable she is not verbal at this time         Review of patient's allergies indicates:   Allergen Reactions    Brimonidine Other (See Comments)     Redness and irritation of eyes     Past Medical History:   Diagnosis Date    Alzheimer disease     Anticoagulant long-term use     ASA 81mg, Fish Oil    Aphasia S/P CVA     Arthritis     Bedbound     Cataract     OU done//    COPD (chronic obstructive pulmonary disease)     Coronary artery disease     DDD (degenerative disc disease), lumbar     Dementia     Glaucoma     suspect    Hospice care     Hyperlipidemia     Low back pain     Onychomycosis due to dermatophyte 11/26/2014    Osteoporosis     Positive PPD 1970    Seasonal allergies     Stroke      Past Surgical History:   Procedure Laterality Date    BREAST BIOPSY Right     2012 neg    CATARACT EXTRACTION W/  INTRAOCULAR LENS IMPLANT Bilateral 10/15/14     Dr Wright    COLONOSCOPY      Epidural Steroid injection      Pain Management    GASTROSTOMY TUBE PLACEMENT      INJECTION OF JOINT Right 4/3/2019    Procedure: Injection, Joint, Hip;  Surgeon: Tashi Morrison MD;  Location: [facility];  Service: Pain Management;  Laterality: Right;    TONSILLECTOMY  1941     Family History   Problem Relation Age of Onset    Cancer Father         Rectal    Glaucoma Mother      Cancer Mother         lung, throat    Cancer Brother         throat    Hypertension Brother     Stroke Brother     Diabetes Sister     Stroke Sister     Amblyopia Neg Hx     Blindness Neg Hx     Cataracts Neg Hx     Macular degeneration Neg Hx     Retinal detachment Neg Hx     Strabismus Neg Hx     Thyroid disease Neg Hx     Nephrolithiasis Neg Hx      Social History     Tobacco Use    Smoking status: Former Smoker     Packs/day: 0.25     Start date: 1961     Quit date: 1992     Years since quittin.2    Smokeless tobacco: Never Used   Substance Use Topics    Alcohol use: No    Drug use: No     Review of Systems   Unable to perform ROS: Dementia       Physical Exam     Initial Vitals [09/10/20 1654]   BP Pulse Resp Temp SpO2   128/71 102 20 99.4 °F (37.4 °C) (!) 90 %      MAP       --         Physical Exam    Constitutional: She appears well-developed and well-nourished. She is not diaphoretic. No distress.   HENT:   Head: Normocephalic and atraumatic.   Right Ear: External ear normal.   Left Ear: External ear normal.   Mouth/Throat: No oropharyngeal exudate.   Eyes: EOM are normal. Pupils are equal, round, and reactive to light. Right eye exhibits no discharge. Left eye exhibits no discharge.   Neck: Normal range of motion. Neck supple. No thyromegaly present. No tracheal deviation present.   Cardiovascular: Normal rate and regular rhythm. Exam reveals no gallop and no friction rub.    No murmur heard.  Pulmonary/Chest: Breath sounds normal. No respiratory distress. She has no wheezes. She has no rhonchi. She has no rales.   Abdominal: Soft. Bowel sounds are normal. She exhibits no distension. There is no abdominal tenderness.   Musculoskeletal: Normal range of motion. No tenderness or edema.   Neurological:   Patient has mild right-sided facial droop, patient is unable to comply with neurologic exam but does move all fours   Skin: Skin is warm and dry. No erythema.         ED Course    Procedures  Labs Reviewed   CBC W/ AUTO DIFFERENTIAL - Abnormal; Notable for the following components:       Result Value    RBC 2.82 (*)     Hemoglobin 9.1 (*)     Hematocrit 27.9 (*)     Mean Corpuscular Volume 99 (*)     Mean Corpuscular Hemoglobin 32.3 (*)     RDW 16.6 (*)     Immature Granulocytes 0.8 (*)     Immature Grans (Abs) 0.06 (*)     Platelet Estimate Clumped (*)     All other components within normal limits   COMPREHENSIVE METABOLIC PANEL - Abnormal; Notable for the following components:    BUN, Bld 29 (*)     eGFR if non  58.1 (*)     All other components within normal limits    Narrative:     Recoll. [id number] by \[facility]\"" at 09/10/2020 19:12, reason: hemolyzed   POCT GLUCOSE - Abnormal; Notable for the following components:    POC Glucose 120 (*)     All other components within normal limits   CULTURE, BLOOD   CULTURE, BLOOD   PROTIME-INR   TSH    Narrative:     Recoll. [id number] by \[facility]\"" at 09/10/2020 19:12, reason: hemolyzed   LIPID PANEL    Narrative:     Recoll. [id number] by \[facility]\"" at 09/10/2020 19:12, reason: hemolyzed   TROPONIN I    Narrative:     Recoll. [id number] by \[facility]\"" at 09/10/2020 19:12, reason: hemolyzed   B-TYPE NATRIURETIC PEPTIDE   URINALYSIS, REFLEX TO URINE CULTURE   POCT GLUCOSE MONITORING CONTINUOUS          Imaging Results          X-Ray Chest AP Portable (Final result)  Result time 09/10/20 18:33:39    Final result by Calixto Bartholomew MD (09/10/20 18:33:39)                 Narrative:    CLINICAL HISTORY:  86 years (2/28/1934) Female Stroke COVID+ // AMS, possible stroke,  right side weakness; Pt unable to stay upright; Hx of dementia, CAD,  COPD    TECHNIQUE:  Portable AP radiograph the chest.    COMPARISON:  Radiograph from August 7, 2020.    FINDINGS:  Radiographic findings are mildly limited secondary to obliquity of  imaging but there is a small faint focal interstitial opacity at the  left costophrenic sulcus and tiny interstitial opacity in the  right  upper lobe, unchanged from the previous exam suggesting mild atypical  infection/pneumonia, scarring or atelectasis in the appropriate  clinical setting Costophrenic angles are seen without effusion. No  pneumothorax is identified. The heart is mildly enlarged. Atheromatous  calcifications are seen at the aortic arch. Osseous structures appear  unchanged. The visualized upper abdomen is unremarkable.    IMPRESSION:  Unchanged radiograph of the chest when accounting for differences in  imaging technique.                  .            Electronically Signed by LIA Landry on 9/10/2020 6:33 PM                             CT Head Without Contrast (Final result)  Result time 09/10/20 17:58:36    Final result by Calixto Bartholomew MD (09/10/20 17:58:36)                 Narrative:    CMS MANDATED QUALITY DATA - CT RADIATION 436    All CT scans at this facility utilize dose modulation, iterative  reconstruction, and/or weight based dosing when appropriate to reduce  radiation dose to as low as reasonably achievable.    CLINICAL HISTORY:  86 years (2/28/1934) Female Neuro deficit, acute, stroke suspected    TECHNIQUE:  CT HEAD WITHOUT CONTRAST. 107 images obtained. Axial CT of the brain  without contrast using soft tissue and bone algorithm. .    COMPARISON:  CT of the brain from April 25, 2019.    FINDINGS:  No acute intracranial hemorrhage, hydrocephalus, herniation or midline  shift. There is an ill-defined area of decreased gray-white  differentiation in the left parietal lobe (image 41). The basal and  suprasellar cisterns are within normal limits. The osseous structures  show no acute skull fracture.    Moderate diffuse cerebral and cerebellar atrophy for age with  periventricular deep cerebral white matter low attenuation,  nonspecific findings which can be seen in any diffuse white matter  process but most commonly associated with chronic microvascular  ischemic disease. There are scattered  calcifications in the basal  ganglia and cerebellum in keeping with senescent calcification,  unchanged. There are scattered atheromatous calcifications in the  intracranial internal carotid arteries.    The orbits appear within normal limits noting likely bilateral lens  replacement. External auditory canals are unremarkable. The visualized  paranasal sinuses and mastoid air cells are essentially clear.    IMPRESSION:  1. Ill-defined area of loss of the normal gray-white differentiation  in the left parietal lobe, possibly secondary to positioning but  concerning for an acute to subacute infarct, new from the previous  exam, consider correlation with a dedicated MRI of the brain.  2. No acute intracranial hemorrhage, hydrocephalus, herniation or  midline shift  3. Additional chronic and involutional findings as noted above.      RESULT NOTIFICATION: These observations were discussed by the  dictating physician by phone with, and acknowledged by NELLI CAIN  at 9/10/2020 6:01 PM.              .    Electronically Signed by LIA Landry on 9/10/2020 6:07 PM                               Medical Decision Making:   History:   Old Medical Records: I decided to obtain old medical records.  Initial Assessment:   Emergent evaluation of an 86-year-old female presenting with decreased mental status, patient is on hospice and confirmed to be a DNR, differential diagnosis of patient's acute altered mental status include intracranial accident, electrolyte abnormality, endocrine dysfunction, infection              Attending Attestation:             Attending ED Notes:   Patient found to have CT concerning for possible ischemic stroke, I discussed the case with Neurology, he does not recommend any further imaging given that patient is on hospice and DNR, he reports that given the time frame with symptoms starting at noon patient is not a candidate for tPA, he does not recommend any further imaging, at this time he  advises comfort care and supportive measures.  I have spoken extensively with patient's daughter who agrees with this assessment.  Will evaluate for any significant lab abnormalities and address any reversible process, will discuss case with hospice on-call nurse, daughter does not wish to take patient off of hospice.      Patient's labs show no significant acute abnormalities patient has known urinary tract infection for which she is taking Cipro, patient has had no significant change in neurologic status while in the emergency department.  I spoke with hospice nurse oLlis who is comfortable assuming care of patient and arranging transport of patient.  Patient will be released to hospice care and family advised that they can change their mind at any time and return.  They are advised to follow-up as directed by hospice doctor and our referred to Neurology should they wish to follow up further.                    Clinical Impression:       ICD-10-CM ICD-9-CM   1. Stroke  I63.9 434.91             ED Disposition Condition    Discharge Stable        ED Prescriptions     Medication Sig Dispense Start Date End Date Auth. Provider    aspirin 81 MG Chew Take 1 tablet (81 mg total) by mouth once daily. 30 tablet 9/10/2020 9/10/2021 Chucky Bailey MD    atorvastatin (LIPITOR) 10 MG tablet Take 1 tablet (10 mg total) by mouth once daily. 30 tablet 9/10/2020 9/10/2021 Chucky Bailey MD        Follow-up Information     Follow up With Specialties Details Why Contact Info Additional Information    your hospice doctor  Schedule an appointment as soon as possible for a visit in 1 day       [facility]  If symptoms worsen [address] [facility] [address]  [phone] 1st floor                                       Chucky Bailey MD  09/11/20 0032

## 2020-09-10 NOTE — ED NOTES
"Loree, daughter, contacted via phone. Daughter stated pt. Is on hospice for her alzheimer's/dementia and that she holds power of  for pt. Daughter states pt. Has "good and bad days". At this time, daughter stated she only wants pt. To be treated in the ER and not to be admitted, but may re-evaluate based on findings. Daughter can be reached at #[phone]  "

## 2020-09-11 NOTE — ED NOTES
Spoke with Michelle with hospice who is going to set up [facility] to home.

## 2020-09-11 NOTE — ED NOTES
Introduced self to pt's daughter. Updated on plan of care. Warm blanket provided. Awaiting lab results.

## 2020-09-12 NOTE — ED NOTES
09/12/2020 1230  Informed by lab of pos blood culture. Dr. Luu informed. vorb to call daughter & inform with choice of return to ER for iv antibiotics. Pt is known hospice/dnr pt. Called daughter Loree Jacobs, [phone]. informed her of pos blood culture & plan of care for iv antibiotics. She states she will speak to hospice & let us know of her decision.

## 2020-09-13 ENCOUNTER — HOSPITAL ENCOUNTER (INPATIENT)
Facility: HOSPITAL | Age: 85
LOS: 3 days | Discharge: HOSPICE | DRG: 948 | End: 2020-09-16
Attending: EMERGENCY MEDICINE | Admitting: INTERNAL MEDICINE
Payer: MEDICARE

## 2020-09-13 DIAGNOSIS — K59.00 CONSTIPATION, UNSPECIFIED CONSTIPATION TYPE: ICD-10-CM

## 2020-09-13 DIAGNOSIS — R78.81 BACTEREMIA: ICD-10-CM

## 2020-09-13 DIAGNOSIS — N30.00 ACUTE CYSTITIS WITHOUT HEMATURIA: ICD-10-CM

## 2020-09-13 DIAGNOSIS — R78.81 POSITIVE BLOOD CULTURE: ICD-10-CM

## 2020-09-13 LAB
ALBUMIN SERPL BCP-MCNC: 3.2 G/DL (ref 3.5–5.2)
ALP SERPL-CCNC: 64 U/L (ref 55–135)
ALT SERPL W/O P-5'-P-CCNC: 17 U/L (ref 10–44)
ANION GAP SERPL CALC-SCNC: 9 MMOL/L (ref 8–16)
AST SERPL-CCNC: 26 U/L (ref 10–40)
BACTERIA #/AREA URNS HPF: ABNORMAL /HPF
BASOPHILS # BLD AUTO: 0.03 K/UL (ref 0–0.2)
BASOPHILS NFR BLD: 0.3 % (ref 0–1.9)
BILIRUB SERPL-MCNC: 1 MG/DL (ref 0.1–1)
BILIRUB UR QL STRIP: NEGATIVE
BUN SERPL-MCNC: 32 MG/DL (ref 8–23)
CALCIUM SERPL-MCNC: 8.5 MG/DL (ref 8.7–10.5)
CHLORIDE SERPL-SCNC: 96 MMOL/L (ref 95–110)
CLARITY UR: ABNORMAL
CO2 SERPL-SCNC: 25 MMOL/L (ref 23–29)
COLOR UR: YELLOW
CREAT SERPL-MCNC: 0.9 MG/DL (ref 0.5–1.4)
DIFFERENTIAL METHOD: ABNORMAL
EOSINOPHIL # BLD AUTO: 0.3 K/UL (ref 0–0.5)
EOSINOPHIL NFR BLD: 2.6 % (ref 0–8)
ERYTHROCYTE [DISTWIDTH] IN BLOOD BY AUTOMATED COUNT: 17 % (ref 11.5–14.5)
EST. GFR  (AFRICAN AMERICAN): >60 ML/MIN/1.73 M^2
EST. GFR  (NON AFRICAN AMERICAN): 58.1 ML/MIN/1.73 M^2
GLUCOSE SERPL-MCNC: 115 MG/DL (ref 70–110)
GLUCOSE UR QL STRIP: NEGATIVE
HCT VFR BLD AUTO: 23.4 % (ref 37–48.5)
HGB BLD-MCNC: 7.5 G/DL (ref 12–16)
HGB UR QL STRIP: NEGATIVE
HYALINE CASTS #/AREA URNS LPF: 12 /LPF
IMM GRANULOCYTES # BLD AUTO: 0.06 K/UL (ref 0–0.04)
IMM GRANULOCYTES NFR BLD AUTO: 0.6 % (ref 0–0.5)
KETONES UR QL STRIP: NEGATIVE
LACTATE SERPL-SCNC: 1.9 MMOL/L (ref 0.5–1.9)
LEUKOCYTE ESTERASE UR QL STRIP: ABNORMAL
LYMPHOCYTES # BLD AUTO: 2.2 K/UL (ref 1–4.8)
LYMPHOCYTES NFR BLD: 19.7 % (ref 18–48)
MCH RBC QN AUTO: 30.7 PG (ref 27–31)
MCHC RBC AUTO-ENTMCNC: 32.1 G/DL (ref 32–36)
MCV RBC AUTO: 96 FL (ref 82–98)
MICROSCOPIC COMMENT: ABNORMAL
MONOCYTES # BLD AUTO: 1.3 K/UL (ref 0.3–1)
MONOCYTES NFR BLD: 12.1 % (ref 4–15)
NEUTROPHILS # BLD AUTO: 7.1 K/UL (ref 1.8–7.7)
NEUTROPHILS NFR BLD: 64.7 % (ref 38–73)
NITRITE UR QL STRIP: POSITIVE
NRBC BLD-RTO: 0 /100 WBC
PH UR STRIP: 8 [PH] (ref 5–8)
PLATELET # BLD AUTO: 267 K/UL (ref 150–350)
PMV BLD AUTO: 11.4 FL (ref 9.2–12.9)
POTASSIUM SERPL-SCNC: 4.6 MMOL/L (ref 3.5–5.1)
PROT SERPL-MCNC: 6.9 G/DL (ref 6–8.4)
PROT UR QL STRIP: ABNORMAL
RBC # BLD AUTO: 2.44 M/UL (ref 4–5.4)
RBC #/AREA URNS HPF: 1 /HPF (ref 0–4)
SARS-COV-2 RDRP RESP QL NAA+PROBE: NEGATIVE
SODIUM SERPL-SCNC: 130 MMOL/L (ref 136–145)
SP GR UR STRIP: 1.02 (ref 1–1.03)
SQUAMOUS #/AREA URNS HPF: 1 /HPF
URN SPEC COLLECT METH UR: ABNORMAL
UROBILINOGEN UR STRIP-ACNC: ABNORMAL EU/DL
WBC # BLD AUTO: 10.9 K/UL (ref 3.9–12.7)
WBC #/AREA URNS HPF: >100 /HPF (ref 0–5)

## 2020-09-13 PROCEDURE — 25000003 PHARM REV CODE 250: Performed by: INTERNAL MEDICINE

## 2020-09-13 PROCEDURE — 63600175 PHARM REV CODE 636 W HCPCS: Performed by: INTERNAL MEDICINE

## 2020-09-13 PROCEDURE — 25000242 PHARM REV CODE 250 ALT 637 W/ HCPCS: Performed by: NURSE PRACTITIONER

## 2020-09-13 PROCEDURE — 99285 EMERGENCY DEPT VISIT HI MDM: CPT | Mod: 25

## 2020-09-13 PROCEDURE — 99223 PR INITIAL HOSPITAL CARE,LEVL III: ICD-10-PCS | Mod: GW,,, | Performed by: INTERNAL MEDICINE

## 2020-09-13 PROCEDURE — U0002 COVID-19 LAB TEST NON-CDC: HCPCS

## 2020-09-13 PROCEDURE — 12000002 HC ACUTE/MED SURGE SEMI-PRIVATE ROOM

## 2020-09-13 PROCEDURE — 25000003 PHARM REV CODE 250: Performed by: NURSE PRACTITIONER

## 2020-09-13 PROCEDURE — 93010 EKG 12-LEAD: ICD-10-PCS | Mod: GW,,, | Performed by: INTERNAL MEDICINE

## 2020-09-13 PROCEDURE — 99223 1ST HOSP IP/OBS HIGH 75: CPT | Mod: GW,,, | Performed by: INTERNAL MEDICINE

## 2020-09-13 PROCEDURE — 83605 ASSAY OF LACTIC ACID: CPT

## 2020-09-13 PROCEDURE — 87086 URINE CULTURE/COLONY COUNT: CPT

## 2020-09-13 PROCEDURE — 81001 URINALYSIS AUTO W/SCOPE: CPT

## 2020-09-13 PROCEDURE — 93010 ELECTROCARDIOGRAM REPORT: CPT | Mod: GW,,, | Performed by: INTERNAL MEDICINE

## 2020-09-13 PROCEDURE — 80053 COMPREHEN METABOLIC PANEL: CPT

## 2020-09-13 PROCEDURE — 27000221 HC OXYGEN, UP TO 24 HOURS

## 2020-09-13 PROCEDURE — 85025 COMPLETE CBC W/AUTO DIFF WBC: CPT

## 2020-09-13 PROCEDURE — 99900035 HC TECH TIME PER 15 MIN (STAT)

## 2020-09-13 PROCEDURE — 93005 ELECTROCARDIOGRAM TRACING: CPT | Performed by: INTERNAL MEDICINE

## 2020-09-13 PROCEDURE — 94761 N-INVAS EAR/PLS OXIMETRY MLT: CPT

## 2020-09-13 PROCEDURE — 87040 BLOOD CULTURE FOR BACTERIA: CPT

## 2020-09-13 RX ORDER — CEFEPIME HYDROCHLORIDE 1 G/50ML
1 INJECTION, SOLUTION INTRAVENOUS
Status: DISCONTINUED | OUTPATIENT
Start: 2020-09-13 | End: 2020-09-16 | Stop reason: HOSPADM

## 2020-09-13 RX ORDER — DONEPEZIL HYDROCHLORIDE 5 MG/1
10 TABLET, FILM COATED ORAL NIGHTLY
Status: DISCONTINUED | OUTPATIENT
Start: 2020-09-13 | End: 2020-09-16 | Stop reason: HOSPADM

## 2020-09-13 RX ORDER — FAMOTIDINE 20 MG/1
20 TABLET, FILM COATED ORAL 2 TIMES DAILY
Status: DISCONTINUED | OUTPATIENT
Start: 2020-09-13 | End: 2020-09-16 | Stop reason: HOSPADM

## 2020-09-13 RX ORDER — BENZONATATE 100 MG/1
100 CAPSULE ORAL 3 TIMES DAILY PRN
Status: DISCONTINUED | OUTPATIENT
Start: 2020-09-13 | End: 2020-09-16 | Stop reason: HOSPADM

## 2020-09-13 RX ORDER — LANOLIN ALCOHOL/MO/W.PET/CERES
800 CREAM (GRAM) TOPICAL
Status: DISCONTINUED | OUTPATIENT
Start: 2020-09-13 | End: 2020-09-16 | Stop reason: HOSPADM

## 2020-09-13 RX ORDER — DEXTROMETHORPHAN POLISTIREX 30 MG/5 ML
1 SUSPENSION, EXTENDED RELEASE 12 HR ORAL ONCE
Status: DISCONTINUED | OUTPATIENT
Start: 2020-09-13 | End: 2020-09-16 | Stop reason: HOSPADM

## 2020-09-13 RX ORDER — ACETAMINOPHEN 325 MG/1
650 TABLET ORAL EVERY 8 HOURS PRN
Status: DISCONTINUED | OUTPATIENT
Start: 2020-09-13 | End: 2020-09-16 | Stop reason: HOSPADM

## 2020-09-13 RX ORDER — ONDANSETRON 2 MG/ML
4 INJECTION INTRAMUSCULAR; INTRAVENOUS EVERY 8 HOURS PRN
Status: DISCONTINUED | OUTPATIENT
Start: 2020-09-13 | End: 2020-09-16 | Stop reason: HOSPADM

## 2020-09-13 RX ORDER — GABAPENTIN 100 MG/1
200 CAPSULE ORAL NIGHTLY
Status: DISCONTINUED | OUTPATIENT
Start: 2020-09-13 | End: 2020-09-16 | Stop reason: HOSPADM

## 2020-09-13 RX ORDER — FLUTICASONE FUROATE AND VILANTEROL 200; 25 UG/1; UG/1
1 POWDER RESPIRATORY (INHALATION) DAILY
Status: DISCONTINUED | OUTPATIENT
Start: 2020-09-13 | End: 2020-09-16 | Stop reason: HOSPADM

## 2020-09-13 RX ORDER — VANCOMYCIN HCL IN 5 % DEXTROSE 1G/250ML
1000 PLASTIC BAG, INJECTION (ML) INTRAVENOUS ONCE
Status: COMPLETED | OUTPATIENT
Start: 2020-09-13 | End: 2020-09-13

## 2020-09-13 RX ORDER — ACETAMINOPHEN 325 MG/1
650 TABLET ORAL EVERY 4 HOURS PRN
Status: DISCONTINUED | OUTPATIENT
Start: 2020-09-13 | End: 2020-09-16 | Stop reason: HOSPADM

## 2020-09-13 RX ORDER — ACETAMINOPHEN 500 MG
500 TABLET ORAL ONCE
Status: COMPLETED | OUTPATIENT
Start: 2020-09-13 | End: 2020-09-13

## 2020-09-13 RX ORDER — FLUTICASONE PROPIONATE 50 MCG
1 SPRAY, SUSPENSION (ML) NASAL DAILY
Status: DISCONTINUED | OUTPATIENT
Start: 2020-09-13 | End: 2020-09-16 | Stop reason: HOSPADM

## 2020-09-13 RX ORDER — LISINOPRIL 5 MG/1
5 TABLET ORAL DAILY
Status: DISCONTINUED | OUTPATIENT
Start: 2020-09-13 | End: 2020-09-16 | Stop reason: HOSPADM

## 2020-09-13 RX ORDER — SODIUM,POTASSIUM PHOSPHATES 280-250MG
2 POWDER IN PACKET (EA) ORAL
Status: DISCONTINUED | OUTPATIENT
Start: 2020-09-13 | End: 2020-09-16 | Stop reason: HOSPADM

## 2020-09-13 RX ORDER — POTASSIUM CHLORIDE 1.5 G/1.58G
40 POWDER, FOR SOLUTION ORAL
Status: DISCONTINUED | OUTPATIENT
Start: 2020-09-13 | End: 2020-09-16 | Stop reason: HOSPADM

## 2020-09-13 RX ORDER — LORAZEPAM 1 MG/1
1 TABLET ORAL NIGHTLY
Status: DISCONTINUED | OUTPATIENT
Start: 2020-09-13 | End: 2020-09-16 | Stop reason: HOSPADM

## 2020-09-13 RX ORDER — SODIUM CHLORIDE 0.9 % (FLUSH) 0.9 %
10 SYRINGE (ML) INJECTION
Status: DISCONTINUED | OUTPATIENT
Start: 2020-09-13 | End: 2020-09-16 | Stop reason: HOSPADM

## 2020-09-13 RX ORDER — METOPROLOL TARTRATE 25 MG/1
25 TABLET, FILM COATED ORAL 2 TIMES DAILY
Status: DISCONTINUED | OUTPATIENT
Start: 2020-09-13 | End: 2020-09-16 | Stop reason: HOSPADM

## 2020-09-13 RX ORDER — ATORVASTATIN CALCIUM 10 MG/1
10 TABLET, FILM COATED ORAL DAILY
Status: DISCONTINUED | OUTPATIENT
Start: 2020-09-13 | End: 2020-09-16 | Stop reason: HOSPADM

## 2020-09-13 RX ORDER — NAPROXEN SODIUM 220 MG/1
81 TABLET, FILM COATED ORAL DAILY
Status: DISCONTINUED | OUTPATIENT
Start: 2020-09-13 | End: 2020-09-16 | Stop reason: HOSPADM

## 2020-09-13 RX ORDER — OXYBUTYNIN CHLORIDE 5 MG/1
5 TABLET ORAL 3 TIMES DAILY
Status: DISCONTINUED | OUTPATIENT
Start: 2020-09-13 | End: 2020-09-16 | Stop reason: HOSPADM

## 2020-09-13 RX ORDER — ACETAMINOPHEN 325 MG/1
650 TABLET ORAL EVERY 6 HOURS PRN
Status: DISCONTINUED | OUTPATIENT
Start: 2020-09-13 | End: 2020-09-16 | Stop reason: HOSPADM

## 2020-09-13 RX ORDER — SYRING-NEEDL,DISP,INSUL,0.3 ML 29 G X1/2"
296 SYRINGE, EMPTY DISPOSABLE MISCELLANEOUS ONCE
Status: COMPLETED | OUTPATIENT
Start: 2020-09-13 | End: 2020-09-13

## 2020-09-13 RX ORDER — LORATADINE 10 MG/1
10 TABLET ORAL DAILY
Status: DISCONTINUED | OUTPATIENT
Start: 2020-09-14 | End: 2020-09-16 | Stop reason: HOSPADM

## 2020-09-13 RX ORDER — SODIUM CHLORIDE, SODIUM LACTATE, POTASSIUM CHLORIDE, CALCIUM CHLORIDE 600; 310; 30; 20 MG/100ML; MG/100ML; MG/100ML; MG/100ML
INJECTION, SOLUTION INTRAVENOUS CONTINUOUS
Status: DISCONTINUED | OUTPATIENT
Start: 2020-09-13 | End: 2020-09-14

## 2020-09-13 RX ADMIN — OXYBUTYNIN CHLORIDE 5 MG: 5 TABLET ORAL at 02:09

## 2020-09-13 RX ADMIN — ATORVASTATIN CALCIUM 10 MG: 10 TABLET, FILM COATED ORAL at 08:09

## 2020-09-13 RX ADMIN — SODIUM CHLORIDE, SODIUM LACTATE, POTASSIUM CHLORIDE, AND CALCIUM CHLORIDE 1000 ML: .6; .31; .03; .02 INJECTION, SOLUTION INTRAVENOUS at 12:09

## 2020-09-13 RX ADMIN — SODIUM CHLORIDE, SODIUM LACTATE, POTASSIUM CHLORIDE, AND CALCIUM CHLORIDE: .6; .31; .03; .02 INJECTION, SOLUTION INTRAVENOUS at 04:09

## 2020-09-13 RX ADMIN — OXYBUTYNIN CHLORIDE 5 MG: 5 TABLET ORAL at 08:09

## 2020-09-13 RX ADMIN — VANCOMYCIN HYDROCHLORIDE 1000 MG: 1 INJECTION, POWDER, LYOPHILIZED, FOR SOLUTION INTRAVENOUS at 10:09

## 2020-09-13 RX ADMIN — FAMOTIDINE 20 MG: 20 TABLET ORAL at 08:09

## 2020-09-13 RX ADMIN — CEFEPIME HYDROCHLORIDE 1 G: 1 INJECTION, SOLUTION INTRAVENOUS at 11:09

## 2020-09-13 RX ADMIN — PIPERACILLIN AND TAZOBACTAM 4.5 G: 4; .5 INJECTION, POWDER, LYOPHILIZED, FOR SOLUTION INTRAVENOUS; PARENTERAL at 07:09

## 2020-09-13 RX ADMIN — ACETAMINOPHEN 650 MG: 325 TABLET, FILM COATED ORAL at 11:09

## 2020-09-13 RX ADMIN — OXYBUTYNIN CHLORIDE 5 MG: 5 TABLET ORAL at 10:09

## 2020-09-13 RX ADMIN — ACETAMINOPHEN 500 MG: 500 TABLET, FILM COATED ORAL at 12:09

## 2020-09-13 RX ADMIN — SODIUM CHLORIDE, SODIUM LACTATE, POTASSIUM CHLORIDE, AND CALCIUM CHLORIDE 500 ML: .6; .31; .03; .02 INJECTION, SOLUTION INTRAVENOUS at 08:09

## 2020-09-13 RX ADMIN — Medication 296 ML: at 11:09

## 2020-09-13 RX ADMIN — DONEPEZIL HYDROCHLORIDE 10 MG: 5 TABLET, FILM COATED ORAL at 08:09

## 2020-09-14 LAB
ANION GAP SERPL CALC-SCNC: 8 MMOL/L (ref 8–16)
BACTERIA #/AREA URNS HPF: NEGATIVE /HPF
BACTERIA BLD CULT: ABNORMAL
BACTERIA UR CULT: NORMAL
BACTERIA UR CULT: NORMAL
BASOPHILS # BLD AUTO: 0.05 K/UL (ref 0–0.2)
BASOPHILS NFR BLD: 0.5 % (ref 0–1.9)
BILIRUB UR QL STRIP: NEGATIVE
BUN SERPL-MCNC: 22 MG/DL (ref 8–23)
CALCIUM SERPL-MCNC: 8.8 MG/DL (ref 8.7–10.5)
CHLORIDE SERPL-SCNC: 102 MMOL/L (ref 95–110)
CLARITY UR: ABNORMAL
CO2 SERPL-SCNC: 21 MMOL/L (ref 23–29)
COLOR UR: YELLOW
CREAT SERPL-MCNC: 0.8 MG/DL (ref 0.5–1.4)
DIFFERENTIAL METHOD: ABNORMAL
EOSINOPHIL # BLD AUTO: 0.5 K/UL (ref 0–0.5)
EOSINOPHIL NFR BLD: 4.4 % (ref 0–8)
ERYTHROCYTE [DISTWIDTH] IN BLOOD BY AUTOMATED COUNT: 16.8 % (ref 11.5–14.5)
EST. GFR  (AFRICAN AMERICAN): >60 ML/MIN/1.73 M^2
EST. GFR  (NON AFRICAN AMERICAN): >60 ML/MIN/1.73 M^2
GLUCOSE SERPL-MCNC: 106 MG/DL (ref 70–110)
GLUCOSE UR QL STRIP: NEGATIVE
HCT VFR BLD AUTO: 23.4 % (ref 37–48.5)
HGB BLD-MCNC: 7.4 G/DL (ref 12–16)
HGB UR QL STRIP: NEGATIVE
HYALINE CASTS #/AREA URNS LPF: 12 /LPF
IMM GRANULOCYTES # BLD AUTO: 0.05 K/UL (ref 0–0.04)
IMM GRANULOCYTES NFR BLD AUTO: 0.5 % (ref 0–0.5)
KETONES UR QL STRIP: NEGATIVE
LEUKOCYTE ESTERASE UR QL STRIP: ABNORMAL
LYMPHOCYTES # BLD AUTO: 1.5 K/UL (ref 1–4.8)
LYMPHOCYTES NFR BLD: 13.4 % (ref 18–48)
MAGNESIUM SERPL-MCNC: 2.3 MG/DL (ref 1.6–2.6)
MCH RBC QN AUTO: 30.1 PG (ref 27–31)
MCHC RBC AUTO-ENTMCNC: 31.6 G/DL (ref 32–36)
MCV RBC AUTO: 95 FL (ref 82–98)
MICROSCOPIC COMMENT: ABNORMAL
MONOCYTES # BLD AUTO: 1 K/UL (ref 0.3–1)
MONOCYTES NFR BLD: 8.9 % (ref 4–15)
NEUTROPHILS # BLD AUTO: 7.8 K/UL (ref 1.8–7.7)
NEUTROPHILS NFR BLD: 72.3 % (ref 38–73)
NITRITE UR QL STRIP: NEGATIVE
NRBC BLD-RTO: 0 /100 WBC
PH UR STRIP: 6 [PH] (ref 5–8)
PLATELET # BLD AUTO: 255 K/UL (ref 150–350)
PMV BLD AUTO: 11 FL (ref 9.2–12.9)
POTASSIUM SERPL-SCNC: 4.5 MMOL/L (ref 3.5–5.1)
PROT UR QL STRIP: ABNORMAL
RBC # BLD AUTO: 2.46 M/UL (ref 4–5.4)
RBC #/AREA URNS HPF: 3 /HPF (ref 0–4)
SODIUM SERPL-SCNC: 131 MMOL/L (ref 136–145)
SP GR UR STRIP: 1 (ref 1–1.03)
SQUAMOUS #/AREA URNS HPF: 4 /HPF
URN SPEC COLLECT METH UR: ABNORMAL
UROBILINOGEN UR STRIP-ACNC: NEGATIVE EU/DL
WBC # BLD AUTO: 10.84 K/UL (ref 3.9–12.7)
WBC #/AREA URNS HPF: 12 /HPF (ref 0–5)

## 2020-09-14 PROCEDURE — 63600175 PHARM REV CODE 636 W HCPCS: Performed by: INTERNAL MEDICINE

## 2020-09-14 PROCEDURE — 81001 URINALYSIS AUTO W/SCOPE: CPT

## 2020-09-14 PROCEDURE — 87086 URINE CULTURE/COLONY COUNT: CPT

## 2020-09-14 PROCEDURE — 85025 COMPLETE CBC W/AUTO DIFF WBC: CPT

## 2020-09-14 PROCEDURE — 94761 N-INVAS EAR/PLS OXIMETRY MLT: CPT

## 2020-09-14 PROCEDURE — 25000003 PHARM REV CODE 250: Performed by: INTERNAL MEDICINE

## 2020-09-14 PROCEDURE — 83735 ASSAY OF MAGNESIUM: CPT

## 2020-09-14 PROCEDURE — 99900035 HC TECH TIME PER 15 MIN (STAT)

## 2020-09-14 PROCEDURE — 27000221 HC OXYGEN, UP TO 24 HOURS

## 2020-09-14 PROCEDURE — 25000003 PHARM REV CODE 250: Performed by: NURSE PRACTITIONER

## 2020-09-14 PROCEDURE — 80048 BASIC METABOLIC PNL TOTAL CA: CPT

## 2020-09-14 PROCEDURE — 36415 COLL VENOUS BLD VENIPUNCTURE: CPT

## 2020-09-14 PROCEDURE — 12000002 HC ACUTE/MED SURGE SEMI-PRIVATE ROOM

## 2020-09-14 RX ADMIN — DONEPEZIL HYDROCHLORIDE 10 MG: 5 TABLET, FILM COATED ORAL at 09:09

## 2020-09-14 RX ADMIN — FAMOTIDINE 20 MG: 20 TABLET ORAL at 09:09

## 2020-09-14 RX ADMIN — LORATADINE 10 MG: 10 TABLET ORAL at 09:09

## 2020-09-14 RX ADMIN — CEFEPIME HYDROCHLORIDE 1 G: 1 INJECTION, SOLUTION INTRAVENOUS at 10:09

## 2020-09-14 RX ADMIN — LISINOPRIL 5 MG: 5 TABLET ORAL at 09:09

## 2020-09-14 RX ADMIN — OXYBUTYNIN CHLORIDE 5 MG: 5 TABLET ORAL at 08:09

## 2020-09-14 RX ADMIN — METOPROLOL TARTRATE 25 MG: 25 TABLET, FILM COATED ORAL at 08:09

## 2020-09-14 RX ADMIN — ATORVASTATIN CALCIUM 10 MG: 10 TABLET, FILM COATED ORAL at 09:09

## 2020-09-14 RX ADMIN — METOPROLOL TARTRATE 25 MG: 25 TABLET, FILM COATED ORAL at 09:09

## 2020-09-14 RX ADMIN — SODIUM CHLORIDE, SODIUM LACTATE, POTASSIUM CHLORIDE, AND CALCIUM CHLORIDE: .6; .31; .03; .02 INJECTION, SOLUTION INTRAVENOUS at 10:09

## 2020-09-14 RX ADMIN — FAMOTIDINE 20 MG: 20 TABLET ORAL at 08:09

## 2020-09-14 RX ADMIN — CEFEPIME HYDROCHLORIDE 1 G: 1 INJECTION, SOLUTION INTRAVENOUS at 11:09

## 2020-09-14 RX ADMIN — ASPIRIN 81 MG 81 MG: 81 TABLET ORAL at 08:09

## 2020-09-14 RX ADMIN — VANCOMYCIN HYDROCHLORIDE 1250 MG: 1.25 INJECTION, POWDER, LYOPHILIZED, FOR SOLUTION INTRAVENOUS at 10:09

## 2020-09-14 RX ADMIN — GABAPENTIN 200 MG: 100 CAPSULE ORAL at 09:09

## 2020-09-14 RX ADMIN — LORAZEPAM 1 MG: 1 TABLET ORAL at 09:09

## 2020-09-14 RX ADMIN — VANCOMYCIN HYDROCHLORIDE 500 MG: 500 INJECTION, POWDER, LYOPHILIZED, FOR SOLUTION INTRAVENOUS at 06:09

## 2020-09-14 RX ADMIN — OXYBUTYNIN CHLORIDE 5 MG: 5 TABLET ORAL at 09:09

## 2020-09-15 LAB
ABO + RH BLD: NORMAL
ANION GAP SERPL CALC-SCNC: 6 MMOL/L (ref 8–16)
BACTERIA BLD CULT: NORMAL
BASOPHILS # BLD AUTO: 0.02 K/UL (ref 0–0.2)
BASOPHILS NFR BLD: 0.3 % (ref 0–1.9)
BLD GP AB SCN CELLS X3 SERPL QL: NORMAL
BLD PROD TYP BPU: NORMAL
BLOOD UNIT EXPIRATION DATE: NORMAL
BLOOD UNIT TYPE CODE: 5100
BLOOD UNIT TYPE: NORMAL
BUN SERPL-MCNC: 15 MG/DL (ref 8–23)
CALCIUM SERPL-MCNC: 8.5 MG/DL (ref 8.7–10.5)
CHLORIDE SERPL-SCNC: 100 MMOL/L (ref 95–110)
CO2 SERPL-SCNC: 27 MMOL/L (ref 23–29)
CODING SYSTEM: NORMAL
CREAT SERPL-MCNC: 0.6 MG/DL (ref 0.5–1.4)
DIFFERENTIAL METHOD: ABNORMAL
DISPENSE STATUS: NORMAL
EOSINOPHIL # BLD AUTO: 0.6 K/UL (ref 0–0.5)
EOSINOPHIL NFR BLD: 8 % (ref 0–8)
ERYTHROCYTE [DISTWIDTH] IN BLOOD BY AUTOMATED COUNT: 16.6 % (ref 11.5–14.5)
EST. GFR  (AFRICAN AMERICAN): >60 ML/MIN/1.73 M^2
EST. GFR  (NON AFRICAN AMERICAN): >60 ML/MIN/1.73 M^2
GLUCOSE SERPL-MCNC: 114 MG/DL (ref 70–110)
HCT VFR BLD AUTO: 21.4 % (ref 37–48.5)
HGB BLD-MCNC: 6.8 G/DL (ref 12–16)
IMM GRANULOCYTES # BLD AUTO: 0.03 K/UL (ref 0–0.04)
IMM GRANULOCYTES NFR BLD AUTO: 0.4 % (ref 0–0.5)
LYMPHOCYTES # BLD AUTO: 1.3 K/UL (ref 1–4.8)
LYMPHOCYTES NFR BLD: 17.9 % (ref 18–48)
MAGNESIUM SERPL-MCNC: 2.2 MG/DL (ref 1.6–2.6)
MCH RBC QN AUTO: 30.5 PG (ref 27–31)
MCHC RBC AUTO-ENTMCNC: 31.8 G/DL (ref 32–36)
MCV RBC AUTO: 96 FL (ref 82–98)
MONOCYTES # BLD AUTO: 0.9 K/UL (ref 0.3–1)
MONOCYTES NFR BLD: 12.5 % (ref 4–15)
NEUTROPHILS # BLD AUTO: 4.3 K/UL (ref 1.8–7.7)
NEUTROPHILS NFR BLD: 60.9 % (ref 38–73)
NRBC BLD-RTO: 0 /100 WBC
NUM UNITS TRANS PACKED RBC: NORMAL
PLATELET # BLD AUTO: 237 K/UL (ref 150–350)
PMV BLD AUTO: 10.5 FL (ref 9.2–12.9)
POTASSIUM SERPL-SCNC: 4.1 MMOL/L (ref 3.5–5.1)
RBC # BLD AUTO: 2.23 M/UL (ref 4–5.4)
SODIUM SERPL-SCNC: 133 MMOL/L (ref 136–145)
WBC # BLD AUTO: 6.98 K/UL (ref 3.9–12.7)

## 2020-09-15 PROCEDURE — 12000002 HC ACUTE/MED SURGE SEMI-PRIVATE ROOM

## 2020-09-15 PROCEDURE — 63600175 PHARM REV CODE 636 W HCPCS: Performed by: INTERNAL MEDICINE

## 2020-09-15 PROCEDURE — 83735 ASSAY OF MAGNESIUM: CPT

## 2020-09-15 PROCEDURE — 27000221 HC OXYGEN, UP TO 24 HOURS

## 2020-09-15 PROCEDURE — 25000003 PHARM REV CODE 250: Performed by: NURSE PRACTITIONER

## 2020-09-15 PROCEDURE — 86920 COMPATIBILITY TEST SPIN: CPT

## 2020-09-15 PROCEDURE — 25000003 PHARM REV CODE 250: Performed by: INTERNAL MEDICINE

## 2020-09-15 PROCEDURE — 36430 TRANSFUSION BLD/BLD COMPNT: CPT

## 2020-09-15 PROCEDURE — 94761 N-INVAS EAR/PLS OXIMETRY MLT: CPT

## 2020-09-15 PROCEDURE — 36415 COLL VENOUS BLD VENIPUNCTURE: CPT

## 2020-09-15 PROCEDURE — 86900 BLOOD TYPING SEROLOGIC ABO: CPT

## 2020-09-15 PROCEDURE — P9016 RBC LEUKOCYTES REDUCED: HCPCS

## 2020-09-15 PROCEDURE — 99900035 HC TECH TIME PER 15 MIN (STAT)

## 2020-09-15 PROCEDURE — 85025 COMPLETE CBC W/AUTO DIFF WBC: CPT

## 2020-09-15 PROCEDURE — 80048 BASIC METABOLIC PNL TOTAL CA: CPT

## 2020-09-15 RX ORDER — HYDROCODONE BITARTRATE AND ACETAMINOPHEN 500; 5 MG/1; MG/1
TABLET ORAL
Status: DISCONTINUED | OUTPATIENT
Start: 2020-09-15 | End: 2020-09-16 | Stop reason: HOSPADM

## 2020-09-15 RX ADMIN — FAMOTIDINE 20 MG: 20 TABLET ORAL at 09:09

## 2020-09-15 RX ADMIN — LORAZEPAM 1 MG: 1 TABLET ORAL at 09:09

## 2020-09-15 RX ADMIN — LEUCINE, PHENYLALANINE, LYSINE, METHIONINE, ISOLEUCINE, VALINE, HISTIDINE, THREONINE, TRYPTOPHAN, ALANINE, GLYCINE, ARGININE, PROLINE, SERINE, TYROSINE, DEXTROSE 1000 ML: 311; 238; 247; 170; 255; 247; 204; 179; 77; 880; 438; 489; 289; 213; 17; 5 INJECTION INTRAVENOUS at 12:09

## 2020-09-15 RX ADMIN — METOPROLOL TARTRATE 25 MG: 25 TABLET, FILM COATED ORAL at 09:09

## 2020-09-15 RX ADMIN — LEUCINE, PHENYLALANINE, LYSINE, METHIONINE, ISOLEUCINE, VALINE, HISTIDINE, THREONINE, TRYPTOPHAN, ALANINE, GLYCINE, ARGININE, PROLINE, SERINE, TYROSINE, DEXTROSE 1000 ML: 311; 238; 247; 170; 255; 247; 204; 179; 77; 880; 438; 489; 289; 213; 17; 5 INJECTION INTRAVENOUS at 08:09

## 2020-09-15 RX ADMIN — LORATADINE 10 MG: 10 TABLET ORAL at 10:09

## 2020-09-15 RX ADMIN — OXYBUTYNIN CHLORIDE 5 MG: 5 TABLET ORAL at 10:09

## 2020-09-15 RX ADMIN — DONEPEZIL HYDROCHLORIDE 10 MG: 5 TABLET, FILM COATED ORAL at 09:09

## 2020-09-15 RX ADMIN — CEFEPIME HYDROCHLORIDE 1 G: 1 INJECTION, SOLUTION INTRAVENOUS at 11:09

## 2020-09-15 RX ADMIN — FAMOTIDINE 20 MG: 20 TABLET ORAL at 10:09

## 2020-09-15 RX ADMIN — OXYBUTYNIN CHLORIDE 5 MG: 5 TABLET ORAL at 09:09

## 2020-09-15 RX ADMIN — GABAPENTIN 200 MG: 100 CAPSULE ORAL at 09:09

## 2020-09-15 RX ADMIN — ATORVASTATIN CALCIUM 10 MG: 10 TABLET, FILM COATED ORAL at 09:09

## 2020-09-15 RX ADMIN — LISINOPRIL 5 MG: 5 TABLET ORAL at 10:09

## 2020-09-15 RX ADMIN — ASPIRIN 81 MG 81 MG: 81 TABLET ORAL at 10:09

## 2020-09-15 RX ADMIN — METOPROLOL TARTRATE 25 MG: 25 TABLET, FILM COATED ORAL at 10:09

## 2020-09-15 RX ADMIN — OXYBUTYNIN CHLORIDE 5 MG: 5 TABLET ORAL at 03:09

## 2020-09-16 VITALS
HEIGHT: 66 IN | HEART RATE: 82 BPM | SYSTOLIC BLOOD PRESSURE: 133 MMHG | RESPIRATION RATE: 19 BRPM | TEMPERATURE: 98 F | BODY MASS INDEX: 24.11 KG/M2 | WEIGHT: 150 LBS | OXYGEN SATURATION: 94 % | DIASTOLIC BLOOD PRESSURE: 77 MMHG

## 2020-09-16 LAB
ANION GAP SERPL CALC-SCNC: 6 MMOL/L (ref 8–16)
BASOPHILS # BLD AUTO: 0.04 K/UL (ref 0–0.2)
BASOPHILS NFR BLD: 0.6 % (ref 0–1.9)
BUN SERPL-MCNC: 14 MG/DL (ref 8–23)
CALCIUM SERPL-MCNC: 8.8 MG/DL (ref 8.7–10.5)
CHLORIDE SERPL-SCNC: 102 MMOL/L (ref 95–110)
CO2 SERPL-SCNC: 28 MMOL/L (ref 23–29)
CREAT SERPL-MCNC: 0.7 MG/DL (ref 0.5–1.4)
DIFFERENTIAL METHOD: ABNORMAL
EOSINOPHIL # BLD AUTO: 0.6 K/UL (ref 0–0.5)
EOSINOPHIL NFR BLD: 8.7 % (ref 0–8)
ERYTHROCYTE [DISTWIDTH] IN BLOOD BY AUTOMATED COUNT: 18.4 % (ref 11.5–14.5)
EST. GFR  (AFRICAN AMERICAN): >60 ML/MIN/1.73 M^2
EST. GFR  (NON AFRICAN AMERICAN): >60 ML/MIN/1.73 M^2
GLUCOSE SERPL-MCNC: 115 MG/DL (ref 70–110)
HCT VFR BLD AUTO: 27.3 % (ref 37–48.5)
HGB BLD-MCNC: 8.7 G/DL (ref 12–16)
IMM GRANULOCYTES # BLD AUTO: 0.02 K/UL (ref 0–0.04)
IMM GRANULOCYTES NFR BLD AUTO: 0.3 % (ref 0–0.5)
LYMPHOCYTES # BLD AUTO: 1.2 K/UL (ref 1–4.8)
LYMPHOCYTES NFR BLD: 18.9 % (ref 18–48)
MAGNESIUM SERPL-MCNC: 2.1 MG/DL (ref 1.6–2.6)
MCH RBC QN AUTO: 29.1 PG (ref 27–31)
MCHC RBC AUTO-ENTMCNC: 31.9 G/DL (ref 32–36)
MCV RBC AUTO: 91 FL (ref 82–98)
MONOCYTES # BLD AUTO: 0.8 K/UL (ref 0.3–1)
MONOCYTES NFR BLD: 11.7 % (ref 4–15)
NEUTROPHILS # BLD AUTO: 3.9 K/UL (ref 1.8–7.7)
NEUTROPHILS NFR BLD: 59.8 % (ref 38–73)
NRBC BLD-RTO: 0 /100 WBC
PLATELET # BLD AUTO: 239 K/UL (ref 150–350)
PMV BLD AUTO: 10.8 FL (ref 9.2–12.9)
POTASSIUM SERPL-SCNC: 3.6 MMOL/L (ref 3.5–5.1)
RBC # BLD AUTO: 2.99 M/UL (ref 4–5.4)
SODIUM SERPL-SCNC: 136 MMOL/L (ref 136–145)
WBC # BLD AUTO: 6.56 K/UL (ref 3.9–12.7)

## 2020-09-16 PROCEDURE — 83735 ASSAY OF MAGNESIUM: CPT

## 2020-09-16 PROCEDURE — 25000003 PHARM REV CODE 250: Performed by: INTERNAL MEDICINE

## 2020-09-16 PROCEDURE — 36415 COLL VENOUS BLD VENIPUNCTURE: CPT

## 2020-09-16 PROCEDURE — 85025 COMPLETE CBC W/AUTO DIFF WBC: CPT

## 2020-09-16 PROCEDURE — 25000003 PHARM REV CODE 250: Performed by: NURSE PRACTITIONER

## 2020-09-16 PROCEDURE — 27000221 HC OXYGEN, UP TO 24 HOURS

## 2020-09-16 PROCEDURE — 80048 BASIC METABOLIC PNL TOTAL CA: CPT

## 2020-09-16 PROCEDURE — 94761 N-INVAS EAR/PLS OXIMETRY MLT: CPT

## 2020-09-16 PROCEDURE — 63600175 PHARM REV CODE 636 W HCPCS: Performed by: INTERNAL MEDICINE

## 2020-09-16 RX ADMIN — LORATADINE 10 MG: 10 TABLET ORAL at 08:09

## 2020-09-16 RX ADMIN — CEFEPIME HYDROCHLORIDE 1 G: 1 INJECTION, SOLUTION INTRAVENOUS at 10:09

## 2020-09-16 RX ADMIN — OXYBUTYNIN CHLORIDE 5 MG: 5 TABLET ORAL at 02:09

## 2020-09-16 RX ADMIN — ASPIRIN 81 MG 81 MG: 81 TABLET ORAL at 08:09

## 2020-09-16 RX ADMIN — METOPROLOL TARTRATE 25 MG: 25 TABLET, FILM COATED ORAL at 08:09

## 2020-09-16 RX ADMIN — OXYBUTYNIN CHLORIDE 5 MG: 5 TABLET ORAL at 08:09

## 2020-09-16 RX ADMIN — FAMOTIDINE 20 MG: 20 TABLET ORAL at 08:09

## 2020-09-16 RX ADMIN — LISINOPRIL 5 MG: 5 TABLET ORAL at 08:09

## 2020-09-17 LAB — BACTERIA UR CULT: NO GROWTH

## 2020-09-18 LAB
BACTERIA BLD CULT: NORMAL
BACTERIA BLD CULT: NORMAL

## 2020-09-21 ENCOUNTER — HOSPITAL ENCOUNTER (EMERGENCY)
Facility: HOSPITAL | Age: 85
Discharge: HOME | End: 2020-09-21
Attending: EMERGENCY MEDICINE
Payer: MEDICARE

## 2020-09-21 VITALS
OXYGEN SATURATION: 98 % | TEMPERATURE: 98 F | HEART RATE: 75 BPM | SYSTOLIC BLOOD PRESSURE: 110 MMHG | RESPIRATION RATE: 18 BRPM | DIASTOLIC BLOOD PRESSURE: 75 MMHG

## 2020-09-21 DIAGNOSIS — G40.901 STATUS EPILEPTICUS: ICD-10-CM

## 2020-09-21 DIAGNOSIS — I61.9 HEMORRHAGIC STROKE: Primary | ICD-10-CM

## 2020-09-21 DIAGNOSIS — I63.9 STROKE: ICD-10-CM

## 2020-09-21 LAB
ALBUMIN SERPL BCP-MCNC: 3.1 G/DL (ref 3.5–5.2)
ALP SERPL-CCNC: 61 U/L (ref 55–135)
ALT SERPL W/O P-5'-P-CCNC: 23 U/L (ref 10–44)
ANION GAP SERPL CALC-SCNC: 10 MMOL/L (ref 8–16)
AST SERPL-CCNC: 30 U/L (ref 10–40)
BASOPHILS # BLD AUTO: 0.03 K/UL (ref 0–0.2)
BASOPHILS NFR BLD: 0.4 % (ref 0–1.9)
BILIRUB SERPL-MCNC: 0.4 MG/DL (ref 0.1–1)
BUN SERPL-MCNC: 19 MG/DL (ref 8–23)
CALCIUM SERPL-MCNC: 9.2 MG/DL (ref 8.7–10.5)
CHLORIDE SERPL-SCNC: 97 MMOL/L (ref 95–110)
CHOLEST SERPL-MCNC: 131 MG/DL (ref 120–199)
CHOLEST/HDLC SERPL: 3.6 {RATIO} (ref 2–5)
CK SERPL-CCNC: 24 U/L (ref 20–180)
CO2 SERPL-SCNC: 27 MMOL/L (ref 23–29)
CREAT SERPL-MCNC: 0.8 MG/DL (ref 0.5–1.4)
DIFFERENTIAL METHOD: ABNORMAL
EOSINOPHIL # BLD AUTO: 0.5 K/UL (ref 0–0.5)
EOSINOPHIL NFR BLD: 5.9 % (ref 0–8)
ERYTHROCYTE [DISTWIDTH] IN BLOOD BY AUTOMATED COUNT: 17.7 % (ref 11.5–14.5)
EST. GFR  (AFRICAN AMERICAN): >60 ML/MIN/1.73 M^2
EST. GFR  (NON AFRICAN AMERICAN): >60 ML/MIN/1.73 M^2
GLUCOSE SERPL-MCNC: 116 MG/DL (ref 70–110)
GLUCOSE SERPL-MCNC: 124 MG/DL (ref 70–110)
HCT VFR BLD AUTO: 28.2 % (ref 37–48.5)
HDLC SERPL-MCNC: 36 MG/DL (ref 40–75)
HDLC SERPL: 27.5 % (ref 20–50)
HGB BLD-MCNC: 9 G/DL (ref 12–16)
IMM GRANULOCYTES # BLD AUTO: 0.03 K/UL (ref 0–0.04)
IMM GRANULOCYTES NFR BLD AUTO: 0.4 % (ref 0–0.5)
INR PPP: 1.2
LDLC SERPL CALC-MCNC: 73.6 MG/DL (ref 63–159)
LYMPHOCYTES # BLD AUTO: 1.5 K/UL (ref 1–4.8)
LYMPHOCYTES NFR BLD: 17.7 % (ref 18–48)
MCH RBC QN AUTO: 29.5 PG (ref 27–31)
MCHC RBC AUTO-ENTMCNC: 31.9 G/DL (ref 32–36)
MCV RBC AUTO: 93 FL (ref 82–98)
MONOCYTES # BLD AUTO: 0.9 K/UL (ref 0.3–1)
MONOCYTES NFR BLD: 10.8 % (ref 4–15)
NEUTROPHILS # BLD AUTO: 5.3 K/UL (ref 1.8–7.7)
NEUTROPHILS NFR BLD: 64.8 % (ref 38–73)
NONHDLC SERPL-MCNC: 95 MG/DL
NRBC BLD-RTO: 0 /100 WBC
PLATELET # BLD AUTO: 356 K/UL (ref 150–350)
PLATELET BLD QL SMEAR: ABNORMAL
PMV BLD AUTO: 10.4 FL (ref 9.2–12.9)
POTASSIUM SERPL-SCNC: 5.9 MMOL/L (ref 3.5–5.1)
PROT SERPL-MCNC: 6.4 G/DL (ref 6–8.4)
PROTHROMBIN TIME: 15 SEC (ref 10.6–14.8)
RBC # BLD AUTO: 3.05 M/UL (ref 4–5.4)
SODIUM SERPL-SCNC: 134 MMOL/L (ref 136–145)
TRIGL SERPL-MCNC: 107 MG/DL (ref 30–150)
TROPONIN I SERPL DL<=0.01 NG/ML-MCNC: <0.03 NG/ML
TSH SERPL DL<=0.005 MIU/L-ACNC: 1.11 UIU/ML (ref 0.34–5.6)
WBC # BLD AUTO: 8.17 K/UL (ref 3.9–12.7)

## 2020-09-21 PROCEDURE — 93010 EKG 12-LEAD: ICD-10-PCS | Mod: GW,,, | Performed by: INTERNAL MEDICINE

## 2020-09-21 PROCEDURE — 93005 ELECTROCARDIOGRAM TRACING: CPT | Performed by: INTERNAL MEDICINE

## 2020-09-21 PROCEDURE — 82550 ASSAY OF CK (CPK): CPT

## 2020-09-21 PROCEDURE — 82962 GLUCOSE BLOOD TEST: CPT

## 2020-09-21 PROCEDURE — 96375 TX/PRO/DX INJ NEW DRUG ADDON: CPT

## 2020-09-21 PROCEDURE — 84443 ASSAY THYROID STIM HORMONE: CPT

## 2020-09-21 PROCEDURE — 25000003 PHARM REV CODE 250: Performed by: EMERGENCY MEDICINE

## 2020-09-21 PROCEDURE — 96365 THER/PROPH/DIAG IV INF INIT: CPT

## 2020-09-21 PROCEDURE — 80061 LIPID PANEL: CPT

## 2020-09-21 PROCEDURE — 85610 PROTHROMBIN TIME: CPT

## 2020-09-21 PROCEDURE — 85025 COMPLETE CBC W/AUTO DIFF WBC: CPT

## 2020-09-21 PROCEDURE — 36415 COLL VENOUS BLD VENIPUNCTURE: CPT

## 2020-09-21 PROCEDURE — 80053 COMPREHEN METABOLIC PANEL: CPT

## 2020-09-21 PROCEDURE — 99291 CRITICAL CARE FIRST HOUR: CPT

## 2020-09-21 PROCEDURE — 84484 ASSAY OF TROPONIN QUANT: CPT

## 2020-09-21 PROCEDURE — 63600175 PHARM REV CODE 636 W HCPCS: Performed by: EMERGENCY MEDICINE

## 2020-09-21 PROCEDURE — 93010 ELECTROCARDIOGRAM REPORT: CPT | Mod: GW,,, | Performed by: INTERNAL MEDICINE

## 2020-09-21 RX ORDER — LEVETIRACETAM 100 MG/ML
500 SOLUTION ORAL 2 TIMES DAILY
Qty: 900 ML | Refills: 3 | Status: SHIPPED | OUTPATIENT
Start: 2020-09-21 | End: 2021-09-21

## 2020-09-21 RX ORDER — LEVETIRACETAM 500 MG/1
500 TABLET ORAL 2 TIMES DAILY
Status: DISCONTINUED | OUTPATIENT
Start: 2020-09-21 | End: 2020-09-21 | Stop reason: HOSPADM

## 2020-09-21 RX ORDER — GABAPENTIN 100 MG/1
100 CAPSULE ORAL 2 TIMES DAILY
COMMUNITY

## 2020-09-21 RX ORDER — LORAZEPAM 2 MG/ML
2 INJECTION INTRAMUSCULAR
Status: COMPLETED | OUTPATIENT
Start: 2020-09-21 | End: 2020-09-21

## 2020-09-21 RX ORDER — ACETAMINOPHEN 500 MG
500 TABLET ORAL
COMMUNITY

## 2020-09-21 RX ORDER — METOPROLOL TARTRATE 25 MG/1
25 TABLET, FILM COATED ORAL 2 TIMES DAILY
COMMUNITY

## 2020-09-21 RX ORDER — HYDROXYZINE HYDROCHLORIDE 25 MG/1
25 TABLET, FILM COATED ORAL NIGHTLY PRN
COMMUNITY

## 2020-09-21 RX ORDER — FLUTICASONE PROPIONATE 50 MCG
1 SPRAY, SUSPENSION (ML) NASAL DAILY
COMMUNITY

## 2020-09-21 RX ORDER — LISINOPRIL 5 MG/1
1 TABLET ORAL DAILY
COMMUNITY
Start: 2020-06-19

## 2020-09-21 RX ORDER — FEXOFENADINE HCL 60 MG
60 TABLET ORAL DAILY
COMMUNITY

## 2020-09-21 RX ADMIN — DEXTROSE MONOHYDRATE 2000 MG: 50 INJECTION, SOLUTION INTRAVENOUS at 12:09

## 2020-09-21 RX ADMIN — LORAZEPAM 2 MG: 2 INJECTION INTRAMUSCULAR; INTRAVENOUS at 11:09

## 2020-09-21 NOTE — ED NOTES
Pt discharge via [id number] back home. EMS given discharge paperwork and medications. Pt stable at time of discharge.

## 2020-09-21 NOTE — DISCHARGE INSTRUCTIONS
You have seizure secondary to bleeding in the head.  We are discharging you home based on your choice to go on hospice care and do not want any further treatment.  Take Keppra as prescribed for seizure prophylaxis.  Stop all blood thinners.  Stop aspirin.  Return to emergency department for any problems.  Follow-up with your hospice nurse and hospice doctor for further care

## 2020-09-21 NOTE — CONSULTS
Formerly [facility]  Neurology  Consult Note    Patient Name: Breanne Culp  MRN: [id number]  Admission Date: 9/21/2020  Hospital Length of Stay: 0 days  Code Status: Prior   Attending Provider: Mya Luu MD  Consulting Provider: Crystal Menard NP  Primary Care Physician: Frantz Bill MD  Principal Problem:<principal problem not specified>    Consults  Subjective:     Chief Complaint:  Seizures     HPI Per EMR: 86-year-old female recently had a stroke and is under hospice care presented emergency department as she started having seizure-like activity since this morning for about 1 hour prior to arrival and still having that activity where she is having tonic colonic activity on the right side of the body and is having decreased responsiveness.  Patient could not answer any questions and continue to have this tonic colonic activity on the right.    Neurology Consult note: Pt seen and examined in the ER with daughter in room. Pt is an 86 year old female who has HX of CVA 2018, CVA about 10 days ago, dementia, anxiety, HTN, overactive bladder. Pt was brought to the ER because she had a witnessed seizure at home. Pt was seizing at home and was continually seizing in the ER. Pt was loaded with 2000mg Keppra and 2mg of Ativan in the ER. CT of head revealed hemorrhagic conversation of large ischemic infarct involving left parietal lobe, including foci of acute intraparenchymal  As well as subarachnoid hemorrhage. At the time of exam, pt is found to be sleeping.Pt is responsive to painful stimuli. No twitching or convulsion noted. Pt has is unable to move bilateral upper extremities from previous strokes. Pt has a Peg tube. Pt is on home hospice since her second stroke as per her daughter. Pt is a DNR and her daughter at this time wants pt to be comfort care and she wants to take patient home.     *Critical care time spent 35 minutes.*    Past Medical History:   Diagnosis Date    Alzheimer disease      Anticoagulant long-term use     ASA 81mg, Fish Oil    Aphasia S/P CVA     Arthritis     Bedbound     Cataract     OU done//    COPD (chronic obstructive pulmonary disease)     Coronary artery disease     DDD (degenerative disc disease), lumbar     Dementia     Glaucoma     suspect    Hospice care     Hyperlipidemia     Low back pain     Onychomycosis due to dermatophyte 11/26/2014    Osteoporosis     Positive PPD 1970    Seasonal allergies     Stroke        Past Surgical History:   Procedure Laterality Date    BREAST BIOPSY Right     2012 neg    CATARACT EXTRACTION W/  INTRAOCULAR LENS IMPLANT Bilateral 10/15/14     Dr Wright    COLONOSCOPY      Epidural Steroid injection      Pain Management    GASTROSTOMY TUBE PLACEMENT      INJECTION OF JOINT Right 4/3/2019    Procedure: Injection, Joint, Hip;  Surgeon: Tashi Morrison MD;  Location: [facility];  Service: Pain Management;  Laterality: Right;    TONSILLECTOMY  1941       Review of patient's allergies indicates:   Allergen Reactions    Brimonidine Other (See Comments)     Redness and irritation of eyes       Current Neurological Medications: Keppra, Ativan           No current facility-administered medications on file prior to encounter.      Current Outpatient Medications on File Prior to Encounter   Medication Sig    acetaminophen (TYLENOL) 500 MG tablet Take 500 mg by mouth as needed for Pain.    aspirin (ASPIR-81 ORAL) Take 1 tablet by mouth once daily.    fexofenadine (ALLEGRA) 60 MG tablet Take 60 mg by mouth once daily.    fluticasone propionate (FLONASE) 50 mcg/actuation nasal spray 1 spray by Each Nostril route once daily.    gabapentin (NEURONTIN) 100 MG capsule Take 100 mg by mouth 2 (two) times daily.    hydrOXYzine HCL (ATARAX) 25 MG tablet Take 25 mg by mouth nightly as needed for Itching.    lisinopriL (PRINIVIL,ZESTRIL) 5 MG tablet Take 1 tablet by mouth once daily.    metoprolol tartrate (LOPRESSOR) 25 MG tablet  Take 25 mg by mouth 2 (two) times daily.    multivitamin/iron/folic acid (CENTRUM ORAL) Take 1 tablet by mouth once daily.    oxybutynin (DITROPAN) 5 MG Tab TAKE ONE TABLET BY MOUTH THREE TIMES DAILY (Patient taking differently: Take 5 mg by mouth 3 (three) times daily. )      Family History     Problem Relation (Age of Onset)    Cancer Father, Mother, Brother    Diabetes Sister    Glaucoma Mother    Hypertension Brother    Stroke Brother, Sister        Tobacco Use    Smoking status: Former Smoker     Packs/day: 0.25     Start date: 1961     Quit date: 1992     Years since quittin.2    Smokeless tobacco: Never Used   Substance and Sexual Activity    Alcohol use: No    Drug use: No    Sexual activity: Not Currently     Review of Systems   Unable to perform ROS: Acuity of condition     Objective:     Vital Signs (Most Recent):  Temp: 98.2 °F (36.8 °C) (20 1137)  Pulse: 73 (20 1230)  Resp: 20 (20 1230)  BP: (!) 112/55 (20 1230)  SpO2: 99 % (20 1230) Vital Signs (24h Range):  Temp:  [98.2 °F (36.8 °C)] 98.2 °F (36.8 °C)  Pulse:  [73-89] 73  Resp:  [18-20] 20  SpO2:  [91 %-99 %] 99 %  BP: (105-186)/(55-87) 112/55        There is no height or weight on file to calculate BMI.    Physical Exam  Vitals signs and nursing note reviewed. Exam conducted with a chaperone present.   HENT:      Head: Normocephalic and atraumatic.   Cardiovascular:      Rate and Rhythm: Normal rate and regular rhythm.   Abdominal:      Palpations: Abdomen is soft.   Skin:     General: Skin is warm and dry.   Neurological:      Mental Status: She is lethargic.   Psychiatric:         Cognition and Memory: Cognition is impaired. Memory is impaired.         NEUROLOGICAL EXAMINATION:     MENTAL STATUS        Assessment limited due to pt's condition.     CRANIAL NERVES        Assessment limited due to pt's condition.      MOTOR EXAM        Assessment limited due to pt's condition.      REFLEXES         Assessment limited due to pt's condition.      GAIT AND COORDINATION        Assessment limited due to pt's condition.        Significant Labs:   CMP  Sodium   Date Value Ref Range Status   09/21/2020 134 (L) 136 - 145 mmol/L Final     Potassium   Date Value Ref Range Status   09/21/2020 5.9 (H) 3.5 - 5.1 mmol/L Final     Chloride   Date Value Ref Range Status   09/21/2020 97 95 - 110 mmol/L Final     CO2   Date Value Ref Range Status   09/21/2020 27 23 - 29 mmol/L Final     Glucose   Date Value Ref Range Status   09/21/2020 116 (H) 70 - 110 mg/dL Final     BUN, Bld   Date Value Ref Range Status   09/21/2020 19 8 - 23 mg/dL Final     Creatinine   Date Value Ref Range Status   09/21/2020 0.8 0.5 - 1.4 mg/dL Final     Calcium   Date Value Ref Range Status   09/21/2020 9.2 8.7 - 10.5 mg/dL Final     Total Protein   Date Value Ref Range Status   09/21/2020 6.4 6.0 - 8.4 g/dL Final     Albumin   Date Value Ref Range Status   09/21/2020 3.1 (L) 3.5 - 5.2 g/dL Final     Total Bilirubin   Date Value Ref Range Status   09/21/2020 0.4 0.1 - 1.0 mg/dL Final     Comment:     For infants and newborns, interpretation of results should be based  on gestational age, weight and in agreement with clinical  observations.  Premature Infant recommended reference ranges:  Up to 24 hours.............<8.0 mg/dL  Up to 48 hours............<12.0 mg/dL  3-5 days..................<15.0 mg/dL  6-29 days.................<15.0 mg/dL       Alkaline Phosphatase   Date Value Ref Range Status   09/21/2020 61 55 - 135 U/L Final     AST   Date Value Ref Range Status   09/21/2020 30 10 - 40 U/L Final     ALT   Date Value Ref Range Status   09/21/2020 23 10 - 44 U/L Final     Anion Gap   Date Value Ref Range Status   09/21/2020 10 8 - 16 mmol/L Final     eGFR if    Date Value Ref Range Status   09/21/2020 >60.0 >60 mL/min/1.73 m^2 Final     eGFR if non    Date Value Ref Range Status   09/21/2020 >60.0 >60 mL/min/1.73 m^2  Final     Comment:     Calculation used to obtain the estimated glomerular filtration  rate (eGFR) is the CKD-EPI equation.          Lab Results   Component Value Date    WBC 8.17 09/21/2020    HGB 9.0 (L) 09/21/2020    HCT 28.2 (L) 09/21/2020    MCV 93 09/21/2020     (H) 09/21/2020           Significant Imaging:   CT Head:    IMPRESSION:        Hemorrhagic conversion of large ischemic infarct involving left  parietal lobe, including foci of acute intraparenchymal as well as  subarachnoid hemorrhage, as described above.     Left parietal edema causes effacement of left lateral ventricle and 4  mm rightward midline shift.     White matter microangiopathic changes.     Right mastoid effusion.     Assessment and Plan:   Seizure r/t intracranial bleeding.   - Pt loaded with 2000mg Keppra. Received 2mg of Ativan. Continue Keppra 500mg BID.  - Keep SBP< 150.  - Stop anticoagulants, antiplatelets.   -Repeat Head CT in 24 hours.       Plan- Pt's daughter may be taking mother home for comfort care. Long discussion was held regarding pt's prognosis. Daughter wants to take mother home and make her comfortable.            [facility] at [phone] within 3 days from discharge.     Stroke education was provided including stroke risk factors modification and any acute neurological changes including weakness, confusion, visual changes to come straight to the ER.     All questions were answered.                                                                    There are no hospital problems to display for this patient.      VTE Risk Mitigation (From admission, onward)    None          Thank you for your consult. Will continue to follow pt. Please let us know if you have additional questions.     Crystal Menard, SAADIA  Neurology  Formerly [facility]

## 2020-09-21 NOTE — ED NOTES
Educated pt daughter on discharge instructions and medications. Pt daughter verbalized understanding.

## 2020-09-21 NOTE — ED NOTES
Pt presents to the ED via EMS from home. Per EMS pt was having seizure like activity and was given 0.2 Ativan by hospice nurse. Upon arrival to ED pt with seizure like activity noted. Pt unable to answer questions. Pt opens her eyes when asked questions. Pt daughter states this is her mother baseline after having a stroke 10 days ago. Pt with a PEG tube and urinary catheter in place. Pt arrived on 2LNC O2. Pt placed on cardiac monitor, cont bp, and cont pulse ox.

## 2020-09-21 NOTE — ED PROVIDER NOTES
Encounter Date: 9/21/2020       History     Chief Complaint   Patient presents with    Seizures     pt with seizure activity from home     86-year-old female recently had a stroke and is under hospice care presented emergency department as she started having seizure-like activity since this morning for about 1 hour prior to arrival and still having that activity where she is having tonic colonic activity on the right side of the body and is having decreased responsiveness.  Patient could not answer any questions and continue to have this tonic colonic activity on the right.        Review of patient's allergies indicates:   Allergen Reactions    Brimonidine Other (See Comments)     Redness and irritation of eyes     Past Medical History:   Diagnosis Date    Alzheimer disease     Anticoagulant long-term use     ASA 81mg, Fish Oil    Aphasia S/P CVA     Arthritis     Bedbound     Cataract     OU done//    COPD (chronic obstructive pulmonary disease)     Coronary artery disease     DDD (degenerative disc disease), lumbar     Dementia     Glaucoma     suspect    Hospice care     Hyperlipidemia     Low back pain     Onychomycosis due to dermatophyte 11/26/2014    Osteoporosis     Positive PPD 1970    Seasonal allergies     Stroke      Past Surgical History:   Procedure Laterality Date    BREAST BIOPSY Right     2012 neg    CATARACT EXTRACTION W/  INTRAOCULAR LENS IMPLANT Bilateral 10/15/14     Dr Wright    COLONOSCOPY      Epidural Steroid injection      Pain Management    GASTROSTOMY TUBE PLACEMENT      INJECTION OF JOINT Right 4/3/2019    Procedure: Injection, Joint, Hip;  Surgeon: Tashi Morrison MD;  Location: [facility] OR;  Service: Pain Management;  Laterality: Right;    TONSILLECTOMY  1941     Family History   Problem Relation Age of Onset    Cancer Father         Rectal    Glaucoma Mother     Cancer Mother         lung, throat    Cancer Brother         throat    Hypertension  Brother     Stroke Brother     Diabetes Sister     Stroke Sister     Amblyopia Neg Hx     Blindness Neg Hx     Cataracts Neg Hx     Macular degeneration Neg Hx     Retinal detachment Neg Hx     Strabismus Neg Hx     Thyroid disease Neg Hx     Nephrolithiasis Neg Hx      Social History     Tobacco Use    Smoking status: Former Smoker     Packs/day: 0.25     Start date: 1961     Quit date: 1992     Years since quittin.2    Smokeless tobacco: Never Used   Substance Use Topics    Alcohol use: No    Drug use: No     Review of Systems   Unable to perform ROS: Patient unresponsive       Physical Exam     Initial Vitals   BP Pulse Resp Temp SpO2   20 1135 20 1135 20 1135 20 1137 20 1135   (!) 186/87 83 18 98.2 °F (36.8 °C) 98 %      MAP       --                Physical Exam    Nursing note and vitals reviewed.  Constitutional: She appears well-developed and well-nourished. She appears distressed.   HENT:   Head: Normocephalic and atraumatic.   Nose: Nose normal.   Mouth/Throat: Oropharynx is clear and moist.   Eyes: Conjunctivae and EOM are normal. Pupils are equal, round, and reactive to light.   Bilateral pinpoint pupils   Neck: Normal range of motion. Neck supple. No thyromegaly present. No tracheal deviation present. No JVD present.   Cardiovascular: Normal rate, regular rhythm, normal heart sounds and intact distal pulses.   No murmur heard.  Pulmonary/Chest: Breath sounds normal. No stridor. No respiratory distress. She has no wheezes. She has no rales.   Abdominal: Soft. Bowel sounds are normal. She exhibits no distension. There is no abdominal tenderness.   Peg tube is in place   Musculoskeletal: Normal range of motion. No edema.   Neurological:   Nonverbal and having tonic colonic activity on the right side of the body with right-sided gaze.  Altered and  actively seizing   Skin: Skin is warm. Capillary refill takes less than 2 seconds.         ED Course    Critical Care    Date/Time: 9/21/2020 1:24 PM  Performed by: Mya Luu MD  Authorized by: Mya Luu MD   Direct patient critical care time: 20 minutes  Ordering / reviewing critical care time: 5 minutes  Consulting other physicians critical care time: 5 minutes  Consult with family critical care time: 5 minutes  Total critical care time (exclusive of procedural time) : 35 minutes        Labs Reviewed   CBC W/ AUTO DIFFERENTIAL - Abnormal; Notable for the following components:       Result Value    RBC 3.05 (*)     Hemoglobin 9.0 (*)     Hematocrit 28.2 (*)     Mean Corpuscular Hemoglobin Conc 31.9 (*)     RDW 17.7 (*)     Platelets 356 (*)     All other components within normal limits   COMPREHENSIVE METABOLIC PANEL - Abnormal; Notable for the following components:    Sodium 134 (*)     Potassium 5.9 (*)     Glucose 116 (*)     Albumin 3.1 (*)     All other components within normal limits   PROTIME-INR - Abnormal; Notable for the following components:    PT 15.0 (*)     All other components within normal limits   LIPID PANEL - Abnormal; Notable for the following components:    HDL 36 (*)     All other components within normal limits   POCT GLUCOSE - Abnormal; Notable for the following components:    POC Glucose 124 (*)     All other components within normal limits   TSH   TROPONIN I   CK   POCT GLUCOSE MONITORING CONTINUOUS     EKG Readings: (Independently Interpreted)   Initial Reading: No STEMI. Rhythm: Normal Sinus Rhythm. Heart Rate: 73. Ectopy: No Ectopy. Conduction: Normal.     ECG Results          ECG 12 lead (In process)  Result time 09/21/20 12:54:41    In process by Interface, Lab In [facility] (09/21/20 12:54:41)                 Narrative:    Test Reason : I63.9,    Vent. Rate : 073 BPM     Atrial Rate : 073 BPM     P-R Int : 142 ms          QRS Dur : 090 ms      QT Int : 408 ms       P-R-T Axes : 057 -02 041 degrees     QTc Int : 449 ms    Normal sinus rhythm  Low voltage QRS  Borderline Abnormal  ECG  When compared with ECG of 13-SEP-2020 04:06,  Borderline criteria for Inferior infarct are no longer Present    Referred By: [email or web address]   SELF           Confirmed By:                             Imaging Results          CT Head Without Contrast (Final result)  Result time 09/21/20 12:07:34    Final result by Mikael Sanders MD (09/21/20 12:07:34)                 Narrative:    CMS MANDATED QUALITY DATA - CT RADIATION  436    All CT scans at this facility utilize dose modulation, iterative  reconstruction, and/or weight based dosing when appropriate to reduce  radiation dose to as low as reasonably achievable.    CT HEAD WITHOUT CONTRAST    CLINICAL HISTORY:  86 years Female Neuro deficit, acute, stroke suspected    COMPARISON: CT head September 10, 2020    FINDINGS: There is a large area of hypoattenuation and loss of  gray-white differentiation involving the left parietal lobe,  consistent with evolving large ischemic infarct and correlated with  the reference examination. Additionally, there are two foci of acute  intraparenchymal hemorrhage within the inferior lateral left parietal  lobe measuring 6 mm negative millimeters on images 33 and 32  respectively. There is also acute left parietal subarachnoid  hemorrhage. Aforementioned findings are compatible with hemorrhagic  conversion of known ischemic infarct. Edema within the left parietal  lobe results in effacement of the left lateral ventricular atrium and  temporal horn. 4 mm rightward midline shift.    Bilateral basal ganglia calcifications. Extensive periventricular and  deep white matter hypoattenuation consistent with microangiopathic  change. Mild cerebral atrophy. Cerebellar hemispheres show no acute  pathology, with stable parenchymal calcifications. Atherosclerotic  calcification of the cavernous carotid arteries.    No calvarial lesion or fracture. . Paranasal sinuses are clear. Right  mastoid effusion.    IMPRESSION:      Hemorrhagic conversion  of large ischemic infarct involving left  parietal lobe, including foci of acute intraparenchymal as well as  subarachnoid hemorrhage, as described above.    Left parietal edema causes effacement of left lateral ventricle and 4  mm rightward midline shift.    White matter microangiopathic changes.    Right mastoid effusion.    Findings were called to Dr. Luu at 12:18 pm.    Electronically Signed by Mikael FENG on 9/21/2020 12:22 PM                               Medical Decision Making:   Differential Diagnosis:   86-year-old female presented emergency department in status epilepticus with active seizure which responded to Ativan.  Patient did have improvement of seizure and seizure activity.  Admit patient extremely sleepy with the medication she received.  CT of the head showed evidence of hemorrhagic conversion of a recent ischemic stroke with slight midline shift secondary to edema.  Case discussed with neurosurgeon Dr. Molina who would follow the patient and neurologist consulted and Neurology evaluated the patient at bedside and recommended continuing Keppra.  Admission offered to the patient however patient's daughter in conjunction with hospice care decided not to be admitted to the hospital and wants to go back home and they understand that this could deteriorate and could lead in patient's death however made that decision with clear understanding and requested discharge back to hospice care.  Patient was brought here only for evaluation for possible intracranial hemorrhage and family comfortable with the diagnosis of having intracranial hemorrhage and understand this could deteriorate.  Patient started on Keppra.  Aspirin to be withheld.  Discharged with instructions and follow up with hospice care  Clinical Tests:   Lab Tests: Reviewed  Radiological Study: Reviewed  Medical Tests: Reviewed                             Clinical Impression:       ICD-10-CM ICD-9-CM   1. Hemorrhagic stroke  I61.9  431   2. Stroke  I63.9 434.91   3. Status epilepticus  G40.901 345.3                          ED Disposition Condition    Discharge Stable        ED Prescriptions     Medication Sig Dispense Start Date End Date Auth. Provider    levETIRAcetam (KEPPRA) 100 mg/mL Soln Take 5 mLs (500 mg total) by mouth 2 (two) times daily. 900 mL 9/21/2020 9/21/2021 Mya Luu MD        Follow-up Information     Follow up With Specialties Details Why Contact Info    Frantz Bill MD Family Medicine In 1 day  [address] [facility] [place] [address]  [phone]                                         Mya Luu MD  09/21/20 [id number]

## 2020-09-21 NOTE — ED NOTES
Pt unable to stay awake to complete swallow screen. Pt able to cough when asked. Pt failed swallow assessment.

## 2020-10-05 ENCOUNTER — PATIENT MESSAGE (OUTPATIENT)
Dept: ADMINISTRATIVE | Facility: HOSPITAL | Age: 85
End: 2020-10-05

## 2021-01-04 ENCOUNTER — PATIENT MESSAGE (OUTPATIENT)
Dept: ADMINISTRATIVE | Facility: HOSPITAL | Age: 86
End: 2021-01-04

## 2021-02-03 ENCOUNTER — PES CALL (OUTPATIENT)
Dept: ADMINISTRATIVE | Facility: CLINIC | Age: 86
End: 2021-02-03

## 2021-03-08 ENCOUNTER — HOSPITAL ENCOUNTER (EMERGENCY)
Facility: HOSPITAL | Age: 86
Discharge: HOME | End: 2021-03-08
Attending: EMERGENCY MEDICINE
Payer: MEDICARE

## 2021-03-08 VITALS
RESPIRATION RATE: 20 BRPM | DIASTOLIC BLOOD PRESSURE: 63 MMHG | SYSTOLIC BLOOD PRESSURE: 126 MMHG | HEIGHT: 66 IN | BODY MASS INDEX: 24.11 KG/M2 | HEART RATE: 98 BPM | OXYGEN SATURATION: 100 % | WEIGHT: 150 LBS | TEMPERATURE: 98 F

## 2021-03-08 DIAGNOSIS — K94.23 PEG TUBE MALFUNCTION: ICD-10-CM

## 2021-03-08 PROCEDURE — 25500020 PHARM REV CODE 255: Performed by: EMERGENCY MEDICINE

## 2021-03-08 PROCEDURE — 99284 EMERGENCY DEPT VISIT MOD MDM: CPT | Mod: 25

## 2021-03-08 PROCEDURE — 43762 RPLC GTUBE NO REVJ TRC: CPT

## 2021-03-08 RX ADMIN — DIATRIZOATE MEGLUMINE AND DIATRIZOATE SODIUM 30 ML: 660; 100 LIQUID ORAL; RECTAL at 08:03

## 2021-04-06 ENCOUNTER — PATIENT MESSAGE (OUTPATIENT)
Dept: ADMINISTRATIVE | Facility: HOSPITAL | Age: 86
End: 2021-04-06

## 2021-09-13 ENCOUNTER — HOSPITAL ENCOUNTER (EMERGENCY)
Facility: HOSPITAL | Age: 86
Discharge: HOME | End: 2021-09-14
Attending: EMERGENCY MEDICINE
Payer: MEDICARE

## 2021-09-13 VITALS
SYSTOLIC BLOOD PRESSURE: 161 MMHG | DIASTOLIC BLOOD PRESSURE: 83 MMHG | BODY MASS INDEX: 19.37 KG/M2 | OXYGEN SATURATION: 97 % | HEART RATE: 96 BPM | TEMPERATURE: 98 F | WEIGHT: 120 LBS | RESPIRATION RATE: 12 BRPM

## 2021-09-13 DIAGNOSIS — K94.23 PEG TUBE MALFUNCTION: Primary | ICD-10-CM

## 2021-09-13 PROCEDURE — 49450 REPLACE G/C TUBE PERC: CPT | Mod: GW,,, | Performed by: EMERGENCY MEDICINE

## 2021-09-13 PROCEDURE — 25500020 PHARM REV CODE 255: Performed by: EMERGENCY MEDICINE

## 2021-09-13 PROCEDURE — 99284 EMERGENCY DEPT VISIT MOD MDM: CPT | Mod: 25

## 2021-09-13 PROCEDURE — 99283 EMERGENCY DEPT VISIT LOW MDM: CPT | Mod: GW,25,, | Performed by: EMERGENCY MEDICINE

## 2021-09-13 PROCEDURE — 49450 PR REPLACE GASTROSTOMY/CECOSTOMY TUBE PERCUTANEOUS: ICD-10-PCS | Mod: GW,,, | Performed by: EMERGENCY MEDICINE

## 2021-09-13 PROCEDURE — 99283 PR EMERGENCY DEPT VISIT,LEVEL III: ICD-10-PCS | Mod: GW,25,, | Performed by: EMERGENCY MEDICINE

## 2021-09-13 PROCEDURE — 43762 RPLC GTUBE NO REVJ TRC: CPT

## 2021-09-13 RX ADMIN — IOHEXOL 30 ML: 350 INJECTION, SOLUTION INTRAVENOUS at 10:09

## 2023-02-15 NOTE — H&P
[facility]  History & Physical    Patient Name: Breanne Culp  MRN: [id number]  Admission Date: 8/19/2019  Attending Physician: Nate Herron MD  Primary Care Provider: Frantz Bill MD         Patient information was obtained from patient and ER records.     Subjective:     Principal Problem:Sepsis    Chief Complaint:   Chief Complaint   Patient presents with    Fever        HPI: 85 year old female transferred from [facility] presents with tachycardia and fever.  Patient is non verbal and cannot provide history.  There is no family currently at bedside.  History obtained from ED MD and [facility] records.  Per report, patient felt warm to her daughter, was noted to be tachycardic and thus sent to the ED.  [facility] records list patient with Hx of CVA, aphasia and dysphagia/peg. Patient noted to have 100.0 temperature and sinus tachycardia 130s. Patient appears at time to be able to non verbally indicate yes and no to me but not consistently.  She does say a few words and is alert and tracks me but communication is not clear and not consistent. She did not seem to indicate to me chest pain or SOB.  She does seem to indicate to me abdominal pain but abdominal exam was benign. ED RN reports patient arrived with aponte catheter and had sediment in the tubing. When we were in the process of changing to aponte catheter, she had a gush of urine out during that process possibly suggesting partial obstruction.    Past Medical History:   Diagnosis Date    Anticoagulant long-term use     ASA 81mg, Fish Oil    Arthritis     Cataract     OU done//    COPD (chronic obstructive pulmonary disease)     Coronary artery disease     DDD (degenerative disc disease), lumbar     Glaucoma     suspect    Hyperlipidemia     Low back pain     Osteoporosis     Positive PPD 1970    Seasonal allergies     Stroke        Past Surgical History:   Procedure Laterality Date     BREAST BIOPSY Right     2012 neg    CATARACT EXTRACTION W/  INTRAOCULAR LENS IMPLANT Bilateral 10/15/14     Dr Wright    COLONOSCOPY      Epidural Steroid injection      Pain Management    LUISANA-TRANSFORAMINAL L5/S1 Bilateral 3/14/2016    [facility]formed [facility]    Injection, Joint, Hip Right 4/3/2019    [facility] at [facility] OR    INJECTION-STEROID-EPIDURAL-LUMBAR N/A 8/4/2014    [facility] at [facility] OR    INJECTION-STEROID-EPIDURAL-LUMBAR, L5/S1 N/A 11/18/2014    [facility]formed [facility] at [facility] OR    phaco/pciol Left 10/15/2014    [facility]    phaco/pciol Right 8/20/2014    [facility] at [facility] OR    TONSILLECTOMY  1941       Review of patient's allergies indicates:   Allergen Reactions    Brimonidine Other (See Comments)     Redness and irritation of eyes       No current facility-administered medications on file prior to encounter.      Current Outpatient Medications on File Prior to Encounter   Medication Sig    acetaminophen (TYLENOL) 500 MG tablet Take 500 mg by mouth every 6 (six) hours as needed for Pain.    ascorbic acid, vitamin C, (VITAMIN C) 500 MG tablet Take 500 mg by mouth 2 (two) times daily.    aspirin 81 mg Tab Every day    donepezil (ARICEPT) 10 MG tablet TAKE 1 TABLET BY MOUTH EVERY EVENING (Patient taking differently: TAKE 1 TABLET BY MOUTH morning)    food supplemt, lactose-reduced (ENSURE) Liqd Take 118 mLs by mouth 2 (two) times daily.    gabapentin (NEURONTIN) 100 MG capsule TAKE 2 CAPSULES BY MOUTH EVERY EVENING    ipratropium (ATROVENT) 0.02 % nebulizer solution Take 500 mcg by nebulization 4 (four) times daily. Rescue    LORazepam (ATIVAN) 1 MG tablet 1/2 to 1 po QHS prn    multivitamin (THERAGRAN) per tablet Take 1 tablet by mouth once daily.    oxybutynin (DITROPAN) 5 MG Tab Take 5 mg by mouth 3 (three) times daily.     traMADol (ULTRAM) 50 mg tablet Take 1 tablet (50 mg total) by mouth nightly as needed for Pain.    diclofenac sodium (VOLTAREN) 1 % Gel Apply 2 g topically 3 (three) times daily.    fexofenadine (ALLEGRA) 30 MG tablet Take 30 mg by mouth 2 (two) times daily. As needed    fluticasone (FLONASE) 50 mcg/actuation nasal spray 2 sprays by Each Nare route once daily.    lidocaine-prilocaine (EMLA) cream As directed    oxybutynin (DITROPAN XL) 15 MG TR24 Take 1 tablet (15 mg total) by mouth once daily.     Family History     Problem Relation (Age of Onset)    Cancer Father, Mother, Brother    Diabetes Sister    Glaucoma Mother    Hypertension Brother    Stroke Brother, Sister        Tobacco Use    Smoking status: Former Smoker     Packs/day: 0.25     Start date: 1961     Last attempt to quit: 1992     Years since quittin.2    Smokeless tobacco: Never Used   Substance and Sexual Activity    Alcohol use: No    Drug use: No    Sexual activity: Not on file     Review of Systems   Unable to perform ROS: Patient nonverbal     Objective:     Vital Signs (Most Recent):  Temp: 99.9 °F (37.7 °C) (19 0000)  Pulse: (!) 134 (19 0230)  Resp: 20 (19 0200)  BP: (!) 171/79 (19 0230)  SpO2: 99 % (19 0000) Vital Signs (24h Range):  Temp:  [99.9 °F (37.7 °C)-100 °F (37.8 °C)] 99.9 °F (37.7 °C)  Pulse:  [116-137] 134  Resp:  [20-28] 20  SpO2:  [97 %-99 %] 99 %  BP: (132-197)/() 171/79     Weight: 53.5 kg (118 lb)  Body mass index is 20.9 kg/m².    Physical Exam   Constitutional: She appears well-developed. No distress.   HENT:   Head: Normocephalic and atraumatic.   Mouth/Throat: Oropharynx is clear and moist.   Eyes: Pupils are equal, round, and reactive to light. EOM are normal.   Neck: Normal range of motion.   Cardiovascular:   No murmur heard.  Sinus tachycardia    Pulmonary/Chest: Effort normal and breath sounds normal. She has no wheezes.   Abdominal: Soft. She exhibits no  distension. There is no tenderness. There is no rebound and no guarding.   Genitourinary:   Genitourinary Comments: Scherer catheter   Musculoskeletal: Normal range of motion.   Neurological: She is alert.   Expressive aphasia. Not consistently following commands.    Skin: No rash noted.   Healing appearing Stage I sacral decubitus POA   Psychiatric: She has a normal mood and affect.   Nursing note and vitals reviewed.        CRANIAL NERVES     CN III, IV, VI   Pupils are equal, round, and reactive to light.  Extraocular motions are normal.        Significant Labs:   CBC:   Recent Labs   Lab 08/19/19 2228   WBC 14.25*   HGB 11.9*   HCT 35.0*   *     CMP:   Recent Labs   Lab 08/19/19 2228      K 3.5      CO2 19*   *   BUN 26*   CREATININE 0.6   CALCIUM 9.6   PROT 7.3   ALBUMIN 3.2*   BILITOT 0.5   ALKPHOS 93   AST 20   ALT 16   ANIONGAP 14   EGFRNONAA >60.0       Significant Imaging: CXR: I have reviewed all pertinent results/findings within the past 24 hours and my personal findings are:  No consolidation    Assessment/Plan:     * Sepsis  Patient will be admitted for Sepsis secondary to UTI.  CXR did not reveal pneumonia. BCx, UCx performed in the ED. Tachycardia has improved from 130 to 118 with tylenol and fluids.  She is not hypotensive- in fact she is hypertensive.  IV abx started.  Scherer changed in the ED with RN reporting a gushing out of urine. Possible obstruction or partial obstruction? Scherer in place presumably due to sacral decub POA though that is not documented and would need to be confirmed. LA is 2.  Repeating in 4 hours.  Further IVFs to be given. Patient is DNR but is not comfort care.      Acute cystitis without hematuria    IV abx.  UCx and BCx in progress.    Metabolic acidosis  Trending lactic acid.  IVFs.        CVA (cerebral vascular accident)    Chronic medical condition.  Continuing home medication.  Monitoring.      Hypertensive urgency  I have reviewed [facility] records and do not see that she has a Hx of HTN or is on antihypertensives.  Prn Hydralazine for now.      Scherer catheter in place on admission    Changed in the ED.    Memory loss  Chronic medical condition.  Continuing home medication.  Monitoring.        Hyperlipidemia  Chronic medical condition.  Continuing home medication.  Monitoring.        Lumbar spondylosis  Chronic medical condition.  Continuing home medication.  Monitoring.          VTE Risk Mitigation (From admission, onward)    None             Nate Herron MD  Department of Hospital Medicine   [facility]   Home with assistance

## 2023-12-12 NOTE — TELEPHONE ENCOUNTER
----- Message from Casandra Jerome sent at 4/3/2020  1:31 PM CDT -----  Contact: Daughter/nevaeh  Type: Needs Medical Advice  Who Called:  patient  Best Call Back Number: [phone]  Additional Information: Daughter states patient might have yeast infection as well on her vagina and hands.  Patient is complaining of itching and has been putting her hand in her diaper scratching.  Please call to advise.  Thanks!     MRN: [id number]

## 2024-02-16 NOTE — PLAN OF CARE
08/22/19 1714   Discharge Reassessment   Assessment Type Discharge Planning Reassessment   Anticipated Discharge Disposition SNF   DC ANTIBIOTIC MD ORDER MANUALLY FAXED TO [facility].  THIS CM MADE LOLITA AWARE OF THE SUBMISSION.   severe

## (undated) DEVICE — GLOVE SURGICAL LATEX SZ 7

## (undated) DEVICE — MARKER SKIN STND TIP BLUE BARR

## (undated) DEVICE — NDL SPINAL SPINOCAN 22GX3.5

## (undated) DEVICE — SEE MEDLINE ITEM 152622

## (undated) DEVICE — APPLICATOR CHLORAPREP CLR 10.5

## (undated) DEVICE — TRAY NERVE BLOCK